# Patient Record
Sex: MALE | Race: WHITE | Employment: OTHER | URBAN - NONMETROPOLITAN AREA
[De-identification: names, ages, dates, MRNs, and addresses within clinical notes are randomized per-mention and may not be internally consistent; named-entity substitution may affect disease eponyms.]

---

## 2017-02-10 ENCOUNTER — TELEPHONE (OUTPATIENT)
Dept: FAMILY MEDICINE CLINIC | Age: 72
End: 2017-02-10

## 2017-02-10 ENCOUNTER — OFFICE VISIT (OUTPATIENT)
Dept: FAMILY MEDICINE CLINIC | Age: 72
End: 2017-02-10

## 2017-02-10 VITALS
DIASTOLIC BLOOD PRESSURE: 70 MMHG | OXYGEN SATURATION: 97 % | WEIGHT: 313.6 LBS | SYSTOLIC BLOOD PRESSURE: 110 MMHG | HEIGHT: 76 IN | TEMPERATURE: 97 F | HEART RATE: 58 BPM | BODY MASS INDEX: 38.19 KG/M2

## 2017-02-10 DIAGNOSIS — N18.3 TYPE 2 DIABETES MELLITUS WITH STAGE 3 CHRONIC KIDNEY DISEASE, UNSPECIFIED LONG TERM INSULIN USE STATUS: ICD-10-CM

## 2017-02-10 DIAGNOSIS — Z11.59 NEED FOR HEPATITIS C SCREENING TEST: ICD-10-CM

## 2017-02-10 DIAGNOSIS — I10 ESSENTIAL HYPERTENSION: ICD-10-CM

## 2017-02-10 DIAGNOSIS — E11.9 TYPE 2 DIABETES MELLITUS WITHOUT COMPLICATION, UNSPECIFIED LONG TERM INSULIN USE STATUS: ICD-10-CM

## 2017-02-10 DIAGNOSIS — D64.9 ANEMIA, UNSPECIFIED TYPE: Primary | ICD-10-CM

## 2017-02-10 DIAGNOSIS — N40.1 BENIGN NON-NODULAR PROSTATIC HYPERPLASIA WITH LOWER URINARY TRACT SYMPTOMS: ICD-10-CM

## 2017-02-10 DIAGNOSIS — E11.22 TYPE 2 DIABETES MELLITUS WITH STAGE 3 CHRONIC KIDNEY DISEASE, UNSPECIFIED LONG TERM INSULIN USE STATUS: ICD-10-CM

## 2017-02-10 DIAGNOSIS — E11.8 TYPE 2 DIABETES MELLITUS WITH COMPLICATION, UNSPECIFIED LONG TERM INSULIN USE STATUS: ICD-10-CM

## 2017-02-10 DIAGNOSIS — D64.9 ANEMIA, UNSPECIFIED TYPE: ICD-10-CM

## 2017-02-10 DIAGNOSIS — I25.10 CORONARY ARTERY DISEASE INVOLVING NATIVE CORONARY ARTERY OF NATIVE HEART, ANGINA PRESENCE UNSPECIFIED: ICD-10-CM

## 2017-02-10 LAB
ALBUMIN SERPL-MCNC: 4.3 G/DL (ref 3.9–4.9)
ALP BLD-CCNC: 73 U/L (ref 35–104)
ALT SERPL-CCNC: 28 U/L (ref 0–41)
ANION GAP SERPL CALCULATED.3IONS-SCNC: 12 MEQ/L (ref 7–13)
AST SERPL-CCNC: 23 U/L (ref 0–40)
BILIRUB SERPL-MCNC: 0.7 MG/DL (ref 0–1.2)
BUN BLDV-MCNC: 39 MG/DL (ref 8–23)
CALCIUM SERPL-MCNC: 9.9 MG/DL (ref 8.6–10.2)
CHLORIDE BLD-SCNC: 96 MEQ/L (ref 98–107)
CHOLESTEROL, TOTAL: 183 MG/DL (ref 0–199)
CO2: 26 MEQ/L (ref 22–29)
CREAT SERPL-MCNC: 1.59 MG/DL (ref 0.7–1.2)
CREATININE URINE: 59.1 MG/DL
GFR AFRICAN AMERICAN: 52.4
GFR NON-AFRICAN AMERICAN: 43.3
GLOBULIN: 2.9 G/DL (ref 2.3–3.5)
GLUCOSE BLD-MCNC: 420 MG/DL (ref 74–109)
HBA1C MFR BLD: 12.6 % (ref 4.8–5.9)
HCT VFR BLD CALC: 47 % (ref 42–52)
HDLC SERPL-MCNC: 55 MG/DL (ref 40–59)
HEMOGLOBIN: 15.7 G/DL (ref 14–18)
HEPATITIS C ANTIBODY INTERPRETATION: NORMAL
LDL CHOLESTEROL CALCULATED: 84 MG/DL (ref 0–129)
MCH RBC QN AUTO: 29 PG (ref 27–31.3)
MCHC RBC AUTO-ENTMCNC: 33.5 % (ref 33–37)
MCV RBC AUTO: 86.8 FL (ref 80–100)
MICROALBUMIN UR-MCNC: 5.3 MG/DL
MICROALBUMIN/CREAT UR-RTO: 89.7 MG/G (ref 0–30)
PDW BLD-RTO: 14.6 % (ref 11.5–14.5)
PLATELET # BLD: 110 K/UL (ref 130–400)
POTASSIUM SERPL-SCNC: 4.6 MEQ/L (ref 3.5–5.1)
RBC # BLD: 5.41 M/UL (ref 4.7–6.1)
SODIUM BLD-SCNC: 134 MEQ/L (ref 132–144)
TOTAL PROTEIN: 7.2 G/DL (ref 6.4–8.1)
TRIGL SERPL-MCNC: 222 MG/DL (ref 0–200)
WBC # BLD: 7.2 K/UL (ref 4.8–10.8)

## 2017-02-10 PROCEDURE — 3017F COLORECTAL CA SCREEN DOC REV: CPT | Performed by: FAMILY MEDICINE

## 2017-02-10 PROCEDURE — 4040F PNEUMOC VAC/ADMIN/RCVD: CPT | Performed by: FAMILY MEDICINE

## 2017-02-10 PROCEDURE — G8417 CALC BMI ABV UP PARAM F/U: HCPCS | Performed by: FAMILY MEDICINE

## 2017-02-10 PROCEDURE — G8484 FLU IMMUNIZE NO ADMIN: HCPCS | Performed by: FAMILY MEDICINE

## 2017-02-10 PROCEDURE — G8427 DOCREV CUR MEDS BY ELIG CLIN: HCPCS | Performed by: FAMILY MEDICINE

## 2017-02-10 PROCEDURE — 1123F ACP DISCUSS/DSCN MKR DOCD: CPT | Performed by: FAMILY MEDICINE

## 2017-02-10 PROCEDURE — 3046F HEMOGLOBIN A1C LEVEL >9.0%: CPT | Performed by: FAMILY MEDICINE

## 2017-02-10 PROCEDURE — 1036F TOBACCO NON-USER: CPT | Performed by: FAMILY MEDICINE

## 2017-02-10 PROCEDURE — G8598 ASA/ANTIPLAT THER USED: HCPCS | Performed by: FAMILY MEDICINE

## 2017-02-10 PROCEDURE — 99214 OFFICE O/P EST MOD 30 MIN: CPT | Performed by: FAMILY MEDICINE

## 2017-02-10 RX ORDER — FERROUS SULFATE 325(65) MG
325 TABLET ORAL
Qty: 30 TABLET | Refills: 5 | Status: SHIPPED | OUTPATIENT
Start: 2017-02-10 | End: 2018-06-11

## 2017-02-10 RX ORDER — TAMSULOSIN HYDROCHLORIDE 0.4 MG/1
0.4 CAPSULE ORAL DAILY
Qty: 90 CAPSULE | Refills: 3 | Status: SHIPPED | OUTPATIENT
Start: 2017-02-10 | End: 2018-02-16 | Stop reason: SDUPTHER

## 2017-02-10 RX ORDER — LOSARTAN POTASSIUM 25 MG/1
25 TABLET ORAL DAILY
Qty: 90 TABLET | Refills: 3 | Status: SHIPPED | OUTPATIENT
Start: 2017-02-10 | End: 2017-04-20 | Stop reason: SDUPTHER

## 2017-02-10 RX ORDER — FUROSEMIDE 40 MG/1
40 TABLET ORAL DAILY
Qty: 90 TABLET | Refills: 3 | Status: SHIPPED | OUTPATIENT
Start: 2017-02-10 | End: 2018-02-16 | Stop reason: SDUPTHER

## 2017-02-10 RX ORDER — VALACYCLOVIR HYDROCHLORIDE 1 G/1
1000 TABLET, FILM COATED ORAL 3 TIMES DAILY
Qty: 21 TABLET | Refills: 0 | Status: SHIPPED | OUTPATIENT
Start: 2017-02-10 | End: 2018-06-11

## 2017-02-10 RX ORDER — GLIMEPIRIDE 4 MG/1
4 TABLET ORAL 2 TIMES DAILY
Qty: 180 TABLET | Refills: 3 | Status: SHIPPED | OUTPATIENT
Start: 2017-02-10 | End: 2018-02-16 | Stop reason: SDUPTHER

## 2017-02-10 RX ORDER — NAPROXEN 500 MG/1
500 TABLET ORAL 2 TIMES DAILY WITH MEALS
Qty: 60 TABLET | Refills: 1 | Status: SHIPPED | OUTPATIENT
Start: 2017-02-10 | End: 2018-06-11

## 2017-02-10 RX ORDER — ATORVASTATIN CALCIUM 40 MG/1
40 TABLET, FILM COATED ORAL NIGHTLY
Qty: 90 TABLET | Refills: 3 | Status: SHIPPED | OUTPATIENT
Start: 2017-02-10 | End: 2018-02-16 | Stop reason: SDUPTHER

## 2017-02-17 ENCOUNTER — OFFICE VISIT (OUTPATIENT)
Dept: CARDIOLOGY | Age: 72
End: 2017-02-17

## 2017-02-17 VITALS
HEIGHT: 76 IN | HEART RATE: 68 BPM | SYSTOLIC BLOOD PRESSURE: 160 MMHG | WEIGHT: 313.4 LBS | DIASTOLIC BLOOD PRESSURE: 88 MMHG | BODY MASS INDEX: 38.16 KG/M2 | RESPIRATION RATE: 14 BRPM | OXYGEN SATURATION: 98 %

## 2017-02-17 DIAGNOSIS — E66.01 MORBID OBESITY DUE TO EXCESS CALORIES (HCC): Chronic | ICD-10-CM

## 2017-02-17 DIAGNOSIS — I10 ESSENTIAL HYPERTENSION: ICD-10-CM

## 2017-02-17 DIAGNOSIS — I25.10 CORONARY ARTERY DISEASE INVOLVING NATIVE CORONARY ARTERY OF NATIVE HEART WITHOUT ANGINA PECTORIS: ICD-10-CM

## 2017-02-17 DIAGNOSIS — Z95.1 S/P CABG X 4: ICD-10-CM

## 2017-02-17 DIAGNOSIS — E78.2 MIXED HYPERLIPIDEMIA: ICD-10-CM

## 2017-02-17 DIAGNOSIS — I48.91 ATRIAL FIBRILLATION, UNSPECIFIED TYPE (HCC): Primary | Chronic | ICD-10-CM

## 2017-02-17 PROCEDURE — 3017F COLORECTAL CA SCREEN DOC REV: CPT | Performed by: INTERNAL MEDICINE

## 2017-02-17 PROCEDURE — G8417 CALC BMI ABV UP PARAM F/U: HCPCS | Performed by: INTERNAL MEDICINE

## 2017-02-17 PROCEDURE — 4040F PNEUMOC VAC/ADMIN/RCVD: CPT | Performed by: INTERNAL MEDICINE

## 2017-02-17 PROCEDURE — 1036F TOBACCO NON-USER: CPT | Performed by: INTERNAL MEDICINE

## 2017-02-17 PROCEDURE — 1123F ACP DISCUSS/DSCN MKR DOCD: CPT | Performed by: INTERNAL MEDICINE

## 2017-02-17 PROCEDURE — G8427 DOCREV CUR MEDS BY ELIG CLIN: HCPCS | Performed by: INTERNAL MEDICINE

## 2017-02-17 PROCEDURE — G8598 ASA/ANTIPLAT THER USED: HCPCS | Performed by: INTERNAL MEDICINE

## 2017-02-17 PROCEDURE — 99213 OFFICE O/P EST LOW 20 MIN: CPT | Performed by: INTERNAL MEDICINE

## 2017-02-17 PROCEDURE — 93000 ELECTROCARDIOGRAM COMPLETE: CPT | Performed by: INTERNAL MEDICINE

## 2017-02-17 PROCEDURE — G8484 FLU IMMUNIZE NO ADMIN: HCPCS | Performed by: INTERNAL MEDICINE

## 2017-04-20 DIAGNOSIS — I10 ESSENTIAL HYPERTENSION: ICD-10-CM

## 2017-04-21 RX ORDER — LOSARTAN POTASSIUM 25 MG/1
25 TABLET ORAL DAILY
Qty: 90 TABLET | Refills: 3 | Status: SHIPPED | OUTPATIENT
Start: 2017-04-21 | End: 2018-02-16 | Stop reason: SDUPTHER

## 2018-02-12 DIAGNOSIS — E11.22 TYPE 2 DIABETES MELLITUS WITH STAGE 3 CHRONIC KIDNEY DISEASE, UNSPECIFIED LONG TERM INSULIN USE STATUS: ICD-10-CM

## 2018-02-12 DIAGNOSIS — N18.3 TYPE 2 DIABETES MELLITUS WITH STAGE 3 CHRONIC KIDNEY DISEASE, UNSPECIFIED LONG TERM INSULIN USE STATUS: ICD-10-CM

## 2018-02-16 ENCOUNTER — OFFICE VISIT (OUTPATIENT)
Dept: FAMILY MEDICINE CLINIC | Age: 73
End: 2018-02-16
Payer: MEDICARE

## 2018-02-16 VITALS
DIASTOLIC BLOOD PRESSURE: 78 MMHG | WEIGHT: 308 LBS | HEIGHT: 76 IN | SYSTOLIC BLOOD PRESSURE: 120 MMHG | HEART RATE: 69 BPM | BODY MASS INDEX: 37.51 KG/M2 | OXYGEN SATURATION: 98 %

## 2018-02-16 DIAGNOSIS — E11.22 TYPE 2 DIABETES MELLITUS WITH STAGE 3 CHRONIC KIDNEY DISEASE, UNSPECIFIED LONG TERM INSULIN USE STATUS: ICD-10-CM

## 2018-02-16 DIAGNOSIS — E11.9 TYPE 2 DIABETES MELLITUS WITHOUT COMPLICATION, UNSPECIFIED LONG TERM INSULIN USE STATUS: ICD-10-CM

## 2018-02-16 DIAGNOSIS — G47.33 OSA (OBSTRUCTIVE SLEEP APNEA): Chronic | ICD-10-CM

## 2018-02-16 DIAGNOSIS — D50.9 IRON DEFICIENCY ANEMIA, UNSPECIFIED IRON DEFICIENCY ANEMIA TYPE: ICD-10-CM

## 2018-02-16 DIAGNOSIS — Z95.1 S/P CABG X 4: ICD-10-CM

## 2018-02-16 DIAGNOSIS — N18.3 TYPE 2 DIABETES MELLITUS WITH STAGE 3 CHRONIC KIDNEY DISEASE, UNSPECIFIED LONG TERM INSULIN USE STATUS: ICD-10-CM

## 2018-02-16 DIAGNOSIS — I25.10 CORONARY ARTERY DISEASE INVOLVING NATIVE CORONARY ARTERY OF NATIVE HEART, ANGINA PRESENCE UNSPECIFIED: ICD-10-CM

## 2018-02-16 DIAGNOSIS — E78.2 MIXED HYPERLIPIDEMIA: ICD-10-CM

## 2018-02-16 DIAGNOSIS — N40.1 BENIGN NON-NODULAR PROSTATIC HYPERPLASIA WITH LOWER URINARY TRACT SYMPTOMS: ICD-10-CM

## 2018-02-16 DIAGNOSIS — N18.30 STAGE 3 CHRONIC KIDNEY DISEASE (HCC): ICD-10-CM

## 2018-02-16 DIAGNOSIS — I10 ESSENTIAL HYPERTENSION: ICD-10-CM

## 2018-02-16 DIAGNOSIS — Z23 NEED FOR PROPHYLACTIC VACCINATION AGAINST STREPTOCOCCUS PNEUMONIAE (PNEUMOCOCCUS): Primary | ICD-10-CM

## 2018-02-16 DIAGNOSIS — N40.1 BENIGN PROSTATIC HYPERPLASIA WITH LOWER URINARY TRACT SYMPTOMS, SYMPTOM DETAILS UNSPECIFIED: ICD-10-CM

## 2018-02-16 LAB
ALBUMIN SERPL-MCNC: 4.1 G/DL (ref 3.9–4.9)
ALP BLD-CCNC: 70 U/L (ref 35–104)
ALT SERPL-CCNC: 21 U/L (ref 0–41)
ANION GAP SERPL CALCULATED.3IONS-SCNC: 17 MEQ/L (ref 7–13)
AST SERPL-CCNC: 23 U/L (ref 0–40)
BILIRUB SERPL-MCNC: 0.8 MG/DL (ref 0–1.2)
BUN BLDV-MCNC: 31 MG/DL (ref 8–23)
CALCIUM SERPL-MCNC: 9.4 MG/DL (ref 8.6–10.2)
CHLORIDE BLD-SCNC: 97 MEQ/L (ref 98–107)
CHOLESTEROL, TOTAL: 155 MG/DL (ref 0–199)
CO2: 23 MEQ/L (ref 22–29)
CREAT SERPL-MCNC: 1.38 MG/DL (ref 0.7–1.2)
CREATININE URINE: 170 MG/DL
GFR AFRICAN AMERICAN: >60
GFR NON-AFRICAN AMERICAN: 50.9
GLOBULIN: 2.7 G/DL (ref 2.3–3.5)
GLUCOSE BLD-MCNC: 306 MG/DL (ref 74–109)
HBA1C MFR BLD: 13.6 % (ref 4.8–5.9)
HCT VFR BLD CALC: 44.6 % (ref 42–52)
HDLC SERPL-MCNC: 43 MG/DL (ref 40–59)
HEMOGLOBIN: 14.8 G/DL (ref 14–18)
LDL CHOLESTEROL CALCULATED: 69 MG/DL (ref 0–129)
MCH RBC QN AUTO: 29.8 PG (ref 27–31.3)
MCHC RBC AUTO-ENTMCNC: 33.3 % (ref 33–37)
MCV RBC AUTO: 89.6 FL (ref 80–100)
MICROALBUMIN UR-MCNC: 16.2 MG/DL
MICROALBUMIN/CREAT UR-RTO: 95.3 MG/G (ref 0–30)
PDW BLD-RTO: 14.5 % (ref 11.5–14.5)
PLATELET # BLD: 130 K/UL (ref 130–400)
POTASSIUM SERPL-SCNC: 4.4 MEQ/L (ref 3.5–5.1)
RBC # BLD: 4.97 M/UL (ref 4.7–6.1)
SODIUM BLD-SCNC: 137 MEQ/L (ref 132–144)
TOTAL PROTEIN: 6.8 G/DL (ref 6.4–8.1)
TRIGL SERPL-MCNC: 215 MG/DL (ref 0–200)
WBC # BLD: 6 K/UL (ref 4.8–10.8)

## 2018-02-16 PROCEDURE — 1123F ACP DISCUSS/DSCN MKR DOCD: CPT | Performed by: FAMILY MEDICINE

## 2018-02-16 PROCEDURE — G8427 DOCREV CUR MEDS BY ELIG CLIN: HCPCS | Performed by: FAMILY MEDICINE

## 2018-02-16 PROCEDURE — 3017F COLORECTAL CA SCREEN DOC REV: CPT | Performed by: FAMILY MEDICINE

## 2018-02-16 PROCEDURE — G0009 ADMIN PNEUMOCOCCAL VACCINE: HCPCS | Performed by: FAMILY MEDICINE

## 2018-02-16 PROCEDURE — 1036F TOBACCO NON-USER: CPT | Performed by: FAMILY MEDICINE

## 2018-02-16 PROCEDURE — G8598 ASA/ANTIPLAT THER USED: HCPCS | Performed by: FAMILY MEDICINE

## 2018-02-16 PROCEDURE — 3046F HEMOGLOBIN A1C LEVEL >9.0%: CPT | Performed by: FAMILY MEDICINE

## 2018-02-16 PROCEDURE — 90670 PCV13 VACCINE IM: CPT | Performed by: FAMILY MEDICINE

## 2018-02-16 PROCEDURE — G8484 FLU IMMUNIZE NO ADMIN: HCPCS | Performed by: FAMILY MEDICINE

## 2018-02-16 PROCEDURE — 99214 OFFICE O/P EST MOD 30 MIN: CPT | Performed by: FAMILY MEDICINE

## 2018-02-16 PROCEDURE — G8417 CALC BMI ABV UP PARAM F/U: HCPCS | Performed by: FAMILY MEDICINE

## 2018-02-16 PROCEDURE — 4040F PNEUMOC VAC/ADMIN/RCVD: CPT | Performed by: FAMILY MEDICINE

## 2018-02-16 RX ORDER — LOSARTAN POTASSIUM 25 MG/1
25 TABLET ORAL DAILY
Qty: 90 TABLET | Refills: 3 | Status: SHIPPED | OUTPATIENT
Start: 2018-02-16 | End: 2019-02-01 | Stop reason: SDUPTHER

## 2018-02-16 RX ORDER — ATORVASTATIN CALCIUM 40 MG/1
40 TABLET, FILM COATED ORAL NIGHTLY
Qty: 90 TABLET | Refills: 3 | Status: SHIPPED | OUTPATIENT
Start: 2018-02-16 | End: 2019-02-01 | Stop reason: SDUPTHER

## 2018-02-16 RX ORDER — GLIMEPIRIDE 4 MG/1
4 TABLET ORAL 2 TIMES DAILY
Qty: 180 TABLET | Refills: 3 | Status: SHIPPED | OUTPATIENT
Start: 2018-02-16 | End: 2019-02-01 | Stop reason: SDUPTHER

## 2018-02-16 RX ORDER — FUROSEMIDE 40 MG/1
40 TABLET ORAL DAILY
Qty: 90 TABLET | Refills: 3 | Status: SHIPPED | OUTPATIENT
Start: 2018-02-16 | End: 2019-02-01 | Stop reason: SDUPTHER

## 2018-02-16 RX ORDER — TAMSULOSIN HYDROCHLORIDE 0.4 MG/1
0.4 CAPSULE ORAL DAILY
Qty: 90 CAPSULE | Refills: 3 | Status: SHIPPED | OUTPATIENT
Start: 2018-02-16 | End: 2019-02-01 | Stop reason: SDUPTHER

## 2018-02-16 RX ORDER — NITROGLYCERIN 0.4 MG/1
0.4 TABLET SUBLINGUAL SEE ADMIN INSTRUCTIONS
Qty: 25 TABLET | Refills: 1 | Status: SHIPPED | OUTPATIENT
Start: 2018-02-16

## 2018-02-16 ASSESSMENT — PATIENT HEALTH QUESTIONNAIRE - PHQ9
SUM OF ALL RESPONSES TO PHQ QUESTIONS 1-9: 0
1. LITTLE INTEREST OR PLEASURE IN DOING THINGS: 0
2. FEELING DOWN, DEPRESSED OR HOPELESS: 0
SUM OF ALL RESPONSES TO PHQ9 QUESTIONS 1 & 2: 0

## 2018-02-16 NOTE — PROGRESS NOTES
Chief Complaint   Patient presents with    Annual Exam     colon 3 years ago, podiatry does foot exam       Bebeto Marrero is a 81 y.o. male    Not seen in about a year  Noncompliant with checking sugars and with regular follow up    He has upcoming appt with cardio    Wt Readings from Last 3 Encounters:   02/16/18 (!) 308 lb (139.7 kg)   02/17/17 (!) 313 lb 6.4 oz (142.2 kg)   02/10/17 (!) 313 lb 9.6 oz (142.2 kg)     BP Readings from Last 3 Encounters:   02/16/18 120/78   02/17/17 (!) 160/88   02/10/17 110/70            Current status:  DM has been previously out of control   And he hasn't been good about follow up    \"im still here\"    Metformin stopped due to renal function which remains prohibitive of metformin    He is only at 15 units of lantus           Review of Systems:   General ROS: negative for - nausea, vomiting, chills, fatigue, fever, malaise, weight gain or weight loss  Respiratory ROS: no cough, shortness of breath, or wheezing  Cardiovascular ROS: no chest pain or dyspnea on exertion  Gastrointestinal ROS: no abdominal pain, change in bowel habits, or black or bloody stools  Genito-Urinary ROS: no dysuria, trouble voiding, or hematuria  Musculoskeletal ROS: occ leg cramps  Neurological ROS: denies tingling in feet  In general patient otherwise reports feeling well. Physical Exam:  /78 (Site: Right Arm)   Pulse 69   Ht 6' 4\" (1.93 m)   Wt (!) 308 lb (139.7 kg)   SpO2 98%   BMI 37.49 kg/m²     Gen: Well, NAD, Alert, Oriented x 3   HEENT: EOMI, eyes clear, MMM  Skin: without rash or jaundice  Neck: no significant lymphadenopathy or thyromegaly  Lungs: CTA B w/out Rales/Wheezes/Rhonchi, Good respiratory effort   Heart: RRR, S1S2, w/out M/R/G, non-displaced PMI   Abdomen: Soft NT/ND, w/out R/G, w/ +BSx4   Ext: No C/C/E Bilaterally. Neuro: Neurovascularly intact w/ Sensory/Motor intact UE/LE Bilaterally. DIABETIC FOOT EXAM:  podiatry        Assessment/Plan:    1.  Need for

## 2018-05-11 ENCOUNTER — TELEPHONE (OUTPATIENT)
Dept: FAMILY MEDICINE CLINIC | Age: 73
End: 2018-05-11

## 2018-05-11 DIAGNOSIS — M48.061 SPINAL STENOSIS OF LUMBAR REGION, UNSPECIFIED WHETHER NEUROGENIC CLAUDICATION PRESENT: Primary | ICD-10-CM

## 2018-05-21 ENCOUNTER — TELEPHONE (OUTPATIENT)
Dept: FAMILY MEDICINE CLINIC | Age: 73
End: 2018-05-21

## 2018-05-21 DIAGNOSIS — M48.061 SPINAL STENOSIS OF LUMBAR REGION, UNSPECIFIED WHETHER NEUROGENIC CLAUDICATION PRESENT: Primary | ICD-10-CM

## 2018-06-11 ENCOUNTER — OFFICE VISIT (OUTPATIENT)
Dept: FAMILY MEDICINE CLINIC | Age: 73
End: 2018-06-11
Payer: MEDICARE

## 2018-06-11 VITALS
SYSTOLIC BLOOD PRESSURE: 138 MMHG | HEART RATE: 64 BPM | OXYGEN SATURATION: 98 % | BODY MASS INDEX: 37.14 KG/M2 | DIASTOLIC BLOOD PRESSURE: 80 MMHG | HEIGHT: 76 IN | WEIGHT: 305 LBS

## 2018-06-11 DIAGNOSIS — N48.1 BALANITIS: Primary | ICD-10-CM

## 2018-06-11 PROCEDURE — G8417 CALC BMI ABV UP PARAM F/U: HCPCS | Performed by: FAMILY MEDICINE

## 2018-06-11 PROCEDURE — G8427 DOCREV CUR MEDS BY ELIG CLIN: HCPCS | Performed by: FAMILY MEDICINE

## 2018-06-11 PROCEDURE — 3017F COLORECTAL CA SCREEN DOC REV: CPT | Performed by: FAMILY MEDICINE

## 2018-06-11 PROCEDURE — 1036F TOBACCO NON-USER: CPT | Performed by: FAMILY MEDICINE

## 2018-06-11 PROCEDURE — 1123F ACP DISCUSS/DSCN MKR DOCD: CPT | Performed by: FAMILY MEDICINE

## 2018-06-11 PROCEDURE — 4040F PNEUMOC VAC/ADMIN/RCVD: CPT | Performed by: FAMILY MEDICINE

## 2018-06-11 PROCEDURE — G8598 ASA/ANTIPLAT THER USED: HCPCS | Performed by: FAMILY MEDICINE

## 2018-06-11 PROCEDURE — 99213 OFFICE O/P EST LOW 20 MIN: CPT | Performed by: FAMILY MEDICINE

## 2018-06-11 RX ORDER — CLOTRIMAZOLE AND BETAMETHASONE DIPROPIONATE 10; .64 MG/G; MG/G
CREAM TOPICAL
Qty: 45 G | Refills: 1 | Status: SHIPPED | OUTPATIENT
Start: 2018-06-11 | End: 2020-11-06

## 2018-07-09 DIAGNOSIS — E11.22 TYPE 2 DIABETES MELLITUS WITH STAGE 3 CHRONIC KIDNEY DISEASE, UNSPECIFIED LONG TERM INSULIN USE STATUS: ICD-10-CM

## 2018-07-09 DIAGNOSIS — N18.3 TYPE 2 DIABETES MELLITUS WITH STAGE 3 CHRONIC KIDNEY DISEASE, UNSPECIFIED LONG TERM INSULIN USE STATUS: ICD-10-CM

## 2018-07-09 NOTE — TELEPHONE ENCOUNTER
PLEASE READ MESSAGE! Pharmacy won't refill because he needs 15cc per day.  Last Rx was only for 10cc, and he ran out quicker than normal. Can you please adjust Rx and refill? lov 6/11/18

## 2019-01-14 LAB
AVERAGE GLUCOSE: NORMAL
HBA1C MFR BLD: 9.4 %

## 2019-02-01 ENCOUNTER — OFFICE VISIT (OUTPATIENT)
Dept: FAMILY MEDICINE CLINIC | Age: 74
End: 2019-02-01
Payer: MEDICARE

## 2019-02-01 VITALS
HEART RATE: 98 BPM | HEIGHT: 76 IN | OXYGEN SATURATION: 68 % | BODY MASS INDEX: 35.19 KG/M2 | WEIGHT: 289 LBS | SYSTOLIC BLOOD PRESSURE: 132 MMHG | DIASTOLIC BLOOD PRESSURE: 64 MMHG

## 2019-02-01 DIAGNOSIS — I10 ESSENTIAL HYPERTENSION: ICD-10-CM

## 2019-02-01 DIAGNOSIS — N40.1 BENIGN NON-NODULAR PROSTATIC HYPERPLASIA WITH LOWER URINARY TRACT SYMPTOMS: ICD-10-CM

## 2019-02-01 DIAGNOSIS — N18.30 TYPE 2 DIABETES MELLITUS WITH STAGE 3 CHRONIC KIDNEY DISEASE, WITH LONG-TERM CURRENT USE OF INSULIN (HCC): ICD-10-CM

## 2019-02-01 DIAGNOSIS — Z79.4 TYPE 2 DIABETES MELLITUS WITH STAGE 3 CHRONIC KIDNEY DISEASE, WITH LONG-TERM CURRENT USE OF INSULIN (HCC): ICD-10-CM

## 2019-02-01 DIAGNOSIS — E66.01 MORBID OBESITY (HCC): Primary | Chronic | ICD-10-CM

## 2019-02-01 DIAGNOSIS — I25.10 CORONARY ARTERY DISEASE INVOLVING NATIVE CORONARY ARTERY OF NATIVE HEART, ANGINA PRESENCE UNSPECIFIED: ICD-10-CM

## 2019-02-01 DIAGNOSIS — E11.22 TYPE 2 DIABETES MELLITUS WITH STAGE 3 CHRONIC KIDNEY DISEASE, WITH LONG-TERM CURRENT USE OF INSULIN (HCC): ICD-10-CM

## 2019-02-01 PROCEDURE — 3046F HEMOGLOBIN A1C LEVEL >9.0%: CPT | Performed by: FAMILY MEDICINE

## 2019-02-01 PROCEDURE — 1101F PT FALLS ASSESS-DOCD LE1/YR: CPT | Performed by: FAMILY MEDICINE

## 2019-02-01 PROCEDURE — 4040F PNEUMOC VAC/ADMIN/RCVD: CPT | Performed by: FAMILY MEDICINE

## 2019-02-01 PROCEDURE — 3017F COLORECTAL CA SCREEN DOC REV: CPT | Performed by: FAMILY MEDICINE

## 2019-02-01 PROCEDURE — G8598 ASA/ANTIPLAT THER USED: HCPCS | Performed by: FAMILY MEDICINE

## 2019-02-01 PROCEDURE — G8417 CALC BMI ABV UP PARAM F/U: HCPCS | Performed by: FAMILY MEDICINE

## 2019-02-01 PROCEDURE — 1123F ACP DISCUSS/DSCN MKR DOCD: CPT | Performed by: FAMILY MEDICINE

## 2019-02-01 PROCEDURE — G8428 CUR MEDS NOT DOCUMENT: HCPCS | Performed by: FAMILY MEDICINE

## 2019-02-01 PROCEDURE — 1036F TOBACCO NON-USER: CPT | Performed by: FAMILY MEDICINE

## 2019-02-01 PROCEDURE — G8482 FLU IMMUNIZE ORDER/ADMIN: HCPCS | Performed by: FAMILY MEDICINE

## 2019-02-01 PROCEDURE — 99213 OFFICE O/P EST LOW 20 MIN: CPT | Performed by: FAMILY MEDICINE

## 2019-02-01 PROCEDURE — 2022F DILAT RTA XM EVC RTNOPTHY: CPT | Performed by: FAMILY MEDICINE

## 2019-02-01 RX ORDER — LOSARTAN POTASSIUM 25 MG/1
25 TABLET ORAL DAILY
Qty: 90 TABLET | Refills: 3 | Status: SHIPPED | OUTPATIENT
Start: 2019-02-01 | End: 2020-02-12 | Stop reason: SDUPTHER

## 2019-02-01 RX ORDER — FUROSEMIDE 40 MG/1
40 TABLET ORAL DAILY
Qty: 90 TABLET | Refills: 3 | Status: SHIPPED | OUTPATIENT
Start: 2019-02-01 | End: 2020-02-12 | Stop reason: SDUPTHER

## 2019-02-01 RX ORDER — GLIMEPIRIDE 4 MG/1
4 TABLET ORAL 2 TIMES DAILY
Qty: 180 TABLET | Refills: 3 | Status: SHIPPED | OUTPATIENT
Start: 2019-02-01 | End: 2020-02-12 | Stop reason: SDUPTHER

## 2019-02-01 RX ORDER — ATORVASTATIN CALCIUM 40 MG/1
40 TABLET, FILM COATED ORAL NIGHTLY
Qty: 90 TABLET | Refills: 3 | Status: SHIPPED | OUTPATIENT
Start: 2019-02-01 | End: 2020-02-12 | Stop reason: SDUPTHER

## 2019-02-01 RX ORDER — TAMSULOSIN HYDROCHLORIDE 0.4 MG/1
0.4 CAPSULE ORAL DAILY
Qty: 90 CAPSULE | Refills: 3 | Status: SHIPPED | OUTPATIENT
Start: 2019-02-01 | End: 2020-02-12 | Stop reason: SDUPTHER

## 2019-05-07 ENCOUNTER — OFFICE VISIT (OUTPATIENT)
Dept: FAMILY MEDICINE CLINIC | Age: 74
End: 2019-05-07
Payer: MEDICARE

## 2019-05-07 VITALS
HEART RATE: 60 BPM | BODY MASS INDEX: 34.61 KG/M2 | HEIGHT: 76 IN | SYSTOLIC BLOOD PRESSURE: 124 MMHG | OXYGEN SATURATION: 98 % | DIASTOLIC BLOOD PRESSURE: 64 MMHG | WEIGHT: 284.2 LBS

## 2019-05-07 DIAGNOSIS — G47.33 OSA (OBSTRUCTIVE SLEEP APNEA): Chronic | ICD-10-CM

## 2019-05-07 DIAGNOSIS — R19.5 OTHER FECAL ABNORMALITIES: Primary | ICD-10-CM

## 2019-05-07 DIAGNOSIS — I10 HYPERTENSION, UNSPECIFIED TYPE: ICD-10-CM

## 2019-05-07 DIAGNOSIS — I25.10 CORONARY ARTERY DISEASE INVOLVING NATIVE CORONARY ARTERY OF NATIVE HEART WITHOUT ANGINA PECTORIS: ICD-10-CM

## 2019-05-07 DIAGNOSIS — D50.8 OTHER IRON DEFICIENCY ANEMIA: ICD-10-CM

## 2019-05-07 DIAGNOSIS — R53.83 FATIGUE, UNSPECIFIED TYPE: ICD-10-CM

## 2019-05-07 DIAGNOSIS — E11.8 TYPE 2 DIABETES MELLITUS WITH COMPLICATION, WITHOUT LONG-TERM CURRENT USE OF INSULIN (HCC): ICD-10-CM

## 2019-05-07 DIAGNOSIS — N18.30 STAGE 3 CHRONIC KIDNEY DISEASE (HCC): ICD-10-CM

## 2019-05-07 DIAGNOSIS — E66.01 MORBID OBESITY (HCC): Chronic | ICD-10-CM

## 2019-05-07 LAB
ALBUMIN SERPL-MCNC: 4 G/DL (ref 3.5–4.6)
ALP BLD-CCNC: 63 U/L (ref 35–104)
ALT SERPL-CCNC: 19 U/L (ref 0–41)
ANION GAP SERPL CALCULATED.3IONS-SCNC: 16 MEQ/L (ref 9–15)
AST SERPL-CCNC: 19 U/L (ref 0–40)
BILIRUB SERPL-MCNC: 0.6 MG/DL (ref 0.2–0.7)
BUN BLDV-MCNC: 46 MG/DL (ref 8–23)
CALCIUM SERPL-MCNC: 9.2 MG/DL (ref 8.5–9.9)
CHLORIDE BLD-SCNC: 103 MEQ/L (ref 95–107)
CO2: 23 MEQ/L (ref 20–31)
CREAT SERPL-MCNC: 1.74 MG/DL (ref 0.7–1.2)
GFR AFRICAN AMERICAN: 47
GFR NON-AFRICAN AMERICAN: 38.8
GLOBULIN: 2.6 G/DL (ref 2.3–3.5)
GLUCOSE BLD-MCNC: 249 MG/DL (ref 70–99)
HBA1C MFR BLD: 7.2 % (ref 4.8–5.9)
HCT VFR BLD CALC: 41.2 % (ref 42–52)
HEMOGLOBIN: 14 G/DL (ref 14–18)
MCH RBC QN AUTO: 30.2 PG (ref 27–31.3)
MCHC RBC AUTO-ENTMCNC: 33.9 % (ref 33–37)
MCV RBC AUTO: 89.1 FL (ref 80–100)
PDW BLD-RTO: 14 % (ref 11.5–14.5)
PLATELET # BLD: 116 K/UL (ref 130–400)
POTASSIUM SERPL-SCNC: 4.7 MEQ/L (ref 3.4–4.9)
RBC # BLD: 4.63 M/UL (ref 4.7–6.1)
SODIUM BLD-SCNC: 142 MEQ/L (ref 135–144)
TOTAL PROTEIN: 6.6 G/DL (ref 6.3–8)
TSH SERPL DL<=0.05 MIU/L-ACNC: 1.19 UIU/ML (ref 0.44–3.86)
WBC # BLD: 5.1 K/UL (ref 4.8–10.8)

## 2019-05-07 PROCEDURE — 3288F FALL RISK ASSESSMENT DOCD: CPT | Performed by: FAMILY MEDICINE

## 2019-05-07 PROCEDURE — G8427 DOCREV CUR MEDS BY ELIG CLIN: HCPCS | Performed by: FAMILY MEDICINE

## 2019-05-07 PROCEDURE — G8417 CALC BMI ABV UP PARAM F/U: HCPCS | Performed by: FAMILY MEDICINE

## 2019-05-07 PROCEDURE — 1036F TOBACCO NON-USER: CPT | Performed by: FAMILY MEDICINE

## 2019-05-07 PROCEDURE — G8510 SCR DEP NEG, NO PLAN REQD: HCPCS | Performed by: FAMILY MEDICINE

## 2019-05-07 PROCEDURE — 99213 OFFICE O/P EST LOW 20 MIN: CPT | Performed by: FAMILY MEDICINE

## 2019-05-07 PROCEDURE — 1123F ACP DISCUSS/DSCN MKR DOCD: CPT | Performed by: FAMILY MEDICINE

## 2019-05-07 PROCEDURE — 3017F COLORECTAL CA SCREEN DOC REV: CPT | Performed by: FAMILY MEDICINE

## 2019-05-07 PROCEDURE — 4040F PNEUMOC VAC/ADMIN/RCVD: CPT | Performed by: FAMILY MEDICINE

## 2019-05-07 PROCEDURE — 2022F DILAT RTA XM EVC RTNOPTHY: CPT | Performed by: FAMILY MEDICINE

## 2019-05-07 PROCEDURE — G8598 ASA/ANTIPLAT THER USED: HCPCS | Performed by: FAMILY MEDICINE

## 2019-05-07 PROCEDURE — 3046F HEMOGLOBIN A1C LEVEL >9.0%: CPT | Performed by: FAMILY MEDICINE

## 2019-05-07 ASSESSMENT — PATIENT HEALTH QUESTIONNAIRE - PHQ9
SUM OF ALL RESPONSES TO PHQ QUESTIONS 1-9: 0
1. LITTLE INTEREST OR PLEASURE IN DOING THINGS: 0
SUM OF ALL RESPONSES TO PHQ QUESTIONS 1-9: 0
SUM OF ALL RESPONSES TO PHQ9 QUESTIONS 1 & 2: 0
2. FEELING DOWN, DEPRESSED OR HOPELESS: 0

## 2019-05-07 NOTE — PROGRESS NOTES
Chief Complaint   Patient presents with    Flank Pain     x 2 week, had food poisioning, since then kidney's sore    Other     pain when having bm        Elfego Quach is a 72 y.o. male    2 weeks ago had gastroenteritis  Kidneys were sore the next few days    Pneumonia years ago with sepsis/ODELL and issues ever since    Drinking water  Urinating ok  Unusual BM  Tan colored BMs with more of an \"earthworm\" appearance  Floating stool    a1c down to 7.9 at [facility]    Keeping the weight off     Annual checkups with cardio        Wt Readings from Last 3 Encounters:   05/07/19 284 lb 3.2 oz (128.9 kg)   02/01/19 289 lb (131.1 kg)   06/11/18 (!) 305 lb (138.3 kg)     BP Readings from Last 3 Encounters:   05/07/19 124/64   02/01/19 132/64   06/11/18 138/80            Current status:  DM has been previously out of control         He is at 25  units of lantus   Mealtime humalog 15-20 with meals        Review of Systems:   General ROS: negative for - nausea, vomiting, chills, fatigue, fever, malaise, weight gain or weight loss  Respiratory ROS: no cough, shortness of breath, or wheezing  Cardiovascular ROS: no chest pain or dyspnea on exertion  Gastrointestinal ROS: per HPI  Genito-Urinary ROS: no dysuria, trouble voiding, or hematuria  Musculoskeletal ROS: occ leg cramps  Neurological ROS: denies tingling in feet  In general patient otherwise reports feeling well. Physical Exam:  /64 (Site: Left Upper Arm)   Pulse 60   Ht 6' 4\" (1.93 m)   Wt 284 lb 3.2 oz (128.9 kg)   SpO2 98%   BMI 34.59 kg/m²     Gen: Well, NAD, Alert, Oriented x 3   HEENT: EOMI, eyes clear, MMM  Skin: without rash or jaundice  Neck: no significant lymphadenopathy or thyromegaly  Lungs: CTA B w/out Rales/Wheezes/Rhonchi, Good respiratory effort   Heart: RRR, S1S2, w/out M/R/G, non-displaced PMI   Abdomen: Soft NT/ND, w/out R/G, w/ +BSx4   Ext: No C/C/E Bilaterally.    Neuro: Neurovascularly intact w/ Sensory/Motor intact UE/LE Bilaterally. DIABETIC FOOT EXAM:  podiatry        Assessment/Plan:     Diagnosis Orders   1. Other fecal abnormalities     2. JAZZMINE (obstructive sleep apnea)     3. Type 2 diabetes mellitus with complication, without long-term current use of insulin ([facility])  Hemoglobin A1C    Comprehensive Metabolic Panel   4. Hypertension, unspecified type     5. Coronary artery disease involving native coronary artery of native heart without angina pectoris     6. Morbid obesity (HCC)     7. Other iron deficiency anemia  CBC   8. Stage 3 chronic kidney disease ([facility] [address]     9.  Fatigue, unspecified type  TSH without Reflex     Will check labs as ordered    Continue other regimen    Drink plenty of water      Wt Readings from Last 3 Encounters:   05/07/19 284 lb 3.2 oz (128.9 kg)   02/01/19 289 lb (131.1 kg)   06/11/18 (!) 305 lb (138.3 kg)         Brayan Diaz MD

## 2019-05-08 ENCOUNTER — CARE COORDINATION (OUTPATIENT)
Dept: CARE COORDINATION | Age: 74
End: 2019-05-08

## 2019-05-08 NOTE — CARE COORDINATION
Dr Charlie Hebert reviewed. He states that the patient has been working with endocrinology for his diabetes and he has made very good changes in his health. His weight is down 24 pounds and his A1C is down 2 percentage points since the beginning of the year,  He does not feel care coordination is needed at this time.

## 2019-06-18 ENCOUNTER — TELEPHONE (OUTPATIENT)
Dept: FAMILY MEDICINE CLINIC | Age: 74
End: 2019-06-18

## 2019-06-18 DIAGNOSIS — M48.061 SPINAL STENOSIS OF LUMBAR REGION, UNSPECIFIED WHETHER NEUROGENIC CLAUDICATION PRESENT: Primary | ICD-10-CM

## 2019-10-08 ENCOUNTER — OFFICE VISIT (OUTPATIENT)
Dept: FAMILY MEDICINE CLINIC | Age: 74
End: 2019-10-08
Payer: MEDICARE

## 2019-10-08 VITALS
OXYGEN SATURATION: 98 % | WEIGHT: 281.4 LBS | HEART RATE: 65 BPM | SYSTOLIC BLOOD PRESSURE: 118 MMHG | BODY MASS INDEX: 34.27 KG/M2 | HEIGHT: 76 IN | DIASTOLIC BLOOD PRESSURE: 60 MMHG

## 2019-10-08 DIAGNOSIS — I25.10 CORONARY ARTERY DISEASE INVOLVING NATIVE CORONARY ARTERY OF NATIVE HEART WITHOUT ANGINA PECTORIS: ICD-10-CM

## 2019-10-08 DIAGNOSIS — Z79.4 TYPE 2 DIABETES MELLITUS WITH STAGE 3 CHRONIC KIDNEY DISEASE, WITH LONG-TERM CURRENT USE OF INSULIN (HCC): ICD-10-CM

## 2019-10-08 DIAGNOSIS — G47.33 OSA (OBSTRUCTIVE SLEEP APNEA): ICD-10-CM

## 2019-10-08 DIAGNOSIS — E66.01 MORBID OBESITY (HCC): ICD-10-CM

## 2019-10-08 DIAGNOSIS — E78.2 MIXED HYPERLIPIDEMIA: ICD-10-CM

## 2019-10-08 DIAGNOSIS — N18.30 STAGE 3 CHRONIC KIDNEY DISEASE (HCC): ICD-10-CM

## 2019-10-08 DIAGNOSIS — E11.22 TYPE 2 DIABETES MELLITUS WITH STAGE 3 CHRONIC KIDNEY DISEASE, WITH LONG-TERM CURRENT USE OF INSULIN (HCC): ICD-10-CM

## 2019-10-08 DIAGNOSIS — N18.30 TYPE 2 DIABETES MELLITUS WITH STAGE 3 CHRONIC KIDNEY DISEASE, WITH LONG-TERM CURRENT USE OF INSULIN (HCC): ICD-10-CM

## 2019-10-08 DIAGNOSIS — M48.061 SPINAL STENOSIS OF LUMBAR REGION, UNSPECIFIED WHETHER NEUROGENIC CLAUDICATION PRESENT: Primary | ICD-10-CM

## 2019-10-08 PROCEDURE — G8427 DOCREV CUR MEDS BY ELIG CLIN: HCPCS | Performed by: FAMILY MEDICINE

## 2019-10-08 PROCEDURE — 2022F DILAT RTA XM EVC RTNOPTHY: CPT | Performed by: FAMILY MEDICINE

## 2019-10-08 PROCEDURE — 3045F PR MOST RECENT HEMOGLOBIN A1C LEVEL 7.0-9.0%: CPT | Performed by: FAMILY MEDICINE

## 2019-10-08 PROCEDURE — 3017F COLORECTAL CA SCREEN DOC REV: CPT | Performed by: FAMILY MEDICINE

## 2019-10-08 PROCEDURE — 1123F ACP DISCUSS/DSCN MKR DOCD: CPT | Performed by: FAMILY MEDICINE

## 2019-10-08 PROCEDURE — 4040F PNEUMOC VAC/ADMIN/RCVD: CPT | Performed by: FAMILY MEDICINE

## 2019-10-08 PROCEDURE — 1036F TOBACCO NON-USER: CPT | Performed by: FAMILY MEDICINE

## 2019-10-08 PROCEDURE — G8598 ASA/ANTIPLAT THER USED: HCPCS | Performed by: FAMILY MEDICINE

## 2019-10-08 PROCEDURE — 99213 OFFICE O/P EST LOW 20 MIN: CPT | Performed by: FAMILY MEDICINE

## 2019-10-08 PROCEDURE — G8417 CALC BMI ABV UP PARAM F/U: HCPCS | Performed by: FAMILY MEDICINE

## 2019-10-08 PROCEDURE — G8484 FLU IMMUNIZE NO ADMIN: HCPCS | Performed by: FAMILY MEDICINE

## 2019-10-08 RX ORDER — GABAPENTIN 300 MG/1
300 CAPSULE ORAL
COMMUNITY
Start: 2019-09-23 | End: 2019-10-23

## 2019-10-08 RX ORDER — OXYCODONE HYDROCHLORIDE AND ACETAMINOPHEN 5; 325 MG/1; MG/1
TABLET ORAL
COMMUNITY
Start: 2019-09-25 | End: 2019-10-09

## 2020-02-12 ENCOUNTER — OFFICE VISIT (OUTPATIENT)
Dept: FAMILY MEDICINE CLINIC | Age: 75
End: 2020-02-12
Payer: MEDICARE

## 2020-02-12 VITALS
HEIGHT: 76 IN | OXYGEN SATURATION: 98 % | SYSTOLIC BLOOD PRESSURE: 130 MMHG | TEMPERATURE: 98.5 F | BODY MASS INDEX: 34.73 KG/M2 | RESPIRATION RATE: 20 BRPM | HEART RATE: 51 BPM | DIASTOLIC BLOOD PRESSURE: 60 MMHG | WEIGHT: 285.2 LBS

## 2020-02-12 DIAGNOSIS — N18.30 TYPE 2 DIABETES MELLITUS WITH STAGE 3 CHRONIC KIDNEY DISEASE, WITH LONG-TERM CURRENT USE OF INSULIN (HCC): ICD-10-CM

## 2020-02-12 DIAGNOSIS — E11.22 TYPE 2 DIABETES MELLITUS WITH STAGE 3 CHRONIC KIDNEY DISEASE, WITH LONG-TERM CURRENT USE OF INSULIN (HCC): ICD-10-CM

## 2020-02-12 DIAGNOSIS — Z79.4 TYPE 2 DIABETES MELLITUS WITH STAGE 3 CHRONIC KIDNEY DISEASE, WITH LONG-TERM CURRENT USE OF INSULIN (HCC): ICD-10-CM

## 2020-02-12 LAB
ALBUMIN SERPL-MCNC: 4.4 G/DL (ref 3.5–4.6)
ALP BLD-CCNC: 88 U/L (ref 35–104)
ALT SERPL-CCNC: 19 U/L (ref 0–41)
ANION GAP SERPL CALCULATED.3IONS-SCNC: 17 MEQ/L (ref 9–15)
AST SERPL-CCNC: 20 U/L (ref 0–40)
BILIRUB SERPL-MCNC: 0.7 MG/DL (ref 0.2–0.7)
BUN BLDV-MCNC: 45 MG/DL (ref 8–23)
CALCIUM SERPL-MCNC: 9.4 MG/DL (ref 8.5–9.9)
CHLORIDE BLD-SCNC: 99 MEQ/L (ref 95–107)
CHOLESTEROL, TOTAL: 166 MG/DL (ref 0–199)
CO2: 22 MEQ/L (ref 20–31)
CREAT SERPL-MCNC: 1.55 MG/DL (ref 0.7–1.2)
CREATININE URINE: 26 MG/DL
GFR AFRICAN AMERICAN: 53.5
GFR NON-AFRICAN AMERICAN: 44.3
GLOBULIN: 2.9 G/DL (ref 2.3–3.5)
GLUCOSE BLD-MCNC: 307 MG/DL (ref 70–99)
HBA1C MFR BLD: 11.3 % (ref 4.8–5.9)
HDLC SERPL-MCNC: 44 MG/DL (ref 40–59)
LDL CHOLESTEROL CALCULATED: 77 MG/DL (ref 0–129)
MICROALBUMIN UR-MCNC: 5.4 MG/DL
MICROALBUMIN/CREAT UR-RTO: 207.7 MG/G (ref 0–30)
POTASSIUM SERPL-SCNC: 4.1 MEQ/L (ref 3.4–4.9)
SODIUM BLD-SCNC: 138 MEQ/L (ref 135–144)
TOTAL PROTEIN: 7.3 G/DL (ref 6.3–8)
TRIGL SERPL-MCNC: 225 MG/DL (ref 0–150)

## 2020-02-12 PROCEDURE — G8427 DOCREV CUR MEDS BY ELIG CLIN: HCPCS | Performed by: FAMILY MEDICINE

## 2020-02-12 PROCEDURE — G8417 CALC BMI ABV UP PARAM F/U: HCPCS | Performed by: FAMILY MEDICINE

## 2020-02-12 PROCEDURE — 99214 OFFICE O/P EST MOD 30 MIN: CPT | Performed by: FAMILY MEDICINE

## 2020-02-12 PROCEDURE — 3017F COLORECTAL CA SCREEN DOC REV: CPT | Performed by: FAMILY MEDICINE

## 2020-02-12 PROCEDURE — 2022F DILAT RTA XM EVC RTNOPTHY: CPT | Performed by: FAMILY MEDICINE

## 2020-02-12 PROCEDURE — 3046F HEMOGLOBIN A1C LEVEL >9.0%: CPT | Performed by: FAMILY MEDICINE

## 2020-02-12 PROCEDURE — 1123F ACP DISCUSS/DSCN MKR DOCD: CPT | Performed by: FAMILY MEDICINE

## 2020-02-12 PROCEDURE — G8484 FLU IMMUNIZE NO ADMIN: HCPCS | Performed by: FAMILY MEDICINE

## 2020-02-12 PROCEDURE — 4040F PNEUMOC VAC/ADMIN/RCVD: CPT | Performed by: FAMILY MEDICINE

## 2020-02-12 PROCEDURE — 1036F TOBACCO NON-USER: CPT | Performed by: FAMILY MEDICINE

## 2020-02-12 RX ORDER — GLIMEPIRIDE 4 MG/1
4 TABLET ORAL 2 TIMES DAILY
Qty: 180 TABLET | Refills: 3 | Status: SHIPPED | OUTPATIENT
Start: 2020-02-12 | End: 2021-01-15 | Stop reason: SDUPTHER

## 2020-02-12 RX ORDER — INSULIN LISPRO 100 [IU]/ML
15 INJECTION, SOLUTION INTRAVENOUS; SUBCUTANEOUS
Qty: 5 PEN | Refills: 3 | Status: SHIPPED | OUTPATIENT
Start: 2020-02-12 | End: 2020-11-06

## 2020-02-12 RX ORDER — TAMSULOSIN HYDROCHLORIDE 0.4 MG/1
0.4 CAPSULE ORAL DAILY
Qty: 90 CAPSULE | Refills: 3 | Status: SHIPPED | OUTPATIENT
Start: 2020-02-12 | End: 2021-01-15 | Stop reason: SDUPTHER

## 2020-02-12 RX ORDER — ATORVASTATIN CALCIUM 40 MG/1
40 TABLET, FILM COATED ORAL NIGHTLY
Qty: 90 TABLET | Refills: 3 | Status: SHIPPED | OUTPATIENT
Start: 2020-02-12 | End: 2021-01-15 | Stop reason: SDUPTHER

## 2020-02-12 RX ORDER — FUROSEMIDE 40 MG/1
40 TABLET ORAL DAILY
Qty: 90 TABLET | Refills: 3 | Status: SHIPPED | OUTPATIENT
Start: 2020-02-12 | End: 2021-01-15 | Stop reason: SDUPTHER

## 2020-02-12 RX ORDER — LOSARTAN POTASSIUM 25 MG/1
25 TABLET ORAL DAILY
Qty: 90 TABLET | Refills: 3 | Status: SHIPPED | OUTPATIENT
Start: 2020-02-12 | End: 2021-01-15 | Stop reason: SDUPTHER

## 2020-02-12 ASSESSMENT — PATIENT HEALTH QUESTIONNAIRE - PHQ9
SUM OF ALL RESPONSES TO PHQ9 QUESTIONS 1 & 2: 0
1. LITTLE INTEREST OR PLEASURE IN DOING THINGS: 0
2. FEELING DOWN, DEPRESSED OR HOPELESS: 0
SUM OF ALL RESPONSES TO PHQ QUESTIONS 1-9: 0
SUM OF ALL RESPONSES TO PHQ QUESTIONS 1-9: 0

## 2020-02-12 NOTE — PROGRESS NOTES
losartan (COZAAR) 25 MG tablet   8. Stage 3 chronic kidney disease ([address]     9. Spinal stenosis of lumbar region, unspecified whether neurogenic claudication present  Handicap Placard MISC     Sugars have been \"ok\"     He states he wants his bloodwork done here    Ongoing issues with back, seeing specialists    Naproxen BID  Refills given  Call or return to clinic prn if these symptoms worsen or fail to improve as anticipated.     Chronic conditions are stable  Continue current regimen  Follow up with appropriate specialists and here routinely for ongoing monitoring of chronic conditions      Eleanor Ruiz MD

## 2020-05-21 ENCOUNTER — TELEPHONE (OUTPATIENT)
Dept: FAMILY MEDICINE CLINIC | Age: 75
End: 2020-05-21

## 2020-05-22 ENCOUNTER — VIRTUAL VISIT (OUTPATIENT)
Dept: FAMILY MEDICINE CLINIC | Age: 75
End: 2020-05-22
Payer: MEDICARE

## 2020-05-22 VITALS
BODY MASS INDEX: 34.69 KG/M2 | TEMPERATURE: 98.7 F | SYSTOLIC BLOOD PRESSURE: 135 MMHG | DIASTOLIC BLOOD PRESSURE: 64 MMHG | WEIGHT: 285 LBS

## 2020-05-22 PROCEDURE — 3017F COLORECTAL CA SCREEN DOC REV: CPT | Performed by: NURSE PRACTITIONER

## 2020-05-22 PROCEDURE — 4040F PNEUMOC VAC/ADMIN/RCVD: CPT | Performed by: NURSE PRACTITIONER

## 2020-05-22 PROCEDURE — G0438 PPPS, INITIAL VISIT: HCPCS | Performed by: NURSE PRACTITIONER

## 2020-05-22 PROCEDURE — 1123F ACP DISCUSS/DSCN MKR DOCD: CPT | Performed by: NURSE PRACTITIONER

## 2020-05-22 ASSESSMENT — PATIENT HEALTH QUESTIONNAIRE - PHQ9
SUM OF ALL RESPONSES TO PHQ QUESTIONS 1-9: 0
SUM OF ALL RESPONSES TO PHQ QUESTIONS 1-9: 0

## 2020-05-22 ASSESSMENT — LIFESTYLE VARIABLES: HOW OFTEN DO YOU HAVE A DRINK CONTAINING ALCOHOL: 0

## 2020-05-22 NOTE — PATIENT INSTRUCTIONS
Personalized Preventive Plan for Annabel Wolff - 5/22/2020  Medicare offers a range of preventive health benefits. Some of the tests and screenings are paid in full while other may be subject to a deductible, co-insurance, and/or copay. Some of these benefits include a comprehensive review of your medical history including lifestyle, illnesses that may run in your family, and various assessments and screenings as appropriate. After reviewing your medical record and screening and assessments performed today your provider may have ordered immunizations, labs, imaging, and/or referrals for you. A list of these orders (if applicable) as well as your Preventive Care list are included within your After Visit Summary for your review. Other Preventive Recommendations:    · A preventive eye exam performed by an eye specialist is recommended every 1-2 years to screen for glaucoma; cataracts, macular degeneration, and other eye disorders. · A preventive dental visit is recommended every 6 months. · Try to get at least 150 minutes of exercise per week or 10,000 steps per day on a pedometer . · Order or download the FREE \"Exercise & Physical Activity: Your Everyday Guide\" from [employer]. Call 4-629.832.3383 or search [employer] online. · You need 7727-0768 mg of calcium and 4431-2342 IU of vitamin D per day. It is possible to meet your calcium requirement with diet alone, but a vitamin D supplement is usually necessary to meet this goal.  · When exposed to the sun, use a sunscreen that protects against both UVA and UVB radiation with an SPF of 30 or greater. Reapply every 2 to 3 hours or after sweating, drying off with a towel, or swimming. · Always wear a seat belt when traveling in a car. Always wear a helmet when riding a bicycle or motorcycle.

## 2020-05-22 NOTE — PROGRESS NOTES
referrals ordered. Positive Risk Factor Screenings with Interventions:     Health Habits/Nutrition:  Health Habits/Nutrition  Do you exercise for at least 20 minutes 2-3 times per week?: Yes  Have you lost any weight without trying in the past 3 months?: No  Do you eat fewer than 2 meals per day?: No  Have you seen a dentist within the past year?: Yes  Body mass index is 34.69 kg/m². Health Habits/Nutrition Interventions:  · n/a    Personalized Preventive Plan   Current Health Maintenance Status  Immunization History   Administered Date(s) Administered    Influenza Vaccine, unspecified formulation 10/26/2015    Influenza, High Dose (Fluzone 65 yrs and older) 10/20/2016, 10/01/2017, 09/18/2018    Pneumococcal Conjugate 13-valent (Utligfy24) 02/16/2018    Pneumococcal Polysaccharide ([id number]) 11/21/2012    Td, unspecified formulation 07/10/2013        Health Maintenance   Topic Date Due    DTaP/Tdap/Td vaccine (1 - Tdap) 10/14/1966    Shingles Vaccine (1 of 2) 10/14/1997    Diabetic retinal exam  06/15/2015    Annual Wellness Visit (AWV)  05/29/2019    A1C test (Diabetic or Prediabetic)  05/12/2020    Colon cancer screen colonoscopy  02/16/2023 (Originally 12/12/2017)    Diabetic foot exam  02/12/2021    Lipid screen  02/12/2021    Potassium monitoring  02/12/2021    Creatinine monitoring  02/12/2021    Flu vaccine  Completed    Pneumococcal 65+ years Vaccine  Completed    Hepatitis C screen  Completed    Hepatitis A vaccine  Aged Out    Hib vaccine  Aged Out    Meningococcal (ACWY) vaccine  Aged Out     Recommendations for [employer] Due: see orders and patient instructions/AVS.  . Recommended screening schedule for the next 5-10 years is provided to the patient in written form: see Patient Instructions/AVS.    There are no diagnoses linked to this encounter.           Ignacia Najjar is a 69 y.o. male being evaluated by a Virtual Visit (phone) encounter to address concerns as mentioned above. A caregiver was present when appropriate. Due to this being a TeleHealth encounter (During [facility] public health emergency), evaluation of the following organ systems was limited: Vitals/Constitutional/EENT/Resp/CV/GI//MS/Neuro/Skin/Heme-Lymph-Imm. Pursuant to the emergency declaration under the [address] [place] [address], [address] [place] [address] and the [employer] Act, this Virtual Visit was conducted with patient's (and/or legal guardian's) consent, to reduce the patient's risk of exposure to COVID-19 and provide necessary medical care. The patient (and/or legal guardian) has also been advised to contact this office for worsening conditions or problems, and seek emergency medical treatment and/or call 911 if deemed necessary. Patient identification was verified at the start of the visit: Yes    Services were provided through phone to substitute for in-person clinic visit. Patient and provider were located at their individual homes. --SULAIMAN Varela NP on 5/22/2020 at 11:18 AM    An electronic signature was used to authenticate this note.

## 2020-05-29 ENCOUNTER — OFFICE VISIT (OUTPATIENT)
Dept: FAMILY MEDICINE CLINIC | Age: 75
End: 2020-05-29
Payer: MEDICARE

## 2020-05-29 VITALS
HEART RATE: 60 BPM | DIASTOLIC BLOOD PRESSURE: 68 MMHG | BODY MASS INDEX: 36.46 KG/M2 | WEIGHT: 299.4 LBS | TEMPERATURE: 97.5 F | HEIGHT: 76 IN | OXYGEN SATURATION: 98 % | SYSTOLIC BLOOD PRESSURE: 126 MMHG

## 2020-05-29 LAB — HBA1C MFR BLD: 12 %

## 2020-05-29 PROCEDURE — 2022F DILAT RTA XM EVC RTNOPTHY: CPT | Performed by: FAMILY MEDICINE

## 2020-05-29 PROCEDURE — 83036 HEMOGLOBIN GLYCOSYLATED A1C: CPT | Performed by: FAMILY MEDICINE

## 2020-05-29 PROCEDURE — 4040F PNEUMOC VAC/ADMIN/RCVD: CPT | Performed by: FAMILY MEDICINE

## 2020-05-29 PROCEDURE — 99214 OFFICE O/P EST MOD 30 MIN: CPT | Performed by: FAMILY MEDICINE

## 2020-05-29 PROCEDURE — 3046F HEMOGLOBIN A1C LEVEL >9.0%: CPT | Performed by: FAMILY MEDICINE

## 2020-05-29 PROCEDURE — 1123F ACP DISCUSS/DSCN MKR DOCD: CPT | Performed by: FAMILY MEDICINE

## 2020-05-29 PROCEDURE — G8427 DOCREV CUR MEDS BY ELIG CLIN: HCPCS | Performed by: FAMILY MEDICINE

## 2020-05-29 PROCEDURE — G8417 CALC BMI ABV UP PARAM F/U: HCPCS | Performed by: FAMILY MEDICINE

## 2020-05-29 PROCEDURE — 3017F COLORECTAL CA SCREEN DOC REV: CPT | Performed by: FAMILY MEDICINE

## 2020-05-29 PROCEDURE — 1036F TOBACCO NON-USER: CPT | Performed by: FAMILY MEDICINE

## 2020-06-23 ENCOUNTER — NURSE TRIAGE (OUTPATIENT)
Dept: OTHER | Facility: CLINIC | Age: 75
End: 2020-06-23

## 2020-06-23 ENCOUNTER — TELEPHONE (OUTPATIENT)
Dept: ADMINISTRATIVE | Age: 75
End: 2020-06-23

## 2020-06-26 ENCOUNTER — OFFICE VISIT (OUTPATIENT)
Dept: FAMILY MEDICINE CLINIC | Age: 75
End: 2020-06-26
Payer: MEDICARE

## 2020-06-26 VITALS
WEIGHT: 300.8 LBS | BODY MASS INDEX: 36.63 KG/M2 | HEIGHT: 76 IN | HEART RATE: 68 BPM | TEMPERATURE: 97.8 F | OXYGEN SATURATION: 97 % | DIASTOLIC BLOOD PRESSURE: 66 MMHG | SYSTOLIC BLOOD PRESSURE: 130 MMHG

## 2020-06-26 PROCEDURE — 99213 OFFICE O/P EST LOW 20 MIN: CPT | Performed by: FAMILY MEDICINE

## 2020-06-26 PROCEDURE — 1036F TOBACCO NON-USER: CPT | Performed by: FAMILY MEDICINE

## 2020-06-26 PROCEDURE — 4040F PNEUMOC VAC/ADMIN/RCVD: CPT | Performed by: FAMILY MEDICINE

## 2020-06-26 PROCEDURE — 1123F ACP DISCUSS/DSCN MKR DOCD: CPT | Performed by: FAMILY MEDICINE

## 2020-06-26 PROCEDURE — 3017F COLORECTAL CA SCREEN DOC REV: CPT | Performed by: FAMILY MEDICINE

## 2020-06-26 PROCEDURE — G8417 CALC BMI ABV UP PARAM F/U: HCPCS | Performed by: FAMILY MEDICINE

## 2020-06-26 PROCEDURE — G8427 DOCREV CUR MEDS BY ELIG CLIN: HCPCS | Performed by: FAMILY MEDICINE

## 2020-06-26 SDOH — ECONOMIC STABILITY: FOOD INSECURITY: WITHIN THE PAST 12 MONTHS, THE FOOD YOU BOUGHT JUST DIDN'T LAST AND YOU DIDN'T HAVE MONEY TO GET MORE.: NEVER TRUE

## 2020-06-26 SDOH — ECONOMIC STABILITY: FOOD INSECURITY: WITHIN THE PAST 12 MONTHS, YOU WORRIED THAT YOUR FOOD WOULD RUN OUT BEFORE YOU GOT MONEY TO BUY MORE.: NEVER TRUE

## 2020-06-26 SDOH — ECONOMIC STABILITY: TRANSPORTATION INSECURITY
IN THE PAST 12 MONTHS, HAS THE LACK OF TRANSPORTATION KEPT YOU FROM MEDICAL APPOINTMENTS OR FROM GETTING MEDICATIONS?: NO

## 2020-06-26 SDOH — ECONOMIC STABILITY: INCOME INSECURITY: HOW HARD IS IT FOR YOU TO PAY FOR THE VERY BASICS LIKE FOOD, HOUSING, MEDICAL CARE, AND HEATING?: NOT HARD AT ALL

## 2020-06-26 SDOH — ECONOMIC STABILITY: TRANSPORTATION INSECURITY
IN THE PAST 12 MONTHS, HAS LACK OF TRANSPORTATION KEPT YOU FROM MEETINGS, WORK, OR FROM GETTING THINGS NEEDED FOR DAILY LIVING?: NO

## 2020-06-26 NOTE — PROGRESS NOTES
Subjective:     Chief Complaint   Patient presents with    Leg Pain     Monday bent down to take off stock and \"tore\" something in left thigh, ice, heat , lloyd         Renee Hanna is a 69 y.o. male who presents with left thigh pain   Sudden pain in left thigh  Felt like a pop/burn  Now when walking on it feels like burning  If he holds it while he is walking it actually helps        Patient's medications, allergies, past medical, surgical, social and family histories were reviewed and updated as appropriate. Review of Systems  Otherwise physically feels healthy without sob/palpitations/chest pain/f/c/n/v/weight gain/weight loss/abd pain     Objective:      /66 (Site: Left Upper Arm)   Pulse 68   Temp 97.8 °F (36.6 °C)   Ht 6' 4\" (1.93 m)   Wt (!) 300 lb 12.8 oz (136.4 kg)   SpO2 97%   BMI 36.61 kg/m²   Right leg:  normal and no effusion, full active range of motion, no joint line tenderness, ligamentous structures intact. Left leg:  Tender left hamstring, no deformity, no bruising        Assessment:      Diagnosis Orders   1.  Hamstring strain, initial encounter          Plan:     RICE  Stretching  Heat  See pt instructions  Lloyd Anderson MD

## 2020-06-26 NOTE — PATIENT INSTRUCTIONS
4 times. 5. Repeat steps 1 through 4, but this time keep your back knee bent. Single-leg balance   1. Stand on a flat surface with your arms stretched out to your sides like you are making the letter \"T. \" Then lift your good leg off the floor, bending it at the knee. If you are not steady on your feet, use one hand to hold on to a chair, counter, or wall. 2. Standing on your affected leg, keep that knee straight. Try to balance on that leg for up to 30 seconds. Then rest for up to 10 seconds. 3. Repeat 6 to 8 times. 4. When you can balance on your affected leg for 30 seconds with your eyes open, try to balance on it with your eyes closed. 5. When you can do this exercise with your eyes closed for 30 seconds and with ease and no pain, try standing on a pillow or piece of foam, and repeat steps 1 through 4. Follow-up care is a key part of your treatment and safety. Be sure to make and go to all appointments, and call your doctor if you are having problems. It's also a good idea to know your test results and keep a list of the medicines you take. Where can you learn more? Go to [email or web address] and sign in to your [employer] account. Enter [phone] in the [employer] box to learn more about \"Hamstring Strain: Rehab Exercises. \"     If you do not have an account, please click on the \"Sign Up Now\" link. Current as of: March 2, 2020               Content Version: 12.5  © 5758-6542 Healthwise, Incorporated. Care instructions adapted under license by [facility] ([facility]). If you have questions about a medical condition or this instruction, always ask your healthcare professional. [place] [address] any warranty or liability for your use of this information. Patient Education         How to Do a Hamstring Stretch While Sitting (01:01)  Your health professional recommends that you watch this short online health video.   Stretch the muscles in the back of your thighs with this seated hamstring stretch. How to watch the video    Scan the QR code   OR Visit the website    [email or web address]   Current as of: March 2, 2020               Content Version: 12.5 © 2006-2020 [employer]. Care instructions adapted under license by [facility] ([facility]). If you have questions about a medical condition or this instruction, always ask your healthcare professional. [facility] any warranty or liability for your use of this information. Patient Education         How to Do the Hamstring Stretch in a Doorway (01:14)  Your health professional recommends that you watch this short online health video. Learn how to do the hamstring stretch exercise in a doorway to stretch the back of your leg. How to watch the video    Scan the QR code   OR Visit the website    [email or web address]   Current as of: January 16, 2020               Content Version: 12.5  © 2006-2020 Healthwise, [employer]. Care instructions adapted under license by [facility] ([facility]). If you have questions about a medical condition or this instruction, always ask your healthcare professional. [employer] any warranty or liability for your use of this information.

## 2020-11-06 ENCOUNTER — OFFICE VISIT (OUTPATIENT)
Dept: CARDIOLOGY CLINIC | Age: 75
End: 2020-11-06
Payer: MEDICARE

## 2020-11-06 VITALS
BODY MASS INDEX: 36.52 KG/M2 | SYSTOLIC BLOOD PRESSURE: 138 MMHG | WEIGHT: 300 LBS | DIASTOLIC BLOOD PRESSURE: 84 MMHG | HEART RATE: 68 BPM

## 2020-11-06 PROCEDURE — 1123F ACP DISCUSS/DSCN MKR DOCD: CPT | Performed by: INTERNAL MEDICINE

## 2020-11-06 PROCEDURE — 3017F COLORECTAL CA SCREEN DOC REV: CPT | Performed by: INTERNAL MEDICINE

## 2020-11-06 PROCEDURE — 93000 ELECTROCARDIOGRAM COMPLETE: CPT | Performed by: INTERNAL MEDICINE

## 2020-11-06 PROCEDURE — G8427 DOCREV CUR MEDS BY ELIG CLIN: HCPCS | Performed by: INTERNAL MEDICINE

## 2020-11-06 PROCEDURE — 4040F PNEUMOC VAC/ADMIN/RCVD: CPT | Performed by: INTERNAL MEDICINE

## 2020-11-06 PROCEDURE — G8417 CALC BMI ABV UP PARAM F/U: HCPCS | Performed by: INTERNAL MEDICINE

## 2020-11-06 PROCEDURE — 99204 OFFICE O/P NEW MOD 45 MIN: CPT | Performed by: INTERNAL MEDICINE

## 2020-11-06 PROCEDURE — 1036F TOBACCO NON-USER: CPT | Performed by: INTERNAL MEDICINE

## 2020-11-06 PROCEDURE — G8484 FLU IMMUNIZE NO ADMIN: HCPCS | Performed by: INTERNAL MEDICINE

## 2020-11-06 RX ORDER — FAMOTIDINE 20 MG/1
20 TABLET, FILM COATED ORAL DAILY
COMMUNITY
End: 2021-11-19 | Stop reason: SDUPTHER

## 2020-11-06 NOTE — PROGRESS NOTES
Chief Complaint   Patient presents with    Cardiac Clearance   WOrks at [facility]. Patient presents for follow up medical evaluation. Patient is followed on a regular basis by Dr. Rivas Carpenter MD. 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 Patient was found to have severe CAD and underwent CABGx4 in 6/2013 at [facility]. Echo with EF of 50-60%, grade III DD. Mildly dilated ascending aorta. Doing well overall. Would like to undergo cardiac rehab. Now allowed to lift up to 20pounds. Compliant with meds, no bleeding issues, no SL nitro use. Pt denies chest pain, dyspnea, dyspnea on exertion, change in exercise capacity, fatigue, nausea, vomiting, diarrhea, constipation, motor weakness, insomnia, weight loss, syncope, dizziness, lightheadedness, palpitations, PND, orthopnea, or claudication. + snoring. Patient had CHRISTIANO post op, was anemic s/p PRBC.     10-17-13: as above, doing well overall. Finished cardiac rehab, has lost 33 pounds since surgery. Exercises 3x a week without any issues. Compliant with meds, no bleeding issues. Pt denies chest pain, dyspnea, dyspnea on exertion, change in exercise capacity, fatigue,  nausea, vomiting, diarrhea, constipation, motor weakness, insomnia, weight loss, syncope, dizziness, lightheadedness, palpitations, PND, orthopnea, or claudication. No Nitro use.    5-1-14: as above, doing good overall, no recent hospitalizations. No change in meds. No bleeding issues. No SL nitro use. Pt denies chest pain, dyspnea, dyspnea on exertion, change in exercise capacity, fatigue,  nausea, vomiting, diarrhea, constipation, motor weakness, insomnia, weight loss, syncope, dizziness, lightheadedness, palpitations, PND, orthopnea, or claudication. Following up with PCP on renal insufficiency. 11-13-14: as above, doing great. Got a new job at [employer].  Pt denies chest pain, dyspnea, dyspnea on exertion, change in exercise capacity, fatigue,  nausea, vomiting, diarrhea, constipation, motor weakness, insomnia, weight loss, syncope, dizziness, lightheadedness, palpitations, PND, orthopnea, or claudication. Compliant with meds. S/p normal KSENIA. States he's active at work, having a hard time losing weight, always have. States he's careful with diet. No SL nitro use. No recent hospitalizations. No LE edema. 6-2-15: as above, Pt denies chest pain, dyspnea, dyspnea on exertion, change in exercise capacity, fatigue,  nausea, vomiting, diarrhea, constipation, motor weakness, insomnia, weight loss, syncope, dizziness, lightheadedness, palpitations, PND, orthopnea, or claudication. No SL nitro use. No hospitalizations. No LE edema. Did see Dr. Nadira Strange and tried an injectable for weight loss that did not work. 12-8-15: as above, did have right LE cellulitis. Doing better now. Pt denies chest pain, dyspnea, dyspnea on exertion, change in exercise capacity, fatigue,  nausea, vomiting, diarrhea, constipation, motor weakness, insomnia, weight loss, syncope, dizziness, lightheadedness, palpitations, PND, orthopnea, or claudication. States he's exercising.      7-22-16; as above, s/p echo with EF of 60%, mild LAE, mild LVH, grade IDD. Pt denies chest pain, dyspnea, dyspnea on exertion, change in exercise capacity, fatigue,  nausea, vomiting, diarrhea, constipation, motor weakness, insomnia, weight loss, syncope, dizziness, lightheadedness, palpitations, PND, orthopnea, or claudication. EKG: first degree AVB. BP is good. 2-17-17: states he is having hip pain and may need replacement. His BP is high today but normal at PCP office last week. Pt denies chest pain, dyspnea, dyspnea on exertion, change in exercise capacity, fatigue,  nausea, vomiting, diarrhea, constipation, motor weakness, insomnia, weight loss, syncope, dizziness, lightheadedness, palpitations, PND, orthopnea. No nitro use. BP and hr are good. CAD is stable. No LE discoloration or ulcers. 3+ on the left side. LABS:    Chemistry        Component Value Date/Time     02/12/2020 1103    K 4.1 02/12/2020 1103    CL 99 02/12/2020 1103    CO2 22 02/12/2020 1103    BUN 45 (H) 02/12/2020 1103    CREATININE 1.55 (H) 02/12/2020 1103        Component Value Date/Time    CALCIUM 9.4 02/12/2020 1103    ALKPHOS 88 02/12/2020 1103    AST 20 02/12/2020 1103    ALT 19 02/12/2020 1103    BILITOT 0.7 02/12/2020 1103            Lab Results   Component Value Date    WBC 5.1 05/07/2019    HGB 14.0 05/07/2019    HCT 41.2 (L) 05/07/2019    MCV 89.1 05/07/2019     (L) 05/07/2019       No components found for: CHLPL  Lab Results   Component Value Date    TRIG 225 (H) 02/12/2020    TRIG 215 (H) 02/16/2018    TRIG 222 (H) 02/10/2017     Lab Results   Component Value Date    HDL 44 02/12/2020    HDL 43 02/16/2018    HDL 55 02/10/2017     Lab Results   Component Value Date    LDLCALC 77 02/12/2020    LDLCALC 69 02/16/2018    LDLCALC 84 02/10/2017       No results found for: LABVLDL     EKG: NSR. First degree AVB. ASSESSMENT:     Diagnosis Orders   1. Pre-operative clearance  EKG 12 Lead    ECHO Complete 2D W Doppler W Color    NM MYOCARDIAL SPECT REST EXERCISE OR RX         PLAN:     Patient will need to continue to follow up with you for their general medical care and return to see me in the office in 6 months    As always, aggressive risk factor modification is strongly recommended. We should adhere to the [address] guidelines for HTN management and the NCEP ATP III guidelines for LDL-C management. The nature of cardiac risk has been fully discussed with this patient. I have made him aware of his LDL target goal given his cardiovascular risk analysis. I have discussed the appropriate diet. The need for lifelong compliance in order to reduce risk is stressed. A regular exercise program is recommended to help achieve and maintain normal body weight, fitness and improve lipid balance.      Continue medications at current doses. Non invasive cardiac testing will be ordered to further evaluate for any ischemic or structural heart disease as a cause of the patient symptoms. We will proceed with a Cardiolite stress test and echo. Check EKG    Consider PAD evaluation in future if warranted by symptoms    Should be ok for hip replacement if needed. Patient was advised and encouraged to check blood pressure at home or at a pharmacy, maintain a logbook, and also call us back if blood pressure are above the target ranges or if it is low. Patient clearly understands and agrees to the instructions. We will need to continue to monitor muscle and liver enzymes, BUN, CR, and electrolytes. The proper use and anticipated side effects of nitroglycerine has been carefully explained. If a single episode of chest pain is not relieved by one tablet, the patient will try another within 5 minutes; and if this doesn't relieve the pain, the patient is instructed to call 911 for transportation to an emergency department. Patient was advised to go to the ER if he starts experiencing adverse effects of the medications. patient was instructed to call us back or go to nearby emergency room immediately if symptoms get worse or do not improve. Details of medical condition explained and patient was warned about adverse consequences of uncontrolled medical conditions and possible side effects of prescribed medications. Thank you for allowing me to participate in the care of your patient, please don't hesitate to contact me if you have any further questions.

## 2020-11-09 ENCOUNTER — HOSPITAL ENCOUNTER (OUTPATIENT)
Dept: NON INVASIVE DIAGNOSTICS | Age: 75
Discharge: HOME | End: 2020-11-09
Payer: MEDICARE

## 2020-11-09 ENCOUNTER — HOSPITAL ENCOUNTER (OUTPATIENT)
Dept: NUCLEAR MEDICINE | Age: 75
Discharge: HOME | End: 2020-11-11
Payer: MEDICARE

## 2020-11-09 LAB
LV EF: 43 %
LVEF MODALITY: NORMAL

## 2020-11-09 PROCEDURE — 2580000003 HC RX 258: Performed by: INTERNAL MEDICINE

## 2020-11-09 PROCEDURE — 6360000002 HC RX W HCPCS: Performed by: INTERNAL MEDICINE

## 2020-11-09 PROCEDURE — A9502 TC99M TETROFOSMIN: HCPCS | Performed by: INTERNAL MEDICINE

## 2020-11-09 PROCEDURE — 3430000000 HC RX DIAGNOSTIC RADIOPHARMACEUTICAL: Performed by: INTERNAL MEDICINE

## 2020-11-09 PROCEDURE — 93017 CV STRESS TEST TRACING ONLY: CPT

## 2020-11-09 PROCEDURE — 93306 TTE W/DOPPLER COMPLETE: CPT

## 2020-11-09 PROCEDURE — 78452 HT MUSCLE IMAGE SPECT MULT: CPT

## 2020-11-09 RX ORDER — SODIUM CHLORIDE 0.9 % (FLUSH) 0.9 %
10 SYRINGE (ML) INJECTION PRN
Status: DISCONTINUED | OUTPATIENT
Start: 2020-11-09 | End: 2020-11-12 | Stop reason: HOSPADM

## 2020-11-09 RX ADMIN — TETROFOSMIN 36 MILLICURIE: 1.38 INJECTION, POWDER, LYOPHILIZED, FOR SOLUTION INTRAVENOUS at 10:42

## 2020-11-09 RX ADMIN — Medication 10 ML: at 10:32

## 2020-11-09 RX ADMIN — REGADENOSON 0.4 MG: 0.08 INJECTION, SOLUTION INTRAVENOUS at 10:32

## 2020-11-09 RX ADMIN — Medication 10 ML: at 10:33

## 2020-11-09 NOTE — PROGRESS NOTES
Reviewed history, allergies, and medications. Consent confirmed. Lexiscan exam explained. Placed patient on monitor. @1642 Nuclear Medicine tech here to inject Child Dingwall. SOB noted during recovery phase. Denied chest pain. No ectopy noted. 1 pvc noted. Patient off monitor and instructed to eat, will have last part of exam in 1 hour.

## 2020-11-10 ENCOUNTER — HOSPITAL ENCOUNTER (OUTPATIENT)
Dept: NUCLEAR MEDICINE | Age: 75
Discharge: HOME | End: 2020-11-12
Payer: MEDICARE

## 2020-11-10 ENCOUNTER — TELEPHONE (OUTPATIENT)
Dept: CARDIOLOGY CLINIC | Age: 75
End: 2020-11-10

## 2020-11-10 LAB
LV EF: 57 %
LVEF MODALITY: NORMAL

## 2020-11-10 PROCEDURE — A9502 TC99M TETROFOSMIN: HCPCS | Performed by: INTERNAL MEDICINE

## 2020-11-10 PROCEDURE — 78452 HT MUSCLE IMAGE SPECT MULT: CPT | Performed by: INTERNAL MEDICINE

## 2020-11-10 PROCEDURE — 3430000000 HC RX DIAGNOSTIC RADIOPHARMACEUTICAL: Performed by: INTERNAL MEDICINE

## 2020-11-10 RX ADMIN — TETROFOSMIN 35.1 MILLICURIE: 1.38 INJECTION, POWDER, LYOPHILIZED, FOR SOLUTION INTRAVENOUS at 07:32

## 2020-11-10 NOTE — TELEPHONE ENCOUNTER
Hilda with Dr. Seth Linn office calling to see if patient is cleared for Surgery (laminectomy) this Friday.     [phone]

## 2020-11-10 NOTE — TELEPHONE ENCOUNTER
Please review    Stress test:  Impression    UNDERLYING LEFT BUNDLE-BRANCH BLOCK.         A NORMAL MYOCARDIAL PERFUSION IMAGING WITH NO STRESS-INDUCED ISCHEMIA AND OR PRIOR MYOCARDIAL INFARCTION.         NORMAL EJECTION FRACTION.       Please see Echo

## 2020-11-10 NOTE — TELEPHONE ENCOUNTER
----- Message from Laila Urban MA sent at 11/6/2020  1:18 PM EST -----  Regarding: testig  Notify of stress and echo asap for preop

## 2020-11-11 ENCOUNTER — TELEPHONE (OUTPATIENT)
Dept: CARDIOLOGY CLINIC | Age: 75
End: 2020-11-11

## 2020-11-11 NOTE — LETTER
[facility]  [address] [facility] [place] [address] [id number]  Phone: [phone]  Fax: Loren Medel DO        November 11, 2020    Ragini Arnett  [address] [place] [id number]      Dear Raquel Powers:    The above named patient is considered to be Low to Intermediate risk for perioperative cardiovascular events from a cardiac standpoint. He  may proceed with scheduled surgery. If you have any questions or concerns, please don't hesitate to call.     Sincerely,        DO Mary Ann

## 2020-11-30 ENCOUNTER — TELEPHONE (OUTPATIENT)
Dept: FAMILY MEDICINE CLINIC | Age: 75
End: 2020-11-30

## 2020-12-16 ENCOUNTER — TELEPHONE (OUTPATIENT)
Dept: FAMILY MEDICINE CLINIC | Age: 75
End: 2020-12-16

## 2020-12-23 ENCOUNTER — TELEPHONE (OUTPATIENT)
Dept: FAMILY MEDICINE CLINIC | Age: 75
End: 2020-12-23

## 2020-12-23 NOTE — TELEPHONE ENCOUNTER
Trinidad Soto from [facility], calling stating that the pt is being discharged from home health. Need an order for Outpatient PT order to the [facility] on [address]. Order has to have box checked that states new claim, Please fax order to [phone].

## 2021-01-06 ENCOUNTER — HOSPITAL ENCOUNTER (OUTPATIENT)
Dept: PHYSICAL THERAPY | Age: 76
Setting detail: THERAPIES SERIES
Discharge: HOME | End: 2021-01-06
Payer: MEDICARE

## 2021-01-06 PROCEDURE — 97162 PT EVAL MOD COMPLEX 30 MIN: CPT

## 2021-01-06 ASSESSMENT — PAIN SCALES - GENERAL: PAINLEVEL_OUTOF10: 7

## 2021-01-06 ASSESSMENT — PAIN DESCRIPTION - DESCRIPTORS: DESCRIPTORS: SHARP;CONSTANT

## 2021-01-06 NOTE — PROGRESS NOTES
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, sciatica laterality unspecified  Treatment Diagnosis: neck and back pain, decreased daily activity tolerance, impaired gait and mobility        Spinal Precautions: No Bending; No Twisting(Lifting > 8#)  Spinal Precautions: Per pt's verbal report    Past Medical History:  has a past medical history of CAD (coronary artery disease), CKD (chronic kidney disease), Hypertension, Kidney stones, Neuropathy, diabetic ([facility] [address] Osteoarthritis, S/P CABG (coronary artery bypass graft), and Type II or unspecified type diabetes mellitus without mention of complication, not stated as uncontrolled. Past Surgical History:   has a past surgical history that includes Tonsillectomy (); Rotator cuff repair (); Biceps tendon repair (); knee surgery (); Coronary artery bypass graft (13); and Cardiac catheterization (2013). Vital Signs  Patient Currently in Pain: Yes   Pain Screening  Patient Currently in Pain: Yes  Pain Assessment  Pain Assessment: 0-10  Pain Level: 7(LBP 7/10, Neck 4/10)  Pain Type: Surgical pain  Pain Location: Back;Neck  Pain Descriptors: Graham Sanderson; Constant     Lives With: Spouse  Type of Home: House  Home Layout: One level; Laundry in basement; Work area in [employer] Access: Stairs to enter with [address] - Number of Steps: 2  Entrance Stairs - Rails: Both  Bathroom Shower/Tub: Walk-in shower  Bathroom Equipment: Shower chair  Home Equipment: Cane;Rolling walker  ADL Assistance: Independent  Homemaking Assistance: Independent  Ambulation Assistance: Independent  Transfer Assistance: Independent  Active : Yes  Occupation: Retired  Type of occupation: [employer] - Retired  Leisure & Hobbies: home chores, yard work, wood working  Additional Comments: Standing activity tolerance < 10 min        Subjective:  Subjective: Pt presents to PT carlosal s/p neck and back surgery 11/13/2020 at [facility] by Dr. Micky Espino. Pt reports following surgery, he spent 3 weeks in inpatient rehab at [facility], completed 3 weeks of [facility], and recommended to transition to outpatient services. Pt pt has a hx of multiple back surgeries, at currently uses a L ankle AFO for foot drop. Pt currently is using a cane for ambulation but states he would like to advance off of it. Pt c/o neck weakness, generalized weakness, and reduced activity tolerance of <10 min standing. Pt verbalizes his surgical restrictions as no bending, lifting >8#, and no twisting. Pt denies changes in swallowing, N/T into extremities, changes in bowel or bladder, and saddle anesthesia. Pt uses OTC meds for pain relief. Pt uses both heat and ice on low back. Objective:   Ambulation 1  Surface: level tile  Device: Single point cane  Assistance: Modified Independent  Gait Deviations: Slow Yvonne  Distance: clinical distances 50-75ft  Comments: mild trunk flexion in standing w/ cervical flexion; reports using cane only for community; functional ambulation limited by endurance  Stairs  # Steps : 8  Stairs Height: 6\"  Rails: Bilateral  Assistance: Modified independent   Comment: non-reciprocal pattern, favors LLE d/t ankle weakness/AFO use     Transfers  Sit to Stand: Independent  Stand to sit:  Independent    Strength RLE  Strength RLE: Exception  R Hip Flexion: 4-/5  R Hip Extension: 4-/5  R Hip ABduction: 4-/5  R Hip ADduction: 4-/5  R Hip Internal Rotation: NT  R Hip External Rotation: NT  R Knee Flexion: 4/5  R Knee Extension: 4/5  R Ankle Dorsiflexion: 4/5  R Ankle Plantar flexion: NT  Strength LLE  Strength LLE: Exception  L Hip Flexion: 4-/5  L Hip Extension: 4-/5  L Hip ABduction: 4-/5  L Hip ADduction: 4-/5  L Hip Internal Rotation: NT  L Hip External Rotation: NT  L Knee Flexion: 4-/5  L Knee Extension: 4/5  L Ankle Dorsiflexion: 2-/5  L Ankle Plantar Flexion: NT  Strength RUE  Strength RUE: WFL  Comment: : 85#  Strength LUE  Strength LUE: WFL  Comment: : 58#  Strength Other  Other: 5x sit to stand: 11 sec       AROM RLE (degrees)  RLE AROM: WFL     AROM LLE (degrees)  LLE AROM : WFL     PROM LUE (degrees)  LUE PROM: WFL  AROM RUE (degrees)  RUE AROM : WFL  RUE General AROM: limited by past R shoulder sx per pt       Spine  Cervical: Flex: 40, Ext: 30, Rot L: 50, Rot R: 50, SB B: 15-20 w/ rotational compensation  Lumbar: NT; post-surgical restrictions    Observation/Palpation  Posture: Fair  Palpation: muscle tension throughout leyla UTs  Observation: fwd head, rounded shoulders  Scar: well healing cervical and lumbar surgical incisions  Bed mobility  Rolling to Left: Independent  Supine to Sit: Independent  Sit to Supine: Independent  Scooting: Independent  Comment: good adherence to BLT precautions         Exercises:   Exercises  Exercise 1: Neck  Exercise 2: *UBE  Exercise 3: *cervical isometrics  Exercise 4: *rows/lats  Exercise 6: Low Back  Exercise 7: *abdominal bracing/DLS exercises  Exercise 8: *supine/SL leg raises  Exercise 9: *sink exercises  Exercise 10: *box steps  Exercise 11: *balance exercises  Exercise 12: *gait drills  Modalities:  Modalities  Moist heat: *PRN  Cryotherapy (Minutes\Location): *PRN  Manual:  Manual therapy  Soft Tissue Mobalization: *cervical and lumbar  *Indicates exercise,modality, or manual techniques to be initiated when appropriate    Assessment: Body structures, Functions, Activity limitations: Increased pain, Decreased posture, Decreased ROM, Decreased strength, Decreased endurance, Decreased ADL status, Decreased functional mobility   Assessment: Pt is a 69 yo male s/p cervical and lumbar spine surgery. Pt completed inpatient rehab and [place] [address] prior to starting OP services.  Pt reports and displays functional daily activity limitations secondary to pain and decreased strength/functional endurance. Pt currently ambulates with a cane and reports ~25% function. Pt can benefit from PT services to address his deficits and goals, while building an appropriate HEP for continued self managment upon discharge. Prognosis: Good  Discharge Recommendations: Continue to assess pending progress  Activity Tolerance: Patient limited by endurance     Decision Making: Medium Complexity  History: arthritis, DM, heart disease, kidney disease, HTN, MI 2013, multile lumbar surgeries  Exam: Mod Oswestry: 24/50; self reports 25% daily function  Clinical Presentation: evolving        Plan  Frequency/Duration:  Plan  Times per week: 2  Plan weeks: 6  Current Treatment Recommendations: ROM, Strengthening, Balance Training, Gait Training, Endurance Training, Manual Therapy - Soft Tissue Mobilization, Patient/Caregiver Education & Training, Home Exercise Program, Modalities         Patient Education  New Education Provided: PT Education: PT Role;Plan of Care;Goals    POST-PAIN     Pain Rating (0-10 pain scale): no numeric change reported  Location and pain description same as pre-treatment unless indicated. Action: [] NA  [] Call Physician  [x] Perform HEP  [x] Meds as prescribed    Evaluation and patient rights have been reviewed and patient agrees with plan of care. Yes  [x]  No  []   Explain:       Reynolds Fall Risk Assessment  Risk Factor Scale  Score   History of Falls [] Yes  [x] No 25  0    Secondary Diagnosis [] Yes  [x] No 15  0    Ambulatory Aid [] Furniture  [x] Crutches/cane/walker  [] None/bedrest/wheelchair/nurse 30  15  0 15   IV/Heparin Lock [] Yes  [x] No 20  0    Gait/Transferring [] Impaired  [] Weak  [x] Normal/bedrest/immobile 20  10  0    Mental Status [] Forgets limitations  [x] Oriented to own ability 15  0       Total: 15     Based on the Assessment score: check the appropriate box.   []  No intervention needed   Low =   Score of 0-24  [x]  Use standard prevention interventions Moderate =  Score of 24-44   [x] Discuss fall prevention strategies   [] Indicate moderate falls risk on eval  []  Use high risk prevention interventions High = Score of 45 and higher   [] Discuss fall prevention strategies   [] Provide supervision during treatment time    Goals  Short term goals  Time Frame for Short term goals: 3 weeks  Short term goal 1: Pt will improve standing activity tolerance to at least 15 consecutive minutes  Short term goal 2: Pt will report at least 25% decrease in neck and LBP with daily activity  Long term goals  Time Frame for Long term goals : 6 weeks  Long term goal 1: Pt will ambulate community distances w/o AD, displaying good step symmetry and balance  Long term goal 2: Pt will display improved functional endurance with ability to complete 6 minute walk test w/o AD. Long term goal 3: Pt will report at least 50% decrease in neck and back pain with daily activity  Long term goal 4: Pt will improve Mod Oswestry score to <15/50 to demo functional improvement.        PT Individual Minutes  Time In: 9044  Time Out: 2799  Minutes: 50     Procedure Minutes: 50 min eval       Electronically signed by Sandra Dunham PT on 1/6/21 at 5:58 PM EST

## 2021-01-06 NOTE — PROGRESS NOTES
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, sciatica laterality unspecified     Date: 2021  Treatment Diagnosis: neck and back pain, decreased daily activity tolerance, impaired gait and mobility    Plan of Care/Certification Expiration Date: 21  Progress Report Period from:  2021  to 2021    Total # of Visits to Date: 1   No Show: 0    Canceled Appointment: 0     OBJECTIVE:   Short Term Goals - Time Frame for Short term goals: 3 weeks    Goals Current/Discharge status  Met   Short term goal 1: Pt will improve standing activity tolerance to at least 15 consecutive minutes  <10 min per pt [] yes  [x] no   Short term goal 2: Pt will report at least 25% decrease in neck and LBP with daily activity    Pain Location: Back, Neck    Pain Level: 7(LBP 7/10, Neck 4/10)    Pain Descriptors: Sharp, Constant     [] yes  [x] no     Long Term Goals - Time Frame for Long term goals : 6 weeks  Goals Current/ Discharge status Met   Long term goal 1: Pt will ambulate community distances w/o AD, displaying good step symmetry and balance No AD at home  [address] for community [] yes  [x] no   Long term goal 2: Pt will display improved functional endurance with ability to complete 6 minute walk test w/o AD.  Test to be completed as appropriate [] yes  [x] no   Long term goal 3: Pt will report at least 50% decrease in neck and back pain with daily activity   Pain Location: Back, Neck    Pain Level: 7(LBP 7/10, Neck 4/10)    Pain Descriptors: Sharp, Constant     [] yes  [x] no Long term goal 4: Pt will improve Mod Oswestry score to <15/50 to demo functional improvement. Exam: Mod Oswestry: 24/50; self reports 25% daily function   [] yes  [x] no       Body structures, Functions, Activity limitations: Increased pain, Decreased posture, Decreased ROM, Decreased strength, Decreased endurance, Decreased ADL status, Decreased functional mobility   Assessment: Pt is a 69 yo male s/p cervical and lumbar spine surgery. Pt completed inpatient rehab and [facility] prior to starting OP services. Pt reports and displays functional daily activity limitations secondary to pain and decreased strength/functional endurance. Pt currently ambulates with a cane and reports ~25% function. Pt can benefit from PT services to address his deficits and goals, while building an appropriate HEP for continued self managment upon discharge. Prognosis: Good  Discharge Recommendations: Continue to assess pending progress      PT Education: PT Role;Plan of Care;Goals    PLAN: [x] Evaluate and Treat  Frequency/Duration:  Plan  Times per week: 2  Plan weeks: 6  Current Treatment Recommendations: ROM, Strengthening, Balance Training, Gait Training, Endurance Training, Manual Therapy - Soft Tissue Mobilization, Patient/Caregiver Education & Training, Home Exercise Program, Modalities     Precautions:         Spinal Precautions: No Bending; No Twisting(Lifting > 8#)  Spinal Precautions: Per pt's verbal report                  Patient Status:[x] Continue/ Initiate plan of Care    [] Discharge PT. Recommend pt continue with HEP. [] Additional visits requested, Please re-certify for additional visits:          Signature: Electronically signed by Pablo Fenton PT on 1/6/21 at 6:00 PM EST      If you have any questions or concerns, please don't hesitate to call. Thank you for your referral.    I have reviewed this plan of care and certify a need for medically necessary rehabilitation services.

## 2021-01-08 ENCOUNTER — HOSPITAL ENCOUNTER (OUTPATIENT)
Dept: PHYSICAL THERAPY | Age: 76
Setting detail: THERAPIES SERIES
Discharge: HOME | End: 2021-01-08
Payer: MEDICARE

## 2021-01-08 PROCEDURE — 97140 MANUAL THERAPY 1/> REGIONS: CPT

## 2021-01-08 PROCEDURE — 97110 THERAPEUTIC EXERCISES: CPT

## 2021-01-08 ASSESSMENT — PAIN DESCRIPTION - DESCRIPTORS: DESCRIPTORS: SHARP;ACHING;DULL

## 2021-01-08 ASSESSMENT — PAIN DESCRIPTION - LOCATION: LOCATION: BACK;NECK

## 2021-01-08 ASSESSMENT — PAIN DESCRIPTION - PAIN TYPE: TYPE: SURGICAL PAIN

## 2021-01-08 NOTE — PROGRESS NOTES
Patient limited by pain    Body structures, Functions, Activity limitations: Increased pain, Decreased posture, Decreased ROM, Decreased strength, Decreased endurance, Decreased ADL status, Decreased functional mobility   Assessment: Initiated POC to improve ROM, posture, ADL, funcitonal mobility and decrease pain. Pt demo'd decreased strength in Lt LE vs RT LE with increased pain noted with SLR with vc's to stay within in tolerable range. Pt required vc's for correct for to decrease compensation with AROM. STM to cervical and Lumbar with good tolerance. Demo'd fatigue and LBP with gait drills. Increased pain with therex in LB to 6/10 with some improvement after MH to LB and Cervical to 5/10(LB). HEP given. Treatment Diagnosis: neck and back pain, decreased daily activity tolerance, impaired gait and mobility  Prognosis: Good       Goals:  Short term goals  Time Frame for Short term goals: 3 weeks  Short term goal 1: Pt will improve standing activity tolerance to at least 15 consecutive minutes  Short term goal 2: Pt will report at least 25% decrease in neck and LBP with daily activity    Long term goals  Time Frame for Long term goals : 6 weeks  Long term goal 1: Pt will ambulate community distances w/o AD, displaying good step symmetry and balance  Long term goal 2: Pt will display improved functional endurance with ability to complete 6 minute walk test w/o AD. Long term goal 3: Pt will report at least 50% decrease in neck and back pain with daily activity  Long term goal 4: Pt will improve Mod Oswestry score to <15/50 to demo functional improvement. Progress toward goals: Intiated POC to improve functional mobility, ROM, strength and decrease pain. POST-PAIN       Pain Rating (0-10 pain scale):   5/10   Location and pain description same as pre-treatment unless indicated.    Action: [] NA   [x] Perform HEP  [x] Meds as prescribed  [] Modalities as prescribed   [] Call Physician     Frequency/Duration: Plan  Times per week: 2  Plan weeks: 6  Current Treatment Recommendations: ROM, Strengthening, Balance Training, Gait Training, Endurance Training, Manual Therapy - Soft Tissue Mobilization, Patient/Caregiver Education & Training, Home Exercise Program, Modalities     Pt to continue current HEP. See objective section for any therapeutic exercise changes, additions or modifications this date.          PT Individual Minutes  Time In: 1500  Time Out: 1550  Minutes: 50  Timed Code Treatment Minutes: 40 Minutes  Procedure Minutes:10 mins     Timed Activity Minutes Units   Ther Ex         30        2   Manual          10        2       Signature:  Electronically signed by Sharif Pacheco PTA on 1/8/21 at 4:04 PM EST

## 2021-01-09 ENCOUNTER — TELEPHONE (OUTPATIENT)
Dept: FAMILY MEDICINE CLINIC | Age: 76
End: 2021-01-09

## 2021-01-12 ENCOUNTER — APPOINTMENT (OUTPATIENT)
Dept: PHYSICAL THERAPY | Age: 76
End: 2021-01-12
Payer: MEDICARE

## 2021-01-14 ENCOUNTER — HOSPITAL ENCOUNTER (OUTPATIENT)
Dept: PHYSICAL THERAPY | Age: 76
Setting detail: THERAPIES SERIES
Discharge: HOME | End: 2021-01-14
Payer: MEDICARE

## 2021-01-14 PROCEDURE — 97110 THERAPEUTIC EXERCISES: CPT

## 2021-01-14 ASSESSMENT — PAIN DESCRIPTION - LOCATION: LOCATION: BACK;NECK

## 2021-01-14 ASSESSMENT — PAIN DESCRIPTION - ORIENTATION: ORIENTATION: LOWER

## 2021-01-14 NOTE — PROGRESS NOTES
[address] [address]  Outpatient Physical Therapy    Treatment Note        Date: 2021  Patient: Dorian Fletcher  :   ACCT #: [de-identified]  Referring Practitioner: Amrita Hall MD  Diagnosis: Chronic right-sided low back pain with sciatica, sciatica laterality unspecified    Visit Information:  PT Visit Information  Onset Date: 21  PT Insurance Information: Medicare  Total # of Visits Approved: 99  Total # of Visits to Date: 3  Plan of Care/Certification Expiration Date: 21  No Show: 0  Canceled Appointment: 0  Progress Note Counter: 3/8- (PN 2021)    Subjective: pain today in LB is 3/10, my neck has less pain, I saw my MD, everything looks good, he removed all restrictions except, 8 lb lifting limit     HEP Compliance:  [x] Good [] Fair [] Poor [] Reports not doing due to:    Vital Signs  Patient Currently in Pain: Yes   Pain Screening  Patient Currently in Pain: Yes  Pain Assessment  Pain Level: 3  Pain Type: Surgical pain  Pain Location: Back;Neck  Pain Orientation: Lower  Pain Descriptors: Dull;Aching    OBJECTIVE:   Exercises  Exercise 2: UBE F/R 2.5 mins ea Level 1  Exercise 3: cervical isometrics 5 secs x 5ea  Exercise 4: rows/lats x 15, RTB  Exercise 5: AROM 6 way, 3 sec x 10, and LS stretch 20 sec x 3, b/l, w/ slight overpressure  Exercise 7: abdominal bracing/DLS exercises: TA breaths 5 sec x 20,  DLS 2-way x 15, supine  Exercise 8: supine/SL leg raises: SLR x 10,  ABD x10  Exercise 11: SLS's 20 sec x 3, b/l  Exercise 13:  Ab walkouts x 15  Exercise 14: DLS double arm reACH X 15 W/ BALL       Strength: [x] NT  [] MMT completed:   ROM: [x] NT  [] ROM measurements:     Modalities:  Modalities  Moist heat: MH to cervcial and LB x 10 mins     *Indicates exercise, modality, or manual techniques to be initiated when appropriate    Assessment: Body structures, Functions, Activity limitations: Increased pain, Decreased posture, Decreased ROM, Decreased strength, Decreased endurance, Decreased ADL status, Decreased functional mobility   Assessment: difficulty w/ SLR and S/L abd, lag and compensation noted, fingers used for support w/ SLS ex, L>R, no c/o pain increase, just fatigue, conclude w/ HP to neck and LB to reduce mm soreness  Treatment Diagnosis: neck and back pain, decreased daily activity tolerance, impaired gait and mobility  Prognosis: Good       Goals:  Short term goals  Time Frame for Short term goals: 3 weeks  Short term goal 1: Pt will improve standing activity tolerance to at least 15 consecutive minutes  Short term goal 2: Pt will report at least 25% decrease in neck and LBP with daily activity    Long term goals  Time Frame for Long term goals : 6 weeks  Long term goal 1: Pt will ambulate community distances w/o AD, displaying good step symmetry and balance  Long term goal 2: Pt will display improved functional endurance with ability to complete 6 minute walk test w/o AD. Long term goal 3: Pt will report at least 50% decrease in neck and back pain with daily activity  Long term goal 4: Pt will improve Mod Oswestry score to <15/50 to demo functional improvement. Progress toward goals:ongoing    POST-PAIN       Pain Rating (0-10 pain scale):   3/10   Location and pain description same as pre-treatment unless indicated. Action: [] NA   [x] Perform HEP  [] Meds as prescribed  [] Modalities as prescribed   [] Call Physician     Frequency/Duration:  Plan  Times per week: 2  Plan weeks: 6  Current Treatment Recommendations: ROM, Strengthening, Balance Training, Gait Training, Endurance Training, Manual Therapy - Soft Tissue Mobilization, Patient/Caregiver Education & Training, Home Exercise Program, Modalities     Pt to continue current HEP. See objective section for any therapeutic exercise changes, additions or modifications this date. PT Individual Minutes  Time In: 1500  Time Out: 7822  Minutes: 48  Timed Code Treatment Minutes: 38 Minutes  Procedure Minutes:HP 10 min     Timed Activity Minutes Units   Ther Ex 38 3       Signature:  Electronically signed by Magalys London PTA on 1/14/21 at 3:36 PM EST

## 2021-01-15 ENCOUNTER — OFFICE VISIT (OUTPATIENT)
Dept: FAMILY MEDICINE CLINIC | Age: 76
End: 2021-01-15
Payer: MEDICARE

## 2021-01-15 VITALS
HEIGHT: 76 IN | BODY MASS INDEX: 36.17 KG/M2 | SYSTOLIC BLOOD PRESSURE: 128 MMHG | WEIGHT: 297 LBS | HEART RATE: 83 BPM | DIASTOLIC BLOOD PRESSURE: 66 MMHG | OXYGEN SATURATION: 98 %

## 2021-01-15 DIAGNOSIS — Z79.4 TYPE 2 DIABETES MELLITUS WITH STAGE 3 CHRONIC KIDNEY DISEASE, WITH LONG-TERM CURRENT USE OF INSULIN, UNSPECIFIED WHETHER STAGE 3A OR 3B CKD (HCC): ICD-10-CM

## 2021-01-15 DIAGNOSIS — N18.30 TYPE 2 DIABETES MELLITUS WITH STAGE 3 CHRONIC KIDNEY DISEASE, WITH LONG-TERM CURRENT USE OF INSULIN, UNSPECIFIED WHETHER STAGE 3A OR 3B CKD (HCC): ICD-10-CM

## 2021-01-15 DIAGNOSIS — I73.9 PVD (PERIPHERAL VASCULAR DISEASE) (HCC): ICD-10-CM

## 2021-01-15 DIAGNOSIS — E66.01 MORBID OBESITY (HCC): ICD-10-CM

## 2021-01-15 DIAGNOSIS — I48.91 ATRIAL FIBRILLATION, UNSPECIFIED TYPE (HCC): ICD-10-CM

## 2021-01-15 DIAGNOSIS — N18.30 STAGE 3 CHRONIC KIDNEY DISEASE, UNSPECIFIED WHETHER STAGE 3A OR 3B CKD (HCC): ICD-10-CM

## 2021-01-15 DIAGNOSIS — E11.22 TYPE 2 DIABETES MELLITUS WITH STAGE 3 CHRONIC KIDNEY DISEASE, WITH LONG-TERM CURRENT USE OF INSULIN, UNSPECIFIED WHETHER STAGE 3A OR 3B CKD (HCC): ICD-10-CM

## 2021-01-15 DIAGNOSIS — G47.33 OSA (OBSTRUCTIVE SLEEP APNEA): Primary | ICD-10-CM

## 2021-01-15 DIAGNOSIS — N40.1 BENIGN NON-NODULAR PROSTATIC HYPERPLASIA WITH LOWER URINARY TRACT SYMPTOMS: ICD-10-CM

## 2021-01-15 DIAGNOSIS — I10 ESSENTIAL HYPERTENSION: ICD-10-CM

## 2021-01-15 DIAGNOSIS — M21.372 LEFT FOOT DROP: ICD-10-CM

## 2021-01-15 DIAGNOSIS — I25.10 CORONARY ARTERY DISEASE INVOLVING NATIVE CORONARY ARTERY OF NATIVE HEART, ANGINA PRESENCE UNSPECIFIED: ICD-10-CM

## 2021-01-15 LAB — HBA1C MFR BLD: 9 % (ref 4.8–5.9)

## 2021-01-15 PROCEDURE — 2022F DILAT RTA XM EVC RTNOPTHY: CPT | Performed by: FAMILY MEDICINE

## 2021-01-15 PROCEDURE — 3017F COLORECTAL CA SCREEN DOC REV: CPT | Performed by: FAMILY MEDICINE

## 2021-01-15 PROCEDURE — 99214 OFFICE O/P EST MOD 30 MIN: CPT | Performed by: FAMILY MEDICINE

## 2021-01-15 PROCEDURE — 4040F PNEUMOC VAC/ADMIN/RCVD: CPT | Performed by: FAMILY MEDICINE

## 2021-01-15 PROCEDURE — 1036F TOBACCO NON-USER: CPT | Performed by: FAMILY MEDICINE

## 2021-01-15 PROCEDURE — G8484 FLU IMMUNIZE NO ADMIN: HCPCS | Performed by: FAMILY MEDICINE

## 2021-01-15 PROCEDURE — 3046F HEMOGLOBIN A1C LEVEL >9.0%: CPT | Performed by: FAMILY MEDICINE

## 2021-01-15 PROCEDURE — 1123F ACP DISCUSS/DSCN MKR DOCD: CPT | Performed by: FAMILY MEDICINE

## 2021-01-15 PROCEDURE — G8427 DOCREV CUR MEDS BY ELIG CLIN: HCPCS | Performed by: FAMILY MEDICINE

## 2021-01-15 PROCEDURE — G8417 CALC BMI ABV UP PARAM F/U: HCPCS | Performed by: FAMILY MEDICINE

## 2021-01-15 RX ORDER — INSULIN GLARGINE 100 [IU]/ML
30 INJECTION, SOLUTION SUBCUTANEOUS NIGHTLY
Qty: 10 PEN | Refills: 5 | Status: SHIPPED | OUTPATIENT
Start: 2021-01-15 | End: 2021-11-19 | Stop reason: SDUPTHER

## 2021-01-15 RX ORDER — FUROSEMIDE 40 MG/1
40 TABLET ORAL DAILY
Qty: 90 TABLET | Refills: 3 | Status: SHIPPED | OUTPATIENT
Start: 2021-01-15 | End: 2021-11-19 | Stop reason: SDUPTHER

## 2021-01-15 RX ORDER — GLIMEPIRIDE 4 MG/1
4 TABLET ORAL 2 TIMES DAILY
Qty: 180 TABLET | Refills: 3 | Status: SHIPPED | OUTPATIENT
Start: 2021-01-15 | End: 2021-11-19 | Stop reason: SDUPTHER

## 2021-01-15 RX ORDER — ATORVASTATIN CALCIUM 40 MG/1
40 TABLET, FILM COATED ORAL NIGHTLY
Qty: 90 TABLET | Refills: 3 | Status: SHIPPED | OUTPATIENT
Start: 2021-01-15 | End: 2021-11-19 | Stop reason: SDUPTHER

## 2021-01-15 RX ORDER — LOSARTAN POTASSIUM 25 MG/1
25 TABLET ORAL DAILY
Qty: 90 TABLET | Refills: 3 | Status: SHIPPED | OUTPATIENT
Start: 2021-01-15 | End: 2021-11-19 | Stop reason: SDUPTHER

## 2021-01-15 RX ORDER — TAMSULOSIN HYDROCHLORIDE 0.4 MG/1
0.4 CAPSULE ORAL DAILY
Qty: 90 CAPSULE | Refills: 3 | Status: SHIPPED | OUTPATIENT
Start: 2021-01-15 | End: 2021-11-19 | Stop reason: SDUPTHER

## 2021-01-15 ASSESSMENT — PATIENT HEALTH QUESTIONNAIRE - PHQ9
SUM OF ALL RESPONSES TO PHQ QUESTIONS 1-9: 0
SUM OF ALL RESPONSES TO PHQ QUESTIONS 1-9: 0
SUM OF ALL RESPONSES TO PHQ9 QUESTIONS 1 & 2: 0

## 2021-01-15 NOTE — PROGRESS NOTES
Chief Complaint   Patient presents with    Annual Exam     physical, needs script for diabetic shoes       Yudy June is a 70 y.o. male    Annual checkups with cardio    Needs DM shoes    Just had neck and back surgery  [place]  [place]overing  Doing therapy    Wt Readings from Last 3 Encounters:   01/15/21 297 lb (134.7 kg)   11/06/20 300 lb (136.1 kg)   06/26/20 (!) 300 lb 12.8 oz (136.4 kg)     BP Readings from Last 3 Encounters:   01/15/21 128/66   11/06/20 138/84   06/26/20 130/66          Current status:  Diet not always best    Was suggested to f/u with endocrine      Lab Results   Component Value Date    LABA1C 12 05/29/2020     No results found for: EAG      Review of Systems:   General ROS: negative for - nausea, vomiting, chills, fatigue, fever, malaise, weight gain or weight loss  Respiratory ROS: no cough, shortness of breath, or wheezing  Cardiovascular ROS: no chest pain or dyspnea on exertion  Gastrointestinal ROS: no abdominal pain, change in bowel habits, or black or bloody stools  Genito-Urinary ROS: no dysuria, trouble voiding, or hematuria  Musculoskeletal ROS: occ leg cramps  Neurological ROS: denies tingling in feet  In general patient otherwise reports feeling well. Physical Exam:  /66 (Site: Left Upper Arm)   Pulse 83   Ht 6' 4\" (1.93 m)   Wt 297 lb (134.7 kg)   SpO2 98%   BMI 36.15 kg/m²     Gen: Well, NAD, Alert, Oriented x 3   HEENT: EOMI, eyes clear, MMM  Skin: without rash or jaundice  Neck: no significant lymphadenopathy or thyromegaly  Lungs: CTA B w/out Rales/Wheezes/Rhonchi, Good respiratory effort   Heart: RRR, S1S2, w/out M/R/G, non-displaced PMI   Ext: No C/C/E Bilaterally. Neuro: drop left foot    DIABETIC FOOT EXAM:  podiatry    No results found for this visit on 01/15/21.    a1c 12! Assessment/Plan:     Diagnosis Orders   1. JAZZMINE (obstructive sleep apnea)     2.  Coronary artery disease involving native coronary artery of native heart, angina presence unspecified  atorvastatin (LIPITOR) 40 MG tablet    metoprolol tartrate (LOPRESSOR) 25 MG tablet   3. Essential hypertension  losartan (COZAAR) 25 MG tablet   4. Benign non-nodular prostatic hyperplasia with lower urinary tract symptoms  tamsulosin (FLOMAX) 0.4 MG capsule   5. Type 2 diabetes mellitus with stage 3 chronic kidney disease, with long-term current use of insulin, unspecified whether stage 3a or 3b CKD (HCC)  glimepiride (AMARYL) 4 MG tablet    insulin glargine (LANTUS SOLOSTAR) 100 UNIT/ML injection pen    Hemoglobin A1C   6. Left foot drop     7. PVD (peripheral vascular disease) ([facility] [address]     8. Atrial fibrillation, unspecified type ([facility] [address]     9. Morbid obesity ([facility] [address]     10.  Stage 3 chronic kidney disease, unspecified whether stage 3a or 3b CKD         Refills given    bp good    Unable to control his DM with noncompliance       Wt Readings from Last 3 Encounters:   01/15/21 297 lb (134.7 kg)   11/06/20 300 lb (136.1 kg)   06/26/20 (!) 300 lb 12.8 oz (136.4 kg)     Script for DM shoes    Sees Dr. Amanda Bahena annually    Brijesh Ramachandran MD

## 2021-01-19 ENCOUNTER — HOSPITAL ENCOUNTER (OUTPATIENT)
Dept: PHYSICAL THERAPY | Age: 76
Setting detail: THERAPIES SERIES
Discharge: HOME | End: 2021-01-19
Payer: MEDICARE

## 2021-01-19 PROCEDURE — 97110 THERAPEUTIC EXERCISES: CPT

## 2021-01-19 ASSESSMENT — PAIN SCALES - GENERAL: PAINLEVEL_OUTOF10: 2

## 2021-01-19 NOTE — PROGRESS NOTES
[address] [address]  Outpatient Physical Therapy    Treatment Note        Date: 2021  Patient: Melo Hilton  :   ACCT #: [de-identified]  Referring Practitioner: Bob Duran MD  Diagnosis: Chronic right-sided low back pain with sciatica, sciatica laterality unspecified    Visit Information:  PT Visit Information  Onset Date: 21  PT Insurance Information: Medicare  Total # of Visits Approved: 99  Total # of Visits to Date: 4  Plan of Care/Certification Expiration Date: 21  No Show: 0  Canceled Appointment: 0  Progress Note Counter: -10 (PN 2021)    Subjective: Has occassional soreness throughout neck and low back. Discomfort is \"maybe between a 1 and a 2\". Comments: No lifting > 8 #  HEP Compliance:  [x] Good [] Fair [] Poor [] Reports not doing due to:    Vital Signs  Patient Currently in Pain: Yes   Pain Screening  Patient Currently in Pain: Yes  Pain Assessment  Pain Assessment: 0-10  Pain Level: 2  Pain Type: Surgical pain  Pain Location: Back    OBJECTIVE:   Exercises  Exercise 1: Neck  Exercise 4: Row/Lat Pulls: 2 x 15, RTB  Exercise 6: Low Back  Exercise 7: TA marches, H/L position x 15 leyla  Exercise 8: TA with hip ADD ball squeeze: 5\" holds x 15  Exercise 9: TA with overhead 4# med ball lift, H/L position x 15  Exercise 10: 4\" box steps: LLE x 10  Exercise 15: STS from table (27-28\" height) w/ foam pad: 2 x 10, leyla UE reach to ceiling to further challenge balance    Strength: [x] NT  [] MMT completed:    ROM: [x] NT  [] ROM measurements:     Manual: none this session     Modalities:  Modalities  Cryotherapy (Minutes\Location): CP to cervical and LB x 10 min     *Indicates exercise, modality, or manual techniques to be initiated when appropriate    Assessment:       Body structures, Functions, Activity limitations: Increased pain, Decreased posture, Decreased ROM, Decreased strength, Decreased endurance, Decreased ADL status, Decreased functional mobility Assessment: Cont'd functional strengthening program to improve mobility and ADL tolerance. Pt able to increase reps on rows/lat pulls w/o difficulty. Added foam to LE strengthening exercises to also challenge balance. Pt with noticeable posterior instability requiring push from back of legs to transition sit<>stand w/o UE support. Difficulty performing 4\" box steps with LLE, requiring leyla UE assist for safety and stability. Overall, good tolerance to exercises addressed. Concluded session with CP to neck and low back per pt request to determine if he gets more relief with CP vs MHP. Treatment Diagnosis: neck and back pain, decreased daily activity tolerance, impaired gait and mobility  Prognosis: Good       Goals:  Short term goals  Time Frame for Short term goals: 3 weeks  Short term goal 1: Pt will improve standing activity tolerance to at least 15 consecutive minutes  Short term goal 2: Pt will report at least 25% decrease in neck and LBP with daily activity    Long term goals  Time Frame for Long term goals : 6 weeks  Long term goal 1: Pt will ambulate community distances w/o AD, displaying good step symmetry and balance  Long term goal 2: Pt will display improved functional endurance with ability to complete 6 minute walk test w/o AD. Long term goal 3: Pt will report at least 50% decrease in neck and back pain with daily activity  Long term goal 4: Pt will improve Mod Oswestry score to <15/50 to demo functional improvement. Progress toward goals: cont strength    POST-PAIN       Pain Rating (0-10 pain scale):  2 /10   Location and pain description same as pre-treatment unless indicated.    Action: [] NA   [x] Perform HEP  [] Meds as prescribed  [x] Modalities as prescribed   [] Call Physician     Frequency/Duration:  Plan  Times per week: 2  Plan weeks: 6 Current Treatment Recommendations: ROM, Strengthening, Balance Training, Gait Training, Endurance Training, Manual Therapy - Soft Tissue Mobilization, Patient/Caregiver Education & Training, Home Exercise Program, Modalities     Pt to continue current HEP. See objective section for any therapeutic exercise changes, additions or modifications this date.        PT Individual Minutes  Time In: 1450  Time Out: 5140  Minutes: 51  Timed Code Treatment Minutes: 38 Minutes  Procedure Minutes: 10 min CP     Timed Activity Minutes Units   Ther Ex 38 3       Signature:  Electronically signed by Bulmaro Aguirre PT on 1/19/21 at 3:38 PM EST

## 2021-01-21 ENCOUNTER — HOSPITAL ENCOUNTER (OUTPATIENT)
Dept: PHYSICAL THERAPY | Age: 76
Setting detail: THERAPIES SERIES
Discharge: HOME | End: 2021-01-21
Payer: MEDICARE

## 2021-01-21 PROCEDURE — 97110 THERAPEUTIC EXERCISES: CPT

## 2021-01-21 ASSESSMENT — PAIN SCALES - GENERAL: PAINLEVEL_OUTOF10: 3

## 2021-01-21 NOTE — PROGRESS NOTES
[address] [address]  Outpatient Physical Therapy    Treatment Note        Date: 2021  Patient: Carmela Leone  :   ACCT #: [de-identified]  Referring Practitioner: Ady Dixon MD  Diagnosis: Chronic right-sided low back pain with sciatica, sciatica laterality unspecified    Visit Information:  PT Visit Information  Onset Date: 21  PT Insurance Information: Medicare  Total # of Visits Approved: 99  Total # of Visits to Date: 5  Plan of Care/Certification Expiration Date: 21  No Show: 0  Canceled Appointment: 0  Progress Note Counter: -10 (PN 2021)    Subjective: Pt notes feeling fatigued after last session, stating he went home and quickly fell asleep. Comments: No lifting > 8 #  HEP Compliance:  [x] Good [] Fair [] Poor [] Reports not doing due to:    Vital Signs  Patient Currently in Pain: Yes   Pain Screening  Patient Currently in Pain: Yes  Pain Assessment  Pain Assessment: 0-10  Pain Level: 3  Pain Location: Back    OBJECTIVE:   Exercises  Exercise 4: Rows: GTB x 15; Double Arm Press: GRB x 15  Exercise 9: TA with overhead 4# med ball lift, H/L position x 15  Exercise 10: 4\" box steps: LLE x 10 (UE support required)  Exercise 11: 6\" box steps x 10 leyla (UE support required)  Exercise 15: STS from table (25\" height) pad: 2 x 15, leyla UE reach to ceiling to further challenge balance  Exercise 16: LAQs w/ 4# ankle weights: x10, x15 leyla    Strength: [x] NT  [] MMT completed:    ROM: [x] NT  [] ROM measurements:     Modalities:  Modalities  Moist heat: MH to cervcial and LB x 10 mins     *Indicates exercise, modality, or manual techniques to be initiated when appropriate    Assessment:       Body structures, Functions, Activity limitations: Increased pain, Decreased posture, Decreased ROM, Decreased strength, Decreased endurance, Decreased ADL status, Decreased functional mobility Assessment: Cont'd challenge pt's functional LE strength and standing endurance to improve mobility and daily activity tolerance. Pt reports neck fatigue with standing activity and prolonged unsupported sitting. Pt still displays less functional LE strength throughout his left leg compared to his right. Pt continues to tolerate exercise progressions including increased sets and reps. Pt expresses desire to continue PT POC working toward all goals. Will continue at 2x/wk frequency. Treatment Diagnosis: neck and back pain, decreased daily activity tolerance, impaired gait and mobility  Prognosis: Good       Goals:  Short term goals  Time Frame for Short term goals: 3 weeks  Short term goal 1: Pt will improve standing activity tolerance to at least 15 consecutive minutes  Short term goal 2: Pt will report at least 25% decrease in neck and LBP with daily activity    Long term goals  Time Frame for Long term goals : 6 weeks  Long term goal 1: Pt will ambulate community distances w/o AD, displaying good step symmetry and balance  Long term goal 2: Pt will display improved functional endurance with ability to complete 6 minute walk test w/o AD. Long term goal 3: Pt will report at least 50% decrease in neck and back pain with daily activity  Long term goal 4: Pt will improve Mod Oswestry score to <15/50 to demo functional improvement. Progress toward goals:    POST-PAIN       Pain Rating (0-10 pain scale):   3/10 low back  Location and pain description same as pre-treatment unless indicated.    Action: [] NA   [x] Perform HEP  [x] Meds as prescribed  [x] Modalities as prescribed   [] Call Physician     Frequency/Duration:  Plan  Times per week: 2  Plan weeks: 6  Current Treatment Recommendations: ROM, Strengthening, Balance Training, Gait Training, Endurance Training, Manual Therapy - Soft Tissue Mobilization, Patient/Caregiver Education & Training, Home Exercise Program, Modalities Pt to continue current HEP. See objective section for any therapeutic exercise changes, additions or modifications this date.        PT Individual Minutes  Time In: 1500  Time Out: 3779  Minutes: 53  Timed Code Treatment Minutes: 40 Minutes  Procedure Minutes: 10 min MHPs     Timed Activity Minutes Units   Ther Ex 40 3       Signature:  Electronically signed by Toby Hall PT on 1/21/21 at 3:53 PM EST

## 2021-01-26 ENCOUNTER — HOSPITAL ENCOUNTER (OUTPATIENT)
Dept: PHYSICAL THERAPY | Age: 76
Setting detail: THERAPIES SERIES
Discharge: HOME | End: 2021-01-26
Payer: MEDICARE

## 2021-01-26 PROCEDURE — 97110 THERAPEUTIC EXERCISES: CPT

## 2021-01-26 ASSESSMENT — PAIN SCALES - GENERAL: PAINLEVEL_OUTOF10: 3

## 2021-01-26 NOTE — PROGRESS NOTES
[address] [address]  Outpatient Physical Therapy    Treatment Note        Date: 2021  Patient: Kacy Olivier  :   ACCT #: [de-identified]  Referring Practitioner: Emmie Brownlee MD  Diagnosis: Chronic right-sided low back pain with sciatica, sciatica laterality unspecified    Visit Information:  PT Visit Information  Onset Date: 21  PT Insurance Information: Medicare  Total # of Visits Approved: 99  Total # of Visits to Date: 6  Plan of Care/Certification Expiration Date: 21  No Show: 0  Canceled Appointment: 0  Progress Note Counter: -10 (PN 2021)    Subjective: Pt states \"I feel ok today, not an exceptional amount of pain. Maybe a 3 or 4.\" (in reference to neck and LBP) Pt staes \"I was wore out after last visit but everyday I seem to get better. \"  Comments: No lifting > 8 #  HEP Compliance:  [] Good [] Fair [] Poor [] Reports not doing due to:    Vital Signs  Patient Currently in Pain: Yes   Pain Screening  Patient Currently in Pain: Yes  Pain Assessment  Pain Assessment: 0-10  Pain Level: 3    OBJECTIVE:   Exercises  Exercise 1: Supine exs performed on red foam pad for pt comfort  Exercise 2: SciFit L2 5 min  Exercise 3: Supine chest pulls x10 3 way  Exercise 4: Rows: GTB 2x 15; Double Arm Press: GTB 2x 15  Exercise 7: TA marches, H/L position x 15 leyla  Exercise 9: TA with overhead 4# med ball lift, H/L position x 20  Exercise 11: 6\" box steps x 10 leyla (UE support required)  Exercise 15: STS from table (24\" height) pad: 2 x 15, leyla UE reach to ceiling to further challenge balance  Exercise 16: LAQs w/ 4# ankle weights: x15 leyla  Exercise 20: HEP: supine chest pulls, TB lats/rows    Strength: [x] NT  [] MMT completed:     ROM: [x] NT  [] ROM measurements:     Modalities:  Modalities  Moist heat: MH to cervcial and LB x 10 mins     *Indicates exercise, modality, or manual techniques to be initiated when appropriate    Assessment: Body structures, Functions, Activity limitations: Increased pain, Decreased posture, Decreased ROM, Decreased strength, Decreased endurance, Decreased ADL status, Decreased functional mobility   Assessment: Cont'd current strengthening and endurance program w/ multiple additions to HEP to inc consistency of exs outside of current PT schedule. Pt tolerating exs well however, requiring frequent seated rests to complete desired standing exs. Pt often stating \"It gets tiring to hold my head up\" though also verbalizes  improving function and strength since initiate start of PT. Will cont to challenge standing gen and postural strengthening to ease functional gen. Treatment Diagnosis: neck and back pain, decreased daily activity tolerance, impaired gait and mobility  Prognosis: Good    Goals:  Short term goals  Time Frame for Short term goals: 3 weeks  Short term goal 1: Pt will improve standing activity tolerance to at least 15 consecutive minutes  Short term goal 2: Pt will report at least 25% decrease in neck and LBP with daily activity  Long term goals  Time Frame for Long term goals : 6 weeks  Long term goal 1: Pt will ambulate community distances w/o AD, displaying good step symmetry and balance  Long term goal 2: Pt will display improved functional endurance with ability to complete 6 minute walk test w/o AD. Long term goal 3: Pt will report at least 50% decrease in neck and back pain with daily activity  Long term goal 4: Pt will improve Mod Oswestry score to <15/50 to demo functional improvement. Progress toward goals: dec neck/back pain     POST-PAIN       Pain Rating (0-10 pain scale):   3/10   Location and pain description same as pre-treatment unless indicated.    Action: [] NA   [x] Perform HEP  [] Meds as prescribed  [x] Modalities as prescribed   [] Call Physician     Frequency/Duration:  Plan  Times per week: 2  Plan weeks: 6 Current Treatment Recommendations: ROM, Strengthening, Balance Training, Gait Training, Endurance Training, Manual Therapy - Soft Tissue Mobilization, Patient/Caregiver Education & Training, Home Exercise Program, Modalities     Pt to continue current HEP. See objective section for any therapeutic exercise changes, additions or modifications this date.     PT Individual Minutes  Time In: 4611  Time Out: 6400  Minutes: 50  Timed Code Treatment Minutes: 40 Minutes  Procedure Minutes: 10 min ()      Timed Activity Minutes Units   Ther Ex 40 3     Signature:  Electronically signed by Tejinder Donahue PTA on 1/26/21 at 4:16 PM EST

## 2021-01-28 ENCOUNTER — HOSPITAL ENCOUNTER (OUTPATIENT)
Dept: PHYSICAL THERAPY | Age: 76
Setting detail: THERAPIES SERIES
Discharge: HOME | End: 2021-01-28
Payer: MEDICARE

## 2021-01-28 PROCEDURE — 97110 THERAPEUTIC EXERCISES: CPT

## 2021-01-28 ASSESSMENT — PAIN DESCRIPTION - DESCRIPTORS: DESCRIPTORS: SORE

## 2021-01-28 ASSESSMENT — PAIN SCALES - GENERAL: PAINLEVEL_OUTOF10: 3

## 2021-01-29 NOTE — PROGRESS NOTES
[address] [address]  Outpatient Physical Therapy    Treatment Note        Date: 2021  Patient: lE Gross  :   ACCT #: [de-identified]  Referring Practitioner: Marysol Dupont MD  Diagnosis: Chronic right-sided low back pain with sciatica, sciatica laterality unspecified    Visit Information:  PT Visit Information  Onset Date: 21  PT Insurance Information: Medicare  Total # of Visits Approved: 99  Total # of Visits to Date: 7  Plan of Care/Certification Expiration Date: 21  No Show: 0  Canceled Appointment: 0  Progress Note Counter: -10 (PN 2021)     HEP Compliance:  [x] Good []  [address] [] Poor [] Reports not doing due to:      OBJECTIVE:   Exercises  Exercise 1: Supine exs performed on red foam pad for pt comfort  Exercise 2: SciFit L2.5 5 min  Exercise 3: Supine chest pulls x10 3 way (progress to sitting NV*)  Exercise 4: Rows: GTB 2x 15; Double Arm Press: GTB 2x 15  Exercise 5: Cervical ext iso into towel (supine) 5\"x10  Exercise 7: TA marches, H/L position x 15 leyla w/ YTB around knees  Exercise 9: TA with overhead 4# med ball lift, H/L position x 20  Exercise 11: 6\" box steps x 10 leyla (UE support required)  Exercise 12: gait drills: attempted marching w/ inability to support Lt SLS; modified to step taps w/ intermittent x1 UE support x20 at 4\" box  Exercise 15: STS from table (24\" height) from foam pad:  x 10 w/ leyla UE reach to ceiling to further challenge balance, x8 w/ Rt foot on 4\" step  Exercise 16: LAQs w/ 4# ankle weights: x15 leyla  Exercise 20: HEP: cont current      Strength: [x] NT  [] MMT completed:     ROM: [x] NT  [] ROM measurements:    Modalities:  Modalities  Moist heat: MH to cervcial and LB x 10 mins     *Indicates exercise, modality, or manual techniques to be initiated when appropriate    Assessment: Body structures, Functions, Activity limitations: Increased pain, Decreased posture, Decreased ROM, Decreased strength, Decreased endurance, Decreased ADL status, Decreased functional mobility   Assessment: Continuation of strengthening and endurance program to ease functional tolerance and daily activity. Attempted marching gait drill however pt c/o inc LBP and inability to assume Lt SLS. Pt was able to modify to 4\" alternating step taps to progress SL tolerance and balance however, x1 UE was req. Inc pain and fatigue post tx therefore concluded w/ MH. Treatment Diagnosis: neck and back pain, decreased daily activity tolerance, impaired gait and mobility  Prognosis: Good     Goals:  Short term goals  Time Frame for Short term goals: 3 weeks  Short term goal 1: Pt will improve standing activity tolerance to at least 15 consecutive minutes  Short term goal 2: Pt will report at least 25% decrease in neck and LBP with daily activity  Long term goals  Time Frame for Long term goals : 6 weeks  Long term goal 1: Pt will ambulate community distances w/o AD, displaying good step symmetry and balance  Long term goal 2: Pt will display improved functional endurance with ability to complete 6 minute walk test w/o AD. Long term goal 3: Pt will report at least 50% decrease in neck and back pain with daily activity  Long term goal 4: Pt will improve Mod Oswestry score to <15/50 to demo functional improvement. Progress toward goals: dec neck/LBP, inc strength/mm endurance     POST-PAIN       Pain Rating (0-10 pain scale):   2/10   Location and pain description same as pre-treatment unless indicated.    Action: [] NA   [] Perform HEP  [] Meds as prescribed  [] Modalities as prescribed   [] Call Physician     Frequency/Duration:  Plan  Times per week: 2  Plan weeks: 6  Specific instructions for Next Treatment: PN to be completed for Medicare; pt would like to cont beyond curent POC if appropriate

## 2021-02-02 ENCOUNTER — HOSPITAL ENCOUNTER (OUTPATIENT)
Dept: PHYSICAL THERAPY | Age: 76
Setting detail: THERAPIES SERIES
Discharge: HOME | End: 2021-02-02
Payer: MEDICARE

## 2021-02-02 PROCEDURE — 97110 THERAPEUTIC EXERCISES: CPT

## 2021-02-02 PROCEDURE — 97116 GAIT TRAINING THERAPY: CPT

## 2021-02-02 NOTE — PROGRESS NOTES
[address] [address]  Outpatient Physical Therapy    Treatment Note        Date: 2021  Patient: Gelacio Tripathi  :   ACCT #: [de-identified]  Referring Practitioner: Abdullahi Gomez MD  Diagnosis: Chronic right-sided low back pain with sciatica, sciatica laterality unspecified    Visit Information:  PT Visit Information  Onset Date: 21  PT Insurance Information: Medicare  Total # of Visits Approved: 99  Total # of Visits to Date: 8  Plan of Care/Certification Expiration Date: 21  No Show: 0  Canceled Appointment: 0  Progress Note Counter: -10 (PN 2021)    Subjective: Pt states \"I'm moving slow today, the low back is stiff. \"  Comments: No lifting > 8 #  HEP Compliance:  [x] Good [] Fair [] Poor [] Reports not doing due to:    Vital Signs  Patient Currently in Pain: Denies   Pain Screening  Patient Currently in Pain: Denies    OBJECTIVE:   Exercises  Exercise 1: Supine exs performed on red foam pad for pt comfort  Exercise 2: SciFit L2.8 5 min  Exercise 3: Seated chest pulls x15 3 way  Exercise 4: Rows: GTB 2x 15; Double Arm Press: GTB 2x 15  Exercise 5: Cervical ext iso into towel (supine) 5\"x10  Exercise 6: Supine DKTC w/ Pball 5\"x20  Exercise 7: Bridges 5\"x10  Exercise 9: Sink exs: x10 ea  Exercise 12: 6 min walk test (w/ SC)- Pt completes 625ft around unit; x1 RB of ~40 sec required at 3:30  Exercise 20: HEP: sink exs    Strength: [x] NT  [] MMT completed:     ROM: [x] NT  [] ROM measurements:    Modalities:  Modalities  Moist heat: MH to cervcial and LB x 10 mins     *Indicates exercise, modality, or manual techniques to be initiated when appropriate    Assessment: Body structures, Functions, Activity limitations: Increased pain, Decreased posture, Decreased ROM, Decreased strength, Decreased endurance, Decreased ADL status, Decreased functional mobility   Assessment: Pt completing 6 min walk test this date w/ consistent use of SC after ~50' D/T onset of LBP.  Pt expressing inc LBP of 5-6/10 level w/ inc distance though recovers quickly after ~30 sec seated rest break; pt able to proceed w/ test and self initiates inc walking speed for distance remaining. Pt also able to progress standing activity w/ initiation of sink exs this tx to further address functional endurance; good gen w/ 1-2 seated rests as needed. Concluded tx w/ MH for pain control and stiffness. Treatment Diagnosis: neck and back pain, decreased daily activity tolerance, impaired gait and mobility  Prognosis: Good     Goals:  Short term goals  Time Frame for Short term goals: 3 weeks  Short term goal 1: Pt will improve standing activity tolerance to at least 15 consecutive minutes  Short term goal 2: Pt will report at least 25% decrease in neck and LBP with daily activity  Long term goals  Time Frame for Long term goals : 6 weeks  Long term goal 1: Pt will ambulate community distances w/o AD, displaying good step symmetry and balance  Long term goal 2: Pt will display improved functional endurance with ability to complete 6 minute walk test w/o AD. Long term goal 3: Pt will report at least 50% decrease in neck and back pain with daily activity  Long term goal 4: Pt will improve Mod Oswestry score to <15/50 to demo functional improvement. Progress toward goals: as listed above     POST-PAIN       Pain Rating (0-10 pain scale):   2/10   Location and pain description same as pre-treatment unless indicated.    Action: [] NA   [x] Perform HEP  [] Meds as prescribed  [x] Modalities as prescribed   [] Call Physician     Frequency/Duration:  Plan  Times per week: 2  Plan weeks: 6  Specific instructions for Next Treatment: PN to be completed for Medicare; pt would like to cont beyond curent POC if appropriate  Current Treatment Recommendations: ROM, Strengthening, Balance Training, Gait Training, Endurance Training, Manual Therapy - Soft Tissue Mobilization, Patient/Caregiver Education & Training, Home Exercise Program, Modalities Pt to continue current HEP. See objective section for any therapeutic exercise changes, additions or modifications this date.      PT Individual Minutes  Time In: 1540  Time Out: 1630  Minutes: 50  Timed Code Treatment Minutes: 40 Minutes  Procedure Minutes: 10 min ()      Timed Activity Minutes Units   Ther Ex 34 2   Gt 6 1       Signature:  Electronically signed by Marisol Figueroa PTA on 2/2/21 at 4:26 PM EST

## 2021-02-04 ENCOUNTER — HOSPITAL ENCOUNTER (OUTPATIENT)
Dept: PHYSICAL THERAPY | Age: 76
Setting detail: THERAPIES SERIES
Discharge: HOME | End: 2021-02-04
Payer: MEDICARE

## 2021-02-04 NOTE — PROGRESS NOTES
Therapy                            Cancellation/No-show Note      Date:  2021  Patient Name:  Jeannine Ocampo  :  [birth year]   MRN:  [id number]  Referring Practitioner: Yadira Ramos MD  Diagnosis: Chronic right-sided low back pain with sciatica, sciatica laterality unspecified    Visit Information:  PT Visit Information  Onset Date: 21  PT Insurance Information: Medicare  Total # of Visits Approved: 99  Total # of Visits to Date: 8  Plan of Care/Certification Expiration Date: 21  No Show: 0  Canceled Appointment: 1  Progress Note Counter: -10 (PN 2021) (CX 21)    For today's appointment patient:  [x]  Cancelled  [x]  Rescheduled appointment  []  No-show   []  Called pt to remind of next appointment     Reason given by patient:  []  Patient ill  [x]  Conflicting appointment  []  No transportation    []  Conflict with work  []  No reason given  []  Other:      [x] Pt has future appointments scheduled, no follow up needed  [] Pt requests to be on hold.     Reason:   If > 2 weeks please discuss with therapist.  [] Therapist to call pt for follow up     Comments:  Prog Note to be completed at next scheduled appt     Signature: Electronically signed by Pablo Fenton PT on 21 at 12:40 PM EST

## 2021-02-09 ENCOUNTER — HOSPITAL ENCOUNTER (OUTPATIENT)
Dept: PHYSICAL THERAPY | Age: 76
Setting detail: THERAPIES SERIES
Discharge: HOME | End: 2021-02-09
Payer: MEDICARE

## 2021-02-09 PROCEDURE — 97110 THERAPEUTIC EXERCISES: CPT

## 2021-02-09 ASSESSMENT — PAIN SCALES - GENERAL: PAINLEVEL_OUTOF10: 6

## 2021-02-09 NOTE — PROGRESS NOTES
Gloria burdick, [address]     Ph: [phone]  Fax: [phone]    [] Certification  [] Recertification []  Plan of Care  [x] Progress Note [] Discharge      To:  Venkat Jon MD      From:  Vaibhav Mendez, PT, DPT  Patient: Welby Leyden     : 1945  Diagnosis: Chronic right-sided low back pain with sciatica, sciatica laterality unspecified     Date: 2021  Treatment Diagnosis: neck and back pain, decreased daily activity tolerance, impaired gait and mobility    Plan of Care/Certification Expiration Date: 21  Progress Report Period from:  2021  to 2021    Total # of Visits to Date: 9   No Show: 0    Canceled Appointment: 1     OBJECTIVE:   Short Term Goals - Time Frame for Short term goals: 3 weeks    Goals Current/Discharge status  Met   Short term goal 1: Pt will improve standing activity tolerance to at least 15 consecutive minutes  <10 min [] yes  [x] no   Short term goal 2: Pt will report at least 25% decrease in neck and LBP with daily activity    Pain Location: Back    Pain Level: 6    Pain Descriptors: (\"pulling\")    Reported pain fluctuated based on weekly activity. Pain increases with standing activity. Relief reported with hot/cold modalities   [] yes  [x] no     Long Term Goals - Time Frame for Long term goals : 6 weeks  Goals Current/ Discharge status Met   Long term goal 1: Pt will ambulate community distances w/o AD, displaying good step symmetry and balance Community ambulation with cane  [] yes  [x] no   Long term goal 2: Pt will display improved functional endurance with ability to complete 6 minute walk test w/o AD.  Cane; 625ft, 1 seated break of 40 sec duration  [] yes  [x] no   Long term goal 3: Pt will report at least 50% decrease in neck and back pain with daily activity   Pain Location: Back    Pain Level: 6    Pain Descriptors: (\"pulling\")     [] yes  [x] no Long term goal 4: Pt will improve Mod Oswestry score to <15/50 to demo functional improvement. Exam: Oswestry: 27/50   [] yes  [x] no       Body structures, Functions, Activity limitations: Increased pain, Decreased posture, Decreased ROM, Decreased strength, Decreased endurance, Decreased ADL status, Decreased functional mobility   Assessment: Pt continues to work with PT to address LBP and neck pain. Pt still displays decreased daily activity tolerance and mobility noting pain as a primary limitation. Pt still notes <10 min standing activity tolerance. Upon assessment of 6min walk test (LV), pt managed to ambuate 625ft while requiring 1 seated break of 40 seconds. PT expresses desire to continue therapy services working on general strength and pain mgmt. Pt declines aquatic exerices at this time secondary to cold weather, despite potential for improved exercise tolerance. Mat level exercises limited by pt's tolerance to supine and prone positioning. Performance standing exercises requires close SBA/CGA for pt safety, with duration of standing activity limited by pain. Will continue to advance exercise to pt tolerance working on general strength, posture, and mobility. Discussed with patient PT discharge if reaching functional plateau or resolution of symptoms. Prognosis: Good  Discharge Recommendations: Continue to assess pending progress           PLAN:   Frequency/Duration:  Plan  Times per week: 2  Plan weeks: 6  Current Treatment Recommendations: ROM, Strengthening, Balance Training, Gait Training, Endurance Training, Manual Therapy - Soft Tissue Mobilization, Patient/Caregiver Education & Training, Home Exercise Program, Modalities  Plan Comment: PN completed this date secondary to appt cancellation on 2/4/21     Precautions:    Fall risk, no lifting >8#                       Patient Status:[x] Continue/ Initiate plan of Care    [] Discharge PT. Recommend pt continue with HEP. [] Additional visits requested, Please re-certify for additional visits:          Signature: Electronically signed by Maria De Jesus Guardado PT on 2/9/21 at 6:42 PM EST      If you have any questions or concerns, please don't hesitate to call. Thank you for your referral.    I have reviewed this plan of care and certify a need for medically necessary rehabilitation services.     Physician Signature:__________________________________________________________  Date:  Please sign and return

## 2021-02-09 NOTE — PROGRESS NOTES
[address] [address]  Outpatient Physical Therapy    Treatment Note        Date: 2021  Patient: Falguni Lackey  : [birth year]  ACCT #: [de-identified]  Referring Practitioner: Oly Daley MD  Diagnosis: Chronic right-sided low back pain with sciatica, sciatica laterality unspecified    Visit Information:  PT Visit Information  Onset Date: 21  PT Insurance Information: Medicare  Total # of Visits Approved: 99  Total # of Visits to Date: 9  Plan of Care/Certification Expiration Date: 21  No Show: 0  Canceled Appointment: 1  Progress Note Counter: 9/10    Subjective: Pt reports body feels stiff and sore today. Rates LBP at 5-6/10 with standing activity. Says his neck feels okay. Comments: No lifting > 8 #  HEP Compliance:  [x] Good [] Fair [] Poor [] Reports not doing due to:    Vital Signs  Patient Currently in Pain: Yes   Pain Screening  Patient Currently in Pain: Yes  Pain Assessment  Pain Assessment: 0-10  Pain Level: 6  Pain Location: Back  Pain Orientation: Lower  Pain Descriptors: (\"pulling\")    OBJECTIVE:   Exercises  Exercise 2: SciFit L2.5 6 min  Exercise 3: Positioning assessment: pain when supine w/ legs extended, mild relief in H/L  Exercise 4: S/L hip ABD x 10 leyla  Exercise 5: RTB resist clam shells: x10 leyla (very challenged to bias glut med)  Exercise 6: 5x STS: x 3  Exercise 7: core punches: RTB 2 x 10 leyla (SBA/CGA for pt safety/stability)  Exercise 8: rows/lats: RTB 2x10    Strength: [x] NT  [] MMT completed:    ROM: [x] NT  [] ROM measurements:     Modalities:  Modalities  Moist heat: MH to cervcial and LB x 10 mins     *Indicates exercise, modality, or manual techniques to be initiated when appropriate    Assessment:       Body structures, Functions, Activity limitations: Increased pain, Decreased posture, Decreased ROM, Decreased strength, Decreased endurance, Decreased ADL status, Decreased functional mobility Assessment: Pt continues to work with PT to address LBP and neck pain. Pt still displays decreased daily activity tolerance and mobility noting pain as a primary limitation. Pt still notes <10 min standing activity tolerance. Upon assessment of 6min walk test (LV), pt managed to ambuate 625ft while requiring 1 seated break of 40 seconds. PT expresses desire to continue therapy services working on general strength and pain mgmt. Pt declines aquatic exerices at this time secondary to cold weather, despite potential for improved exercise tolerance. Mat level exercises limited by pt's tolerance to supine and prone positioning. Performing standing exercises with close SBA/CGA as needed for pt safety, although duration of standing activity limited by pain. Will continue to advance exercise to pt tolerance working on general strength, posture, and mobility. Discussed with patient need for PT discharge if reaching functional plateau or resolution of symptoms. Treatment Diagnosis: neck and back pain, decreased daily activity tolerance, impaired gait and mobility  Prognosis: Good       Goals:  Short term goals  Time Frame for Short term goals: 3 weeks  Short term goal 1: Pt will improve standing activity tolerance to at least 15 consecutive minutes  Short term goal 2: Pt will report at least 25% decrease in neck and LBP with daily activity    Long term goals  Time Frame for Long term goals : 6 weeks  Long term goal 1: Pt will ambulate community distances w/o AD, displaying good step symmetry and balance  Long term goal 2: Pt will display improved functional endurance with ability to complete 6 minute walk test w/o AD. Long term goal 3: Pt will report at least 50% decrease in neck and back pain with daily activity  Long term goal 4: Pt will improve Mod Oswestry score to <15/50 to demo functional improvement.   Progress toward goals: see PN    POST-PAIN       Pain Rating (0-10 pain scale):  0 /10 Location and pain description same as pre-treatment unless indicated. Action: [] NA   [] Perform HEP  [] Meds as prescribed  [] Modalities as prescribed   [] Call Physician     Frequency/Duration:  Plan  Times per week: 2  Plan weeks: 6  Current Treatment Recommendations: ROM, Strengthening, Balance Training, Gait Training, Endurance Training, Manual Therapy - Soft Tissue Mobilization, Patient/Caregiver Education & Training, Home Exercise Program, Modalities  Plan Comment: PN completed this date secondary to appt cancellation on 2/4/21     Pt to continue current HEP. See objective section for any therapeutic exercise changes, additions or modifications this date.        PT Individual Minutes  Time In: 1500  Time Out: 1550  Minutes: 50  Timed Code Treatment Minutes: 38 Minutes  Procedure Minutes: 10 min MHP     Timed Activity Minutes Units   Ther Ex 38 3       Signature:  Electronically signed by Bulmaro Aguirre PT on 2/9/21 at 6:37 PM EST

## 2021-02-11 ENCOUNTER — HOSPITAL ENCOUNTER (OUTPATIENT)
Dept: PHYSICAL THERAPY | Age: 76
Setting detail: THERAPIES SERIES
Discharge: HOME | End: 2021-02-11
Payer: MEDICARE

## 2021-02-16 ENCOUNTER — HOSPITAL ENCOUNTER (OUTPATIENT)
Dept: PHYSICAL THERAPY | Age: 76
Setting detail: THERAPIES SERIES
Discharge: HOME | End: 2021-02-16
Payer: MEDICARE

## 2021-02-16 NOTE — PROGRESS NOTES
[address]       Physical Therapy  Cancellation/No-show Note  Patient Name:  Trish Lange  :     Date:  2021  Referring Practitioner: Estelle Montes MD  Diagnosis: Chronic right-sided low back pain with sciatica, sciatica laterality unspecified    Visit Information:  PT Visit Information  Onset Date: 21  PT Insurance Information: Medicare  Total # of Visits Approved: 99  Total # of Visits to Date: 9  Plan of Care/Certification Expiration Date: 21  No Show: 0  Canceled Appointment: 3  Progress Note Counter: 9/10 (CX 21)    For today's appointment patient:  [x]  Cancelled  []  Rescheduled appointment  []  No-show   []  Called pt to remind of next appointment     Reason given by patient:  []  Patient ill  []  Conflicting appointment  []  No transportation    []  Conflict with work  []  No reason given  [x]  Other:  Winter storm     Comments:       Signature: Electronically signed by Pamela Dhillon PTA on 21 at 9:26 AM EST

## 2021-02-18 ENCOUNTER — HOSPITAL ENCOUNTER (OUTPATIENT)
Dept: PHYSICAL THERAPY | Age: 76
Setting detail: THERAPIES SERIES
Discharge: HOME | End: 2021-02-18
Payer: MEDICARE

## 2021-02-18 PROCEDURE — 97110 THERAPEUTIC EXERCISES: CPT

## 2021-02-18 PROCEDURE — 97112 NEUROMUSCULAR REEDUCATION: CPT

## 2021-02-18 ASSESSMENT — PAIN DESCRIPTION - DESCRIPTORS: DESCRIPTORS: TIGHTNESS

## 2021-02-18 ASSESSMENT — PAIN DESCRIPTION - LOCATION: LOCATION: BACK

## 2021-02-18 NOTE — PROGRESS NOTES
[address] [address]  Outpatient Physical Therapy    Treatment Note        Date: 2021  Patient: Trish Lange  : [birth year]  ACCT #: [de-identified]  Referring Practitioner: Estelle Montes MD  Diagnosis: Chronic right-sided low back pain with sciatica, sciatica laterality unspecified    Visit Information:  PT Visit Information  Onset Date: 21  PT Insurance Information: Medicare  Total # of Visits Approved: 99  Total # of Visits to Date: 10  Plan of Care/Certification Expiration Date: 21  No Show: 0  Canceled Appointment: 3  Progress Note Counter: 10/12 (per POC)    Subjective: Pt reports LB muscles feel stretched. Feels balance could improve, currently ambulates short distance in house without AD however feels unsteady at times. Comments: No lifting > 8 #  HEP Compliance:  [x] Good [] Fair [] Poor [] Reports not doing due to:    Vital Signs  Patient Currently in Pain: Yes   Pain Screening  Patient Currently in Pain: Yes  Pain Assessment  Pain Level: 4  Pain Location: Back  Pain Orientation: Lower  Pain Descriptors: Tightness    OBJECTIVE:   Exercises  Exercise 1: Supine exs performed on red foam pad for pt comfort  Exercise 2: SciFit L3 6 min to increase LE and core strength  Exercise 3: RTB seated chest pulls x12  Exercise 4: S/L hip ABD x 10 leyla (tactile cues to improve technique)  Exercise 6: Bridges 3'' x10 (add TB around knees NV)  Exercise 17: Gait drills: Forward, lateral and retro unsupported with emphasis on foot clearance x2 laps  Exercise 18:  Shah balance tasks: static standing feet together, modified tandem 30sec, eyes closed 10sec x2  Exercise 20: HEP: static stands         Modalities:  Modalities  Moist heat: MH to cervcial and LB x 10 mins     *Indicates exercise, modality, or manual techniques to be initiated when appropriate    Assessment: Body structures, Functions, Activity limitations: Increased pain, Decreased posture, Decreased ROM, Decreased strength, Decreased endurance, Decreased ADL status, Decreased functional mobility   Assessment: Initiated balance activities to improve standing tolerance and stability. Stability challenged with NBOS activities such as WBing through [address] in modified tandem stance. Demonstrates Lt Trendelenburg with lateral ambulation, VCs to increase Lt foot clearance with forward gait. Treatment Diagnosis: neck and back pain, decreased daily activity tolerance, impaired gait and mobility  Prognosis: Good       Goals:  Short term goals  Time Frame for Short term goals: 3 weeks  Short term goal 1: Pt will improve standing activity tolerance to at least 15 consecutive minutes  Short term goal 2: Pt will report at least 25% decrease in neck and LBP with daily activity    Long term goals  Time Frame for Long term goals : 6 weeks  Long term goal 1: Pt will ambulate community distances w/o AD, displaying good step symmetry and balance  Long term goal 2: Pt will display improved functional endurance with ability to complete 6 minute walk test w/o AD. Long term goal 3: Pt will report at least 50% decrease in neck and back pain with daily activity  Long term goal 4: Pt will improve Mod Oswestry score to <15/50 to demo functional improvement. Progress toward goals: Strength, balance    POST-PAIN       Pain Rating (0-10 pain scale):  2 /10   Location and pain description same as pre-treatment unless indicated.    Action: [] NA   [] Perform HEP  [x] Meds as prescribed  [] Modalities as prescribed   [] Call Physician     Frequency/Duration:  Plan  Times per week: 2  Plan weeks: 6  Current Treatment Recommendations: ROM, Strengthening, Balance Training, Gait Training, Endurance Training, Manual Therapy - Soft Tissue Mobilization, Patient/Caregiver Education & Training, Home Exercise Program, Modalities Pt to continue current HEP. See objective section for any therapeutic exercise changes, additions or modifications this date.          PT Individual Minutes  Time In: 7228  Time Out: 1426  Minutes: 51  Timed Code Treatment Minutes: 41 Minutes  Procedure Minutes:10- HP   Timed Activity Minutes Units   Ther Ex 30 2   Neuro 11 1       Signature:  Electronically signed by Alba Ware PTA on 2/18/21 at 1:15 PM EST

## 2021-02-23 ENCOUNTER — HOSPITAL ENCOUNTER (OUTPATIENT)
Dept: PHYSICAL THERAPY | Age: 76
Setting detail: THERAPIES SERIES
Discharge: HOME | End: 2021-02-23
Payer: MEDICARE

## 2021-02-23 PROCEDURE — 97140 MANUAL THERAPY 1/> REGIONS: CPT

## 2021-02-23 PROCEDURE — 97110 THERAPEUTIC EXERCISES: CPT

## 2021-02-23 ASSESSMENT — PAIN DESCRIPTION - PAIN TYPE: TYPE: SURGICAL PAIN

## 2021-02-23 ASSESSMENT — PAIN SCALES - GENERAL: PAINLEVEL_OUTOF10: 6

## 2021-02-23 NOTE — PROGRESS NOTES
[address] [address]  Outpatient Physical Therapy    Treatment Note        Date: 2021  Patient: Shanta Rodriguez  :   ACCT #: [de-identified]  Referring Practitioner: Kathy Sevilla MD  Diagnosis: Chronic right-sided low back pain with sciatica, sciatica laterality unspecified    Visit Information:  PT Visit Information  Onset Date: 21  PT Insurance Information: Medicare  Total # of Visits Approved: 99  Total # of Visits to Date: 11  Plan of Care/Certification Expiration Date: 21  No Show: 0  Canceled Appointment: 3  Progress Note Counter:  (per POC)    Subjective: Pt reports \"a sore muscle\" on R side of LB that he's had since Friday. Pt states he's unsure what caused it. Comments: No lifting > 8 #  HEP Compliance:  [x] Good [] Fair [] Poor [] Reports not doing due to:    Vital Signs  Patient Currently in Pain: Yes   Pain Screening  Patient Currently in Pain: Yes  Pain Assessment  Pain Assessment: 0-10  Pain Level: 6  Pain Type: Surgical pain  Pain Location: Back  Pain Orientation: Lower  Pain Descriptors: Tightness    OBJECTIVE:   Exercises  Exercise 1: Supine exs performed on red foam pad for pt comfort  Exercise 2: SciFit L3 6 min to increase LE and core strength  Exercise 3: RTB seated chest pulls x12  Exercise 5: RTB resist clam shells: x10 leyla (very challenged to bias glut med)  Exercise 6: Bridges 3'' x10 (w/ RTB around knees)  Exercise 7: DKTC w/ pball 5''x10  Exercise 8: HS curls RTB x15 b/l  Exercise 17: Gait drills:  Forward, lateral unsupported with emphasis on foot clearance x2 laps - inc LBP therefore held retro     Strength: [x] NT  [] MMT completed:    ROM: [x] NT  [] ROM measurements:     Manual:   Manual therapy  Soft Tissue Mobalization: Lumbar (R>L) STM w/ tennis ball to dec pain and mm tightness x10 mins    Modalities:   Modalities  Moist heat: MH to cervcial and LB x 10 mins     *Indicates exercise, modality, or manual techniques to be initiated when appropriate Current Treatment Recommendations: ROM, Strengthening, Balance Training, Gait Training, Endurance Training, Manual Therapy - Soft Tissue Mobilization, Patient/Caregiver Education & Training, Home Exercise Program, Modalities     Pt to continue current HEP. See objective section for any therapeutic exercise changes, additions or modifications this date.     PT Individual Minutes  Time In: 6849  Time Out: 3988  Minutes: 48  Timed Code Treatment Minutes: 38 Minutes  Procedure Minutes: 10     Timed Activity Minutes Units   Ther Ex 28 2   Manual  10 1       Signature:  Electronically signed by Nori Hood PTA on 2/23/21 at 2:56 PM EST

## 2021-02-25 ENCOUNTER — HOSPITAL ENCOUNTER (OUTPATIENT)
Dept: PHYSICAL THERAPY | Age: 76
Setting detail: THERAPIES SERIES
Discharge: HOME | End: 2021-02-25
Payer: MEDICARE

## 2021-02-25 PROCEDURE — 97112 NEUROMUSCULAR REEDUCATION: CPT

## 2021-02-25 PROCEDURE — 97110 THERAPEUTIC EXERCISES: CPT

## 2021-02-25 PROCEDURE — 97116 GAIT TRAINING THERAPY: CPT

## 2021-02-25 ASSESSMENT — PAIN DESCRIPTION - LOCATION: LOCATION: BACK

## 2021-02-25 ASSESSMENT — PAIN DESCRIPTION - ORIENTATION: ORIENTATION: LOWER;RIGHT

## 2021-02-25 NOTE — PROGRESS NOTES
[id number] [address]  Outpatient Physical Therapy    Treatment Note        Date: 2021  Patient: Jeannine Ocampo  :   ACCT #: [de-identified]  Referring Practitioner: Yadira Ramos MD  Diagnosis: Chronic right-sided low back pain with sciatica, sciatica laterality unspecified    Visit Information:  PT Visit Information  Onset Date: 21  PT Insurance Information: Medicare  Total # of Visits Approved: 99  Total # of Visits to Date: 12  Plan of Care/Certification Expiration Date: 21  No Show: 0  Canceled Appointment: 3  Progress Note Counter:  (next PN counter )    Subjective: Pt states \"I'm sore from when I pulled my back last week but the tennis ball massage really helped. \"  Comments: No lifting > 8 #  HEP Compliance:  [x] Good [] Fair [] Poor [] Reports not doing due to:    Vital Signs  Patient Currently in Pain: Yes   Pain Screening  Patient Currently in Pain: Yes  Pain Assessment  Pain Assessment: 0-10  Pain Level: 5(5-6)  Pain Location: Back  Pain Orientation: Lower;Right  Pain Descriptors: (\"weakness on right side\")    OBJECTIVE:   Exercises  Exercise 2: SciFit L3 6 min to increase LE and core strength  Exercise 3: 6 min walk test (w/ SC)- Pt completes 825ft around unit; x1 standing RB of ~20 sec required at 3:00  Exercise 4: Shah balance assessment: 41/56    Strength: [x] NT  [] MMT completed:     ROM: [] NT  [x] ROM measurements:   Spine  Lumbar: flex 75% WNLs, ext <25%, b/l SB 75% (bending/twisting restrictions have been lifted)     Modalities:  Modalities  Moist heat: MH to cervcial and LB x 10 mins     *Indicates exercise, modality, or manual techniques to be initiated when appropriate    Assessment:     Body structures, Functions, Activity limitations: Increased pain, Decreased posture, Decreased ROM, Decreased strength, Decreased endurance, Decreased ADL status, Decreased functional mobility Assessment: Pt has reached end of current POC w/ good improvements towards goals though has cont'd concern for balance , Lt LE weakness and need for [facility] w/ ambulation. Pt LB and neck pain cont's to range 3-8/10 at most dependant on activity though pt expressing 30% decrease since start of PT. POC extended additional 8 visits to cont establishing appropriate HEP and working towards pts goal of increasing standing gen and amb w/o AD. Treatment Diagnosis: neck and back pain, decreased daily activity tolerance, impaired gait and mobility  Prognosis: Good     Goals:  Short term goals  Time Frame for Short term goals: 3 weeks  Short term goal 1: Pt will improve standing activity tolerance to at least 15 consecutive minutes  Short term goal 2: Pt will report at least 25% decrease in neck and LBP with daily activity  Long term goals  Time Frame for Long term goals : 6 weeks  Long term goal 1: Pt will ambulate community distances w/o AD, displaying good step symmetry and balance  Long term goal 2: Pt will display improved functional endurance with ability to complete 6 minute walk test w/o AD. Long term goal 3: Pt will report at least 50% decrease in neck and back pain with daily activity  Long term goal 4: Pt will improve Mod Oswestry score to <15/50 to demo functional improvement. Progress toward goals: Please refer to PN for details. POST-PAIN       Pain Rating (0-10 pain scale):   3/10   Location and pain description same as pre-treatment unless indicated. Action: [] NA   [x] Perform HEP  [] Meds as prescribed  [x] Modalities as prescribed   [] Call Physician     Frequency/Duration:  Plan  Times per week: 2  Plan weeks: 4  Current Treatment Recommendations: ROM, Strengthening, Balance Training, Gait Training, Endurance Training, Manual Therapy - Soft Tissue Mobilization, Patient/Caregiver Education & Training, Home Exercise Program, Modalities  Plan Comment: Additional 4 wks; PN completed to extend POC. Pt to continue current HEP. See objective section for any therapeutic exercise changes, additions or modifications this date.     PT Individual Minutes  Time In: 2972  Time Out: 7499  Minutes: 50  Timed Code Treatment Minutes: 40 Minutes  Procedure Minutes: 10 min ()      Timed Activity Minutes Units   Ther Ex 24 2   Neuro 10 1   Gt 6 0     Signature:  Electronically signed by Tejinder Donahue PTA on 2/25/21 at 5:55 PM EST

## 2021-02-25 NOTE — PROGRESS NOTES
[address]     Ph: [phone]  Fax: [phone]    [] Certification  [] Recertification []  Plan of Care  [x] Progress Note [] Discharge      To:  Trice Landry MD      From:  Carmelita Gardiner, PT, DPT  Patient: Peña Castillo     : 1945  Diagnosis: Chronic right-sided low back pain with sciatica, sciatica laterality unspecified     Date: 2021  Treatment Diagnosis: neck and back pain, decreased daily activity tolerance, impaired gait and mobility    Plan of Care/Certification Expiration Date: 21  Progress Report Period from:  2021  to 2021    Total # of Visits to Date: 12   No Show: 0    Canceled Appointment: 3     OBJECTIVE:   Short Term Goals - Time Frame for Short term goals: 3 weeks    Goals Current/Discharge status  Met   Short term goal 1: Pt will improve standing activity tolerance to at least 15 consecutive minutes  10 min standing gen per pt report. [] yes  [x] no   Short term goal 2: Pt will report at least 25% decrease in neck and LBP with daily activity  30% dec in neck/LBP reported    Pain Location: Back    Pain Level: 5(5-6)    Pain Descriptors: (\"weakness on right side\")  Pain ranges 3-8/10 at most.  [x] yes  [] no     Long Term Goals - Time Frame for Long term goals : 6 weeks  Goals Current/ Discharge status Met   Long term goal 1: Pt will ambulate community distances w/o AD, displaying good step symmetry and balance Community amb w/ SC  [] yes  [x] no   Long term goal 2: Pt will display improved functional endurance with ability to complete 6 minute walk test w/o AD. Cane; 825 ft, x1 seated RB of ~20 sec duration.   [] yes  [x] no   Long term goal 3: Pt will report at least 50% decrease in neck and back pain with daily activity See above ; 30% decrease [] yes  [x] no Please sign and return

## 2021-03-01 ENCOUNTER — HOSPITAL ENCOUNTER (OUTPATIENT)
Dept: PHYSICAL THERAPY | Age: 76
Setting detail: THERAPIES SERIES
Discharge: HOME | End: 2021-03-01
Payer: MEDICARE

## 2021-03-01 PROCEDURE — 97110 THERAPEUTIC EXERCISES: CPT

## 2021-03-01 ASSESSMENT — PAIN DESCRIPTION - LOCATION: LOCATION: BACK

## 2021-03-01 ASSESSMENT — PAIN DESCRIPTION - ORIENTATION: ORIENTATION: LOWER

## 2021-03-01 NOTE — PROGRESS NOTES
[address] [address]  Outpatient Physical Therapy    Treatment Note        Date: 3/1/2021  Patient: Donaldo Oneill  :   ACCT #: [de-identified]  Referring Practitioner: Graeme Diaz MD  Diagnosis: Chronic right-sided low back pain with sciatica, sciatica laterality unspecified    Visit Information:  PT Visit Information  Onset Date: 21  PT Insurance Information: Medicare  Total # of Visits Approved: 99  Total # of Visits to Date: 946  Plan of Care/Certification Expiration Date: 21  No Show: 0  Canceled Appointment: 3  Progress Note Counter:     Subjective: slight pain in my LB today, it feels tired and sore, my neck is feeling better  Comments: No lifting > 8 #  HEP Compliance:  [x] Good [] Fair [] Poor [] Reports not doing due to:    Vital Signs  Patient Currently in Pain: Yes   Pain Screening  Patient Currently in Pain: Yes  Pain Assessment  Pain Level: 2  Pain Type: Surgical pain  Pain Location: Back  Pain Orientation: Lower  Pain Descriptors: Tiring; Sore    OBJECTIVE:   Exercises  Exercise 2: SciFit L-3.0, 6 min to increase LE and core strength  Exercise 3: LTR's 5 sec x 10  Exercise 4: SLR x 5, b/l  Exercise 6: Bridges 3'' x10 (w/ RTB around knees)  Exercise 7: DKTC w/ pball 5''x10  Exercise 8: HS curls RTB x15 b/l  Exercise 9: mid thoracic stretch 10 sec x 5, over p-ball  Exercise 12: DLS 3-way x 10, supine  Exercise 13: Ab walkouts x 15  Exercise 14: DLS double arm reach x 15, w/ ball            Strength: [x] NT  [] MMT completed:   ROM: [x] NT  [] ROM measurements:      Modalities:  Modalities  Moist heat: MH to cervcial and LB x 10 mins     *Indicates exercise, modality, or manual techniques to be initiated when appropriate    Assessment:         Body structures, Functions, Activity limitations: Increased pain, Decreased posture, Decreased ROM, Decreased strength, Decreased endurance, Decreased ADL status, Decreased functional mobility Assessment: focused today on mostly LB stretching and core strengthening ex, w/ some LE strengthening also, good tolerance, relief stated w/ tennis ball massage, conclude w/ HP to LB to reduce mm stiffness  Treatment Diagnosis: neck and back pain, decreased daily activity tolerance, impaired gait and mobility  Prognosis: Good       Goals:  Short term goals  Time Frame for Short term goals: 3 weeks  Short term goal 1: Pt will improve standing activity tolerance to at least 15 consecutive minutes  Short term goal 2: Pt will report at least 25% decrease in neck and LBP with daily activity    Long term goals  Time Frame for Long term goals : 6 weeks  Long term goal 1: Pt will ambulate community distances w/o AD, displaying good step symmetry and balance  Long term goal 2: Pt will display improved functional endurance with ability to complete 6 minute walk test w/o AD. Long term goal 3: Pt will report at least 50% decrease in neck and back pain with daily activity  Long term goal 4: Pt will improve Mod Oswestry score to <15/50 to demo functional improvement. Progress toward goals:ongoing    POST-PAIN       Pain Rating (0-10 pain scale):   3/10   Location and pain description same as pre-treatment unless indicated. Action: [] NA   [x] Perform HEP  [] Meds as prescribed  [] Modalities as prescribed   [] Call Physician     Frequency/Duration:  Plan  Times per week: 2  Plan weeks: 4  Current Treatment Recommendations: ROM, Strengthening, Balance Training, Gait Training, Endurance Training, Manual Therapy - Soft Tissue Mobilization, Patient/Caregiver Education & Training, Home Exercise Program, Modalities  Plan Comment: Additional 4 wks; PN completed to extend POC. Pt to continue current HEP. See objective section for any therapeutic exercise changes, additions or modifications this date.          PT Individual Minutes  Time In: 1500  Time Out: 2027  Minutes: 48  Timed Code Treatment Minutes: 38 Minutes Procedure Minutes:HP 10 min     Timed Activity Minutes Units   Ther Ex 38 3       Signature:  Electronically signed by Keiry Ware PTA on 3/1/21 at 3:39 PM EST

## 2021-03-16 ENCOUNTER — HOSPITAL ENCOUNTER (OUTPATIENT)
Dept: PHYSICAL THERAPY | Age: 76
Setting detail: THERAPIES SERIES
Discharge: HOME | End: 2021-03-16
Payer: MEDICARE

## 2021-03-16 PROCEDURE — 97110 THERAPEUTIC EXERCISES: CPT

## 2021-03-16 PROCEDURE — 97140 MANUAL THERAPY 1/> REGIONS: CPT

## 2021-03-16 ASSESSMENT — PAIN DESCRIPTION - ORIENTATION: ORIENTATION: RIGHT;LOWER

## 2021-03-16 ASSESSMENT — PAIN SCALES - GENERAL: PAINLEVEL_OUTOF10: 6

## 2021-03-16 NOTE — PROGRESS NOTES
[address] [address]  Outpatient Physical Therapy    Treatment Note        Date: 3/16/2021  Patient: Yudy June  :   ACCT #: [de-identified]  Referring Practitioner: Yuan Fuentes MD  Diagnosis: Chronic right-sided low back pain with sciatica, sciatica laterality unspecified    Visit Information:  PT Visit Information  Onset Date: 21  PT Insurance Information: Medicare  Total # of Visits Approved: 99  Total # of Visits to Date: 14  Plan of Care/Certification Expiration Date: 21  No Show: 0  Canceled Appointment: 3  Progress Note Counter:     Subjective: Pt saw surgeon last week. He is pleased with current progress, XRs looked great, recommened pt continue therapy for next 6 visits then continue with HEP noting it may be another 3-4 months before healing process is complete. Pt reports his neck feels tired today and rigth side of his low back feels tight and sore. Comments: All restrictions lifted per patient, following last f/u with MD  HEP Compliance:  [] Good [] Fair [] Poor [] Reports not doing due to:    Vital Signs  Patient Currently in Pain: Yes   Pain Screening  Patient Currently in Pain: Yes  Pain Assessment  Pain Assessment: 0-10  Pain Level: 6  Pain Location: Back  Pain Orientation: Right; Lower  Pain Descriptors: Sore;Tightness    OBJECTIVE:   Exercises  Exercise 2: SF: L3 x 6 min  Exercise 3: LTRs with legs on P-ball x 15 (after manual)  Exercise 4: DKTC w/ p-ball x 15 (after manual)    Strength: [x] NT  [] MMT completed:    ROM: [x] NT  [] ROM measurements:      Manual:   Manual therapy  PROM: Georges LE stretches: georges hip flexion, georges HS, hip ER/IR (pt prone and supine), georges piriformis  Soft Tissue Mobalization: R gluteal STM with TB; pt in S/L and prone  Other: Manual x 30 min    Modalities:  Modalities  Moist heat: MH to cervcial and LB x 10 mins     *Indicates exercise, modality, or manual techniques to be initiated when appropriate    Assessment:         Body structures, Functions, Activity limitations: Increased pain, Decreased posture, Decreased ROM, Decreased strength, Decreased endurance, Decreased ADL status, Decreased functional mobility   Assessment: Pt returning to therapy after 2 week break to attend multiple appointments for himself and his spouse. Had f/u with MD who notes good healing progress via XR. Pt still having LBP and neck fatigue that limits his standing activity tolerance. Focused this tx session on mainly manual interventions to decrease low back and LE muscle tightness for pain relief. Pt was able to attain prone position for portion of tx without pain. Pt has very tight leyla LEs, but notes pulling in familiar areas of pain when passive stretches are applied. Pt to continue therapy on 2x/wk basis working toward all therapy goals and HEP. Treatment Diagnosis: neck and back pain, decreased daily activity tolerance, impaired gait and mobility  Prognosis: Good       Goals:  Short term goals  Time Frame for Short term goals: 3 weeks  Short term goal 1: Pt will improve standing activity tolerance to at least 15 consecutive minutes  Short term goal 2: Pt will report at least 25% decrease in neck and LBP with daily activity    Long term goals  Time Frame for Long term goals : 6 weeks  Long term goal 1: Pt will ambulate community distances w/o AD, displaying good step symmetry and balance  Long term goal 2: Pt will display improved functional endurance with ability to complete 6 minute walk test w/o AD. Long term goal 3: Pt will report at least 50% decrease in neck and back pain with daily activity  Long term goal 4: Pt will improve Mod Oswestry score to <15/50 to demo functional improvement. Progress toward goals: cont toward all     POST-PAIN       Pain Rating (0-10 pain scale):  3 /10   Location and pain description same as pre-treatment unless indicated.    Action: [] NA   [x] Perform HEP  [] Meds as prescribed  [x] Modalities as prescribed   [] Call Physician Frequency/Duration:  Plan  Times per week: 2  Plan weeks: 4  Current Treatment Recommendations: ROM, Strengthening, Balance Training, Gait Training, Endurance Training, Manual Therapy - Soft Tissue Mobilization, Patient/Caregiver Education & Training, Home Exercise Program, Modalities  Plan Comment: Additional 4 wks added at last PN     Pt to continue current HEP. See objective section for any therapeutic exercise changes, additions or modifications this date.        PT Individual Minutes  Time In: 1500  Time Out: 6701  Minutes: 53  Timed Code Treatment Minutes: 40 Minutes  Procedure Minutes: 10 min MHPs     Timed Activity Minutes Units   Ther Ex 10 1   Manual  30 2       Signature:  Electronically signed by Karan Salazar, PT on 3/16/21 at 3:54 PM EDT

## 2021-03-18 ENCOUNTER — HOSPITAL ENCOUNTER (OUTPATIENT)
Dept: PHYSICAL THERAPY | Age: 76
Setting detail: THERAPIES SERIES
Discharge: HOME | End: 2021-03-18
Payer: MEDICARE

## 2021-03-18 PROCEDURE — 97110 THERAPEUTIC EXERCISES: CPT

## 2021-03-18 ASSESSMENT — PAIN DESCRIPTION - LOCATION: LOCATION: BACK;NECK

## 2021-03-18 ASSESSMENT — PAIN DESCRIPTION - ORIENTATION: ORIENTATION: LOWER;RIGHT

## 2021-03-18 ASSESSMENT — PAIN SCALES - GENERAL: PAINLEVEL_OUTOF10: 4

## 2021-03-18 NOTE — PROGRESS NOTES
[address] [address]  Outpatient Physical Therapy    Treatment Note        Date: 3/18/2021  Patient: Aamir Macias  :   ACCT #: [de-identified]  Referring Practitioner: Rosemary Reyes MD  Diagnosis: Chronic right-sided low back pain with sciatica, sciatica laterality unspecified    Visit Information:  PT Visit Information  Onset Date: 21  PT Insurance Information: Medicare  Total # of Visits Approved: 99  Total # of Visits to Date: 15  Plan of Care/Certification Expiration Date: 21  No Show: 0  Canceled Appointment: 3  Progress Note Counter: 3/8    Subjective: I was really sore post LV, so I'd like to take it easy with my back today, my neck is 2/10 and feels heavy, my LB right is 4/10  Comments: All restrictions lifted per patient, following last f/u with MD  HEP Compliance:  [x] Good [] Fair [] Poor [] Reports not doing due to:    Vital Signs  Patient Currently in Pain: Yes   Pain Screening  Patient Currently in Pain: Yes  Pain Assessment  Pain Level: 4  Pain Type: Surgical pain  Pain Location: Back;Neck  Pain Orientation: Lower;Right  Pain Descriptors: Sore    OBJECTIVE:   Exercises  Exercise 2: SF: L-3 x 6 min  Exercise 3: LTR's 10 sec x 10 (on red foam pad)  Exercise 4: SKTC 10 sec x 5, b/l, w/ strap  Exercise 5: QL stretch 10 sec x 5, seated, b/l  Exercise 10: knee to opp shoulder 20 sec x 3, b/l  Exercise 11: scap retractions 5 sec x 15  Exercise 18: cerv retractions x 15  Exercise 19: UT and LS stretches 20 sec x 3, b/l, cerv rotation L/R 3 sec x 10         Strength: [x] NT  [] MMT completed:     ROM: [x] NT  [] ROM measurements:   Modalities:  Modalities  Moist heat: MH to cervcial and LB x 10 mins  Cryotherapy (Minutes\Location): CP to cervical and LB x 10 min     *Indicates exercise, modality, or manual techniques to be initiated when appropriate    Assessment:         Body structures, Functions, Activity limitations: Increased pain, Decreased posture, Decreased ROM, Decreased strength, Decreased endurance, Decreased ADL status, Decreased functional mobility   Assessment: focus today was on stretching, neck > LB, added multiple stretches to improve ROM and decrease mm soreness, vc;s for proper technique and to remain in a comfortable pull position, good tolerance to all, conclude w/ HP to neck and LB, no neck pain post TX, LB pain  Decreased to 3/10. Treatment Diagnosis: neck and back pain, decreased daily activity tolerance, impaired gait and mobility  Prognosis: Good       Goals:  Short term goals  Time Frame for Short term goals: 3 weeks  Short term goal 1: Pt will improve standing activity tolerance to at least 15 consecutive minutes  Short term goal 2: Pt will report at least 25% decrease in neck and LBP with daily activity    Long term goals  Time Frame for Long term goals : 6 weeks  Long term goal 1: Pt will ambulate community distances w/o AD, displaying good step symmetry and balance  Long term goal 2: Pt will display improved functional endurance with ability to complete 6 minute walk test w/o AD. Long term goal 3: Pt will report at least 50% decrease in neck and back pain with daily activity  Long term goal 4: Pt will improve Mod Oswestry score to <15/50 to demo functional improvement. Progress toward goals:ongoing    POST-PAIN       Pain Rating (0-10 pain scale):   3/10   Location and pain description same as pre-treatment unless indicated. Action: [] NA   [x] Perform HEP  [] Meds as prescribed  [] Modalities as prescribed   [] Call Physician     Frequency/Duration:  Plan  Times per week: 2  Plan weeks: 4  Current Treatment Recommendations: ROM, Strengthening, Balance Training, Gait Training, Endurance Training, Manual Therapy - Soft Tissue Mobilization, Patient/Caregiver Education & Training, Home Exercise Program, Modalities  Plan Comment: Additional 4 wks added at last PN     Pt to continue current HEP.   See objective section for any therapeutic exercise changes, additions or modifications this date.          PT Individual Minutes  Time In: 0373  Time Out: 1530  Minutes: 48  Timed Code Treatment Minutes: 38 Minutes  Procedure Minutes:HP 10 min     Timed Activity Minutes Units   Ther Ex 38 3       Signature:  Electronically signed by Donald Echeverria PTA on 3/18/21 at 3:27 PM EDT

## 2021-03-23 ENCOUNTER — HOSPITAL ENCOUNTER (OUTPATIENT)
Dept: PHYSICAL THERAPY | Age: 76
Setting detail: THERAPIES SERIES
Discharge: HOME | End: 2021-03-23
Payer: MEDICARE

## 2021-03-23 PROCEDURE — 97110 THERAPEUTIC EXERCISES: CPT

## 2021-03-23 PROCEDURE — 97140 MANUAL THERAPY 1/> REGIONS: CPT

## 2021-03-23 ASSESSMENT — PAIN DESCRIPTION - LOCATION: LOCATION: BACK;NECK

## 2021-03-23 ASSESSMENT — PAIN SCALES - GENERAL: PAINLEVEL_OUTOF10: 4

## 2021-03-23 ASSESSMENT — PAIN DESCRIPTION - PAIN TYPE: TYPE: SURGICAL PAIN

## 2021-03-23 NOTE — PROGRESS NOTES
[facility]  Outpatient Physical Therapy    Treatment Note        Date: 3/23/2021  Patient: Tono Kumar  : [birth year]  ACCT #: [de-identified]  Referring Practitioner: Yanet Perry MD  Diagnosis: Chronic right-sided low back pain with sciatica, sciatica laterality unspecified    Visit Information:  PT Visit Information  Onset Date: 21  PT Insurance Information: Medicare  Total # of Visits Approved: 99  Total # of Visits to Date: 12  Plan of Care/Certification Expiration Date: 21  No Show: 0  Canceled Appointment: 3  Progress Note Counter:     Subjective: Patient states he walks without AD around his home. \"Sometimes I feel a little unsteady and reach for the wall/ grab bars. \" Patient denies recent LOB/falls. Comments: All restrictions lifted per patient, following last f/u with MD  HEP Compliance:  [x] Good [] Fair [] Poor [] Reports not doing due to:    Vital Signs  Patient Currently in Pain: Yes   Pain Screening  Patient Currently in Pain: Yes  Pain Assessment  Pain Assessment: 0-10  Pain Level: 4  Pain Type: Surgical pain  Pain Location: Back;Neck    OBJECTIVE:   Exercises  Exercise 2: SF: L-3 x 6 min  Exercise 3: LTR's 10 sec x 10 (on red foam pad)  Exercise 4: SKTC 10 sec x 5, b/l, w/ strap  Exercise 5: QL stretch 10 sec x 5, seated, b/l  Exercise 10: knee to opp shoulder 20 sec x 3, b/l  Exercise 11: scap retractions 5 sec x 15  Exercise 18: cerv retractions x 15  Exercise 19: UT and LS stretches 20 sec x 3, b/l, cerv rotation L/R 3 sec x 10  Manual:   Manual therapy  PROM: Georges LE stretches: georges hip flexion, georges HS    Modalities:  Modalities  Moist heat: MH to cervcial and LB x 10 mins     *Indicates exercise, modality, or manual techniques to be initiated when appropriate    Assessment:    Body structures, Functions, Activity limitations: Increased pain, Decreased posture, Decreased ROM, Decreased strength, Decreased endurance, Decreased ADL status, Decreased functional mobility Assessment: continued current POC wit focus on BLE/ lumbar mobility and decreased pain. Patient continues to require cuing to maintain pain free range during stretches. C/o fatigue post cervical spine exercises. Treatment Diagnosis: neck and back pain, decreased daily activity tolerance, impaired gait and mobility  Prognosis: Good       Goals:  Short term goals  Time Frame for Short term goals: 3 weeks  Short term goal 1: Pt will improve standing activity tolerance to at least 15 consecutive minutes  Short term goal 2: Pt will report at least 25% decrease in neck and LBP with daily activity    Long term goals  Time Frame for Long term goals : 6 weeks  Long term goal 1: Pt will ambulate community distances w/o AD, displaying good step symmetry and balance  Long term goal 2: Pt will display improved functional endurance with ability to complete 6 minute walk test w/o AD. Long term goal 3: Pt will report at least 50% decrease in neck and back pain with daily activity  Long term goal 4: Pt will improve Mod Oswestry score to <15/50 to demo functional improvement. Progress toward goals:continue towards all   POST-PAIN       Pain Rating (0-10 pain scale):   /10   Location and pain description same as pre-treatment unless indicated. Action: [] NA   [] Perform HEP  [] Meds as prescribed  [] Modalities as prescribed   [] Call Physician     Frequency/Duration:  Plan  Times per week: 2  Plan weeks: 4  Current Treatment Recommendations: ROM, Strengthening, Balance Training, Gait Training, Endurance Training, Manual Therapy - Soft Tissue Mobilization, Patient/Caregiver Education & Training, Home Exercise Program, Modalities  Plan Comment: Additional 4 wks added at last PN     Pt to continue current HEP. See objective section for any therapeutic exercise changes, additions or modifications this date.          PT Individual Minutes  Time In: 1502  Time Out: 1552  Minutes: 50  Timed Code Treatment Minutes: 40 Minutes Procedure Minutes:10     Timed Activity Minutes Units   Ther Ex 35 2   Manual  5 1       Signature:  Electronically signed by Josephine Wong PTA on 3/23/21 at 4:01 PM EDT

## 2021-03-25 ENCOUNTER — HOSPITAL ENCOUNTER (OUTPATIENT)
Dept: PHYSICAL THERAPY | Age: 76
Setting detail: THERAPIES SERIES
Discharge: HOME | End: 2021-03-25
Payer: MEDICARE

## 2021-03-25 PROCEDURE — 97110 THERAPEUTIC EXERCISES: CPT

## 2021-03-25 ASSESSMENT — PAIN SCALES - GENERAL: PAINLEVEL_OUTOF10: 1

## 2021-03-25 NOTE — PROGRESS NOTES
[address] [address]  Outpatient Physical Therapy    Treatment Note        Date: 3/25/2021  Patient: Pasqual Councilman  :   ACCT #: [de-identified]  Referring Practitioner: Mia Zuleta MD  Diagnosis: Chronic right-sided low back pain with sciatica, sciatica laterality unspecified    Visit Information:  PT Visit Information  Onset Date: 21  PT Insurance Information: Medicare  Total # of Visits Approved: 99  Total # of Visits to Date:   Plan of Care/Certification Expiration Date: 21  No Show: 0  Canceled Appointment: 3  Progress Note Counter:     Subjective: Pt states \"Really sore and stiff today in the lower back. I did a lot of stretches yesterday. \"  Comments:  All restrictions lifted per patient, following last f/u with MD, RTD 21  HEP Compliance:  [x] Good [] Fair [] Poor [] Reports not doing due to:    Vital Signs  Patient Currently in Pain: Yes   Pain Screening  Patient Currently in Pain: Yes  Pain Assessment  Pain Assessment: 0-10  Pain Level: 1(1/10 neck, LB 3-4/10)  Pain Location: Back;Neck  Pain Orientation: Lower;Right    OBJECTIVE:   Exercises  Exercise 2: SF: L-3 x 6 min  Exercise 3: LTR's 10 sec x 10 (on red foam pad)  Exercise 4: SKTC 10 sec x 5, b/l, w/ strap  Exercise 5: QL stretch 10 sec x 5, seated, b/l over PBall  Exercise 6: Bridges 3'' x10 (w/ RTB around knees)  Exercise 7: Lumbar flexion stretch over PBall in seated 10\"x5 ea  Exercise 8: TB lats/rows x10 YTB  Exercise 9: Seaed chest pulls x10 (IR only this date) cues to progress in standing  Exercise 10: knee to opp shoulder 20 sec x 3, b/l  Exercise 11: scap retractions 5 sec x 15  Exercise 18: cerv retractions x 15  Exercise 19: UT and LS stretches 20 sec x 3, b/l, cerv rotation L/R 3 sec x 10    Strength: [x] NT  [] MMT completed:     ROM: [x] NT  [] ROM measurements:     Modalities:  Modalities  Moist heat: MH to cervcial and LB x 10 mins     *Indicates exercise, modality, or manual techniques to be initiated Current Treatment Recommendations: ROM, Strengthening, Balance Training, Gait Training, Endurance Training, Manual Therapy - Soft Tissue Mobilization, Patient/Caregiver Education & Training, Home Exercise Program, Modalities  Plan Comment: Additional 4 wks added at last PN     Pt to continue current HEP. See objective section for any therapeutic exercise changes, additions or modifications this date.      PT Individual Minutes  Time In: 1997  Time Out: 2310  Minutes: 48  Timed Code Treatment Minutes: 38 Minutes  Procedure Minutes: 10 min ()      Timed Activity Minutes Units   Ther Ex 38 3     Signature:  Electronically signed by Tobi Ontiveros PTA on 3/25/21 at 3:49 PM EDT

## 2021-03-30 ENCOUNTER — HOSPITAL ENCOUNTER (OUTPATIENT)
Dept: PHYSICAL THERAPY | Age: 76
Setting detail: THERAPIES SERIES
Discharge: HOME | End: 2021-03-30
Payer: MEDICARE

## 2021-03-30 PROCEDURE — 97110 THERAPEUTIC EXERCISES: CPT

## 2021-03-30 PROCEDURE — 97150 GROUP THERAPEUTIC PROCEDURES: CPT

## 2021-03-30 ASSESSMENT — PAIN DESCRIPTION - PAIN TYPE: TYPE: SURGICAL PAIN

## 2021-03-30 ASSESSMENT — PAIN SCALES - GENERAL: PAINLEVEL_OUTOF10: 3

## 2021-03-30 ASSESSMENT — PAIN DESCRIPTION - DESCRIPTORS: DESCRIPTORS: SORE

## 2021-03-30 NOTE — PROGRESS NOTES
[facility]  Outpatient Physical Therapy    Treatment Note        Date: 3/30/2021  Patient: Refugio Rodriguez  :   ACCT #: [de-identified]  Referring Practitioner: David Hicks MD  Diagnosis: Chronic right-sided low back pain with sciatica, sciatica laterality unspecified    Visit Information:  PT Visit Information  Onset Date: 21  PT Insurance Information: Medicare  Total # of Visits Approved: 99  Total # of Visits to Date: 18  Plan of Care/Certification Expiration Date: 21  No Show: 0  Canceled Appointment: 3  Progress Note Counter:     Subjective: Pt presenting to appt reporting 3/10 in LB stating \"The pain is still in the back but not like it was. There is no pain in the neck but it still gets tired. \"  Comments:  All restrictions lifted per patient, following last f/u with MD, RTD 21  HEP Compliance:  [x] Good [] Fair [] Poor [] Reports not doing due to:    Vital Signs  Patient Currently in Pain: Yes   Pain Screening  Patient Currently in Pain: Yes  Pain Assessment  Pain Assessment: 0-10  Pain Level: 3  Pain Type: Surgical pain  Pain Location: Neck;Back  Pain Orientation: Lower  Pain Descriptors: Sore    OBJECTIVE:   Exercises  Exercise 2: SF: L-3 x 6 min  Exercise 3: LTR's 10 sec x 10 (on red foam pad)  Exercise 4: SKTC 10 sec x 5, b/l, w/ strap  Exercise 5: QL stretch 10 sec x 5, seated, b/l over PBall  Exercise 6: Bridges 3'' x10 alternating w/ ABD (w/ YTB around knees)  Exercise 7: Lumbar flexion stretch over PBall in seated 10\"x5 ea  Exercise 8: TB lats/rows x10 YTB  Exercise 9: Standing chest pulls x10 (IR only this date)  Exercise 10: knee to opp shoulder 20 sec x 3, b/l  Exercise 11: scap retractions 5 sec x 15  Exercise 18: cerv retractions x 15  Exercise 19: UT and LS stretches 20 sec x 3, b/l, cerv rotation L/R 3 sec x 10  Exercise 20: HEP: YTB provided for chest pulls    Strength: [x] NT  [] MMT completed:     ROM: [x] NT  [] ROM measurements:      Modalities: Modalities  Moist heat: MH to cervcial and LB x 10 mins     *Indicates exercise, modality, or manual techniques to be initiated when appropriate    Assessment: Body structures, Functions, Activity limitations: Increased pain, Decreased posture, Decreased ROM, Decreased strength, Decreased endurance, Decreased ADL status, Decreased functional mobility   Assessment: Cont'd current program focused on strengthening and functional endurance. Pt still utilizing SC in community however, stating \"more for security\" vs continuous need for support. Discussed pt appraching end of POC in 2 visits w/ pt requesting to D/C following NV. Pt expressing good compliance w/ HEP noting increased LE strength and standing tolerance from initial 30 sec to now 5-10 minutes. Treatment Diagnosis: neck and back pain, decreased daily activity tolerance, impaired gait and mobility  Prognosis: Good     Goals:  Short term goals  Time Frame for Short term goals: 3 weeks  Short term goal 1: Pt will improve standing activity tolerance to at least 15 consecutive minutes  Short term goal 2: Pt will report at least 25% decrease in neck and LBP with daily activity  Long term goals  Time Frame for Long term goals : 6 weeks  Long term goal 1: Pt will ambulate community distances w/o AD, displaying good step symmetry and balance  Long term goal 2: Pt will display improved functional endurance with ability to complete 6 minute walk test w/o AD. Long term goal 3: Pt will report at least 50% decrease in neck and back pain with daily activity  Long term goal 4: Pt will improve Mod Oswestry score to <15/50 to demo functional improvement. Progress toward goals: as listed above    POST-PAIN       Pain Rating (0-10 pain scale):   0/10   Location and pain description same as pre-treatment unless indicated.    Action: [x] NA   [] Perform HEP  [] Meds as prescribed  [] Modalities as prescribed   [] Call Physician     Frequency/Duration:  Plan  Times per week: 2 Plan weeks: 4  Specific instructions for Next Treatment: D/C NV  Current Treatment Recommendations: ROM, Strengthening, Balance Training, Gait Training, Endurance Training, Manual Therapy - Soft Tissue Mobilization, Patient/Caregiver Education & Training, Home Exercise Program, Modalities     Pt to continue current HEP. See objective section for any therapeutic exercise changes, additions or modifications this date.      PT Individual Minutes  Time In: 6897  Time Out: 1612  Minutes: 48  Timed Code Treatment Minutes: 38 Minutes  Procedure Minutes: 10 min ()      Timed Activity Minutes Units   Ther Ex 38 3     Signature:  Electronically signed by Jeffrey Villanueva PTA on 3/30/21 at 3:51 PM EDT

## 2021-04-01 ENCOUNTER — HOSPITAL ENCOUNTER (OUTPATIENT)
Dept: PHYSICAL THERAPY | Age: 76
Setting detail: THERAPIES SERIES
Discharge: HOME | End: 2021-04-01
Payer: MEDICARE

## 2021-04-01 PROCEDURE — 97110 THERAPEUTIC EXERCISES: CPT

## 2021-04-01 ASSESSMENT — PAIN SCALES - GENERAL: PAINLEVEL_OUTOF10: 2

## 2021-04-01 NOTE — PROGRESS NOTES
[address] [address]  Outpatient Physical Therapy    Treatment Note        Date: 2021  Patient: Jose Maria Vogt  :   ACCT #: [de-identified]  Referring Practitioner: Susie Mills MD  Diagnosis: Chronic right-sided low back pain with sciatica, sciatica laterality unspecified    Visit Information:  PT Visit Information  Onset Date: 21  PT Insurance Information: Medicare  Total # of Visits Approved: 99  Total # of Visits to Date:   Plan of Care/Certification Expiration Date: 21  No Show: 0  Canceled Appointment: 3  Progress Note Counter:     Subjective: slight pain today, but getting better, I feel about 90% functional  Comments: All restrictions lifted per patient, following last f/u with MD, RTD 21  HEP Compliance:  [x] Good [] Fair [] Poor [] Reports not doing due to:    Vital Signs  Patient Currently in Pain: Yes   Pain Screening  Patient Currently in Pain: Yes  Pain Assessment  Pain Level: 2  Pain Type: Surgical pain  Pain Location: Neck;Back  Pain Orientation: Lower  Pain Descriptors: Sore    OBJECTIVE:   Exercises  Exercise 2: SF: L-3 x 6 min  Exercise 3: LTR's 10 sec x 10 (on red foam pad)  Exercise 4: SKTC 10 sec x 10, b/l, w/ strap  Exercise 10: knee to opp shoulder 20 sec x 3, b/l  Exercise 13: Ab walkouts x 15  Exercise 14: DLS double arm reach x 15, w/ 2# ball  Exercise 15: 6 min walk on track, st cane used        Strength: [x] NT  [] MMT completed:     ROM: [x] NT  [] ROM measurements:      Modalities:  Modalities  Cryotherapy (Minutes\Location): CP to cervical and LB x 10 min     *Indicates exercise, modality, or manual techniques to be initiated when appropriate    Assessment:         Body structures, Functions, Activity limitations: Increased pain, Decreased posture, Decreased ROM, Decreased strength, Decreased endurance, Decreased ADL status, Decreased functional mobility   Assessment: continued w/ current ex and performed 6 min walk test, good tolerance to exercises, mod fatigue w/ walk test, no c/o pain during session, conclude w/ HP to LB and neck to reduce mm soreness  Treatment Diagnosis: neck and back pain, decreased daily activity tolerance, impaired gait and mobility  Prognosis: Good       Goals:  Short term goals  Time Frame for Short term goals: 3 weeks  Short term goal 1: Pt will improve standing activity tolerance to at least 15 consecutive minutes  Short term goal 2: Pt will report at least 25% decrease in neck and LBP with daily activity    Long term goals  Time Frame for Long term goals : 6 weeks  Long term goal 1: Pt will ambulate community distances w/o AD, displaying good step symmetry and balance  Long term goal 2: Pt will display improved functional endurance with ability to complete 6 minute walk test w/o AD. Long term goal 3: Pt will report at least 50% decrease in neck and back pain with daily activity  Long term goal 4: Pt will improve Mod Oswestry score to <15/50 to demo functional improvement. Progress toward goals:see D/C summary    POST-PAIN       Pain Rating (0-10 pain scale):   0/10   Location and pain description same as pre-treatment unless indicated. Action: [x] NA   [] Perform HEP  [] Meds as prescribed  [] Modalities as prescribed   [] Call Physician       Plan  D/C per request     Pt to continue current HEP. See objective section for any therapeutic exercise changes, additions or modifications this date.          PT Individual Minutes  Time In: 1500  Time Out: 5487  Minutes: 48  Timed Code Treatment Minutes: 38 Minutes  Procedure Minutes:HP 10 min     Timed Activity Minutes Units   Ther Ex 38 3       Signature:  Electronically signed by Zaida Schwarz PTA on 4/1/21 at 3:40 PM EDT

## 2021-04-01 NOTE — PROGRESS NOTES
Jalen burdick, [address]     Ph: [phone]  Fax: [phone]    [] Certification  [] Recertification []  Plan of Care  [] Progress Note [x] Discharge      To:  Vianey Lee MD      From:  Luanne Cheung, PT  Patient: Lien Muhammad     : 1945  Diagnosis: Chronic right-sided low back pain with sciatica, sciatica laterality unspecified     Date: 2021  Treatment Diagnosis: neck and back pain, decreased daily activity tolerance, impaired gait and mobility    Plan of Care/Certification Expiration Date: 21  Progress Report Period from:  2021  to 2021    Total # of Visits to Date: 19   No Show: 0    Canceled Appointment: 3     OBJECTIVE:   Short Term Goals - Time Frame for Short term goals: 3 weeks    Goals Current/Discharge status  Met   Short term goal 1: Pt will improve standing activity tolerance to at least 15 consecutive minutes  Able to stand <15 min [x] yes  [] no   Short term goal 2: Pt will report at least 25% decrease in neck and LBP with daily activity  Recent pain 0-2/10 [x] yes  [] no     Long Term Goals - Time Frame for Long term goals : 6 weeks  Goals Current/ Discharge status Met   Long term goal 1: Pt will ambulate community distances w/o AD, displaying good step symmetry and balance Able to walk 6 min w/ st cane and 10 min pushing shopping cart  [x] yes  [] no   Long term goal 2: Pt will display improved functional endurance with ability to complete 6 minute walk test w/o AD. Able to walk 6 min w/ cane and 10 min pushing shopping cart [] yes  [x] no- continues to use cane   Long term goal 3: Pt will report at least 50% decrease in neck and back pain with daily activity Recent pain 0-2/10 [x] yes  [] no   Long term goal 4: Pt will improve Mod Oswestry score to <15/50 to demo functional improvement.  ASA=19/50 [x] yes  [] no       Body structures, Functions, Activity limitations: Increased pain, Decreased posture, Decreased ROM, Decreased strength, Decreased endurance, Decreased ADL status, Decreased functional mobility   Assessment: Pt exhibits decreased pain ranging from 0-2/10 during activity. Pt continues to use cane during walking but exhibited improvement in self report of back pain. Pt requesting d/c on 4/1/21. Prognosis: Good  Discharge Recommendations: Continue to assess pending progress    PLAN:     Plan   D/C per request                   Patient Status:[] Continue/ Initiate plan of Care    [x] Discharge PT. Recommend pt continue with HEP. [] Additional visits requested, Please re-certify for additional visits:          Signature: objective info byElectronically signed by Isacc Richardson PTA on 4/1/21 at 3:47 PM EDT  Electronically signed by Jessy Solorio PT on 4/7/2021 at 9:58 AM    If you have any questions or concerns, please don't hesitate to call. Thank you for your referral.    I have reviewed this plan of care and certify a need for medically necessary rehabilitation services.     Physician Signature:__________________________________________________________  Date:  Please sign and return

## 2021-07-01 ENCOUNTER — OFFICE VISIT (OUTPATIENT)
Dept: CARDIOLOGY CLINIC | Age: 76
End: 2021-07-01
Payer: MEDICARE

## 2021-07-01 VITALS
SYSTOLIC BLOOD PRESSURE: 122 MMHG | OXYGEN SATURATION: 98 % | WEIGHT: 294 LBS | BODY MASS INDEX: 35.79 KG/M2 | DIASTOLIC BLOOD PRESSURE: 58 MMHG | HEART RATE: 73 BPM

## 2021-07-01 DIAGNOSIS — E66.01 MORBID OBESITY (HCC): Chronic | ICD-10-CM

## 2021-07-01 DIAGNOSIS — N18.30 STAGE 3 CHRONIC KIDNEY DISEASE, UNSPECIFIED WHETHER STAGE 3A OR 3B CKD (HCC): ICD-10-CM

## 2021-07-01 DIAGNOSIS — I10 ESSENTIAL HYPERTENSION: ICD-10-CM

## 2021-07-01 DIAGNOSIS — G47.33 OSA (OBSTRUCTIVE SLEEP APNEA): Chronic | ICD-10-CM

## 2021-07-01 DIAGNOSIS — I25.10 CORONARY ARTERY DISEASE INVOLVING NATIVE CORONARY ARTERY OF NATIVE HEART WITHOUT ANGINA PECTORIS: Primary | ICD-10-CM

## 2021-07-01 DIAGNOSIS — Z95.1 S/P CABG X 4: ICD-10-CM

## 2021-07-01 DIAGNOSIS — I73.9 PVD (PERIPHERAL VASCULAR DISEASE) (HCC): ICD-10-CM

## 2021-07-01 DIAGNOSIS — E78.2 MIXED HYPERLIPIDEMIA: ICD-10-CM

## 2021-07-01 PROCEDURE — G8417 CALC BMI ABV UP PARAM F/U: HCPCS | Performed by: INTERNAL MEDICINE

## 2021-07-01 PROCEDURE — G8427 DOCREV CUR MEDS BY ELIG CLIN: HCPCS | Performed by: INTERNAL MEDICINE

## 2021-07-01 PROCEDURE — 3017F COLORECTAL CA SCREEN DOC REV: CPT | Performed by: INTERNAL MEDICINE

## 2021-07-01 PROCEDURE — 99214 OFFICE O/P EST MOD 30 MIN: CPT | Performed by: INTERNAL MEDICINE

## 2021-07-01 PROCEDURE — 4040F PNEUMOC VAC/ADMIN/RCVD: CPT | Performed by: INTERNAL MEDICINE

## 2021-07-01 PROCEDURE — 1036F TOBACCO NON-USER: CPT | Performed by: INTERNAL MEDICINE

## 2021-07-01 PROCEDURE — 1123F ACP DISCUSS/DSCN MKR DOCD: CPT | Performed by: INTERNAL MEDICINE

## 2021-07-01 NOTE — PROGRESS NOTES
Chief Complaint   Patient presents with    Coronary Artery Disease    Claudication     P.V.D.  6 Month Follow-Up   WOrks at [place]. Patient presents for follow up medical evaluation. Patient is followed on a regular basis by Dr. Juliana Alvarez MD. 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 Patient was found to have severe CAD and underwent CABGx4 in 6/2013 at [facility]. Echo with EF of 50-60%, grade III DD. Mildly dilated ascending aorta. Doing well overall. Would like to undergo cardiac rehab. Now allowed to lift up to 20pounds. Compliant with meds, no bleeding issues, no SL nitro use. Pt denies chest pain, dyspnea, dyspnea on exertion, change in exercise capacity, fatigue, nausea, vomiting, diarrhea, constipation, motor weakness, insomnia, weight loss, syncope, dizziness, lightheadedness, palpitations, PND, orthopnea, or claudication. + snoring. Patient had CHRISTIANO post op, was anemic s/p PRBC.     10-17-13: as above, doing well overall. Finished cardiac rehab, has lost 33 pounds since surgery. Exercises 3x a week without any issues. Compliant with meds, no bleeding issues. Pt denies chest pain, dyspnea, dyspnea on exertion, change in exercise capacity, fatigue,  nausea, vomiting, diarrhea, constipation, motor weakness, insomnia, weight loss, syncope, dizziness, lightheadedness, palpitations, PND, orthopnea, or claudication. No Nitro use.    5-1-14: as above, doing good overall, no recent hospitalizations. No change in meds. No bleeding issues. No SL nitro use. Pt denies chest pain, dyspnea, dyspnea on exertion, change in exercise capacity, fatigue,  nausea, vomiting, diarrhea, constipation, motor weakness, insomnia, weight loss, syncope, dizziness, lightheadedness, palpitations, PND, orthopnea, or claudication. Following up with PCP on renal insufficiency. 11-13-14: as above, doing great. Got a new job at [employer].  Pt denies chest pain, dyspnea, dyspnea on exertion, change in exercise capacity, fatigue,  nausea, vomiting, diarrhea, constipation, motor weakness, insomnia, weight loss, syncope, dizziness, lightheadedness, palpitations, PND, orthopnea, or claudication. Compliant with meds. S/p normal KSENIA. States he's active at work, having a hard time losing weight, always have. States he's careful with diet. No SL nitro use. No recent hospitalizations. No LE edema. 6-2-15: as above, Pt denies chest pain, dyspnea, dyspnea on exertion, change in exercise capacity, fatigue,  nausea, vomiting, diarrhea, constipation, motor weakness, insomnia, weight loss, syncope, dizziness, lightheadedness, palpitations, PND, orthopnea, or claudication. No SL nitro use. No hospitalizations. No LE edema. Did see Dr. Eris Quinonez and tried an injectable for weight loss that did not work. 12-8-15: as above, did have right LE cellulitis. Doing better now. Pt denies chest pain, dyspnea, dyspnea on exertion, change in exercise capacity, fatigue,  nausea, vomiting, diarrhea, constipation, motor weakness, insomnia, weight loss, syncope, dizziness, lightheadedness, palpitations, PND, orthopnea, or claudication. States he's exercising.      7-22-16; as above, s/p echo with EF of 60%, mild LAE, mild LVH, grade IDD. Pt denies chest pain, dyspnea, dyspnea on exertion, change in exercise capacity, fatigue,  nausea, vomiting, diarrhea, constipation, motor weakness, insomnia, weight loss, syncope, dizziness, lightheadedness, palpitations, PND, orthopnea, or claudication. EKG: first degree AVB. BP is good. 2-17-17: states he is having hip pain and may need replacement. His BP is high today but normal at PCP office last week. Pt denies chest pain, dyspnea, dyspnea on exertion, change in exercise capacity, fatigue,  nausea, vomiting, diarrhea, constipation, motor weakness, insomnia, weight loss, syncope, dizziness, lightheadedness, palpitations, PND, orthopnea. No nitro use.  BP and hr are good. CAD is stable. No LE discoloration or ulcers. No LE edema. No CHF type symptoms. Lipid profile is normal.  Follows with Pod DR prasad for diabetic foot care. Does have CKD, CR of 1.6. Has gained two pounds. 11-6-20: Needs preoperative clearance for back surgery at [facility] as well as neck surgery. Patient has not been seen since 2017. Patient with history of Crohn disease status post CABG x4 in 2013 at [facility].  He denies any cardiac symptoms such as chest pain chest pressure heaviness or shortness of breath. Denies any nitro use. Blood pressure and heart rate are under control. Denies any recent cardiac testing. Patient with history of diabetes, hypertension, hyperlipidemia. EKG with normal sinus rhythm, left bundle branch block. BMI of 36.     2021: Status post normal nuclear stress test in November 2020. Underlying left bundle branch block. Normal ejection fraction status post echo at that time ejection fraction of 40 to 30% grade 1 diastolic dysfunction. Pt denies chest pain, dyspnea, dyspnea on exertion, change in exercise capacity, fatigue,  nausea, vomiting, diarrhea, constipation, motor weakness, insomnia, weight loss, syncope, dizziness, lightheadedness, palpitations, PND, orthopnea, or claudication. Patient with history of coronary artery disease status post CABG x4 in 2013 at [facility].  He denies any cardiac symptoms such as chest pain chest pressure heaviness or shortness of breath. LDL was 77 in 2/12/2020. Has stage III CKD. GFR of 43.       Patient Active Problem List   Diagnosis    Hypertension    Diabetes mellitus ([address]    Kidney stone    S/P CABG x 4    JAZZMINE (obstructive sleep apnea)    Morbid obesity (HCC)    Iron deficiency anemia    CAD (coronary artery disease)    BPH (benign prostatic hyperplasia)    Hyperlipidemia    Stage 3 chronic kidney disease    Type 2 diabetes mellitus with stage 3 chronic kidney disease, with long-term current use of insulin (HCC)    Left foot drop    PVD (peripheral vascular disease) ([facility])       Current Outpatient Medications   Medication Sig Dispense Refill    atorvastatin (LIPITOR) 40 MG tablet Take 1 tablet by mouth nightly 90 tablet 3    furosemide (LASIX) 40 MG tablet Take 1 tablet by mouth daily 90 tablet 3    glimepiride (AMARYL) 4 MG tablet Take 1 tablet by mouth 2 times daily 180 tablet 3    insulin glargine (LANTUS SOLOSTAR) 100 UNIT/ML injection pen Inject 30 Units into the skin nightly 10 pen 5    losartan (COZAAR) 25 MG tablet Take 1 tablet by mouth daily 90 tablet 3    tamsulosin (FLOMAX) 0.4 MG capsule Take 1 capsule by mouth daily 90 capsule 3    metoprolol tartrate (LOPRESSOR) 25 MG tablet Take 1 tablet by mouth 2 times daily 180 tablet 3    famotidine (PEPCID) 20 MG tablet Take 20 mg by mouth daily      nitroGLYCERIN (NITROSTAT) 0.4 MG SL tablet Place 1 tablet under the tongue See Admin Instructions 25 tablet 1    Handicap Placard MISC by Does not apply route Exp 5 years 1 each 0    aspirin 81 MG tablet Take 81 mg by mouth daily. No current facility-administered medications for this visit. ALLERGIES: Dye [iodides]    Review of Systems:  General ROS: negative  Psychological ROS: negative  Hematological and Lymphatic ROS: No history of blood clots or bleeding disorder. Respiratory ROS: no cough, shortness of breath, or wheezing  Cardiovascular ROS: no chest pain or dyspnea on exertion  Gastrointestinal ROS: no abdominal pain, change in bowel habits, or black or bloody stools  Genito-Urinary ROS: no dysuria, trouble voiding, or hematuria  Musculoskeletal ROS: negative  Neurological ROS: no TIA or stroke symptoms  Dermatological ROS: negative    VITALS:  Blood pressure (!) 122/58, pulse 73, weight 294 lb (133.4 kg), SpO2 98 %. Body mass index is 35.79 kg/m².     Physical Examination:  General appearance - alert, well appearing, and in no distress  Mental status - alert, oriented to artery disease involving native coronary artery of native heart without angina pectoris     2. Mixed hyperlipidemia     3. Essential hypertension     4. Morbid obesity ([facility]     5. JAZZMINE (obstructive sleep apnea)     6. PVD (peripheral vascular disease) ([facility]     7. S/P CABG x 4     8. Stage 3 chronic kidney disease, unspecified whether stage 3a or 3b CKD ([facility]           PLAN:     Patient will need to continue to follow up with you for their general medical care and return to see me in the office in 6 months    As always, aggressive risk factor modification is strongly recommended. We should adhere to the [address] guidelines for HTN management and the NCEP ATP III guidelines for LDL-C management. The nature of cardiac risk has been fully discussed with this patient. I have made him aware of his LDL target goal given his cardiovascular risk analysis. I have discussed the appropriate diet. The need for lifelong compliance in order to reduce risk is stressed. A regular exercise program is recommended to help achieve and maintain normal body weight, fitness and improve lipid balance. Continue medications at current doses. Check EKG    Consider PAD evaluation in future if warranted by symptoms    Patient was advised and encouraged to check blood pressure at home or at a pharmacy, maintain a logbook, and also call us back if blood pressure are above the target ranges or if it is low. Patient clearly understands and agrees to the instructions. We will need to continue to monitor muscle and liver enzymes, BUN, CR, and electrolytes. The proper use and anticipated side effects of nitroglycerine has been carefully explained. If a single episode of chest pain is not relieved by one tablet, the patient will try another within 5 minutes; and if this doesn't relieve the pain, the patient is instructed to call 911 for transportation to an emergency department.     Patient was advised to go to the ER if he starts experiencing adverse effects of the medications. patient was instructed to call us back or go to nearby emergency room immediately if symptoms get worse or do not improve. Details of medical condition explained and patient was warned about adverse consequences of uncontrolled medical conditions and possible side effects of prescribed medications. Thank you for allowing me to participate in the care of your patient, please don't hesitate to contact me if you have any further questions.

## 2021-07-06 ENCOUNTER — OFFICE VISIT (OUTPATIENT)
Dept: FAMILY MEDICINE CLINIC | Age: 76
End: 2021-07-06
Payer: MEDICARE

## 2021-07-06 VITALS
OXYGEN SATURATION: 98 % | HEIGHT: 76 IN | SYSTOLIC BLOOD PRESSURE: 122 MMHG | DIASTOLIC BLOOD PRESSURE: 72 MMHG | HEART RATE: 75 BPM | TEMPERATURE: 98.8 F | BODY MASS INDEX: 36.07 KG/M2 | WEIGHT: 296.2 LBS

## 2021-07-06 DIAGNOSIS — R10.9 RIGHT FLANK PAIN: Primary | ICD-10-CM

## 2021-07-06 DIAGNOSIS — E11.22 TYPE 2 DIABETES MELLITUS WITH STAGE 3 CHRONIC KIDNEY DISEASE, WITH LONG-TERM CURRENT USE OF INSULIN, UNSPECIFIED WHETHER STAGE 3A OR 3B CKD (HCC): ICD-10-CM

## 2021-07-06 DIAGNOSIS — M54.50 ACUTE RIGHT-SIDED LOW BACK PAIN, UNSPECIFIED WHETHER SCIATICA PRESENT: ICD-10-CM

## 2021-07-06 DIAGNOSIS — N18.30 TYPE 2 DIABETES MELLITUS WITH STAGE 3 CHRONIC KIDNEY DISEASE, WITH LONG-TERM CURRENT USE OF INSULIN, UNSPECIFIED WHETHER STAGE 3A OR 3B CKD (HCC): ICD-10-CM

## 2021-07-06 DIAGNOSIS — Z79.4 TYPE 2 DIABETES MELLITUS WITH STAGE 3 CHRONIC KIDNEY DISEASE, WITH LONG-TERM CURRENT USE OF INSULIN, UNSPECIFIED WHETHER STAGE 3A OR 3B CKD (HCC): ICD-10-CM

## 2021-07-06 LAB
BILIRUBIN, POC: ABNORMAL
BLOOD URINE, POC: ABNORMAL
CLARITY, POC: CLEAR
COLOR, POC: YELLOW
GLUCOSE URINE, POC: 500
KETONES, POC: ABNORMAL
LEUKOCYTE EST, POC: ABNORMAL
NITRITE, POC: ABNORMAL
PH, POC: 5.5
PROTEIN, POC: 100
SPECIFIC GRAVITY, POC: 1.02
UROBILINOGEN, POC: 0.2

## 2021-07-06 PROCEDURE — G8427 DOCREV CUR MEDS BY ELIG CLIN: HCPCS | Performed by: FAMILY MEDICINE

## 2021-07-06 PROCEDURE — 1123F ACP DISCUSS/DSCN MKR DOCD: CPT | Performed by: FAMILY MEDICINE

## 2021-07-06 PROCEDURE — 81003 URINALYSIS AUTO W/O SCOPE: CPT | Performed by: FAMILY MEDICINE

## 2021-07-06 PROCEDURE — 99214 OFFICE O/P EST MOD 30 MIN: CPT | Performed by: FAMILY MEDICINE

## 2021-07-06 PROCEDURE — 3052F HG A1C>EQUAL 8.0%<EQUAL 9.0%: CPT | Performed by: FAMILY MEDICINE

## 2021-07-06 PROCEDURE — 3017F COLORECTAL CA SCREEN DOC REV: CPT | Performed by: FAMILY MEDICINE

## 2021-07-06 PROCEDURE — G8417 CALC BMI ABV UP PARAM F/U: HCPCS | Performed by: FAMILY MEDICINE

## 2021-07-06 PROCEDURE — 1036F TOBACCO NON-USER: CPT | Performed by: FAMILY MEDICINE

## 2021-07-06 PROCEDURE — 2022F DILAT RTA XM EVC RTNOPTHY: CPT | Performed by: FAMILY MEDICINE

## 2021-07-06 PROCEDURE — 4040F PNEUMOC VAC/ADMIN/RCVD: CPT | Performed by: FAMILY MEDICINE

## 2021-07-06 RX ORDER — HYDROCODONE BITARTRATE AND ACETAMINOPHEN 5; 325 MG/1; MG/1
1 TABLET ORAL EVERY 6 HOURS PRN
Qty: 20 TABLET | Refills: 0 | Status: SHIPPED | OUTPATIENT
Start: 2021-07-06 | End: 2021-07-11

## 2021-07-06 RX ORDER — CIPROFLOXACIN 500 MG/1
500 TABLET, FILM COATED ORAL 2 TIMES DAILY
Qty: 14 TABLET | Refills: 0 | Status: SHIPPED | OUTPATIENT
Start: 2021-07-06 | End: 2021-07-13

## 2021-07-06 RX ORDER — CYCLOBENZAPRINE HCL 5 MG
5 TABLET ORAL 3 TIMES DAILY PRN
COMMUNITY
End: 2021-07-19

## 2021-07-06 SDOH — ECONOMIC STABILITY: FOOD INSECURITY: WITHIN THE PAST 12 MONTHS, THE FOOD YOU BOUGHT JUST DIDN'T LAST AND YOU DIDN'T HAVE MONEY TO GET MORE.: NEVER TRUE

## 2021-07-06 SDOH — ECONOMIC STABILITY: FOOD INSECURITY: WITHIN THE PAST 12 MONTHS, YOU WORRIED THAT YOUR FOOD WOULD RUN OUT BEFORE YOU GOT MONEY TO BUY MORE.: NEVER TRUE

## 2021-07-06 ASSESSMENT — SOCIAL DETERMINANTS OF HEALTH (SDOH): HOW HARD IS IT FOR YOU TO PAY FOR THE VERY BASICS LIKE FOOD, HOUSING, MEDICAL CARE, AND HEATING?: NOT HARD AT ALL

## 2021-07-06 NOTE — PROGRESS NOTES
Chief Complaint   Patient presents with    Back Pain     back pain kidney right side pain been going on for two weeks, medication muscle relaxer isnt working        Yesica All is a 70 y.o. male    Flank pain for two weeks  Muscle relaxer not working  Prescribed by surgeon    He is still recovering from a recent back surgery     Has had stones before    Does feel like that possibely    Wt Readings from Last 3 Encounters:   07/06/21 296 lb 3.2 oz (134.4 kg)   07/01/21 294 lb (133.4 kg)   01/15/21 297 lb (134.7 kg)     BP Readings from Last 3 Encounters:   07/06/21 122/72   07/01/21 (!) 122/58   01/15/21 128/66          Current status:  Diet not always best    Was suggested to f/u with endocrine      Lab Results   Component Value Date    LABA1C 9.0 (H) 01/15/2021     No results found for: EAG      Review of Systems:   General ROS: negative for - nausea, vomiting, chills, fatigue, fever, malaise, weight gain or weight loss  Respiratory ROS: no cough, shortness of breath, or wheezing  Cardiovascular ROS: no chest pain or dyspnea on exertion  Gastrointestinal ROS: no abdominal pain, change in bowel habits, or black or bloody stools  Genito-Urinary ROS: no dysuria, trouble voiding, or hematuria  Musculoskeletal ROS: occ leg cramps  Neurological ROS: denies tingling in feet  In general patient otherwise reports feeling well. Physical Exam:  /72 (Site: Left Upper Arm)   Pulse 75   Temp 98.8 °F (37.1 °C)   Ht 6' 4\" (1.93 m)   Wt 296 lb 3.2 oz (134.4 kg)   SpO2 98%   BMI 36.05 kg/m²     Gen: Well, NAD, Alert, Oriented x 3   HEENT: EOMI, eyes clear, MMM  Skin: without rash or jaundice  Neck: no significant lymphadenopathy or thyromegaly  Lungs: CTA B w/out Rales/Wheezes/Rhonchi, Good respiratory effort   Heart: RRR, S1S2, w/out M/R/G, non-displaced PMI   Ext: No C/C/E Bilaterally.    Neuro: drop left foot    Right CVA tenderness    DIABETIC FOOT EXAM:  podiatry    No results found for this visit on 07/06/21. Blood/glucose/protein    Assessment/Plan:     Diagnosis Orders   1. Right flank pain  POCT Urinalysis No Micro (Auto)    CT ABDOMEN PELVIS WO CONTRAST Additional Contrast? None    HYDROcodone-acetaminophen (NORCO) 5-325 MG per tablet    Culture, Urine    ciprofloxacin (CIPRO) 500 MG tablet   2. Acute right-sided low back pain, unspecified whether sciatica present  POCT Urinalysis No Micro (Auto)    HYDROcodone-acetaminophen (NORCO) 5-325 MG per tablet    Culture, Urine    ciprofloxacin (CIPRO) 500 MG tablet   3. Type 2 diabetes mellitus with stage 3 chronic kidney disease, with long-term current use of insulin, unspecified whether stage 3a or 3b CKD (HCC)  Hemoglobin A1C    Lipid Panel    Comprehensive Metabolic Panel    CBC       Suspect kidney stone  Cover for infection  Pain med     Arrange CT  Labs as ordered    Call or return to clinic prn if these symptoms worsen or fail to improve as anticipated.     Moderate MDM        Wt Readings from Last 3 Encounters:   07/06/21 296 lb 3.2 oz (134.4 kg)   07/01/21 294 lb (133.4 kg)   01/15/21 297 lb (134.7 kg)       Karrie Menard MD

## 2021-07-07 DIAGNOSIS — N18.30 TYPE 2 DIABETES MELLITUS WITH STAGE 3 CHRONIC KIDNEY DISEASE, WITH LONG-TERM CURRENT USE OF INSULIN, UNSPECIFIED WHETHER STAGE 3A OR 3B CKD (HCC): ICD-10-CM

## 2021-07-07 DIAGNOSIS — E11.22 TYPE 2 DIABETES MELLITUS WITH STAGE 3 CHRONIC KIDNEY DISEASE, WITH LONG-TERM CURRENT USE OF INSULIN, UNSPECIFIED WHETHER STAGE 3A OR 3B CKD (HCC): ICD-10-CM

## 2021-07-07 DIAGNOSIS — M54.50 ACUTE RIGHT-SIDED LOW BACK PAIN, UNSPECIFIED WHETHER SCIATICA PRESENT: ICD-10-CM

## 2021-07-07 DIAGNOSIS — R10.9 RIGHT FLANK PAIN: ICD-10-CM

## 2021-07-07 DIAGNOSIS — Z79.4 TYPE 2 DIABETES MELLITUS WITH STAGE 3 CHRONIC KIDNEY DISEASE, WITH LONG-TERM CURRENT USE OF INSULIN, UNSPECIFIED WHETHER STAGE 3A OR 3B CKD (HCC): ICD-10-CM

## 2021-07-07 LAB
ALBUMIN SERPL-MCNC: 3.9 G/DL (ref 3.5–4.6)
ALP BLD-CCNC: 94 U/L (ref 35–104)
ALT SERPL-CCNC: 13 U/L (ref 0–41)
ANION GAP SERPL CALCULATED.3IONS-SCNC: 16 MEQ/L (ref 9–15)
AST SERPL-CCNC: 19 U/L (ref 0–40)
BILIRUB SERPL-MCNC: 0.4 MG/DL (ref 0.2–0.7)
BUN BLDV-MCNC: 46 MG/DL (ref 8–23)
CALCIUM SERPL-MCNC: 9.6 MG/DL (ref 8.5–9.9)
CHLORIDE BLD-SCNC: 102 MEQ/L (ref 95–107)
CHOLESTEROL, TOTAL: 148 MG/DL (ref 0–199)
CO2: 19 MEQ/L (ref 20–31)
CREAT SERPL-MCNC: 1.68 MG/DL (ref 0.7–1.2)
GFR AFRICAN AMERICAN: 48.6
GFR NON-AFRICAN AMERICAN: 40.2
GLOBULIN: 3.2 G/DL (ref 2.3–3.5)
GLUCOSE BLD-MCNC: 372 MG/DL (ref 70–99)
HBA1C MFR BLD: 10.1 % (ref 4.8–5.9)
HCT VFR BLD CALC: 42.2 % (ref 42–52)
HDLC SERPL-MCNC: 39 MG/DL (ref 40–59)
HEMOGLOBIN: 14 G/DL (ref 14–18)
LDL CHOLESTEROL CALCULATED: 66 MG/DL (ref 0–129)
MCH RBC QN AUTO: 29.7 PG (ref 27–31.3)
MCHC RBC AUTO-ENTMCNC: 33.1 % (ref 33–37)
MCV RBC AUTO: 89.6 FL (ref 80–100)
PDW BLD-RTO: 13.9 % (ref 11.5–14.5)
PLATELET # BLD: 115 K/UL (ref 130–400)
POTASSIUM SERPL-SCNC: 4.4 MEQ/L (ref 3.4–4.9)
RBC # BLD: 4.71 M/UL (ref 4.7–6.1)
SODIUM BLD-SCNC: 137 MEQ/L (ref 135–144)
TOTAL PROTEIN: 7.1 G/DL (ref 6.3–8)
TRIGL SERPL-MCNC: 213 MG/DL (ref 0–150)
WBC # BLD: 4.2 K/UL (ref 4.8–10.8)

## 2021-07-09 LAB — URINE CULTURE, ROUTINE: NORMAL

## 2021-07-12 ENCOUNTER — HOSPITAL ENCOUNTER (OUTPATIENT)
Dept: CT IMAGING | Age: 76
Discharge: HOME | End: 2021-07-14
Payer: MEDICARE

## 2021-07-12 DIAGNOSIS — R10.9 RIGHT FLANK PAIN: ICD-10-CM

## 2021-07-12 PROCEDURE — 74176 CT ABD & PELVIS W/O CONTRAST: CPT

## 2021-07-19 ENCOUNTER — OFFICE VISIT (OUTPATIENT)
Dept: FAMILY MEDICINE CLINIC | Age: 76
End: 2021-07-19
Payer: MEDICARE

## 2021-07-19 VITALS
TEMPERATURE: 97.5 F | DIASTOLIC BLOOD PRESSURE: 78 MMHG | OXYGEN SATURATION: 97 % | HEIGHT: 76 IN | HEART RATE: 99 BPM | WEIGHT: 296.2 LBS | SYSTOLIC BLOOD PRESSURE: 138 MMHG | BODY MASS INDEX: 36.07 KG/M2

## 2021-07-19 DIAGNOSIS — M54.41 RIGHT-SIDED LOW BACK PAIN WITH RIGHT-SIDED SCIATICA, UNSPECIFIED CHRONICITY: Primary | ICD-10-CM

## 2021-07-19 PROCEDURE — 99212 OFFICE O/P EST SF 10 MIN: CPT | Performed by: FAMILY MEDICINE

## 2021-07-19 PROCEDURE — 1123F ACP DISCUSS/DSCN MKR DOCD: CPT | Performed by: FAMILY MEDICINE

## 2021-07-19 PROCEDURE — 3017F COLORECTAL CA SCREEN DOC REV: CPT | Performed by: FAMILY MEDICINE

## 2021-07-19 PROCEDURE — G8417 CALC BMI ABV UP PARAM F/U: HCPCS | Performed by: FAMILY MEDICINE

## 2021-07-19 PROCEDURE — 1036F TOBACCO NON-USER: CPT | Performed by: FAMILY MEDICINE

## 2021-07-19 PROCEDURE — 4040F PNEUMOC VAC/ADMIN/RCVD: CPT | Performed by: FAMILY MEDICINE

## 2021-07-19 PROCEDURE — G8427 DOCREV CUR MEDS BY ELIG CLIN: HCPCS | Performed by: FAMILY MEDICINE

## 2021-07-19 NOTE — PROGRESS NOTES
chronicity         Suspect kidney stone  Cover for infection  Pain med     Arrange CT  Labs as ordered    Call or return to clinic prn if these symptoms worsen or fail to improve as anticipated.     Moderate MDM        Wt Readings from Last 3 Encounters:   07/19/21 296 lb 3.2 oz (134.4 kg)   07/06/21 296 lb 3.2 oz (134.4 kg)   07/01/21 294 lb (133.4 kg)       Karrie Menard MD

## 2021-08-11 ENCOUNTER — TELEPHONE (OUTPATIENT)
Dept: CARDIOLOGY CLINIC | Age: 76
End: 2021-08-11

## 2021-08-18 NOTE — TELEPHONE ENCOUNTER
Ok to just have surgery if no symptoms. Intermedaite risk.      Electronically signed by Joette Sandhoff, DO on 8/18/2021 at 8:47 AM

## 2021-10-26 ENCOUNTER — HOSPITAL ENCOUNTER (OUTPATIENT)
Dept: PHYSICAL THERAPY | Age: 76
Setting detail: THERAPIES SERIES
Discharge: HOME | End: 2021-10-26
Payer: MEDICARE

## 2021-10-26 PROCEDURE — 97162 PT EVAL MOD COMPLEX 30 MIN: CPT

## 2021-10-26 PROCEDURE — 97110 THERAPEUTIC EXERCISES: CPT

## 2021-10-26 ASSESSMENT — PAIN DESCRIPTION - DESCRIPTORS: DESCRIPTORS: SHARP;SHOOTING

## 2021-10-26 ASSESSMENT — PAIN DESCRIPTION - DIRECTION: RADIATING_TOWARDS: DENIES

## 2021-10-26 ASSESSMENT — PAIN DESCRIPTION - ORIENTATION: ORIENTATION: LOWER

## 2021-10-26 ASSESSMENT — PAIN SCALES - GENERAL: PAINLEVEL_OUTOF10: 2

## 2021-10-26 ASSESSMENT — PAIN DESCRIPTION - LOCATION: LOCATION: BACK

## 2021-10-26 NOTE — PROGRESS NOTES
[address]  PHYSICAL THERAPY EVALUATION    Date: 10/26/2021  Patient Name: Moon Valdez       MRN: [id number]   Account: [de-identified]   : 1945  (73 y.o.)   Gender: male   Referring Practitioner: Kulwinedr Mello MD                 Diagnosis: s/p lumbar fusion, failed back syndrome, arthrodesis status, OA, postalminectomy syndrome  Treatment Diagnosis: decreased balance, decreased posture, decreased hamstring flexibility, decreased LE and core strength, decreased activity tolerance for standing functional mobility, and inceased pain in lumbar spine  Additional Pertinent Hx: CABG, OA, DM, kidney stone, neuropathy, CAD, CKD, L drop foot, back surgeries X 4        Other position/activity restrictions: per note from Anabel PERALTA: no restrictions    Past Medical History:  has a past medical history of CAD (coronary artery disease), CKD (chronic kidney disease), Hypertension, Kidney stones, Left foot drop, Neuropathy, diabetic ([facility] Osteoarthritis, S/P CABG (coronary artery bypass graft), and Type II or unspecified type diabetes mellitus without mention of complication, not stated as uncontrolled. Past Surgical History:   has a past surgical history that includes Tonsillectomy (1966); Rotator cuff repair (1996); Biceps tendon repair (1997); knee surgery (2006); Coronary artery bypass graft (2013); Cardiac catheterization (2013); Neck surgery (); and back surgery (N/A). Vital Signs  Patient Currently in Pain: Yes   Pain Screening  Patient Currently in Pain: Yes  Pain Assessment  Pain Assessment: 0-10  Pain Level: 2 (pain range 2-7/10)  Pain Location: Back (site of incision)  Pain Orientation: Lower  Pain Radiating Towards: denies  Pain Descriptors: Karole Pander; Shooting     Lives With: Spouse  Home Layout: One level (basement- computer- 8 steps with 2 rails)  Home Access: Stairs to enter with rails  Entrance Stairs - Number of Steps: 2  Entrance Stairs - Rails: Both  Bathroom Shower/Tub: Walk-in shower  Bathroom Equipment: Grab bars in shower; Shower chair  Home Equipment: Reacher;Rolling walker  ADL Assistance: Independent (wife dries back; indep with brace)  Homemaking Responsibilities: No  Ambulation Assistance: Independent [employer])  Transfer Assistance: Independent  Active : Yes     Subjective:  Subjective: PT to PT due to continued L LE deficits including weakness since previous back surgeries. Pt with h/o of multiple back surgeries (1st in 2019), most recent surgery: s/p laminectomy and fusion on 09/02/2021. Drop foot after 1st surgery- wears AFO outside and when doing exercises at home. Pt using a cane prior to Sept 2nd. Post surgery on Sept 2nd- pt had rehab in [place] then [facility] which ended last wed. Pt currently completing march, amb fwd/back/side, toe/heel raise daily.   Comments: RTD 11/23/2021    Objective:   Sensation  Overall Sensation Status: Impaired (intermittently tingling L thigh)    Balance  Standing - Static: Fair (mod increased sway without UE support)  Standing - Dynamic: Fair, - (pt requires B UE support to maintain steadiness during gait)    Ambulation 1  Surface: carpet  Device: Rolling Walker  Assistance: Modified Independent  Quality of Gait: FF; heavy use of UEs on AD  Gait Deviations: Slow Yvonne, Increased MARISA, Decreased step length, Decreased step height  Distance: 75ft  Comments: AFO donned  Stairs  # Steps : 4  Stairs Height: 6\"  Rails: Bilateral  Device: No Device  Assistance: Supervision  Comment: non-reciprocal; posterior lean with descend    Strength RLE  Strength RLE: Exception  R Hip Flexion: 4+/5  R Hip Extension: 4/5  R Hip ABduction: 4/5  R Knee Flexion: 4+/5  R Knee Extension: 4+/5  R Ankle Dorsiflexion: 5/5  Strength LLE  Strength LLE: Exception  L Hip Flexion: 2-/5  L Hip Extension: 3+/5  L Hip ABduction: 3/5  L Knee Flexion: 4/5  L Knee Extension: 3-/5  L Ankle Dorsiflexion: 1+/5     Strength Other  Other: isometric core strength: 2/5    PROM RLE (degrees)  RLE PROM: WFL     PROM LLE (degrees)  LLE PROM: WFL    Observation/Palpation  Posture: Poor (FF)  Observation: L AFO donned; scabbing proximal incision     Additional Measures  Special Tests: Lumbar: SLUMP(-), SLR(-)    Exercises:   Exercises  Exercise 1: LAQ 3 sec X 10  Exercise 2: seated TA iso 10 sec X 10  Exercise 3: SLR*  Exercise 4: bridge*  Exercise 5: sink ex*  Exercise 6: standing 4 way hip*  Exercise 7: step ups*  Exercise 8: squats*  Exercise 9: SLS*  Exercise 10: gait over hurdles*  Exercise 11: balance progression on foam*  Exercise 12: gait with cane*  Exercise 13: gait in parallel bars*  Exercise 14: stair negotiation*  Exercise 15: nustep/ scifit*  Exercise 16: DLS progression*  Exercise 17: s/l hip abd*  Exercise 20: HEP: LAQ, TA iso     Modalities:  Modalities  Cryotherapy (Minutes\Location): *  E-stim (parameters): Romanian L quad/hip flexors*     Manual:  Manual therapy  PROM: hamstring, piriformis stretch*  *Indicates exercise,modality, or manual techniques to be initiated when appropriate    Assessment: Body structures, Functions, Activity limitations: Decreased functional mobility , Decreased sensation, Increased pain, Decreased posture, Decreased balance, Decreased ROM, Decreased strength, Decreased endurance  Assessment: Pt is 78 y.o. male with history of 4 back surgeries and currently attending PT due to back pain, L LE weakness, and desire to return to amb with cane. Pt exhibits impairments including decreased balance, decreased posture, decreased hamstring flexibility, decreased LE and core strength, decreased activity tolerance for standing functional mobility, and inceased pain in lumbar spine. Pt requires continued PT to address these impairments, progress strength and ROM, and provide pt with HEP for self management of pain.   Prognosis: Good  Discharge Recommendations: Continue to assess pending progress     Decision prevention strategies   [x] Provide supervision during treatment time    Goals  Short term goals  Time Frame for Short term goals: 2 wks  Short term goal 1: Pt will demonstrate improved abdominal and B LE strength >/= 4+/5 for carryover to decreased pain and amb with cane. Long term goals  Time Frame for Long term goals : 5 wks  Long term goal 1: Pt will demonstrate indep and compliance with % of the time for self management of low back pain. Long term goal 2: Pt will demonstrate improved score on modified oswestry back pain questionnaire to </= 20% indicating minimal distability for improved pt quality of life. Long term goal 3: The pt will report decreased pain </=4/10 at worst in order to increase ease prolonged amb. Long term goal 4: Pt will demonstrate amb with cane household distances mod indep for improved functional mobility.     PT Individual Minutes  Time In: 1500  Time Out: 1550  Minutes: 50  Timed Code Treatment Minutes: 9 Minutes  Procedure Minutes: eval x 41 min     Timed Activity Minutes Units   Ther Ex 9 1     Electronically signed by Beatriz Velez PT on 10/26/21 at 4:50 PM EDT

## 2021-10-26 NOTE — PROGRESS NOTES
[address]     Ph: [phone]  Fax: [phone]    [x] Certification  [] Recertification [x]  Plan of Care  [] Progress Note [] Discharge      To:  Elsie Chen MD      From:  Marshall Ellsworth, PT  Patient: Sylvester Adams     : 1945  Diagnosis: s/p lumbar fusion, failed back syndrome, arthrodesis status, OA, postalminectomy syndrome     Date: 10/26/2021  Treatment Diagnosis: decreased balance, decreased posture, decreased hamstring flexibility, decreased LE and core strength, decreased activity tolerance for standing functional mobility, and inceased pain in lumbar spine    Plan of Care/Certification Expiration Date: 21  Progress Report Period from:  10/26/2021  to 10/26/2021    Total # of Visits to Date: 1   No Show: 0    Canceled Appointment: 0     OBJECTIVE:   Short Term Goals - Time Frame for Short term goals: 2 wks    Goals Current/Discharge status  Met   Short term goal 1: Pt will demonstrate improved abdominal and B LE strength >/= 4+/5 for carryover to decreased pain and amb with cane. Strength RLE  Strength RLE: Exception  R Hip Flexion: 4+/5  R Hip Extension: 4/5  R Hip ABduction: 4/5  R Knee Flexion: 4+/5  R Knee Extension: 4+/5  R Ankle Dorsiflexion: 5/5  Strength LLE  Strength LLE: Exception  L Hip Flexion: 2-/5  L Hip Extension: 3+/5  L Hip ABduction: 3/5  L Knee Flexion: 4/5  L Knee Extension: 3-/5  L Ankle Dorsiflexion: 1+/5        Strength Other  Other: isometric core strength: 2/5   [] yes  [x] no     Long Term Goals - Time Frame for Long term goals : 5 wks  Goals Current/ Discharge status Met   Long term goal 1: Pt will demonstrate indep and compliance with % of the time for self management of low back pain.  HEP initiated [] yes  [x] no   Long term goal 2: Pt will demonstrate improved score on modified oswestry back pain questionnaire to </= 20% indicating minimal distability for improved pt quality of life. modified oswestry back pain index: 23/50 is 46% indicates severe disability   [] yes  [x] no   Long term goal 3: The pt will report decreased pain </=4/10 at worst in order to increase ease prolonged amb. Pain Location: Back (site of incision)    Pain Level: 2 (pain range 2-7/10)    Pain Descriptors: Garon [place], Shooting [] yes  [x] no   Long term goal 4: Pt will demonstrate amb with cane household distances mod indep for improved functional mobility. amb with [employer]; gait deviations: FF; heavy use of UEs on AD, Slow Yvonne; Increased MARISA; Decreased step length; Decreased step height [] yes  [x] no     Body structures, Functions, Activity limitations: Decreased functional mobility , Decreased sensation, Increased pain, Decreased posture, Decreased balance, Decreased ROM, Decreased strength, Decreased endurance  Assessment: Pt is 78 y.o. male with history of 4 back surgeries and currently attending PT due to back pain, L LE weakness, and desire to return to amb with cane. Pt exhibits impairments including decreased balance, decreased posture, decreased hamstring flexibility, decreased LE and core strength, decreased activity tolerance for standing functional mobility, and inceased pain in lumbar spine. Pt requires continued PT to address these impairments, progress strength and ROM, and provide pt with HEP for self management of pain.   Prognosis: Good  Discharge Recommendations: Continue to assess pending progress      PT Education: Goals;PT Role;Plan of Care;Home Exercise Program    PLAN: [x] Evaluate and Treat  Frequency/Duration:  Plan  Times per week: 2  Plan weeks: 5  Current Treatment Recommendations: Strengthening, Neuromuscular Re-education, Home Exercise Program, ROM, Manual Therapy - Soft Tissue Mobilization, Safety Education & Training, Balance Training, Endurance Training, Patient/Caregiver Education & Training, Functional Mobility Training, Equipment Evaluation, Education, & procurement, Gait Training, Modalities, Stair training, Pain Management, Positioning     Precautions:   falls      Other position/activity restrictions: per note from Anabel PERALTA: no restrictions                  Patient Status:[x] Continue/ Initiate plan of Care    [] Discharge PT. Recommend pt continue with HEP. [] Additional visits requested, Please re-certify for additional visits:          Signature: Electronically signed by Kalpesh Guy PT on 10/26/21 at 4:53 PM EDT      If you have any questions or concerns, please don't hesitate to call. Thank you for your referral.    I have reviewed this plan of care and certify a need for medically necessary rehabilitation services.     Physician Signature:__________________________________________________________  Date:  Please sign and return

## 2021-10-28 ENCOUNTER — HOSPITAL ENCOUNTER (OUTPATIENT)
Dept: PHYSICAL THERAPY | Age: 76
Setting detail: THERAPIES SERIES
Discharge: HOME | End: 2021-10-28
Payer: MEDICARE

## 2021-10-28 PROCEDURE — 97110 THERAPEUTIC EXERCISES: CPT

## 2021-10-28 ASSESSMENT — PAIN DESCRIPTION - LOCATION: LOCATION: BACK

## 2021-10-28 ASSESSMENT — PAIN SCALES - GENERAL: PAINLEVEL_OUTOF10: 4

## 2021-10-28 ASSESSMENT — PAIN DESCRIPTION - ORIENTATION: ORIENTATION: LOWER

## 2021-10-28 ASSESSMENT — PAIN DESCRIPTION - DESCRIPTORS: DESCRIPTORS: SHARP;SHOOTING

## 2021-11-02 ENCOUNTER — HOSPITAL ENCOUNTER (OUTPATIENT)
Dept: PHYSICAL THERAPY | Age: 76
Setting detail: THERAPIES SERIES
Discharge: HOME | End: 2021-11-02
Payer: MEDICARE

## 2021-11-02 PROCEDURE — 97116 GAIT TRAINING THERAPY: CPT

## 2021-11-02 PROCEDURE — 97110 THERAPEUTIC EXERCISES: CPT

## 2021-11-02 ASSESSMENT — PAIN DESCRIPTION - LOCATION: LOCATION: BACK

## 2021-11-02 ASSESSMENT — PAIN SCALES - GENERAL: PAINLEVEL_OUTOF10: 4

## 2021-11-02 ASSESSMENT — PAIN DESCRIPTION - ORIENTATION: ORIENTATION: RIGHT;LOWER;LEFT

## 2021-11-02 NOTE — PROGRESS NOTES
[id number] [address]  Outpatient Physical Therapy    Treatment Note        Date: 2021  Patient: Christie Glez  :   ACCT #: [de-identified]  Referring Practitioner: Malia Escobar MD  Diagnosis: s/p lumbar fusion, failed back syndrome, arthrodesis status, OA, postalminectomy syndrome  Treatment Diagnosis: decreased balance, decreased posture, decreased hamstring flexibility, decreased LE and core strength, decreased activity tolerance for standing functional mobility, and inceased pain in lumbar spine    Visit Information:  PT Visit Information  Onset Date:  (chronic pain)  PT Insurance Information: Medicare  Total # of Visits Approved: 80 (BMN)  Total # of Visits to Date: 3  Plan of Care/Certification Expiration Date: 21  No Show: 0  Canceled Appointment: 0  Progress Note Counter: 3/10 (PN 2021)    Subjective: Pt states \"The back is sore today. I've been doing a lot of walking in public. \" Pt reports significant soreness lasting ~3 days after LV. Pt has been practicing use of SC at home and walking along wall shirt distances. Comments: RTD 2021  HEP Compliance:  [x] Good [] Fair [] Poor [] Reports not doing due to:    Vital Signs  Patient Currently in Pain: Yes   Pain Screening  Patient Currently in Pain: Yes  Pain Assessment  Pain Assessment: 0-10  Pain Level: 4  Pain Location: Back  Pain Orientation: Right; Lower; Left (Rt>Lt)    OBJECTIVE:   Exercises  Exercise 1: LAQ 3 sec X 15  Exercise 2: Seated hip ADD into ball 5\"x20  Exercise 5: sink ex: x10 ea, B UE support (SB/CGA)  Exercise 7: step ups (Rt progressed from 4 to 6\" box) x15 fwd, x6 lat;  Lt 2\" step taps x10 ( w/ focus on clearance w/o hip compensation)  Exercise 13: gait in parallel bars w/ RT UE support F/R x2, lat x1 B UE support  Exercise 15: nustep/ scifit: SF L1 5 min  Exercise 18: Seated HS stretching b/l 3x30\"  Exercise 20: HEP: sink exs     Strength: [x] NT  [] MMT completed:     ROM: [x] NT  [] ROM measurements:        Modalities:  Modalities  Cryotherapy (Minutes\Location): CP to LB 10 min  E-stim (parameters): Citizen of [place] L quad/hip flexors*     *Indicates exercise, modality, or manual techniques to be initiated when appropriate    Assessment: Body structures, Functions, Activity limitations: Decreased functional mobility , Decreased sensation, Increased pain, Decreased posture, Decreased balance, Decreased ROM, Decreased strength, Decreased endurance  Assessment: Pt continuing to req frequent seated RB's for tolerance and completion of standing/amb  this date however, trying to improve on stamina and functional activity as pt tolerates. Some modification on UE support and technique w/ exs to encourage upright trunk/ reduced strain on LB. LBP reducing down to 3/10 at best and 5-6/10 at most during tx. Cont;d to concluded w/ CP for post exertional soreness. Treatment Diagnosis: decreased balance, decreased posture, decreased hamstring flexibility, decreased LE and core strength, decreased activity tolerance for standing functional mobility, and inceased pain in lumbar spine  Prognosis: Good     Goals:  Short term goals  Time Frame for Short term goals: 2 wks  Short term goal 1: Pt will demonstrate improved abdominal and B LE strength >/= 4+/5 for carryover to decreased pain and amb with cane. Long term goals  Time Frame for Long term goals : 5 wks  Long term goal 1: Pt will demonstrate indep and compliance with % of the time for self management of low back pain. Long term goal 2: Pt will demonstrate improved score on modified oswestry back pain questionnaire to </= 20% indicating minimal distability for improved pt quality of life. Long term goal 3: The pt will report decreased pain </=4/10 at worst in order to increase ease prolonged amb. Long term goal 4: Pt will demonstrate amb with cane household distances mod indep for improved functional mobility. Progress toward goals:  B LE strength POST-PAIN       Pain Rating (0-10 pain scale):   3/10   Location and pain description same as pre-treatment unless indicated. Action: [] NA   [x] Perform HEP  [] Meds as prescribed  [x] Modalities as prescribed   [] Call Physician     Frequency/Duration:  Plan  Times per week: 2  Plan weeks: 5  Current Treatment Recommendations: Strengthening, Neuromuscular Re-education, Home Exercise Program, ROM, Manual Therapy - Soft Tissue Mobilization, Safety Education & Training, Balance Training, Endurance Training, Patient/Caregiver Education & Training, Functional Mobility Training, Equipment Evaluation, Education, & procurement, Gait Training, Modalities, Stair training, Pain Management, Positioning     Pt to continue current HEP. See objective section for any therapeutic exercise changes, additions or modifications this bienvenido.      PT Individual Minutes  Time In: 1551  Time Out: 1506  Minutes: 52  Timed Code Treatment Minutes: 42 Minutes  Procedure Minutes: 10 min (CP)      Timed Activity Minutes Units   Ther Ex 34 2   Gt  8 1     Signature:  Electronically signed by Eliezer Lala PTA on 11/2/21 at 5:16 PM EDT

## 2021-11-04 ENCOUNTER — HOSPITAL ENCOUNTER (OUTPATIENT)
Dept: PHYSICAL THERAPY | Age: 76
Setting detail: THERAPIES SERIES
Discharge: HOME | End: 2021-11-04
Payer: MEDICARE

## 2021-11-04 PROCEDURE — 97110 THERAPEUTIC EXERCISES: CPT

## 2021-11-04 ASSESSMENT — PAIN DESCRIPTION - DESCRIPTORS: DESCRIPTORS: ACHING

## 2021-11-04 ASSESSMENT — PAIN DESCRIPTION - ORIENTATION: ORIENTATION: RIGHT

## 2021-11-04 ASSESSMENT — PAIN DESCRIPTION - LOCATION: LOCATION: HIP

## 2021-11-04 ASSESSMENT — PAIN DESCRIPTION - PAIN TYPE: TYPE: CHRONIC PAIN

## 2021-11-04 ASSESSMENT — PAIN SCALES - GENERAL: PAINLEVEL_OUTOF10: 5

## 2021-11-04 NOTE — PROGRESS NOTES
Functions, Activity limitations: Decreased functional mobility , Decreased sensation, Increased pain, Decreased posture, Decreased balance, Decreased ROM, Decreased strength, Decreased endurance  Assessment: Continued current POC with focus on Bilateral LE strength to improve quality/safety of ambulation. Patient fatigues quickly. Recovers with short seated RB. Requires Bilateral UE support for standing exercises. Treatment Diagnosis: decreased balance, decreased posture, decreased hamstring flexibility, decreased LE and core strength, decreased activity tolerance for standing functional mobility, and inceased pain in lumbar spine  Prognosis: Good       Goals:  Short term goals  Time Frame for Short term goals: 2 wks  Short term goal 1: Pt will demonstrate improved abdominal and B LE strength >/= 4+/5 for carryover to decreased pain and amb with cane. Long term goals  Time Frame for Long term goals : 5 wks  Long term goal 1: Pt will demonstrate indep and compliance with % of the time for self management of low back pain. Long term goal 2: Pt will demonstrate improved score on modified oswestry back pain questionnaire to </= 20% indicating minimal distability for improved pt quality of life. Long term goal 3: The pt will report decreased pain </=4/10 at worst in order to increase ease prolonged amb. Long term goal 4: Pt will demonstrate amb with cane household distances mod indep for improved functional mobility. Progress toward goals:continue towards all     POST-PAIN       Pain Rating (0-10 pain scale):   /10   Location and pain description same as pre-treatment unless indicated.    Action: [] NA   [] Perform HEP  [] Meds as prescribed  [] Modalities as prescribed   [] Call Physician     Frequency/Duration:  Plan  Times per week: 2  Plan weeks: 5  Current Treatment Recommendations: Strengthening, Neuromuscular Re-education, Home Exercise Program, ROM, Manual Therapy - Soft Tissue Mobilization, Safety Education & Training, Balance Training, Endurance Training, Patient/Caregiver Education & Training, Functional Mobility Training, Equipment Evaluation, Education, & procurement, Gait Training, Modalities, Stair training, Pain Management, Positioning     Pt to continue current HEP. See objective section for any therapeutic exercise changes, additions or modifications this date.   PT Individual Minutes  Time In: 0462  Time Out: 1510  Minutes: 50  Timed Code Treatment Minutes: 40 Minutes  Procedure Minutes:10     Timed Activity Minutes Units   Ther Ex 40 3     Signature:  Electronically signed by Tristin Cordero PTA on 11/4/21 at 3:28 PM EDT

## 2021-11-09 ENCOUNTER — HOSPITAL ENCOUNTER (OUTPATIENT)
Dept: PHYSICAL THERAPY | Age: 76
Setting detail: THERAPIES SERIES
Discharge: HOME | End: 2021-11-09
Payer: MEDICARE

## 2021-11-09 PROCEDURE — 97110 THERAPEUTIC EXERCISES: CPT

## 2021-11-09 PROCEDURE — 97112 NEUROMUSCULAR REEDUCATION: CPT

## 2021-11-09 ASSESSMENT — PAIN DESCRIPTION - ORIENTATION: ORIENTATION: RIGHT

## 2021-11-09 ASSESSMENT — PAIN DESCRIPTION - DESCRIPTORS: DESCRIPTORS: SORE

## 2021-11-09 ASSESSMENT — PAIN DESCRIPTION - PAIN TYPE: TYPE: CHRONIC PAIN

## 2021-11-09 ASSESSMENT — PAIN SCALES - GENERAL: PAINLEVEL_OUTOF10: 6

## 2021-11-09 ASSESSMENT — PAIN DESCRIPTION - LOCATION: LOCATION: LEG

## 2021-11-09 NOTE — PROGRESS NOTES
[address] [address]  Outpatient Physical Therapy    Treatment Note        Date: 2021  Patient: Moon Valdez  :   ACCT #: [de-identified]  Referring Practitioner: Kulwinder Mello MD  Diagnosis: s/p lumbar fusion, failed back syndrome, arthrodesis status, OA, postalminectomy syndrome  Treatment Diagnosis: decreased balance, decreased posture, decreased hamstring flexibility, decreased LE and core strength, decreased activity tolerance for standing functional mobility, and inceased pain in lumbar spine    Visit Information:  PT Visit Information  Onset Date:  (chronic pain)  PT Insurance Information: Medicare  Total # of Visits Approved: 80 (BMN)  Total # of Visits to Date: 5  Plan of Care/Certification Expiration Date: 21  No Show: 0  Canceled Appointment: 0  Progress Note Counter: 5/10 (PN 2021)    Subjective: Pt arriving to appt stating \"I think I pulled a muscle in my Rt leg when I was trying to swing my legs into bed, this was 2 days ago. It's still sore. \"  Comments: RTD 2021  HEP Compliance:  [x] Good [] Fair [] Poor [] Reports not doing due to:    Vital Signs  Patient Currently in Pain: Yes   Pain Screening  Patient Currently in Pain: Yes  Pain Assessment  Pain Assessment: 0-10  Pain Level: 6  Pain Type: Chronic pain  Pain Location: Leg  Pain Orientation: Right  Pain Descriptors: Sore    OBJECTIVE:   Exercises  Exercise 1: QS and LAQ 3 sec X 20 w/ [place] Stim  Exercise 2: Seated hip ADD into ball 5\"x20  Exercise 3: Heel slides into Lt hip abd (leg extended) and knee flexion x20 ea in seated, Lt hip flexion (w/ focus on clearance from floor) x10  Exercise 15: nustep/ scifit: NS L2 5 min  Exercise 18: Seated HS stretching b/l 3x30\"  Exercise 20: HEP: cont current    Strength: [x] NT  [] MMT completed:     ROM: [x] NT  [] ROM measurements:      Modalities:  Modalities  Cryotherapy (Minutes\Location): CP to LB 10 min  E-stim (parameters): [place] L quad     *Indicates exercise, modality, or manual techniques to be initiated when appropriate    Assessment: Body structures, Functions, Activity limitations: Decreased functional mobility , Decreased sensation, Increased pain, Decreased posture, Decreased balance, Decreased ROM, Decreased strength, Decreased endurance  Assessment: Citizen of [place] stim trialed to Lt quad w/ QS and LAQ's to encourage neuro muscular re-ed of Lt LE. Unable to achieve any sensation from pt at higher intensity however, some pulsating occuring beneathe pad placement. Will cont to monitor muscle response. Pt still unable to achieve Lt LE clearance from floor in seated position without posterior lean and/or UE assist. Seated exs added to work towards indep function of Lt LE during transfers etc; isolating ABD, knee and hip flexion this date within limited range. Concluded w/ CP. Treatment Diagnosis: decreased balance, decreased posture, decreased hamstring flexibility, decreased LE and core strength, decreased activity tolerance for standing functional mobility, and inceased pain in lumbar spine  Prognosis: Good     Goals:  Short term goals  Time Frame for Short term goals: 2 wks  Short term goal 1: Pt will demonstrate improved abdominal and B LE strength >/= 4+/5 for carryover to decreased pain and amb with cane. Long term goals  Time Frame for Long term goals : 5 wks  Long term goal 1: Pt will demonstrate indep and compliance with % of the time for self management of low back pain. Long term goal 2: Pt will demonstrate improved score on modified oswestry back pain questionnaire to </= 20% indicating minimal distability for improved pt quality of life. Long term goal 3: The pt will report decreased pain </=4/10 at worst in order to increase ease prolonged amb. Long term goal 4: Pt will demonstrate amb with cane household distances mod indep for improved functional mobility. Progress toward goals:  B LE strength     POST-PAIN       Pain Rating (0-10 pain scale):   6/10   Location and pain description same as pre-treatment unless indicated. Action: [] NA   [] Perform HEP  [] Meds as prescribed  [] Modalities as prescribed   [] Call Physician     Frequency/Duration:  Plan  Times per week: 2  Plan weeks: 5  Current Treatment Recommendations: Strengthening, Neuromuscular Re-education, Home Exercise Program, ROM, Manual Therapy - Soft Tissue Mobilization, Safety Education & Training, Balance Training, Endurance Training, Patient/Caregiver Education & Training, Functional Mobility Training, Equipment Evaluation, Education, & procurement, Gait Training, Modalities, Stair training, Pain Management, Positioning     Pt to continue current HEP. See objective section for any therapeutic exercise changes, additions or modifications this date.      PT Individual Minutes  Time In: 8704  Time Out: 1510  Minutes: 48  Timed Code Treatment Minutes: 38 Minutes  Procedure Minutes: 10 min (CP)      Timed Activity Minutes Units   Ther Ex 30 2   Neuro 8 1     Signature:  Electronically signed by Sharman Dancer, PTA on 11/9/21 at 5:22 PM EST

## 2021-11-11 ENCOUNTER — HOSPITAL ENCOUNTER (OUTPATIENT)
Dept: PHYSICAL THERAPY | Age: 76
Setting detail: THERAPIES SERIES
Discharge: HOME | End: 2021-11-11
Payer: MEDICARE

## 2021-11-11 PROCEDURE — 97110 THERAPEUTIC EXERCISES: CPT

## 2021-11-11 ASSESSMENT — PAIN SCALES - GENERAL: PAINLEVEL_OUTOF10: 5

## 2021-11-11 ASSESSMENT — PAIN DESCRIPTION - LOCATION: LOCATION: BACK

## 2021-11-11 ASSESSMENT — PAIN DESCRIPTION - DESCRIPTORS: DESCRIPTORS: ACHING

## 2021-11-11 ASSESSMENT — PAIN DESCRIPTION - ORIENTATION: ORIENTATION: LOWER

## 2021-11-11 ASSESSMENT — PAIN DESCRIPTION - PAIN TYPE: TYPE: CHRONIC PAIN

## 2021-11-11 NOTE — PROGRESS NOTES
sensation, Increased pain, Decreased posture, Decreased balance, Decreased ROM, Decreased strength, Decreased endurance  Assessment: Continued current POC with focus on bilateral LE strength to improve ADL tolerance. Patient request to D/C e-stim due to poor LE sensation. Attempted supine exercises however patient unable to tolerate due to increased LBP. Good tolerance to standing exercises when frequent RB given. Treatment Diagnosis: decreased balance, decreased posture, decreased hamstring flexibility, decreased LE and core strength, decreased activity tolerance for standing functional mobility, and inceased pain in lumbar spine  Prognosis: Good  Goals:  Short term goals  Time Frame for Short term goals: 2 wks  Short term goal 1: Pt will demonstrate improved abdominal and B LE strength >/= 4+/5 for carryover to decreased pain and amb with cane. Long term goals  Time Frame for Long term goals : 5 wks  Long term goal 1: Pt will demonstrate indep and compliance with % of the time for self management of low back pain. Long term goal 2: Pt will demonstrate improved score on modified oswestry back pain questionnaire to </= 20% indicating minimal distability for improved pt quality of life. Long term goal 3: The pt will report decreased pain </=4/10 at worst in order to increase ease prolonged amb. Long term goal 4: Pt will demonstrate amb with cane household distances mod indep for improved functional mobility. Progress toward goals:continue towards all     POST-PAIN       Pain Rating (0-10 pain scale):  5 /10   Location and pain description same as pre-treatment unless indicated.    Action: [x] NA   [] Perform HEP  [] Meds as prescribed  [] Modalities as prescribed   [] Call Physician     Frequency/Duration:  Plan  Times per week: 2  Plan weeks: 5  Current Treatment Recommendations: Strengthening, Neuromuscular Re-education, Home Exercise Program, ROM, Manual Therapy - Soft Tissue Mobilization, Safety Education & Training, Balance Training, Endurance Training, Patient/Caregiver Education & Training, Functional Mobility Training, Equipment Evaluation, Education, & procurement, Gait Training, Modalities, Stair training, Pain Management, Positioning     Pt to continue current HEP. See objective section for any therapeutic exercise changes, additions or modifications this date.   PT Individual Minutes  Time In: 0128  Time Out: 1500  Minutes: 40  Timed Code Treatment Minutes: 40 Minutes  Procedure Minutes:0     Timed Activity Minutes Units   Ther Ex 40 3       Signature:  Electronically signed by Hartford Merlin, PTA on 11/11/21 at 3:23 PM EST

## 2021-11-15 ENCOUNTER — HOSPITAL ENCOUNTER (OUTPATIENT)
Dept: PHYSICAL THERAPY | Age: 76
Setting detail: THERAPIES SERIES
Discharge: HOME | End: 2021-11-15
Payer: MEDICARE

## 2021-11-15 PROCEDURE — 97140 MANUAL THERAPY 1/> REGIONS: CPT

## 2021-11-15 PROCEDURE — 97110 THERAPEUTIC EXERCISES: CPT

## 2021-11-15 ASSESSMENT — PAIN DESCRIPTION - LOCATION: LOCATION: BUTTOCKS

## 2021-11-15 ASSESSMENT — PAIN SCALES - GENERAL: PAINLEVEL_OUTOF10: 5

## 2021-11-15 ASSESSMENT — PAIN DESCRIPTION - PAIN TYPE: TYPE: CHRONIC PAIN

## 2021-11-15 ASSESSMENT — PAIN DESCRIPTION - ORIENTATION: ORIENTATION: RIGHT

## 2021-11-15 NOTE — PROGRESS NOTES
[address] [address]  Outpatient Physical Therapy    Treatment Note        Date: 11/15/2021  Patient: Josephine Mckeon  :   ACCT #: [de-identified]  Referring Practitioner: Tracy Pires MD  Diagnosis: s/p lumbar fusion, failed back syndrome, arthrodesis status, OA, postalminectomy syndrome  Treatment Diagnosis: decreased balance, decreased posture, decreased hamstring flexibility, decreased LE and core strength, decreased activity tolerance for standing functional mobility, and inceased pain in lumbar spine    Visit Information:  PT Visit Information  Onset Date:  (chronic pain)  PT Insurance Information: Medicare  Total # of Visits Approved: 80 (BMN)  Total # of Visits to Date: 7  Plan of Care/Certification Expiration Date: 21  No Show: 0  Canceled Appointment: 0  Progress Note Counter: 7/10 (PN 2021)    Subjective: Sharp, nagging, persistent pain reported into R buttock. Increases with standing and lying down supine, decreases with medication and ice. Declines pain radiating futher down leg. Occaional shocks of pain into L leg. L leg still feels very weak. Walking short distances in home with cane, but very fearful of falling.   Comments: RTD 2021  HEP Compliance:  [x] Good [] Fair [] Poor [] Reports not doing due to:    Vital Signs  Patient Currently in Pain: Yes   Pain Screening  Patient Currently in Pain: Yes  Pain Assessment  Pain Assessment: 0-10  Pain Level: 5  Pain Type: Chronic pain  Pain Location: Buttocks  Pain Orientation: Right    OBJECTIVE:   Exercises  Exercise 1: SF: L2 x 6 min  Exercise 2: Gait laps in // bars: lateral, retro, march x 3 ea  Exercise 3: Fitter leg press, supine: 2 cords x 10 leyla    Strength: [x] NT  [] MMT completed:    ROM: [x] NT  [] ROM measurements:     Manual:   Manual therapy  PROM: leyla hip flex and ER stretches; pt supine  Other: Manual x 6 min    Modalities:  Modalities  Cryotherapy (Minutes\Location): CP to LB 10 min     *Indicates exercise,

## 2021-11-16 ENCOUNTER — APPOINTMENT (OUTPATIENT)
Dept: PHYSICAL THERAPY | Age: 76
End: 2021-11-16
Payer: MEDICARE

## 2021-11-18 ENCOUNTER — HOSPITAL ENCOUNTER (OUTPATIENT)
Dept: PHYSICAL THERAPY | Age: 76
Setting detail: THERAPIES SERIES
Discharge: HOME | End: 2021-11-18
Payer: MEDICARE

## 2021-11-18 PROCEDURE — 97110 THERAPEUTIC EXERCISES: CPT

## 2021-11-18 ASSESSMENT — PAIN DESCRIPTION - LOCATION: LOCATION: BACK

## 2021-11-18 ASSESSMENT — PAIN SCALES - GENERAL: PAINLEVEL_OUTOF10: 3

## 2021-11-18 ASSESSMENT — PAIN DESCRIPTION - ORIENTATION: ORIENTATION: RIGHT

## 2021-11-18 ASSESSMENT — PAIN DESCRIPTION - DESCRIPTORS: DESCRIPTORS: ACHING

## 2021-11-18 ASSESSMENT — PAIN DESCRIPTION - PAIN TYPE: TYPE: CHRONIC PAIN

## 2021-11-18 NOTE — PROGRESS NOTES
[address] [address]  Outpatient Physical Therapy    Treatment Note        Date: 2021  Patient: Christie Glez  :   ACCT #: [de-identified]  Referring Practitioner: Malia Escobar MD  Diagnosis: s/p lumbar fusion, failed back syndrome, arthrodesis status, OA, postalminectomy syndrome  Treatment Diagnosis: decreased balance, decreased posture, decreased hamstring flexibility, decreased LE and core strength, decreased activity tolerance for standing functional mobility, and inceased pain in lumbar spine    Visit Information:  PT Visit Information  Onset Date:  (chronic pain)  PT Insurance Information: Medicare  Total # of Visits Approved: 99 (BMN)  Total # of Visits to Date: 8  Plan of Care/Certification Expiration Date: 21  No Show: 0  Canceled Appointment: 0  Progress Note Counter: 8/10 (PN 2021)    Subjective: Pt presents to appt reporting 3.5/10 pain level in Rt side of LB. Pt states \"I had a good session with Phill. \" Pt expressing no soreness/pain following.   Comments: RTD 2021  HEP Compliance:  [x] Good [] Fair [] Poor [] Reports not doing due to:    Vital Signs  Patient Currently in Pain: Yes   Pain Screening  Patient Currently in Pain: Yes  Pain Assessment  Pain Assessment: 0-10  Pain Level: 3 (3.5)  Pain Type: Chronic pain  Pain Location: Back  Pain Orientation: Right  Pain Descriptors: Aching    OBJECTIVE:   Exercises  Exercise 1: SF: L2 x 6 min  Exercise 2: Gait laps in // bars: lateral, retro, march x 3 ea (x1 HR)  Exercise 3: Fitter leg press, supine: 2 cords 2x10 leyla  Exercise 4: Standing bal on foam 1 min  static, x20 F/R wt shifts, overhead reaching w/ small ball  x20  Exercise 5: STS from low mat x10 (light b/l UE support, w/ focus on b/l wt distribution)  Exercise 6: Standing trunk ext rolls up wall*  Exercise 7: Seated heel slides into Lt hip ABD and knee flexion x20 ea, Lt LE lifts over SC w/ focus on strengthening and clearance x10    Strength: [x] NT  [] MMT completed:     ROM: [x] NT  [] ROM measurements:      Modalities:  Modalities  Cryotherapy (Minutes\Location): CP to LB 10 min     *Indicates exercise, modality, or manual techniques to be initiated when appropriate    Assessment: Body structures, Functions, Activity limitations: Decreased functional mobility , Decreased sensation, Increased pain, Decreased posture, Decreased balance, Decreased ROM, Decreased strength, Decreased endurance  Assessment: Continuation of functional strengthening to inc QOL and reduce strain on lumbar spine. Incorporated standing activities on foam to challenge static and dynamic balance w/ good egn; SBA needed for safety. Pt cont's to display LE/core weakness w/ UE assist needed to manage Lt LE during mat transfers. Pt cont's to have poor gen to supine positioning on mat w/ LBP reaching 5/10 at most. Pt has begun amb home distancees ~20-30ft w/ use of SC vs [employer]. Treatment Diagnosis: decreased balance, decreased posture, decreased hamstring flexibility, decreased LE and core strength, decreased activity tolerance for standing functional mobility, and inceased pain in lumbar spine  Prognosis: Good     Goals:  Short term goals  Time Frame for Short term goals: 2 wks  Short term goal 1: Pt will demonstrate improved abdominal and B LE strength >/= 4+/5 for carryover to decreased pain and amb with cane. Long term goals  Time Frame for Long term goals : 5 wks  Long term goal 1: Pt will demonstrate indep and compliance with % of the time for self management of low back pain. Long term goal 2: Pt will demonstrate improved score on modified oswestry back pain questionnaire to </= 20% indicating minimal distability for improved pt quality of life. Long term goal 3: The pt will report decreased pain </=4/10 at worst in order to increase ease prolonged amb. Long term goal 4: Pt will demonstrate amb with cane household distances mod indep for improved functional mobility.   Progress

## 2021-11-19 ENCOUNTER — OFFICE VISIT (OUTPATIENT)
Dept: FAMILY MEDICINE CLINIC | Age: 76
End: 2021-11-19
Payer: MEDICARE

## 2021-11-19 VITALS
TEMPERATURE: 96.7 F | HEIGHT: 76 IN | BODY MASS INDEX: 35.97 KG/M2 | SYSTOLIC BLOOD PRESSURE: 146 MMHG | OXYGEN SATURATION: 96 % | HEART RATE: 72 BPM | DIASTOLIC BLOOD PRESSURE: 72 MMHG | WEIGHT: 295.4 LBS

## 2021-11-19 DIAGNOSIS — E11.22 TYPE 2 DIABETES MELLITUS WITH STAGE 3 CHRONIC KIDNEY DISEASE, WITH LONG-TERM CURRENT USE OF INSULIN, UNSPECIFIED WHETHER STAGE 3A OR 3B CKD (HCC): ICD-10-CM

## 2021-11-19 DIAGNOSIS — Z79.4 TYPE 2 DIABETES MELLITUS WITH STAGE 3 CHRONIC KIDNEY DISEASE, WITH LONG-TERM CURRENT USE OF INSULIN, UNSPECIFIED WHETHER STAGE 3A OR 3B CKD (HCC): ICD-10-CM

## 2021-11-19 DIAGNOSIS — N18.30 TYPE 2 DIABETES MELLITUS WITH STAGE 3 CHRONIC KIDNEY DISEASE, WITH LONG-TERM CURRENT USE OF INSULIN, UNSPECIFIED WHETHER STAGE 3A OR 3B CKD (HCC): ICD-10-CM

## 2021-11-19 DIAGNOSIS — Z00.00 ROUTINE GENERAL MEDICAL EXAMINATION AT A HEALTH CARE FACILITY: Primary | ICD-10-CM

## 2021-11-19 DIAGNOSIS — I10 ESSENTIAL HYPERTENSION: ICD-10-CM

## 2021-11-19 DIAGNOSIS — N40.1 BENIGN NON-NODULAR PROSTATIC HYPERPLASIA WITH LOWER URINARY TRACT SYMPTOMS: ICD-10-CM

## 2021-11-19 DIAGNOSIS — I25.10 CORONARY ARTERY DISEASE INVOLVING NATIVE CORONARY ARTERY OF NATIVE HEART WITHOUT ANGINA PECTORIS: ICD-10-CM

## 2021-11-19 PROCEDURE — 1123F ACP DISCUSS/DSCN MKR DOCD: CPT | Performed by: FAMILY MEDICINE

## 2021-11-19 PROCEDURE — G8484 FLU IMMUNIZE NO ADMIN: HCPCS | Performed by: FAMILY MEDICINE

## 2021-11-19 PROCEDURE — 4040F PNEUMOC VAC/ADMIN/RCVD: CPT | Performed by: FAMILY MEDICINE

## 2021-11-19 PROCEDURE — 3046F HEMOGLOBIN A1C LEVEL >9.0%: CPT | Performed by: FAMILY MEDICINE

## 2021-11-19 PROCEDURE — G0439 PPPS, SUBSEQ VISIT: HCPCS | Performed by: FAMILY MEDICINE

## 2021-11-19 PROCEDURE — 3017F COLORECTAL CA SCREEN DOC REV: CPT | Performed by: FAMILY MEDICINE

## 2021-11-19 RX ORDER — GLIMEPIRIDE 4 MG/1
4 TABLET ORAL 2 TIMES DAILY
Qty: 180 TABLET | Refills: 3 | Status: SHIPPED | OUTPATIENT
Start: 2021-11-19

## 2021-11-19 RX ORDER — INSULIN GLARGINE 100 [IU]/ML
30 INJECTION, SOLUTION SUBCUTANEOUS 2 TIMES DAILY
Qty: 10 PEN | Refills: 5 | Status: SHIPPED | OUTPATIENT
Start: 2021-11-19

## 2021-11-19 RX ORDER — FUROSEMIDE 40 MG/1
40 TABLET ORAL DAILY
Qty: 90 TABLET | Refills: 3 | Status: ON HOLD | OUTPATIENT
Start: 2021-11-19 | End: 2022-04-07 | Stop reason: SDUPTHER

## 2021-11-19 RX ORDER — TAMSULOSIN HYDROCHLORIDE 0.4 MG/1
0.4 CAPSULE ORAL DAILY
Qty: 90 CAPSULE | Refills: 3 | Status: SHIPPED | OUTPATIENT
Start: 2021-11-19

## 2021-11-19 RX ORDER — LOSARTAN POTASSIUM 25 MG/1
25 TABLET ORAL DAILY
Qty: 90 TABLET | Refills: 3 | Status: ON HOLD | OUTPATIENT
Start: 2021-11-19 | End: 2022-04-07 | Stop reason: HOSPADM

## 2021-11-19 RX ORDER — FAMOTIDINE 20 MG/1
20 TABLET, FILM COATED ORAL DAILY
Qty: 180 TABLET | Refills: 3 | Status: SHIPPED | OUTPATIENT
Start: 2021-11-19

## 2021-11-19 RX ORDER — ATORVASTATIN CALCIUM 40 MG/1
40 TABLET, FILM COATED ORAL NIGHTLY
Qty: 90 TABLET | Refills: 3 | Status: SHIPPED | OUTPATIENT
Start: 2021-11-19

## 2021-11-19 ASSESSMENT — PATIENT HEALTH QUESTIONNAIRE - PHQ9
1. LITTLE INTEREST OR PLEASURE IN DOING THINGS: 0
2. FEELING DOWN, DEPRESSED OR HOPELESS: 0
SUM OF ALL RESPONSES TO PHQ QUESTIONS 1-9: 0
SUM OF ALL RESPONSES TO PHQ9 QUESTIONS 1 & 2: 0

## 2021-11-19 ASSESSMENT — LIFESTYLE VARIABLES: HOW OFTEN DO YOU HAVE A DRINK CONTAINING ALCOHOL: 0

## 2021-11-19 NOTE — PROGRESS NOTES
Medicare Annual Wellness Visit  Name: Rosina العراقي Date: 2021   MRN: [id number] Sex: Male   Age: 78 y.o. Ethnicity: Non- / Non    : 1945 Race: White (non-)      Marian Cason is here for Medicare AWV    Screenings for behavioral, psychosocial and functional/safety risks, and cognitive dysfunction are all negative except as indicated below. These results, as well as other patient data from the [address] form, are documented in Flowsheets linked to this Encounter. Allergies   Allergen Reactions    Dye [Iodides]          Prior to Visit Medications    Medication Sig Taking? Authorizing Provider   atorvastatin (LIPITOR) 40 MG tablet Take 1 tablet by mouth nightly Yes Eric Patten MD   furosemide (LASIX) 40 MG tablet Take 1 tablet by mouth daily Yes Eric Patten MD   glimepiride (AMARYL) 4 MG tablet Take 1 tablet by mouth 2 times daily Yes Eric Patten MD   insulin glargine (LANTUS SOLOSTAR) 100 UNIT/ML injection pen Inject 30 Units into the skin 2 times daily Yes Eric Patten MD   losartan (COZAAR) 25 MG tablet Take 1 tablet by mouth daily Yes Eric Patten MD   metoprolol tartrate (LOPRESSOR) 25 MG tablet Take 1 tablet by mouth 2 times daily Yes Eric Patten MD   tamsulosin (FLOMAX) 0.4 MG capsule Take 1 capsule by mouth daily Yes Eric Patten MD   famotidine (PEPCID) 20 MG tablet Take 1 tablet by mouth daily Yes Eric Patten MD   nitroGLYCERIN (NITROSTAT) 0.4 MG SL tablet Place 1 tablet under the tongue See Admin Instructions Yes Eric Patten MD   Handicap Placard MISC by Does not apply route Exp 5 years Yes Eric Patten MD   aspirin 81 MG tablet Take 81 mg by mouth daily.  Yes Historical Provider, MD         Past Medical History:   Diagnosis Date    CAD (coronary artery disease)     Dr. Marylen Boast, Dr. Zuleyka Pop CKD (chronic kidney disease)     Hypertension     Kidney stones     Left foot drop     Neuropathy, diabetic (HCC)     mild    Osteoarthritis hip, knee    S/P CABG (coronary artery bypass graft)     Type II or unspecified type diabetes mellitus without mention of complication, not stated as uncontrolled        Past Surgical History:   Procedure Laterality Date    BACK SURGERY N/A     BICEPS TENDON REPAIR  01/01/1997    CARDIAC CATHETERIZATION  06/05/2013    CORONARY ARTERY BYPASS GRAFT  06/07/2013     [address] SURGERY  01/01/2006    left replacement    NECK SURGERY  2020    ROTATOR CUFF REPAIR  01/01/1996    TONSILLECTOMY  01/01/1966         Family History   Problem Relation Age of Onset    Cancer Mother         intestinal, skin    Heart Disease Father     Stroke Father        CareTeam (Including outside providers/suppliers regularly involved in providing care):   Patient Care Team:  Verona Cortés MD as PCP - General (Family Medicine)  Verona Cortés MD as PCP - [facility] Empaneled Provider    Wt Readings from Last 3 Encounters:   11/19/21 295 lb 6.4 oz (134 kg)   07/19/21 296 lb 3.2 oz (134.4 kg)   07/06/21 296 lb 3.2 oz (134.4 kg)     Vitals:    11/19/21 1512 11/19/21 1521   BP: (!) 150/70 (!) 146/72   Site: Left Upper Arm Right Upper Arm   Position: Sitting Sitting   Cuff Size: Large Adult Large Adult   Pulse: 72    Temp: 96.7 °F (35.9 °C)    SpO2: 96%    Weight: 295 lb 6.4 oz (134 kg)    Height: 6' 4\" (1.93 m)      Body mass index is 35.96 kg/m². Based upon direct observation of the patient, evaluation of cognition reveals recent and remote memory intact.     Physical Exam:  BP (!) 146/72 (Site: Right Upper Arm, Position: Sitting, Cuff Size: Large Adult)   Pulse 72   Temp 96.7 °F (35.9 °C)   Ht 6' 4\" (1.93 m)   Wt 295 lb 6.4 oz (134 kg)   SpO2 96%   BMI 35.96 kg/m²     Gen: Well, NAD, Alert, Oriented x 3   HEENT: EOMI, eyes clear, MMM  Skin: without rash or jaundice  Neck: no significant lymphadenopathy or thyromegaly  Lungs: CTA B w/out Rales/Wheezes/Rhonchi, Good respiratory effort   Heart: RRR, S1S2, w/out M/R/G, non-displaced PMI   Ext: No C/C/E Bilaterally. Neuro: Neurovascularly intact w/ Sensory/Motor intact UE/LE Bilaterally. Lab Results   Component Value Date    WBC 5.5 09/21/2021    HGB 10.8 (L) 09/21/2021    HCT 34.7 (L) 09/21/2021     09/21/2021    CHOL 148 07/07/2021    TRIG 213 (H) 07/07/2021    HDL 39 (L) 07/07/2021    ALT 13 07/07/2021    AST 19 07/07/2021     09/21/2021    K 5.1 09/21/2021     09/21/2021    CREATININE 1.70 (H) 09/21/2021    BUN 46 (H) 07/07/2021    CO2 19 (L) 07/07/2021    TSH 1.190 05/07/2019    PSA 1.78 01/18/2016    INR 1.0 11/24/2015    LABA1C 10.1 (H) 07/07/2021    LABMICR 5.40 (H) 02/12/2020       Patient's complete Health Risk Assessment and screening values have been reviewed and are found in Flowsheets. The following problems were reviewed today and where indicated follow up appointments were made and/or referrals ordered. Positive Risk Factor Screenings with Interventions:            General Health and ACP:  General  In general, how would you say your health is?: Fair  In the past 7 days, have you experienced any of the following?  New or Increased Pain, New or Increased Fatigue, Loneliness, Social Isolation, Stress or Anger?: (!) New or Increased Pain  Do you get the social and emotional support that you need?: Yes  Do you have a Living Will?: Yes  Advance Directives     Power of  Living Will ACP-Advance Directive ACP-Power of     Not on File Not on File Not on File Not on File      General Health Risk Interventions:  · persistent back pain following surgery    Health Habits/Nutrition:  Health Habits/Nutrition  Do you exercise for at least 20 minutes 2-3 times per week?: Yes  Have you lost any weight without trying in the past 3 months?: No  Do you eat only one meal per day?: No  Have you seen the dentist within the past year?: (!) No  Body mass index: (!) 35.95  Health Habits/Nutrition Interventions:  · Suggest dentist Hearing/Vision:  No exam data present  Hearing/Vision  Do you or your family notice any trouble with your hearing that hasn't been managed with hearing aids?: No  Do you have difficulty driving, watching TV, or doing any of your daily activities because of your eyesight?: No  Have you had an eye exam within the past year?: (!) No  Hearing/Vision Interventions:  · suggest eye exam      Personalized Preventive Plan   Current Health Maintenance Status  Immunization History   Administered Date(s) Administered    COVID-19, Amanda Pardo, Primary or Immunocompromised, PF, 100mcg/0.5mL 02/11/2021, 03/11/2021, 10/31/2021    Influenza Vaccine, unspecified formulation 10/26/2015    Influenza, High Dose (Fluzone 65 yrs and older) 10/20/2016, 10/01/2017, 09/18/2018    Influenza, High-dose, Quadv, 65 yrs +, IM (Fluzone) 10/24/2021    Pneumococcal Conjugate 13-valent ([id number]) 02/16/2018    Pneumococcal Polysaccharide ([id number]) 11/21/2012    Td, unspecified formulation 07/10/2013        Health Maintenance   Topic Date Due    Shingles Vaccine (1 of 2) Never done    DTaP/Tdap/Td vaccine (1 - Tdap) 07/11/2013    Diabetic retinal exam  06/15/2015    Diabetic foot exam  02/12/2021    Annual Wellness Visit (AWV)  05/27/2021    A1C test (Diabetic or Prediabetic)  10/07/2021    Colon cancer screen colonoscopy  02/16/2023 (Originally 12/12/2017)    Lipid screen  07/07/2022    Potassium monitoring  09/21/2022    Creatinine monitoring  09/21/2022    Flu vaccine  Completed    Pneumococcal 65+ years Vaccine  Completed    COVID-19 Vaccine  Completed    Hepatitis C screen  Completed    Hepatitis A vaccine  Aged Out    Hib vaccine  Aged Out    Meningococcal (ACWY) vaccine  Aged Out     Recommendations for [employer] Due: see orders and patient instructions/AVS.  .   Recommended screening schedule for the next 5-10 years is provided to the patient in written form: see Patient Gilberto Multani was seen today for medicare aw. Diagnoses and all orders for this visit:    Routine general medical examination at a health care facility    Coronary artery disease involving native coronary artery of native heart  -     atorvastatin (LIPITOR) 40 MG tablet; Take 1 tablet by mouth nightly  -     metoprolol tartrate (LOPRESSOR) 25 MG tablet; Take 1 tablet by mouth 2 times daily    Type 2 diabetes mellitus with stage 3 chronic kidney disease, with long-term current use of insulin, unspecified whether stage 3a or 3b CKD (HCC)  -     glimepiride (AMARYL) 4 MG tablet; Take 1 tablet by mouth 2 times daily  -     insulin glargine (LANTUS SOLOSTAR) 100 UNIT/ML injection pen; Inject 30 Units into the skin 2 times daily    Essential hypertension  -     losartan (COZAAR) 25 MG tablet; Take 1 tablet by mouth daily    Benign non-nodular prostatic hyperplasia with lower urinary tract symptoms  -     tamsulosin (FLOMAX) 0.4 MG capsule; Take 1 capsule by mouth daily    Other orders  -     furosemide (LASIX) 40 MG tablet; Take 1 tablet by mouth daily  -     famotidine (PEPCID) 20 MG tablet;  Take 1 tablet by mouth daily               Chronic conditions are stable  Continue current regimen  Follow up with appropriate specialists and here routinely for ongoing monitoring of chronic conditions     Kira Escobedo MD

## 2021-11-19 NOTE — PATIENT INSTRUCTIONS
Personalized Preventive Plan for Lesa Ortega - 11/19/2021  Medicare offers a range of preventive health benefits. Some of the tests and screenings are paid in full while other may be subject to a deductible, co-insurance, and/or copay. Some of these benefits include a comprehensive review of your medical history including lifestyle, illnesses that may run in your family, and various assessments and screenings as appropriate. After reviewing your medical record and screening and assessments performed today your provider may have ordered immunizations, labs, imaging, and/or referrals for you. A list of these orders (if applicable) as well as your Preventive Care list are included within your After Visit Summary for your review. Other Preventive Recommendations:    · A preventive eye exam performed by an eye specialist is recommended every 1-2 years to screen for glaucoma; cataracts, macular degeneration, and other eye disorders. · A preventive dental visit is recommended every 6 months. · Try to get at least 150 minutes of exercise per week or 10,000 steps per day on a pedometer . · Order or download the FREE \"Exercise & Physical Activity: Your Everyday Guide\" from [employer]. Call 4-667.321.1022 or search [employer] online. · You need 1717-8841 mg of calcium and 1394-5683 IU of vitamin D per day. It is possible to meet your calcium requirement with diet alone, but a vitamin D supplement is usually necessary to meet this goal.  · When exposed to the sun, use a sunscreen that protects against both UVA and UVB radiation with an SPF of 30 or greater. Reapply every 2 to 3 hours or after sweating, drying off with a towel, or swimming. · Always wear a seat belt when traveling in a car. Always wear a helmet when riding a bicycle or motorcycle.

## 2021-11-22 ENCOUNTER — HOSPITAL ENCOUNTER (OUTPATIENT)
Dept: PHYSICAL THERAPY | Age: 76
Setting detail: THERAPIES SERIES
Discharge: HOME | End: 2021-11-22
Payer: MEDICARE

## 2021-11-22 PROCEDURE — 97110 THERAPEUTIC EXERCISES: CPT

## 2021-11-22 PROCEDURE — 97140 MANUAL THERAPY 1/> REGIONS: CPT

## 2021-11-22 ASSESSMENT — PAIN SCALES - GENERAL: PAINLEVEL_OUTOF10: 6

## 2021-11-22 ASSESSMENT — PAIN DESCRIPTION - LOCATION: LOCATION: BACK

## 2021-11-22 ASSESSMENT — PAIN DESCRIPTION - DESCRIPTORS: DESCRIPTORS: ACHING

## 2021-11-22 ASSESSMENT — PAIN DESCRIPTION - ORIENTATION: ORIENTATION: LOWER

## 2021-11-22 NOTE — PROGRESS NOTES
[address] [address]  Outpatient Physical Therapy    Treatment Note        Date: 2021  Patient: Brooksie Najjar  :   ACCT #: [de-identified]  Referring Practitioner: Alayna Velez MD  Diagnosis: s/p lumbar fusion, failed back syndrome, arthrodesis status, OA, postalminectomy syndrome  Treatment Diagnosis: decreased balance, decreased posture, decreased hamstring flexibility, decreased LE and core strength, decreased activity tolerance for standing functional mobility, and inceased pain in lumbar spine    Visit Information:  PT Visit Information  Onset Date:  (chronic pain)  PT Insurance Information: Medicare  Total # of Visits Approved: 80 (BMN)  Total # of Visits to Date: 9  Plan of Care/Certification Expiration Date: 21  No Show: 0  Canceled Appointment: 0  Progress Note Counter: 8/10 (PN 2021)    Subjective: Pt reports 6/10 pain \" I've been doing a lot this morning. .. was 4/10 before\"  Comments: RTD 2021  HEP Compliance:  [x] Good [] Fair [] Poor [] Reports not doing due to:    Vital Signs  Patient Currently in Pain: Yes   Pain Screening  Patient Currently in Pain: Yes  Pain Assessment  Pain Level: 6  Patient's Stated Pain Goal: No pain  Pain Location: Back  Pain Orientation: Lower  Pain Descriptors: Aching    OBJECTIVE:   Exercises  Exercise 1: SF: L2.2 x 6 min  Exercise 3: Fitter leg press, supine: 3 cords 2x10 leyla  Exercise 4: Standing bal on foam 1 min  static, x20 F/R wt shifts, overhead reaching w/ small ball  x20  Exercise 5: STS from low mat x10 (light b/l UE support, w/ focus on b/l wt distribution)  Exercise 9: seated : DLS with 4# ball shoulder flexion, push/pull, trunk rotation x15 ea  Exercise 19: objective measurements       Ambulation 1  Surface: carpet  Device: Single point cane  Assistance: Stand by assistance  Quality of Gait: ER BLE's , FF  Gait Deviations: Slow Yvonne, Increased MARISA, Decreased step length, Decreased step height  Distance: 50' Strength: [] NT  [x] MMT completed:  Strength RLE  R Hip Flexion: 4+/5  R Hip Extension: 4/5  R Hip ABduction: 4/5  R Knee Flexion: 4+/5  R Knee Extension: 4+/5  R Ankle Dorsiflexion: 5/5  Strength LLE  Comment: Hip Abd and ext done in sitting secondary to pain sidelying and prone. L Hip Flexion: 3+/5, 4-/5  L Hip Extension: 4/5  L Hip ABduction: 4-/5  L Knee Flexion: 4+/5  L Knee Extension: 4+/5  L Ankle Dorsiflexion: 2-/5             ROM: [x] NT  [] ROM measurements:         Modalities:  Modalities  Cryotherapy (Minutes\Location): CP to LB 10 min     *Indicates exercise, modality, or manual techniques to be initiated when appropriate    Assessment: Body structures, Functions, Activity limitations: Decreased functional mobility , Decreased sensation, Increased pain, Decreased posture, Decreased balance, Decreased ROM, Decreased strength, Decreased endurance  Assessment: Continue to be seen for progression of current exercises. Compliant with HEP. Worst pain in last 5 days 7/10 doing sink ex. Instructed to do in only motions that do not increase sx and focus on core. Concluded with CP. Treatment Diagnosis: decreased balance, decreased posture, decreased hamstring flexibility, decreased LE and core strength, decreased activity tolerance for standing functional mobility, and inceased pain in lumbar spine          Goals:  Short term goals  Time Frame for Short term goals: 2 wks  Short term goal 1: Pt will demonstrate improved abdominal and B LE strength >/= 4+/5 for carryover to decreased pain and amb with cane. Long term goals  Time Frame for Long term goals : 5 wks  Long term goal 1: Pt will demonstrate indep and compliance with % of the time for self management of low back pain. Long term goal 2: Pt will demonstrate improved score on modified oswestry back pain questionnaire to </= 20% indicating minimal distability for improved pt quality of life.   Long term goal 3: The pt will report decreased pain </=4/10 at worst in order to increase ease prolonged amb. Long term goal 4: Pt will demonstrate amb with cane household distances mod indep for improved functional mobility. Progress toward goals: Progressing towards goals. POST-PAIN       Pain Rating (0-10 pain scale):   4/10   Location and pain description same as pre-treatment unless indicated. Action: [] NA   [x] Perform HEP  [] Meds as prescribed  [] Modalities as prescribed   [] Call Physician     Frequency/Duration:  Plan  Times per week: 2  Plan weeks: 5  Current Treatment Recommendations: Strengthening, Neuromuscular Re-education, Home Exercise Program, ROM, Manual Therapy - Soft Tissue Mobilization, Safety Education & Training, Balance Training, Endurance Training, Patient/Caregiver Education & Training, Functional Mobility Training, Equipment Evaluation, Education, & procurement, Gait Training, Modalities, Stair training, Pain Management, Positioning  Plan Comment: PN done for medicare and RTD. Pt to continue current HEP. See objective section for any therapeutic exercise changes, additions or modifications this date.          PT Individual Minutes  Time In: 1823  Time Out: 1510  Minutes: 50  Timed Code Treatment Minutes: 38 Minutes  Procedure Minutes: CP 10     Timed Activity Minutes Units   Ther Ex 38 3     Signature:  Electronically signed by Kyler Troncoso PTA on 11/22/21 at 3:36 PM EST

## 2021-11-22 NOTE — PROGRESS NOTES
[address] [address]     Ph: [phone]  Fax: [phone]    [] Certification  [] Recertification []  Plan of Care  [x] Progress Note [] Discharge      To:  Wyona Eisenmenger, MD      From:  Betzy Dennis PT  Patient: Shane Ray     : 1945  Diagnosis: s/p lumbar fusion, failed back syndrome, arthrodesis status, OA, postalminectomy syndrome     Date: 2021  Treatment Diagnosis: decreased balance, decreased posture, decreased hamstring flexibility, decreased LE and core strength, decreased activity tolerance for standing functional mobility, and inceased pain in lumbar spine    Plan of Care/Certification Expiration Date: 21  Progress Report Period from:  10/26/2021  to 2021    Total # of Visits to Date: 9   No Show: 0    Canceled Appointment: 0     OBJECTIVE:   Short Term Goals - Time Frame for Short term goals: 2 wks    Goals Current/Discharge status  Met   Short term goal 1: Pt will demonstrate improved abdominal and B LE strength >/= 4+/5 for carryover to decreased pain and amb with cane. Strength RLE  R Hip Flexion: 4+/5  R Hip Extension: 4/5  R Hip ABduction: 4/5  R Knee Flexion: 4+/5  R Knee Extension: 4+/5  R Ankle Dorsiflexion: 5/5  Strength LLE  Comment: Hip Abd and ext done in sitting secondary to pain sidelying and prone. L Hip Flexion: 3+/5, 4-/5  L Hip Extension: 4/5  L Hip ABduction: 4-/5  L Knee Flexion: 4+/5  L Knee Extension: 4+/5  L Ankle Dorsiflexion: 2-/5 [] yes  [x] no     Long Term Goals - Time Frame for Long term goals : 5 wks  Goals Current/ Discharge status Met   Long term goal 1: Pt will demonstrate indep and compliance with % of the time for self management of low back pain.  Compliant with HEP- progressions ongoing [] yes  [x] no   Long term goal 2: Pt will demonstrate improved score on modified oswestry back pain questionnaire to </= 20% indicating minimal distability for improved pt quality of life. ASA 25/50 is 50% [] yes  [x] no   Long term goal 3: The pt will report decreased pain </=4/10 at worst in order to increase ease prolonged amb. 7/10 doing sink ex. [] yes  [x] no   Long term goal 4: Pt will demonstrate amb with cane household distances mod indep for improved functional mobility. Ambulation 1  Surface: carpet  Device: Single point cane  Assistance: Stand by assistance  Quality of Gait: ER BLE's , FF  Gait Deviations: Slow Yvonne, Increased MARISA, Decreased step length, Decreased step height  Distance: 50' [] yes  [x] no       Body structures, Functions, Activity limitations: Decreased functional mobility , Decreased sensation, Increased pain, Decreased posture, Decreased balance, Decreased ROM, Decreased strength, Decreased endurance  Assessment: Continue to be seen for progression of current exercises. Compliant with HEP. Worst pain in last 5 days 7/10 doing sink ex. Instructed to do in only motions that do not increase sx and focus on core. Concluded with CP. Pt is making progress toward goals; however, pt has not reached indep level and requires continued PT. Discharge Recommendations: Continue to assess pending progress    PLAN: [x]   Frequency/Duration:  Plan  Times per week: 2  Plan weeks: 5  Current Treatment Recommendations: Strengthening, Neuromuscular Re-education, Home Exercise Program, ROM, Manual Therapy - Soft Tissue Mobilization, Safety Education & Training, Balance Training, Endurance Training, Patient/Caregiver Education & Training, Functional Mobility Training, Equipment Evaluation, Education, & procurement, Gait Training, Modalities, Stair training, Pain Management, Positioning  Plan Comment: PN done for medicare and RTD. Patient Status:[x] Continue/ Initiate plan of Care    [] Discharge PT. Recommend pt continue with HEP.      [] Additional visits requested, Please re-certify for additional visits:          Signature: Electronically signed by Kai Boss PTA on 11/22/21 at 3:41 PM EST  Electronically signed by Awilda Martinez PT on 11/24/2021 at 9:11 AM      If you have any questions or concerns, please don't hesitate to call. Thank you for your referral.    I have reviewed this plan of care and certify a need for medically necessary rehabilitation services.     Physician Signature:__________________________________________________________  Date:  Please sign and return

## 2021-11-24 ENCOUNTER — HOSPITAL ENCOUNTER (OUTPATIENT)
Dept: PHYSICAL THERAPY | Age: 76
Setting detail: THERAPIES SERIES
Discharge: HOME | End: 2021-11-24
Payer: MEDICARE

## 2021-11-24 PROCEDURE — 97112 NEUROMUSCULAR REEDUCATION: CPT

## 2021-11-24 PROCEDURE — 97110 THERAPEUTIC EXERCISES: CPT

## 2021-11-24 ASSESSMENT — PAIN DESCRIPTION - PAIN TYPE: TYPE: CHRONIC PAIN

## 2021-11-24 ASSESSMENT — PAIN DESCRIPTION - LOCATION: LOCATION: BACK

## 2021-11-24 ASSESSMENT — PAIN SCALES - GENERAL: PAINLEVEL_OUTOF10: 4

## 2021-11-24 ASSESSMENT — PAIN DESCRIPTION - ORIENTATION: ORIENTATION: LOWER

## 2021-11-24 ASSESSMENT — PAIN DESCRIPTION - DESCRIPTORS: DESCRIPTORS: SORE

## 2021-11-24 NOTE — PROGRESS NOTES
[address] [address]  Outpatient Physical Therapy    Treatment Note        Date: 2021  Patient: Refugio Schreiber  :   ACCT #: [de-identified]  Referring Practitioner: Edson Terrell MD  Diagnosis: s/p lumbar fusion, failed back syndrome, arthrodesis status, OA, postalminectomy syndrome  Treatment Diagnosis: decreased balance, decreased posture, decreased hamstring flexibility, decreased LE and core strength, decreased activity tolerance for standing functional mobility, and inceased pain in lumbar spine    Visit Information:  PT Visit Information  Onset Date:  (chronic pain)  PT Insurance Information: Medicare  Total # of Visits Approved: 80 (BMN)  Total # of Visits to Date: 10  Plan of Care/Certification Expiration Date: 21  No Show: 0  Progress Note Due Date: 21  Canceled Appointment: 0  Progress Note Counter: 57/15 (recert due )    Subjective: Pt report 4/10 LBP sore\"I did a lot of walking today. .. Dr said to do 12 more. \"  Comments: RTD 2022  HEP Compliance:  [x] Good [] Fair [] Poor [] Reports not doing due to:    Vital Signs  Patient Currently in Pain: Yes   Pain Screening  Patient Currently in Pain: Yes  Pain Assessment  Pain Level: 4  Pain Type: Chronic pain  Pain Location: Back  Pain Orientation: Lower  Pain Descriptors: Sore    OBJECTIVE:   Exercises  Exercise 1: SF: L2.5 x 6 min  Exercise 2: Gait laps in // bars: lateral, retro, march x 3 ea (x1 HR)  Exercise 3: Fitter leg press, seated: 3 cords 2x15 leyla  Exercise 4: Standing bal on foam 1 min  static, x20 F/R wt shifts, overhead reaching w/ small ball  x20  Exercise 5: STS from low mat x10 (light b/l UE support, w/ focus on b/l wt distribution)  Exercise 9: seated : DLS with 4# ball shoulder flexion, push/pull, trunk rotation x20 ea  Exercise 13: gait in parallel bars w/ RT UE support F/R x2, lat x1 B UE support       Strength: [x] NT  [] MMT completed:     ROM: [x] NT  [] ROM measurements: Modalities:  Modalities  Cryotherapy (Minutes\Location): CP to LB 10 min     *Indicates exercise, modality, or manual techniques to be initiated when appropriate    Assessment: Body structures, Functions, Activity limitations: Decreased functional mobility , Decreased sensation, Increased pain, Decreased posture, Decreased balance, Decreased ROM, Decreased strength, Decreased endurance  Assessment: Increased most exercises with out complaint of increased pain. Fatigued with gait drills in II bars needing breif restbreak. less UE assist during gait drills. Sattes using SC around house. Concluded with CP LB to decrease inflammation. Treatment Diagnosis: decreased balance, decreased posture, decreased hamstring flexibility, decreased LE and core strength, decreased activity tolerance for standing functional mobility, and inceased pain in lumbar spine          Goals:  Short term goals  Time Frame for Short term goals: 2 wks  Short term goal 1: Pt will demonstrate improved abdominal and B LE strength >/= 4+/5 for carryover to decreased pain and amb with cane. Long term goals  Time Frame for Long term goals : 5 wks  Long term goal 1: Pt will demonstrate indep and compliance with % of the time for self management of low back pain. Long term goal 2: Pt will demonstrate improved score on modified oswestry back pain questionnaire to </= 20% indicating minimal distability for improved pt quality of life. Long term goal 3: The pt will report decreased pain </=4/10 at worst in order to increase ease prolonged amb. Long term goal 4: Pt will demonstrate amb with cane household distances mod indep for improved functional mobility. Progress toward goals: Progressing towards goals. POST-PAIN       Pain Rating (0-10 pain scale): \"Same\"/10   Location and pain description same as pre-treatment unless indicated.    Action: [] NA   [x] Perform HEP  [] Meds as prescribed  [] Modalities as prescribed   [] Call

## 2021-11-26 LAB — HBA1C MFR BLD: 7.7 % (ref 4.8–5.9)

## 2021-11-30 ENCOUNTER — HOSPITAL ENCOUNTER (OUTPATIENT)
Dept: PHYSICAL THERAPY | Age: 76
Setting detail: THERAPIES SERIES
Discharge: HOME | End: 2021-11-30
Payer: MEDICARE

## 2021-11-30 PROCEDURE — 97110 THERAPEUTIC EXERCISES: CPT

## 2021-11-30 ASSESSMENT — PAIN DESCRIPTION - PAIN TYPE: TYPE: CHRONIC PAIN

## 2021-11-30 ASSESSMENT — PAIN DESCRIPTION - DESCRIPTORS: DESCRIPTORS: SORE

## 2021-11-30 ASSESSMENT — PAIN SCALES - GENERAL: PAINLEVEL_OUTOF10: 2

## 2021-11-30 ASSESSMENT — PAIN DESCRIPTION - ORIENTATION: ORIENTATION: LOWER

## 2021-11-30 ASSESSMENT — PAIN DESCRIPTION - LOCATION: LOCATION: BACK

## 2021-11-30 NOTE — PROGRESS NOTES
[id number] [address]  Outpatient Physical Therapy    Treatment Note        Date: 2021  Patient: Ian Gray  :   ACCT #: [de-identified]  Referring Practitioner: Hailey Ch MD  Diagnosis: s/p lumbar fusion, failed back syndrome, arthrodesis status, OA, postalminectomy syndrome  Treatment Diagnosis: decreased balance, decreased posture, decreased hamstring flexibility, decreased LE and core strength, decreased activity tolerance for standing functional mobility, and inceased pain in lumbar spine    Visit Information:  PT Visit Information  Onset Date:  (chronic pain)  PT Insurance Information: Medicare  Total # of Visits Approved: 80 (BMN)  Total # of Visits to Date: 6  Plan of Care/Certification Expiration Date: 21  No Show: 0  Canceled Appointment: 0  Progress Note Counter:  (recert due )    Subjective: Pt presents to appt reporting 2-3/10 pain in LB.   Comments: RTD 2022  HEP Compliance:  [x] Good [] Fair [] Poor [] Reports not doing due to:    Vital Signs  Patient Currently in Pain: Yes   Pain Screening  Patient Currently in Pain: Yes  Pain Assessment  Pain Assessment: 0-10  Pain Level: 2 (2-3)  Pain Type: Chronic pain  Pain Location: Back  Pain Orientation: Lower  Pain Descriptors: Sore    OBJECTIVE:   Exercises  Exercise 1: SF: L2.8 x 6 min (0.61 mi completed)  Exercise 2: Gait laps in // bars: lateral w/ YTB x2 , retro, march x 3 ea (x1 HR)  Exercise 3: Fitter leg press, seated: 3 cords 2x15 leyla  Exercise 4: Standing bal on foam 1 min  static, x20 F/R wt shifts, overhead reaching w/ small ball  x20  Exercise 5: STS from low mat x10 (light b/l UE support, w/ focus on b/l wt distribution)  Exercise 6: Standing trunk ext rolls up wall x10  Exercise 7: Modified sit ups from seated positioning 2x10 (Pball support)  Exercise 9: seated : DLS with 4# ball chops, seated chest press w/ SC and therapist resistance x20 ea  Exercise 10: TB rows/lats RTB x10 functional mobility. Progress toward goals: B LE strength    POST-PAIN       Pain Rating (0-10 pain scale):   2/10   Location and pain description same as pre-treatment unless indicated. Action: [] NA   [x] Perform HEP  [] Meds as prescribed  [x] Modalities as prescribed   [] Call Physician     Frequency/Duration:  Plan  Times per week: 2  Plan weeks: 9  Current Treatment Recommendations: Strengthening, Neuromuscular Re-education, Home Exercise Program, ROM, Manual Therapy - Soft Tissue Mobilization, Safety Education & Training, Balance Training, Endurance Training, Patient/Caregiver Education & Training, Functional Mobility Training, Equipment Evaluation, Education, & procurement, Gait Training, Modalities, Stair training, Pain Management, Positioning  Plan Comment: added 2xs/wk X 4 wks to POC on 11/22/2021     Pt to continue current HEP. See objective section for any therapeutic exercise changes, additions or modifications this date.      PT Individual Minutes  Time In: 1411  Time Out: 1502  Minutes: 51  Timed Code Treatment Minutes: 41 Minutes  Procedure Minutes: 10 min (CP)       Timed Activity Minutes Units   Ther Ex 41 3     Signature:  Electronically signed by Syd Hernández PTA on 11/30/21 at 2:58 PM EST

## 2021-12-02 ENCOUNTER — HOSPITAL ENCOUNTER (OUTPATIENT)
Dept: PHYSICAL THERAPY | Age: 76
Setting detail: THERAPIES SERIES
Discharge: HOME | End: 2021-12-02
Payer: MEDICARE

## 2021-12-02 PROCEDURE — 97110 THERAPEUTIC EXERCISES: CPT

## 2021-12-02 ASSESSMENT — PAIN DESCRIPTION - DESCRIPTORS: DESCRIPTORS: DULL;ACHING

## 2021-12-02 ASSESSMENT — PAIN DESCRIPTION - PAIN TYPE: TYPE: CHRONIC PAIN

## 2021-12-02 ASSESSMENT — PAIN DESCRIPTION - ORIENTATION: ORIENTATION: LOWER

## 2021-12-02 ASSESSMENT — PAIN DESCRIPTION - LOCATION: LOCATION: BACK

## 2021-12-02 ASSESSMENT — PAIN SCALES - GENERAL: PAINLEVEL_OUTOF10: 4

## 2021-12-02 NOTE — PROGRESS NOTES
[address] [address]  Outpatient Physical Therapy    Treatment Note        Date: 2021  Patient: Brooksie Najjar  :   ACCT #: [de-identified]  Referring Practitioner: Alayna Velez MD  Diagnosis: s/p lumbar fusion, failed back syndrome, arthrodesis status, OA, postalminectomy syndrome  Treatment Diagnosis: decreased balance, decreased posture, decreased hamstring flexibility, decreased LE and core strength, decreased activity tolerance for standing functional mobility, and inceased pain in lumbar spine    Visit Information:  PT Visit Information  Onset Date:  (chronic pain)  PT Insurance Information: Medicare  Total # of Visits Approved: 80 (BMN)  Total # of Visits to Date: 15  Plan of Care/Certification Expiration Date: 21  No Show: 0  Canceled Appointment: 0  Progress Note Counter:  (recert due )    Subjective: Pt arriving w/ reports of still being sore from last therapy session stating \"You worked me hard. \" Pt has current pain level of 4/10  Comments: RTD 2022  HEP Compliance:  [x] Good [] Fair [] Poor [] Reports not doing due to:    Vital Signs  Patient Currently in Pain: Yes   Pain Screening  Patient Currently in Pain: Yes  Pain Assessment  Pain Assessment: 0-10  Pain Level: 4  Pain Type: Chronic pain  Pain Location: Back  Pain Orientation: Lower  Pain Descriptors: Dull; Aching    OBJECTIVE:   Exercises  Exercise 1: SF: L3 x 6 min (0.64 mi completed)  Exercise 2: Gait laps in // bars: lateral w/ YTB x3  Exercise 3: Fitter leg press, seated: 3 cords 2x15 leyla  Exercise 4: Standing bal on foam w/ bean bag toss w/ multidirectional reaches , occ x1UE support  Exercise 5: STS from low mat x10 (light b/l UE support, w/ focus on b/l wt distribution)  Exercise 6: Standing trunk ext rolls up wall x10  Exercise 7: Modified sit ups from seated positioning 2x10 (Pball support)  Exercise 8: 4 \" step taps w/ Lt LE x15 (x1 UE support at [employer])  Exercise 9: standing 4# ball chops x20 b/l  Exercise 20: HEP:     Strength: [x] NT  [] MMT completed:     ROM: [x] NT  [] ROM measurements:      Modalities:  Modalities  Cryotherapy (Minutes\Location): CP to LB 10 min     *Indicates exercise, modality, or manual techniques to be initiated when appropriate    Assessment: Body structures, Functions, Activity limitations: Decreased sensation, Increased pain, Decreased posture, Decreased balance, Decreased ROM, Decreased strength, Decreased endurance  Assessment: Pt demonstrating ability to clear 4\" step w/ Lt LE despite cont'd need for contralateral UE support at [facility]. Still working to improve functional endurance w/ standing/amb activity however, reduced RB's needed today vs LV. Functional/WB activity tolerance continues to be below 5 min during PT sessions. Treatment Diagnosis: decreased balance, decreased posture, decreased hamstring flexibility, decreased LE and core strength, decreased activity tolerance for standing functional mobility, and inceased pain in lumbar spine     Goals:  Short term goals  Time Frame for Short term goals: 2 wks  Short term goal 1: Pt will demonstrate improved abdominal and B LE strength >/= 4+/5 for carryover to decreased pain and amb with cane. Long term goals  Time Frame for Long term goals : 5 wks  Long term goal 1: Pt will demonstrate indep and compliance with % of the time for self management of low back pain. Long term goal 2: Pt will demonstrate improved score on modified oswestry back pain questionnaire to </= 20% indicating minimal distability for improved pt quality of life. Long term goal 3: The pt will report decreased pain </=4/10 at worst in order to increase ease prolonged amb. Long term goal 4: Pt will demonstrate amb with cane household distances mod indep for improved functional mobility.   Progress toward goals: B LE/core strength    POST-PAIN       Pain Rating (0-10 pain scale):  0 /10   Location and pain description same as pre-treatment

## 2021-12-07 ENCOUNTER — HOSPITAL ENCOUNTER (OUTPATIENT)
Dept: PHYSICAL THERAPY | Age: 76
Setting detail: THERAPIES SERIES
Discharge: HOME | End: 2021-12-07
Payer: MEDICARE

## 2021-12-07 PROCEDURE — 97110 THERAPEUTIC EXERCISES: CPT

## 2021-12-07 ASSESSMENT — PAIN DESCRIPTION - DESCRIPTORS: DESCRIPTORS: SHARP;THROBBING

## 2021-12-07 ASSESSMENT — PAIN SCALES - GENERAL: PAINLEVEL_OUTOF10: 5

## 2021-12-07 ASSESSMENT — PAIN DESCRIPTION - PAIN TYPE: TYPE: CHRONIC PAIN

## 2021-12-07 ASSESSMENT — PAIN DESCRIPTION - ORIENTATION: ORIENTATION: RIGHT

## 2021-12-07 ASSESSMENT — PAIN DESCRIPTION - LOCATION: LOCATION: HIP

## 2021-12-07 NOTE — PROGRESS NOTES
[id number] [address]  Outpatient Physical Therapy    Treatment Note        Date: 2021  Patient: Griselda Belton  :   ACCT #: [de-identified]  Referring Practitioner: Jaycee Veras MD  Diagnosis: s/p lumbar fusion, failed back syndrome, arthrodesis status, OA, postalminectomy syndrome  Treatment Diagnosis: decreased balance, decreased posture, decreased hamstring flexibility, decreased LE and core strength, decreased activity tolerance for standing functional mobility, and inceased pain in lumbar spine    Visit Information:  PT Visit Information  Onset Date:  (chronic pain)  PT Insurance Information: Medicare  Total # of Visits Approved: 99 (BMN)  Total # of Visits to Date: 15  Plan of Care/Certification Expiration Date: 21  No Show: 0  Canceled Appointment: 0  Progress Note Counter:  (recert due )    Subjective: no pain at rest, pain when on my feet may reach 5/10, I use the cane in the house and mostly the [employer] when outside the house  Comments: RTD 2022  HEP Compliance:  [x] Good [] Fair [] Poor [] Reports not doing due to:    Vital Signs  Patient Currently in Pain: Yes   Pain Screening  Patient Currently in Pain: Yes  Pain Assessment  Pain Level: 5  Pain Type: Chronic pain  Pain Location: Hip  Pain Orientation: Right (lateral/posterior)  Pain Descriptors: [place]ne;  Throbbing    OBJECTIVE:   Exercises  Exercise 1: SF: L-3.0, 5 min  Exercise 2: Gait laps in // bars: lateral w/ YTB x3  Exercise 3: Fitter leg press, seated: 3 cords 2x15 leyla  Exercise 5: STS from low mat x10 light push off of knees  Exercise 8: 4 \" step taps w/ Lt LE x15 (x1 UE support at [employer])  Exercise 9: standing 4# ball chops x 10, left/right,  b/l  Exercise 11: hip abd w/ circles x 10, b/l, standing and abd, seated w/ LE's ext x 15  Exercise 12: step ups Fwd x 10 w/ 2 in step  Exercise 14: marching x 15, standing       Strength: [] NT  [x] MMT completed:     Strength LLE  Comment: hip abd in seated  L Hip ABduction: 4/5      ROM: [x] NT  [] ROM measurements:     Modalities:  Modalities  Cryotherapy (Minutes\Location): CP to LB 10 min     *Indicates exercise, modality, or manual techniques to be initiated when appropriate    Assessment: Body structures, Functions, Activity limitations: Decreased sensation, Increased pain, Decreased posture, Decreased balance, Decreased ROM, Decreased strength, Decreased endurance  Assessment: continued w/ current exercises, added, standing marches, standing hip abd and circles, fwd step ups on 2 in step leading w/ LLEUE support used throughout session w/ cues for patient to challenge himself while also thinking about safety, conclude w/ CP to reduce mm soreness  Treatment Diagnosis: decreased balance, decreased posture, decreased hamstring flexibility, decreased LE and core strength, decreased activity tolerance for standing functional mobility, and inceased pain in lumbar spine          Goals:  Short term goals  Time Frame for Short term goals: 2 wks  Short term goal 1: Pt will demonstrate improved abdominal and B LE strength >/= 4+/5 for carryover to decreased pain and amb with cane. Long term goals  Time Frame for Long term goals : 5 wks  Long term goal 1: Pt will demonstrate indep and compliance with % of the time for self management of low back pain. Long term goal 2: Pt will demonstrate improved score on modified oswestry back pain questionnaire to </= 20% indicating minimal distability for improved pt quality of life. Long term goal 3: The pt will report decreased pain </=4/10 at worst in order to increase ease prolonged amb. Long term goal 4: Pt will demonstrate amb with cane household distances mod indep for improved functional mobility. Progress toward goals:ongoing    POST-PAIN       Pain Rating (0-10 pain scale):  3 /10   Location and pain description same as pre-treatment unless indicated.    Action: [] NA   [x] Perform HEP  [] Meds as prescribed  []

## 2021-12-09 ENCOUNTER — HOSPITAL ENCOUNTER (OUTPATIENT)
Dept: PHYSICAL THERAPY | Age: 76
Setting detail: THERAPIES SERIES
Discharge: HOME | End: 2021-12-09
Payer: MEDICARE

## 2021-12-09 PROCEDURE — 97110 THERAPEUTIC EXERCISES: CPT

## 2021-12-09 ASSESSMENT — PAIN DESCRIPTION - ORIENTATION: ORIENTATION: LOWER;RIGHT

## 2021-12-09 ASSESSMENT — PAIN SCALES - GENERAL: PAINLEVEL_OUTOF10: 4

## 2021-12-09 ASSESSMENT — PAIN DESCRIPTION - PAIN TYPE: TYPE: CHRONIC PAIN

## 2021-12-09 ASSESSMENT — PAIN DESCRIPTION - DESCRIPTORS: DESCRIPTORS: SHARP

## 2021-12-09 ASSESSMENT — PAIN DESCRIPTION - LOCATION: LOCATION: BACK;HIP

## 2021-12-09 NOTE — PROGRESS NOTES
[id number] [address]  Outpatient Physical Therapy    Treatment Note        Date: 2021  Patient: Harman Kawasaki  :   ACCT #: [de-identified]  Referring Practitioner: Vale Ross MD  Diagnosis: s/p lumbar fusion, failed back syndrome, arthrodesis status, OA, postalminectomy syndrome  Treatment Diagnosis: decreased balance, decreased posture, decreased hamstring flexibility, decreased LE and core strength, decreased activity tolerance for standing functional mobility, and inceased pain in lumbar spine    Visit Information:  PT Visit Information  Onset Date:  (chronic pain)  PT Insurance Information: Medicare  Total # of Visits Approved: 80 (BMN)  Total # of Visits to Date: 15  Plan of Care/Certification Expiration Date: 21  No Show: 0  Canceled Appointment: 0  Progress Note Counter:  (recert due 40/15/5868)    Subjective: pain today is about 4/10 right hip/back  Comments: RTD 2022  HEP Compliance:  [x] Good [] Fair [] Poor [] Reports not doing due to:    Vital Signs  Patient Currently in Pain: Yes   Pain Screening  Patient Currently in Pain: Yes  Pain Assessment  Pain Level: 4  Pain Type: Chronic pain  Pain Location: Back; Hip  Pain Orientation: Lower; Right  Pain Descriptors: Sharp    OBJECTIVE:   Exercises  Exercise 1: SF: L-3.3, 5 min  Exercise 2: SLS high knee march 3 sec x 3 laps and monster walk x 3 laps w/ YTB  Exercise 3: Fitter leg press, seated: 3 cords 2x15 leyla  Exercise 11: hip abd w/ circles x 15, b/l, standing and abd, seated w/ LE's ext x 15  Exercise 12: step ups Fwd x 20 w/ 2 in step  Exercise 14: marching x 15, standing  Exercise 15: hip ext 5 sec x 20, seated, w/ ball  Exercise 16: squats x 12       Strength: [x] NT  [] MMT completed:   ROM: [x] NT  [] ROM measurements:   Modalities:  Modalities  Moist heat: HP 10 min     *Indicates exercise, modality, or manual techniques to be initiated when appropriate    Assessment:         Body structures, Functions, Activity limitations: Decreased sensation, Increased pain, Decreased posture, Decreased balance, Decreased ROM, Decreased strength, Decreased endurance  Assessment: increased Sci-Fit and hip abd ex,  added SLS high knee rosamaria squats,  to improve LE strength and to improve balance, some UE support used, improved tolerance to exercises noted, conclude w/ HP to reduce mm soreness  Treatment Diagnosis: decreased balance, decreased posture, decreased hamstring flexibility, decreased LE and core strength, decreased activity tolerance for standing functional mobility, and inceased pain in lumbar spine          Goals:  Short term goals  Time Frame for Short term goals: 2 wks  Short term goal 1: Pt will demonstrate improved abdominal and B LE strength >/= 4+/5 for carryover to decreased pain and amb with cane. Long term goals  Time Frame for Long term goals : 5 wks  Long term goal 1: Pt will demonstrate indep and compliance with % of the time for self management of low back pain. Long term goal 2: Pt will demonstrate improved score on modified oswestry back pain questionnaire to </= 20% indicating minimal distability for improved pt quality of life. Long term goal 3: The pt will report decreased pain </=4/10 at worst in order to increase ease prolonged amb. Long term goal 4: Pt will demonstrate amb with cane household distances mod indep for improved functional mobility. Progress toward goals:ongoing    POST-PAIN       Pain Rating (0-10 pain scale):  3 /10   Location and pain description same as pre-treatment unless indicated.    Action: [] NA   [x] Perform HEP  [] Meds as prescribed  [] Modalities as prescribed   [] Call Physician     Frequency/Duration:  Plan  Times per week: 2  Plan weeks: 9  Current Treatment Recommendations: Strengthening, Neuromuscular Re-education, Home Exercise Program, ROM, Manual Therapy - Soft Tissue Mobilization, Safety Education & Training, Balance Training, Endurance Training, Patient/Caregiver Education & Training, Functional Mobility Training, Equipment Evaluation, Education, & procurement, Gait Training, Modalities, Stair training, Pain Management, Positioning  Plan Comment: added 2xs/wk X 4 wks to POC on 11/22/2021     Pt to continue current HEP. See objective section for any therapeutic exercise changes, additions or modifications this date.          PT Individual Minutes  Time In: 7724  Time Out: 0703  Minutes: 48  Timed Code Treatment Minutes: 38 Minutes  Procedure Minutes: HP 10 min     Timed Activity Minutes Units   Ther Ex 38 3       Signature:  Electronically signed by Ayah Booth PTA on 12/9/21 at 3:37 PM EST

## 2021-12-14 ENCOUNTER — HOSPITAL ENCOUNTER (OUTPATIENT)
Dept: PHYSICAL THERAPY | Age: 76
Setting detail: THERAPIES SERIES
Discharge: HOME | End: 2021-12-14
Payer: MEDICARE

## 2021-12-14 PROCEDURE — 97110 THERAPEUTIC EXERCISES: CPT

## 2021-12-14 ASSESSMENT — PAIN DESCRIPTION - DESCRIPTORS: DESCRIPTORS: SHARP

## 2021-12-14 ASSESSMENT — PAIN DESCRIPTION - PAIN TYPE: TYPE: CHRONIC PAIN

## 2021-12-14 ASSESSMENT — PAIN SCALES - GENERAL: PAINLEVEL_OUTOF10: 4

## 2021-12-14 ASSESSMENT — PAIN DESCRIPTION - LOCATION: LOCATION: BACK;HIP

## 2021-12-14 ASSESSMENT — PAIN DESCRIPTION - ORIENTATION: ORIENTATION: LOWER;RIGHT

## 2021-12-14 NOTE — PROGRESS NOTES
[address] [address]  Outpatient Physical Therapy    Treatment Note        Date: 2021  Patient: Griselda Belton  :   ACCT #: [de-identified]  Referring Practitioner: Jaycee Veras MD  Diagnosis: s/p lumbar fusion, failed back syndrome, arthrodesis status, OA, postalminectomy syndrome  Treatment Diagnosis: decreased balance, decreased posture, decreased hamstring flexibility, decreased LE and core strength, decreased activity tolerance for standing functional mobility, and inceased pain in lumbar spine    Visit Information:  PT Visit Information  Onset Date:  (chronic pain)  PT Insurance Information: Medicare  Total # of Visits Approved: 80 (BMN)  Total # of Visits to Date: 15  Plan of Care/Certification Expiration Date: 21  No Show: 0  Canceled Appointment: 0  Progress Note Counter:  (recert due 58/91/8497)    Subjective: pain today is about 4/10 right hip/back, I would like to add additional visits  Comments: RTD 2022  HEP Compliance:  [x] Good [] Fair [] Poor [] Reports not doing due to:    Vital Signs  Patient Currently in Pain: Yes   Pain Screening  Patient Currently in Pain: Yes  Pain Assessment  Pain Level: 4  Pain Type: Chronic pain  Pain Location: Back; Hip  Pain Orientation: Lower; Right  Pain Descriptors: Sharp    OBJECTIVE:   Exercises  Exercise 1: SF: L-3.3, 5 min  Exercise 2: SLS high knee march 3 sec x 3 laps and monster walk x 3 laps w/ YTB  Exercise 3: Fitter leg press, seated: 3 cords x 20, leyla  Exercise 10: HS curls x 20, b/l, YTB  Exercise 11: sink ex and circles x 20, and abd seated w/ LE's ext x 20  Exercise 14: marching x 20, standing         Strength: [] NT  [x] MMT completed:     Strength LLE  L Ankle Dorsiflexion: 2/5, 2-/5      ROM: [x] NT  [] ROM measurements:      +Modalities:  Modalities  Cryotherapy (Minutes\Location): CP to LB 10 min     *Indicates exercise, modality, or manual techniques to be initiated when appropriate    Assessment:         Body structures, Functions, Activity limitations: Decreased sensation, Increased pain, Decreased posture, Decreased balance, Decreased ROM, Decreased strength, Decreased endurance  Assessment: decreased hip/knee flexion w/ high knee march, b/l UE support used, increased reps w/ seated and standing abd and circles, short rest breaks taken during session, added sink ex and HS curls w/ YTB, good tolerance, conclude w/ CP, LB, right hip to reduce mm soreness  Treatment Diagnosis: decreased balance, decreased posture, decreased hamstring flexibility, decreased LE and core strength, decreased activity tolerance for standing functional mobility, and inceased pain in lumbar spine          Goals:  Short term goals  Time Frame for Short term goals: 2 wks  Short term goal 1: Pt will demonstrate improved abdominal and B LE strength >/= 4+/5 for carryover to decreased pain and amb with cane. Long term goals  Time Frame for Long term goals : 5 wks  Long term goal 1: Pt will demonstrate indep and compliance with % of the time for self management of low back pain. Long term goal 2: Pt will demonstrate improved score on modified oswestry back pain questionnaire to </= 20% indicating minimal distability for improved pt quality of life. Long term goal 3: The pt will report decreased pain </=4/10 at worst in order to increase ease prolonged amb. Long term goal 4: Pt will demonstrate amb with cane household distances mod indep for improved functional mobility. Progress toward goals:ongoing    POST-PAIN       Pain Rating (0-10 pain scale):   3/10   Location and pain description same as pre-treatment unless indicated.    Action: [] NA   [x] Perform HEP  [] Meds as prescribed  [] Modalities as prescribed   [] Call Physician     Frequency/Duration:  Plan  Times per week: 2  Plan weeks: 9  Current Treatment Recommendations: Strengthening, Neuromuscular Re-education, Home Exercise Program, ROM, Manual Therapy - Soft Tissue Mobilization, Safety Education & Training, Balance Training, Endurance Training, Patient/Caregiver Education & Training, Functional Mobility Training, Equipment Evaluation, Education, & procurement, Gait Training, Modalities, Stair training, Pain Management, Positioning  Plan Comment: added 2xs/wk X 4 wks to POC on 11/22/2021     Pt to continue current HEP. See objective section for any therapeutic exercise changes, additions or modifications this date.          PT Individual Minutes  Time In: 1209  Time Out: 8134  Minutes: 48  Timed Code Treatment Minutes: 38 Minutes  Procedure Minutes: CP 10 mimn     Timed Activity Minutes Units   Ther Ex 38 3       Signature:  Electronically signed by Zion Poe PTA on 12/14/21 at 1:40 PM EST

## 2021-12-16 ENCOUNTER — HOSPITAL ENCOUNTER (OUTPATIENT)
Dept: PHYSICAL THERAPY | Age: 76
Setting detail: THERAPIES SERIES
Discharge: HOME | End: 2021-12-16
Payer: MEDICARE

## 2021-12-16 PROCEDURE — 97140 MANUAL THERAPY 1/> REGIONS: CPT

## 2021-12-16 PROCEDURE — 97110 THERAPEUTIC EXERCISES: CPT

## 2021-12-16 ASSESSMENT — PAIN DESCRIPTION - DESCRIPTORS: DESCRIPTORS: SHARP

## 2021-12-16 ASSESSMENT — PAIN SCALES - GENERAL: PAINLEVEL_OUTOF10: 4

## 2021-12-16 ASSESSMENT — PAIN DESCRIPTION - ORIENTATION: ORIENTATION: RIGHT

## 2021-12-16 ASSESSMENT — PAIN DESCRIPTION - LOCATION: LOCATION: BACK;HIP

## 2021-12-16 ASSESSMENT — PAIN DESCRIPTION - PAIN TYPE: TYPE: CHRONIC PAIN

## 2021-12-16 NOTE — PROGRESS NOTES
[address] [address]  Outpatient Physical Therapy    Treatment Note        Date: 2021  Patient: Rubina Zapata  :   ACCT #: [de-identified]  Referring Practitioner: Zachariah Spear MD  Diagnosis: s/p lumbar fusion, failed back syndrome, arthrodesis status, OA, postalminectomy syndrome  Treatment Diagnosis: decreased balance, decreased posture, decreased hamstring flexibility, decreased LE and core strength, decreased activity tolerance for standing functional mobility, and inceased pain in lumbar spine    Visit Information:  PT Visit Information  Onset Date:  (chronic pain)  PT Insurance Information: Medicare  Total # of Visits Approved: 99 (BMN)  Total # of Visits to Date: 12  Plan of Care/Certification Expiration Date: 21  No Show: 0  Canceled Appointment: 0  Progress Note Counter:  (recert due )    Subjective: Pt states \"I still am having that sharp pain in the right side of my low back. Otherwise, things are good. \"  Comments: RTD 2022  HEP Compliance:  [x] Good [] Fair [] Poor [] Reports not doing due to:     Vital Signs  Patient Currently in Pain: Yes   Pain Screening  Patient Currently in Pain: Yes  Pain Assessment  Pain Assessment: 0-10  Pain Level: 4  Pain Type: Chronic pain  Pain Location: Back;  Hip  Pain Orientation: Right  Pain Descriptors: Sharp    OBJECTIVE:   Exercises  Exercise 1: SF: L-3.5, 5 min  Exercise 2: MMT 5 min  Exercise 3: Review of HEP  Exercise 4: x5 STS 14 sec (1st attempt)     Ambulation 1  Surface: carpet  Device: Single point cane  Assistance: Modified Independent  Gait Deviations: Slow Yvonne, Increased MARISA, Decreased step length, Decreased step height  Distance: 100' including sharp turns R/L  Comments: AFO donned    Stairs  # Steps : 8  Stairs Height: 6\"  Rails: Bilateral  Device: No Device  Assistance: Modified independent   Comment: non-reciprocal; ant lean with descend on HR's    Strength: [] NT  [x] MMT completed:  Strength and B LE strength >/= 4+/5 for carryover to decreased pain and amb with cane. Long term goals  Time Frame for Long term goals : 5 wks  Long term goal 1: Pt will demonstrate indep and compliance with % of the time for self management of low back pain. Long term goal 2: Pt will demonstrate improved score on modified oswestry back pain questionnaire to </= 20% indicating minimal distability for improved pt quality of life. Long term goal 3: The pt will report decreased pain </=4/10 at worst in order to increase ease prolonged amb. Long term goal 4: Pt will demonstrate amb with cane household distances mod indep for improved functional mobility. Progress toward goals: Please refer to D/C note. POST-PAIN       Pain Rating (0-10 pain scale):  2 /10   Location and pain description same as pre-treatment unless indicated. Action: [x] NA   [] Perform HEP  [] Meds as prescribed  [] Modalities as prescribed   [] Call Physician     Frequency/Duration:  Plan  Plan Comment: D/C this date     Pt to continue current HEP. See objective section for any therapeutic exercise changes, additions or modifications this date.     PT Individual Minutes  Time In: 5831  Time Out: 2551  Minutes: 40  Timed Code Treatment Minutes: 30 Minutes  Procedure Minutes: 10 min (CP)      Timed Activity Minutes Units   Ther Ex 20 1   Manual  10 1     Signature:  Electronically signed by Tony Verdugo PTA on 12/16/21 at 2:58 PM EST

## 2021-12-16 NOTE — PROGRESS NOTES
[address]     Ph: [phone]  Fax: [phone]    [] Certification  [] Recertification []  Plan of Care  [] Progress Note [x] Discharge      To:  Hailey Ch MD      From:  Elvis Goldberg , PT  Patient: Ian Gray     : 1945  Diagnosis: s/p lumbar fusion, failed back syndrome, arthrodesis status, OA, postalminectomy syndrome     Date: 2021  Treatment Diagnosis: decreased balance, decreased posture, decreased hamstring flexibility, decreased LE and core strength, decreased activity tolerance for standing functional mobility, and inceased pain in lumbar spine    Plan of Care/Certification Expiration Date: 21  Progress Report Period from:  10/26/2021  to 2021    Total # of Visits to Date: 16   No Show: 0    Canceled Appointment: 0     OBJECTIVE:   Short Term Goals - Time Frame for Short term goals: 2 wks    Goals Current/Discharge status  Met   Short term goal 1: Pt will demonstrate improved abdominal and B LE strength >/= 4+/5 for carryover to decreased pain and amb with cane. Strength RLE  Strength RLE: Exception  R Hip Flexion: 4+/5  R Hip Extension: 4/5  R Hip ABduction: 4/5  R Knee Flexion: 4+/5  R Knee Extension: 4+/5  R Ankle Dorsiflexion: 5/5  Strength LLE  Strength LLE: Exception  L Hip Flexion: 2-/5  L Hip Extension: 3+/5  L Hip ABduction: 3/5  L Knee Flexion: 4+/5  L Knee Extension: 4/5  L Ankle Dorsiflexion: 2/5     Strength Other  Other: isometric core strength: 2/5 [] yes  [x] no     Long Term Goals - Time Frame for Long term goals : 5 wks  Goals Current/ Discharge status Met   Long term goal 1: Pt will demonstrate indep and compliance with % of the time for self management of low back pain.  Indep/compliant  [x] yes  [] no   Long term goal 2: Pt will demonstrate improved score on modified oswestry back pain questionnaire to </= 20% indicating minimal distability for improved pt quality of life. 23/50 or 54% function  [] yes  [x] no   Long term goal 3: The pt will report decreased pain </=4/10 at worst in order to increase ease prolonged amb. Pain Location: Back, Hip    Pain Level: 4    Pain Descriptors: Sharp  Pain ranges 3-6/10 at most.   [] yes  [x] no   Long term goal 4: Pt will demonstrate amb with cane household distances mod indep for improved functional mobility. Ambulation 1  Surface: carpet  Device: Single point cane  Assistance: Modified Independent  Gait Deviations: Slow Yvonne, Increased MARISA, Decreased step length, Decreased step height  Distance: 100' including sharp turns R/L  Comments: AFO donned      Stairs  # Steps : 8  Stairs Height: 6\"  Rails: Bilateral  Device: No Device  Assistance: Modified independent   Comment: non-reciprocal; ant lean with descend on HR's [x] yes  [] no     Body structures, Functions, Activity limitations: Decreased sensation, Increased pain, Decreased posture, Decreased balance, Decreased ROM, Decreased strength, Decreased endurance  Assessment: Pt completing 8 full wks of skilled PT w/ remarkable attendance to sessions. Pt verbalizing 60% function currently as opposed to 20-25% function upon eval ~ 2 months ago despite ASA determining 4% improvement. Pt cont's to self challenge his standing/walking abilities w/ ongoing limitation of 5-10 min D/T SOB/fatigue vs LBP at this time. Pt has become less dependent on [employer] and now able to complete home distances w/ use of hurrycane. MMT was completed noting some improvement in Lt LE however, not significant w/ cont'd need of UE assist to manage Lt LE during transfers. Discussed importance of continuation w/ HEP and functional endurance beyond PT to further inc LOQ.  Therapist posing option of gym membership through use of [employer] program; pt expressed some interest.  Discharge Recommendations: Defer PT at this time    PLAN:   Frequency/Duration:  Plan  Plan Comment: D/C this date              Patient Status:[] Continue/ Initiate plan of Care    [x] Discharge PT. Recommend pt continue with HEP. [] Additional visits requested, Please re-certify for additional visits:        Objective measurements provided by:Electronically signed by Janene Franco PTA on 12/16/21 at 3:02 PM EST  Electronically signed by Soraya Suh PT on 12/16/2021 at 3:24 PM    Signature:  Electronically signed by Janene Franco PTA on 12/16/2021 at 3:02 PM    If you have any questions or concerns, please don't hesitate to call. Thank you for your referral.    I have reviewed this plan of care and certify a need for medically necessary rehabilitation services.     Physician Signature:__________________________________________________________  Date:  Please sign and return

## 2021-12-21 ENCOUNTER — HOSPITAL ENCOUNTER (OUTPATIENT)
Dept: PHYSICAL THERAPY | Age: 76
Setting detail: THERAPIES SERIES
End: 2021-12-21
Payer: MEDICARE

## 2021-12-23 ENCOUNTER — APPOINTMENT (OUTPATIENT)
Dept: PHYSICAL THERAPY | Age: 76
End: 2021-12-23
Payer: MEDICARE

## 2021-12-28 ENCOUNTER — APPOINTMENT (OUTPATIENT)
Dept: PHYSICAL THERAPY | Age: 76
End: 2021-12-28
Payer: MEDICARE

## 2021-12-30 ENCOUNTER — APPOINTMENT (OUTPATIENT)
Dept: PHYSICAL THERAPY | Age: 76
End: 2021-12-30
Payer: MEDICARE

## 2022-02-18 ENCOUNTER — TELEPHONE (OUTPATIENT)
Dept: FAMILY MEDICINE CLINIC | Age: 77
End: 2022-02-18

## 2022-02-18 NOTE — TELEPHONE ENCOUNTER
Spoke with pt, they said that they will call back in a few days to schedule an appt with Dr Perico Locke.

## 2022-03-11 ENCOUNTER — NURSE TRIAGE (OUTPATIENT)
Dept: OTHER | Facility: CLINIC | Age: 77
End: 2022-03-11

## 2022-03-11 ENCOUNTER — TELEPHONE (OUTPATIENT)
Dept: OTHER | Facility: CLINIC | Age: 77
End: 2022-03-11

## 2022-03-11 NOTE — TELEPHONE ENCOUNTER
Received call from Sonia Erickson at [facility] with [employer]. Subjective: Caller states \"SOB and legs swelling x 10 days\"     Current Symptoms: unable to walk length of house without becoming SOB. Gets SOB going to BR, having to use more pillows in bed to sit more elevated. Some nights has to get up and sleep in recliner. Also has noticed blisters on a couple toes on left foot    Onset: 10 days ago; gradual, worsening    Associated Symptoms: NA    Pain Severity: 0/10; ;     Temperature: denies     What has been tried: nothing    LMP: NA Pregnant: NA    Recommended disposition: See in Office Today, or go to ER if unable to schedule    Care advice provided, patient verbalizes understanding; denies any other questions or concerns; instructed to call back for any new or worsening symptoms. Patient/Caller agrees with recommended disposition; writer provided warm transfer to [facility] at [facility] for appointment scheduling     Attention Provider: Thank you for allowing me to participate in the care of your patient. The patient was connected to triage in response to information provided to the ECC/PSC. Please do not respond through this encounter as the response is not directed to a shared pool.       Reason for Disposition   MODERATE swelling of both ankles (e.g., swelling extends up to the knees) AND new-onset or worsening    Protocols used: LEG SWELLING AND EDEMA-ADULT-OH

## 2022-03-25 ENCOUNTER — TELEPHONE (OUTPATIENT)
Dept: FAMILY MEDICINE CLINIC | Age: 77
End: 2022-03-25

## 2022-04-01 ENCOUNTER — APPOINTMENT (OUTPATIENT)
Dept: GENERAL RADIOLOGY | Age: 77
DRG: 286 | End: 2022-04-01
Payer: MEDICARE

## 2022-04-01 ENCOUNTER — HOSPITAL ENCOUNTER (INPATIENT)
Age: 77
LOS: 6 days | Discharge: HOME | DRG: 286 | End: 2022-04-07
Attending: INTERNAL MEDICINE | Admitting: STUDENT IN AN ORGANIZED HEALTH CARE EDUCATION/TRAINING PROGRAM
Payer: MEDICARE

## 2022-04-01 DIAGNOSIS — M79.89 LEG SWELLING: Primary | ICD-10-CM

## 2022-04-01 DIAGNOSIS — R06.09 DYSPNEA ON EXERTION: ICD-10-CM

## 2022-04-01 LAB
ALBUMIN SERPL-MCNC: 3.7 G/DL (ref 3.5–4.6)
ALP BLD-CCNC: 187 U/L (ref 35–104)
ALT SERPL-CCNC: 24 U/L (ref 0–41)
ANION GAP SERPL CALCULATED.3IONS-SCNC: 13 MEQ/L (ref 9–15)
AST SERPL-CCNC: 29 U/L (ref 0–40)
BASOPHILS ABSOLUTE: 0 K/UL (ref 0–0.2)
BASOPHILS RELATIVE PERCENT: 0.7 %
BILIRUB SERPL-MCNC: 0.6 MG/DL (ref 0.2–0.7)
BUN BLDV-MCNC: 45 MG/DL (ref 8–23)
CALCIUM SERPL-MCNC: 9 MG/DL (ref 8.5–9.9)
CHLORIDE BLD-SCNC: 105 MEQ/L (ref 95–107)
CO2: 23 MEQ/L (ref 20–31)
CREAT SERPL-MCNC: 1.83 MG/DL (ref 0.7–1.2)
EKG ATRIAL RATE: 74 BPM
EKG P AXIS: 34 DEGREES
EKG P-R INTERVAL: 278 MS
EKG Q-T INTERVAL: 452 MS
EKG QRS DURATION: 160 MS
EKG QTC CALCULATION (BAZETT): 501 MS
EKG R AXIS: -5 DEGREES
EKG T AXIS: 129 DEGREES
EKG VENTRICULAR RATE: 74 BPM
EOSINOPHILS ABSOLUTE: 0.2 K/UL (ref 0–0.7)
EOSINOPHILS RELATIVE PERCENT: 3.3 %
GFR AFRICAN AMERICAN: 43.9
GFR NON-AFRICAN AMERICAN: 36.3
GLOBULIN: 3.3 G/DL (ref 2.3–3.5)
GLUCOSE BLD-MCNC: 113 MG/DL (ref 70–99)
GLUCOSE BLD-MCNC: 185 MG/DL (ref 70–99)
HCT VFR BLD CALC: 39.8 % (ref 42–52)
HEMOGLOBIN: 12.3 G/DL (ref 14–18)
LYMPHOCYTES ABSOLUTE: 0.5 K/UL (ref 1–4.8)
LYMPHOCYTES RELATIVE PERCENT: 11.7 %
MAGNESIUM: 1.9 MG/DL (ref 1.7–2.4)
MCH RBC QN AUTO: 26.6 PG (ref 27–31.3)
MCHC RBC AUTO-ENTMCNC: 31 % (ref 33–37)
MCV RBC AUTO: 85.8 FL (ref 80–100)
MONOCYTES ABSOLUTE: 0.4 K/UL (ref 0.2–0.8)
MONOCYTES RELATIVE PERCENT: 9.1 %
NEUTROPHILS ABSOLUTE: 3.5 K/UL (ref 1.4–6.5)
NEUTROPHILS RELATIVE PERCENT: 75.2 %
PDW BLD-RTO: 17.6 % (ref 11.5–14.5)
PERFORMED ON: ABNORMAL
PLATELET # BLD: 108 K/UL (ref 130–400)
POTASSIUM SERPL-SCNC: 4.8 MEQ/L (ref 3.4–4.9)
PRO-BNP: 6425 PG/ML
RBC # BLD: 4.64 M/UL (ref 4.7–6.1)
SODIUM BLD-SCNC: 141 MEQ/L (ref 135–144)
TOTAL PROTEIN: 7 G/DL (ref 6.3–8)
TROPONIN: 0.06 NG/ML (ref 0–0.01)
TROPONIN: 0.07 NG/ML (ref 0–0.01)
TROPONIN: 0.07 NG/ML (ref 0–0.01)
WBC # BLD: 4.7 K/UL (ref 4.8–10.8)

## 2022-04-01 PROCEDURE — 99284 EMERGENCY DEPT VISIT MOD MDM: CPT

## 2022-04-01 PROCEDURE — 36415 COLL VENOUS BLD VENIPUNCTURE: CPT

## 2022-04-01 PROCEDURE — 6360000002 HC RX W HCPCS: Performed by: NURSE PRACTITIONER

## 2022-04-01 PROCEDURE — 6360000002 HC RX W HCPCS: Performed by: INTERNAL MEDICINE

## 2022-04-01 PROCEDURE — 80053 COMPREHEN METABOLIC PANEL: CPT

## 2022-04-01 PROCEDURE — 6370000000 HC RX 637 (ALT 250 FOR IP): Performed by: INTERNAL MEDICINE

## 2022-04-01 PROCEDURE — 83735 ASSAY OF MAGNESIUM: CPT

## 2022-04-01 PROCEDURE — 85025 COMPLETE CBC W/AUTO DIFF WBC: CPT

## 2022-04-01 PROCEDURE — 84484 ASSAY OF TROPONIN QUANT: CPT

## 2022-04-01 PROCEDURE — 93010 ELECTROCARDIOGRAM REPORT: CPT | Performed by: INTERNAL MEDICINE

## 2022-04-01 PROCEDURE — 96374 THER/PROPH/DIAG INJ IV PUSH: CPT

## 2022-04-01 PROCEDURE — 2060000000 HC ICU INTERMEDIATE R&B

## 2022-04-01 PROCEDURE — 2580000003 HC RX 258: Performed by: INTERNAL MEDICINE

## 2022-04-01 PROCEDURE — 99223 1ST HOSP IP/OBS HIGH 75: CPT | Performed by: INTERNAL MEDICINE

## 2022-04-01 PROCEDURE — APPSS30 APP SPLIT SHARED TIME 16-30 MINUTES: Performed by: NURSE PRACTITIONER

## 2022-04-01 PROCEDURE — 93005 ELECTROCARDIOGRAM TRACING: CPT | Performed by: STUDENT IN AN ORGANIZED HEALTH CARE EDUCATION/TRAINING PROGRAM

## 2022-04-01 PROCEDURE — 71045 X-RAY EXAM CHEST 1 VIEW: CPT

## 2022-04-01 PROCEDURE — 83880 ASSAY OF NATRIURETIC PEPTIDE: CPT

## 2022-04-01 PROCEDURE — 6360000002 HC RX W HCPCS: Performed by: STUDENT IN AN ORGANIZED HEALTH CARE EDUCATION/TRAINING PROGRAM

## 2022-04-01 RX ORDER — ACETAMINOPHEN 325 MG/1
650 TABLET ORAL EVERY 6 HOURS PRN
Status: DISCONTINUED | OUTPATIENT
Start: 2022-04-01 | End: 2022-04-07 | Stop reason: HOSPADM

## 2022-04-01 RX ORDER — ONDANSETRON 4 MG/1
4 TABLET, ORALLY DISINTEGRATING ORAL EVERY 8 HOURS PRN
Status: DISCONTINUED | OUTPATIENT
Start: 2022-04-01 | End: 2022-04-07 | Stop reason: HOSPADM

## 2022-04-01 RX ORDER — FUROSEMIDE 10 MG/ML
40 INJECTION INTRAMUSCULAR; INTRAVENOUS 2 TIMES DAILY
Status: DISCONTINUED | OUTPATIENT
Start: 2022-04-01 | End: 2022-04-07 | Stop reason: HOSPADM

## 2022-04-01 RX ORDER — ATORVASTATIN CALCIUM 80 MG/1
80 TABLET, FILM COATED ORAL NIGHTLY
Status: DISCONTINUED | OUTPATIENT
Start: 2022-04-01 | End: 2022-04-02

## 2022-04-01 RX ORDER — FUROSEMIDE 10 MG/ML
40 INJECTION INTRAMUSCULAR; INTRAVENOUS ONCE
Status: COMPLETED | OUTPATIENT
Start: 2022-04-01 | End: 2022-04-01

## 2022-04-01 RX ORDER — ACETAMINOPHEN 650 MG/1
650 SUPPOSITORY RECTAL EVERY 6 HOURS PRN
Status: DISCONTINUED | OUTPATIENT
Start: 2022-04-01 | End: 2022-04-07 | Stop reason: HOSPADM

## 2022-04-01 RX ORDER — ASPIRIN 81 MG/1
81 TABLET, CHEWABLE ORAL DAILY
Status: DISCONTINUED | OUTPATIENT
Start: 2022-04-02 | End: 2022-04-02 | Stop reason: SDUPTHER

## 2022-04-01 RX ORDER — POLYETHYLENE GLYCOL 3350 17 G/17G
17 POWDER, FOR SOLUTION ORAL DAILY PRN
Status: DISCONTINUED | OUTPATIENT
Start: 2022-04-01 | End: 2022-04-07 | Stop reason: HOSPADM

## 2022-04-01 RX ORDER — SODIUM CHLORIDE 0.9 % (FLUSH) 0.9 %
5-40 SYRINGE (ML) INJECTION PRN
Status: DISCONTINUED | OUTPATIENT
Start: 2022-04-01 | End: 2022-04-06

## 2022-04-01 RX ORDER — ONDANSETRON 2 MG/ML
4 INJECTION INTRAMUSCULAR; INTRAVENOUS EVERY 6 HOURS PRN
Status: DISCONTINUED | OUTPATIENT
Start: 2022-04-01 | End: 2022-04-07 | Stop reason: HOSPADM

## 2022-04-01 RX ORDER — SODIUM CHLORIDE 9 MG/ML
INJECTION, SOLUTION INTRAVENOUS PRN
Status: DISCONTINUED | OUTPATIENT
Start: 2022-04-01 | End: 2022-04-07 | Stop reason: HOSPADM

## 2022-04-01 RX ORDER — SODIUM CHLORIDE 0.9 % (FLUSH) 0.9 %
5-40 SYRINGE (ML) INJECTION EVERY 12 HOURS SCHEDULED
Status: DISCONTINUED | OUTPATIENT
Start: 2022-04-01 | End: 2022-04-07 | Stop reason: HOSPADM

## 2022-04-01 RX ADMIN — Medication 10 ML: at 20:50

## 2022-04-01 RX ADMIN — ATORVASTATIN CALCIUM 80 MG: 80 TABLET, FILM COATED ORAL at 20:50

## 2022-04-01 RX ADMIN — ENOXAPARIN SODIUM 30 MG: 100 INJECTION SUBCUTANEOUS at 20:50

## 2022-04-01 RX ADMIN — FUROSEMIDE 40 MG: 10 INJECTION, SOLUTION INTRAMUSCULAR; INTRAVENOUS at 18:13

## 2022-04-01 RX ADMIN — FUROSEMIDE 40 MG: 10 INJECTION, SOLUTION INTRAMUSCULAR; INTRAVENOUS at 12:01

## 2022-04-01 ASSESSMENT — ENCOUNTER SYMPTOMS
WHEEZING: 0
SORE THROAT: 0
SHORTNESS OF BREATH: 1
DIARRHEA: 0
ABDOMINAL DISTENTION: 0
CHEST TIGHTNESS: 0
CONSTIPATION: 0
NAUSEA: 0
COUGH: 0
EYE PAIN: 0
RHINORRHEA: 0
TROUBLE SWALLOWING: 0
ABDOMINAL PAIN: 0
VOMITING: 0
BACK PAIN: 0

## 2022-04-01 ASSESSMENT — PAIN SCALES - GENERAL
PAINLEVEL_OUTOF10: 0

## 2022-04-01 NOTE — ACP (ADVANCE CARE PLANNING)
Advance Care Planning     Advance Care Planning Activator (Inpatient)  Conversation Note      Date of ACP Conversation: 4/1/2022     Conversation Conducted with: Patient with Decision Making Capacity    ACP Activator: Nghia Montilla RN    Pt states that he does have a living will and that it is current with his wishes in that if he were in a coma or vegetative state he \"would not\" have cpr/vent as per his documentation.     Health Care Decision Maker:     Current Designated Health Care Decision Maker:     Primary Decision Maker: Carol Jackson Spouse - [phone]    Secondary Decision Maker: Juliana Montalvo - Child - [phone]

## 2022-04-01 NOTE — ED TRIAGE NOTES
Patient states he has bilateral leg swelling and redness, reports increased fatigue, neg covid test weds.  Denies any pain

## 2022-04-01 NOTE — ED NOTES
Pt a/o x 3 skin pink w/d resp slt labored with exertion, lungs faint crackle RLL lt clear. Pt reports sob with laying flat and has been sleeping in chair. 4 + pitting edema bilat legs x 5 days     Maryam Ayers. Iliana Grigsby RN  04/01/22 [facility]  Iliana Grigsby RN  04/01/22 9248

## 2022-04-01 NOTE — PROGRESS NOTES
DVT / VTE PROPHYLAXIS EVALUATION    Estimated Creatinine Clearance: 53 mL/min (A) (based on SCr of 1.83 mg/dL (H)). Recent Labs     04/01/22  1155   BUN 45*   CREATININE 1.83*   *   HGB 12.3*   HCT 39.8*     ADMITTING DX OR CHIEF COMPLAINT? CHF  WARFARIN? DOAC'S? none  ANY APPARENT BLEEDING? none  SCHEDULED SURGERY?  none     Current order:  Enoxaparin 40 mg SUBQ once daily      Plan:  Pharmacologic VTE prophylaxis modified based on patient weight per [facility]/P&T approved protocol     Patient Weight (kg)      50.9 and below .9 101-150.9 151-174.9 175 or greater   Estimated   CrCl  (ml/min) 30 or greater []   30 mg   SUBQ daily   []   40 mg   SUBQ daily [x]  30 mg SUBQ   BID  []  40 mg   SUBQ   BID []  60mg SUBQ BID    15-29.9 []  UFH 5000   units SUBQ BID []  30 mg   SUBQ daily [] 30 mg SUBQ   daily []  40 mg SUBQ   daily [] 60 mg SUBQ   daily    Less than 15 or dialysis []  UFH 5000   units SUBQ BID [] UFH 5000 units SUBQ TID []  UFH 7500   units   SUBQ TID     Car Millan [facility]  4/1/2022  4:33 PM

## 2022-04-01 NOTE — ED PROVIDER NOTES
[facility] 1W TELEMETRY  eMERGENCYdEPARTMENT eNCOUnter      Pt Name: Thalia Dash  MRN: [id number]  Elzbietagfzulema 1945  Date of evaluation: 4/1/2022  Provider:Kian Obregon PA-C    CHIEF COMPLAINT           HPI  Thalia Dash is a 78 y.o. male per chart review has a h/o JAZZMINE, diabetes, stage III CKD, CAD with quadruple bypass presents with leg swelling. Patient reports gradual onset, worsening, severe, nonpainful leg swelling has been ongoing for the past 5 days associated with dyspnea on exertion and while laying flat. Denies chest pain, unilateral leg swelling, fever, chills, back pain, abdominal pain. ROS  Review of Systems   Constitutional: Negative for chills, fatigue and fever. HENT: Negative for ear pain, hearing loss and sore throat. Eyes: Negative for pain and visual disturbance. Respiratory: Positive for shortness of breath. Negative for chest tightness. Cardiovascular: Positive for leg swelling. Negative for chest pain. Gastrointestinal: Negative for diarrhea and nausea. Endocrine: Negative for cold intolerance. Genitourinary: Negative for hematuria. Musculoskeletal: Negative for back pain. Skin: Negative for rash and wound. Neurological: Negative for dizziness and headaches. Psychiatric/Behavioral: Negative for behavioral problems and confusion. Except as noted above the remainder of the review of systems was reviewed and negative.        PAST MEDICAL HISTORY     Past Medical History:   Diagnosis Date    CAD (coronary artery disease)     Dr. Kwame Jensen, Dr. Augustin Nails CKD (chronic kidney disease)     Hypertension     Kidney stones     Left foot drop     Neuropathy, diabetic (HCC)     mild    Osteoarthritis     hip, knee    S/P CABG (coronary artery bypass graft)     Type II or unspecified type diabetes mellitus without mention of complication, not stated as uncontrolled          SURGICAL HISTORY       Past Surgical History:   Procedure Laterality Date    BACK SURGERY N/A     BICEPS TENDON REPAIR  01/01/1997    CARDIAC CATHETERIZATION  06/05/2013    CORONARY ARTERY BYPASS GRAFT  06/07/2013      [address] SURGERY  01/01/2006    left replacement    NECK SURGERY  2020    ROTATOR CUFF REPAIR  01/01/1996    TONSILLECTOMY  01/01/1966         CURRENTMEDICATIONS       Current Discharge Medication List      CONTINUE these medications which have NOT CHANGED    Details   atorvastatin (LIPITOR) 40 MG tablet Take 1 tablet by mouth nightly  Qty: 90 tablet, Refills: 3    Associated Diagnoses: Coronary artery disease involving native coronary artery of native heart without angina pectoris      furosemide (LASIX) 40 MG tablet Take 1 tablet by mouth daily  Qty: 90 tablet, Refills: 3      glimepiride (AMARYL) 4 MG tablet Take 1 tablet by mouth 2 times daily  Qty: 180 tablet, Refills: 3    Associated Diagnoses: Type 2 diabetes mellitus with stage 3 chronic kidney disease, with long-term current use of insulin, unspecified whether stage 3a or 3b CKD (HCC)      insulin glargine (LANTUS SOLOSTAR) 100 UNIT/ML injection pen Inject 30 Units into the skin 2 times daily  Qty: 10 pen, Refills: 5    Associated Diagnoses: Type 2 diabetes mellitus with stage 3 chronic kidney disease, with long-term current use of insulin, unspecified whether stage 3a or 3b CKD (HCC)      losartan (COZAAR) 25 MG tablet Take 1 tablet by mouth daily  Qty: 90 tablet, Refills: 3    Associated Diagnoses: Essential hypertension      metoprolol tartrate (LOPRESSOR) 25 MG tablet Take 1 tablet by mouth 2 times daily  Qty: 180 tablet, Refills: 3    Associated Diagnoses: Coronary artery disease involving native coronary artery of native heart without angina pectoris      tamsulosin (FLOMAX) 0.4 MG capsule Take 1 capsule by mouth daily  Qty: 90 capsule, Refills: 3    Associated Diagnoses: Benign non-nodular prostatic hyperplasia with lower urinary tract symptoms      famotidine (PEPCID) 20 MG tablet Take 1 tablet by mouth daily  Qty: 180 tablet, Refills: 3      nitroGLYCERIN (NITROSTAT) 0.4 MG SL tablet Place 1 tablet under the tongue See Admin Instructions  Qty: 25 tablet, Refills: 1    Associated Diagnoses: Coronary artery disease involving native coronary artery of native heart, angina presence unspecified      Handicap Placard MISC by Does not apply route Exp 5 years  Qty: 1 each, Refills: 0      aspirin 81 MG tablet Take 81 mg by mouth daily. ALLERGIES     Dye [iodides]    FAMILY HISTORY       Family History   Problem Relation Age of Onset    Cancer Mother         intestinal, skin    Heart Disease Father     Stroke Father           SOCIAL HISTORY       Social History     Socioeconomic History    Marital status:      Spouse name: None    Number of children: None    Years of education: None    Highest education level: None   Occupational History    None   Tobacco Use    Smoking status: Never Smoker    Smokeless tobacco: Never Used   Substance and Sexual Activity    Alcohol use: No    Drug use: None    Sexual activity: None   Other Topics Concern    None   Social History Narrative    None     Social Determinants of Health     Financial Resource Strain: Low Risk     Difficulty of Paying Living Expenses: Not hard at all   Food Insecurity: No Food Insecurity    Worried About Running Out of Food in the Last Year: Never true    Candy of Food in the Last Year: Never true   Transportation Needs:     Lack of Transportation (Medical): Not on file    Lack of Transportation (Non-Medical):  Not on file   Physical Activity:     Days of Exercise per Week: Not on file    Minutes of Exercise per Session: Not on file   Stress:     Feeling of Stress : Not on file   Social Connections:     Frequency of Communication with Friends and Family: Not on file    Frequency of Social Gatherings with Friends and Family: Not on file    Attends Confucianist Services: Not on file   [employer] of Clubs or Organizations: Not on file    Attends Club or Organization Meetings: Not on file    Marital Status: Not on file   Intimate Partner Violence:     Fear of Current or Ex-Partner: Not on file    Emotionally Abused: Not on file    Physically Abused: Not on file    Sexually Abused: Not on file   Housing Stability:     Unable to Pay for Housing in the Last Year: Not on file    Number of [place] in the Last Year: Not on file    Unstable Housing in the Last Year: Not on file         PHYSICAL EXAM       ED Triage Vitals [22 1134]   BP Temp Temp src Pulse Resp SpO2 Height Weight   (!) 158/87 97.5 °F (36.4 °C) -- 76 19 98 % 6' 4\" (1.93 m) 300 lb (136.1 kg)       Physical Exam  Constitutional:       Appearance: Normal appearance. HENT:      Head: Normocephalic and atraumatic. Nose: Nose normal.      Mouth/Throat:      Mouth: Mucous membranes are moist.      Pharynx: No oropharyngeal exudate or posterior oropharyngeal erythema. Eyes:      Extraocular Movements: Extraocular movements intact. Conjunctiva/sclera: Conjunctivae normal.      Pupils: Pupils are equal, round, and reactive to light. Cardiovascular:      Rate and Rhythm: Normal rate and regular rhythm. Heart sounds: Heart sounds are distant. Pulmonary:      Effort: Pulmonary effort is normal.      Breath sounds: Normal breath sounds. No wheezing or rhonchi. Abdominal:      General: Bowel sounds are normal.      Palpations: Abdomen is soft. Tenderness: There is no abdominal tenderness. There is no guarding. Musculoskeletal:         General: No deformity. Normal range of motion. Cervical back: Normal range of motion and neck supple. Right lower le+ Pitting Edema present. Left lower le+ Pitting Edema present. Skin:     General: Skin is warm and dry. Coloration: Skin is not jaundiced. Neurological:      General: No focal deficit present.       Mental Status: He is alert and oriented to person, place, and time. Psychiatric:         Mood and Affect: Mood normal.         Behavior: Behavior normal.           MDM  This is a 68-year-old male presenting with leg swelling. Patient is afebrile hemodynamically stable. Patient given IV Lasix. EKG shows sinus with 1st degree AV block with HR 74, normal axis, normal intervals, no ST changes, unchanged from 11/06/20. BNP 6425. Troponin 0 0.072. Creatinine 1.83, slightly elevated from patient's baseline. Spoke with Dr. Shasha Pereyra who accepts patient to admission for CHF. FINAL IMPRESSION      1. Leg swelling    2.  Dyspnea on exertion          DISPOSITION/PLAN   DISPOSITION Admitted 04/01/2022 02:21:34 PM        DISCHARGE MEDICATIONS:  [unfilled]         Erica Hayes PA-C(electronically signed)  Attending Emergency Physician           Erica Hayes PA-C  04/01/22 [phone]

## 2022-04-01 NOTE — H&P
DEPARTMENT OF CARDIOLOGY  HISTORY AND PHYSICAL EXAM    PATIENT NAME:  Pj Miller    MRN:  [id number]  SERVICE DATE:  4/1/2022   SERVICE TIME:  3:20 PM    Primary Care Physician: Rosibel Lopez MD     SUBJECTIVE  CHIEF COMPLAINT:  LE edema    HPI:  Pj Miller is a 78 y.o., , male who  has a past medical history of CAD (coronary artery disease), CKD (chronic kidney disease), Hypertension, Kidney stones, Left foot drop, Neuropathy, diabetic ([address] Osteoarthritis, S/P CABG (coronary artery bypass graft), and Type II or unspecified type diabetes mellitus without mention of complication, not stated as uncontrolled. that is hospitalized for acute CHF exacerbation. Pt presented to ED with c/o worsening lower extremity edema x 3-4 days and orthopnea. Pt is known to this cardiology service as he follows with Dr. Nam Pereira in office. Most recent echo in 2020 with EF 40-45%. Negative stress test in 2020. Work up in the ED shows pro-BNP 6425, troponin 0.072, BUN 45, Cr 1.83, GFR 43.9. CXR shows a small R effusion. On exam, pt has 4+ pitting edema to bilat lower ext. Pt reports at home he has been unable to walk more than a few feet without feeling SOB. States he has been waking up in the middle of the night gasping for air. Pt denies chest pain, denies palpitations. Pt was given 40mg IV Lasix and has been diuresing in the ED. States he feels a little better.         HPI     PAST MEDICAL HISTORY:    Past Medical History:   Diagnosis Date    CAD (coronary artery disease)     Dr. Lenka Mackenzie, Dr. Shamir Brown CKD (chronic kidney disease)     Hypertension     Kidney stones     Left foot drop     Neuropathy, diabetic (HCC)     mild    Osteoarthritis     hip, knee    S/P CABG (coronary artery bypass graft)     Type II or unspecified type diabetes mellitus without mention of complication, not stated as uncontrolled      PAST SURGICAL HISTORY:    Past Surgical History:   Procedure Laterality Date    BACK SURGERY N/A     BICEPS TENDON REPAIR  01/01/1997    CARDIAC CATHETERIZATION  06/05/2013    CORONARY ARTERY BYPASS GRAFT  06/07/2013     [address]  01/01/2006    left replacement    NECK SURGERY  2020    ROTATOR CUFF REPAIR  01/01/1996    TONSILLECTOMY  01/01/1966     FAMILY HISTORY:    Family History   Problem Relation Age of Onset    Cancer Mother         intestinal, skin    Heart Disease Father     Stroke Father      SOCIAL HISTORY:    Social History     Socioeconomic History    Marital status:      Spouse name: Not on file    Number of children: Not on file    Years of education: Not on file    Highest education level: Not on file   Occupational History    Not on file   Tobacco Use    Smoking status: Never Smoker    Smokeless tobacco: Never Used   Substance and Sexual Activity    Alcohol use: No    Drug use: Not on file    Sexual activity: Not on file   Other Topics Concern    Not on file   Social History Narrative    Not on file     Social Determinants of Health     Financial Resource Strain: Low Risk     Difficulty of Paying Living Expenses: Not hard at all   Food Insecurity: No Food Insecurity    Worried About [address] in the Last Year: Never true    [facility] in the Last Year: Never true   Transportation Needs:     Lack of Transportation (Medical): Not on file    Lack of Transportation (Non-Medical):  Not on file   Physical Activity:     Days of Exercise per Week: Not on file    Minutes of Exercise per Session: Not on file   Stress:     Feeling of Stress : Not on file   Social Connections:     Frequency of Communication with Friends and Family: Not on file    Frequency of Social Gatherings with Friends and Family: Not on file    Attends Worship Services: Not on file    Active Member of Clubs or Organizations: Not on file    Attends Club or Organization Meetings: Not on file    Marital Status: Not on file   Intimate Partner Violence:     Fear of Current or Ex-Partner: Not on file    Emotionally Abused: Not on file    Physically Abused: Not on file    Sexually Abused: Not on file   Housing Stability:     Unable to Pay for Housing in the Last Year: Not on file    Number of [place] in the Last Year: Not on file    Unstable Housing in the Last Year: Not on file     MEDICATIONS:   Prior to Admission medications    Medication Sig Start Date End Date Taking? Authorizing Provider   atorvastatin (LIPITOR) 40 MG tablet Take 1 tablet by mouth nightly 11/19/21  Yes Lydia Murillo MD   furosemide (LASIX) 40 MG tablet Take 1 tablet by mouth daily 11/19/21  Yes Lydia Murillo MD   glimepiride (AMARYL) 4 MG tablet Take 1 tablet by mouth 2 times daily 11/19/21  Yes Lydia Murillo MD   insulin glargine (LANTUS SOLOSTAR) 100 UNIT/ML injection pen Inject 30 Units into the skin 2 times daily 11/19/21  Yes Lydia Murillo MD   losartan (COZAAR) 25 MG tablet Take 1 tablet by mouth daily 11/19/21  Yes Lydia Murillo MD   metoprolol tartrate (LOPRESSOR) 25 MG tablet Take 1 tablet by mouth 2 times daily 11/19/21  Yes Lydia Murillo MD   tamsulosin [facility]) 0.4 MG capsule Take 1 capsule by mouth daily 11/19/21  Yes Lydia Murillo MD   famotidine (PEPCID) 20 MG tablet Take 1 tablet by mouth daily 11/19/21  Yes Lydia Murillo MD   nitroGLYCERIN (NITROSTAT) 0.4 MG SL tablet Place 1 tablet under the tongue See Admin Instructions 2/16/18  Yes Lydia Murillo MD   Handicap Placard MISC by Does not apply route Exp 5 years 7/13/15  Yes Lydia Murillo MD   aspirin 81 MG tablet Take 81 mg by mouth daily. Yes Historical Provider, MD       ALLERGIES: Dye [iodides]    REVIEW OF SYSTEM:   Review of Systems   Constitutional: Negative for chills, diaphoresis and fever. HENT: Negative for congestion, rhinorrhea and trouble swallowing. Eyes: Negative for visual disturbance. Respiratory: Positive for shortness of breath. Negative for cough and wheezing.     Cardiovascular: Positive for leg swelling. Negative for chest pain and palpitations. Gastrointestinal: Negative for abdominal distention, abdominal pain, constipation, diarrhea, nausea and vomiting. Endocrine: Negative. Genitourinary: Negative for difficulty urinating, dysuria, frequency and urgency. Musculoskeletal: Negative for back pain and gait problem. Skin: Negative for wound. Neurological: Negative for dizziness, seizures, syncope, speech difficulty, weakness, numbness and headaches. Hematological: Does not bruise/bleed easily. Psychiatric/Behavioral: Negative. OBJECTIVE  PHYSICAL EXAM:   Physical Exam  Vitals and nursing note reviewed. Constitutional:       General: He is not in acute distress. HENT:      Head: Normocephalic. Nose: Nose normal.      Mouth/Throat:      Mouth: Mucous membranes are moist.   Eyes:      Pupils: Pupils are equal, round, and reactive to light. Neck:      Vascular: No carotid bruit. Cardiovascular:      Rate and Rhythm: Normal rate and regular rhythm. Pulses: Normal pulses. Heart sounds: Normal heart sounds. No murmur heard. No friction rub. No gallop. Pulmonary:      Effort: Pulmonary effort is normal. No respiratory distress. Breath sounds: Normal breath sounds. No stridor. No wheezing, rhonchi or rales. Chest:      Chest wall: No tenderness. Abdominal:      General: Abdomen is flat. Palpations: Abdomen is soft. Musculoskeletal:         General: Normal range of motion. Cervical back: Normal range of motion. Right lower leg: Edema present. Left lower leg: Edema present. Skin:     General: Skin is warm and dry. Capillary Refill: Capillary refill takes less than 2 seconds. Neurological:      General: No focal deficit present. Mental Status: He is alert and oriented to person, place, and time.    Psychiatric:         Mood and Affect: Mood normal.         Behavior: Behavior normal.          BP (!) 160/83   Pulse 68   Temp 97.5 °F (36.4 °C)   Resp 22   Ht 6' 4\" (1.93 m)   Wt 300 lb (136.1 kg)   SpO2 99%   BMI 36.52 kg/m²     DATA:     Diagnostic tests reviewed for today's visit:    Most recent labs and imaging results reviewed.      LABS:    Recent Results (from the past 24 hour(s))   EKG 12 Lead - Chest Pain    Collection Time: 04/01/22 11:49 AM   Result Value Ref Range    Ventricular Rate 74 BPM    Atrial Rate 74 BPM    P-R Interval 278 ms    QRS Duration 160 ms    Q-T Interval 452 ms    QTc Calculation (Bazett) 501 ms    P Axis 34 degrees    R Axis -5 degrees    T Axis 129 degrees   Comprehensive Metabolic Panel    Collection Time: 04/01/22 11:55 AM   Result Value Ref Range    Sodium 141 135 - 144 mEq/L    Potassium 4.8 3.4 - 4.9 mEq/L    Chloride 105 95 - 107 mEq/L    CO2 23 20 - 31 mEq/L    Anion Gap 13 9 - 15 mEq/L    Glucose 185 (H) 70 - 99 mg/dL    BUN 45 (H) 8 - 23 mg/dL    CREATININE 1.83 (H) 0.70 - 1.20 mg/dL    GFR Non-African American 36.3 (L) >60    GFR  43.9 (L) >60    Calcium 9.0 8.5 - 9.9 mg/dL    Total Protein 7.0 6.3 - 8.0 g/dL    Albumin 3.7 3.5 - 4.6 g/dL    Total Bilirubin 0.6 0.2 - 0.7 mg/dL    Alkaline Phosphatase 187 (H) 35 - 104 U/L    ALT 24 0 - 41 U/L    AST 29 0 - 40 U/L    Globulin 3.3 2.3 - 3.5 g/dL   CBC with Auto Differential    Collection Time: 04/01/22 11:55 AM   Result Value Ref Range    WBC 4.7 (L) 4.8 - 10.8 K/uL    RBC 4.64 (L) 4.70 - 6.10 M/uL    Hemoglobin 12.3 (L) 14.0 - 18.0 g/dL    Hematocrit 39.8 (L) 42.0 - 52.0 %    MCV 85.8 80.0 - 100.0 fL    MCH 26.6 (L) 27.0 - 31.3 pg    MCHC 31.0 (L) 33.0 - 37.0 %    RDW 17.6 (H) 11.5 - 14.5 %    Platelets 903 (L) 452 - 400 K/uL    Neutrophils % 75.2 %    Lymphocytes % 11.7 %    Monocytes % 9.1 %    Eosinophils % 3.3 %    Basophils % 0.7 %    Neutrophils Absolute 3.5 1.4 - 6.5 K/uL    Lymphocytes Absolute 0.5 (L) 1.0 - 4.8 K/uL    Monocytes Absolute 0.4 0.2 - 0.8 K/uL    Eosinophils Absolute 0.2 0.0 - 0.7 K/uL    Basophils Absolute 0.0 0.0 - 0.2 K/uL   Magnesium    Collection Time: 04/01/22 11:55 AM   Result Value Ref Range    Magnesium 1.9 1.7 - 2.4 mg/dL   Troponin    Collection Time: 04/01/22 11:55 AM   Result Value Ref Range    Troponin 0.072 (HH) 0.000 - 0.010 ng/mL   Brain Natriuretic Peptide    Collection Time: 04/01/22 11:55 AM   Result Value Ref Range    Pro-BNP 6,425 pg/mL       IMAGING:  XR CHEST PORTABLE    Result Date: 4/1/2022  EXAMINATION: XR CHEST PORTABLE CLINICAL HISTORY: DYSPNEA ON EXERTION. SHORTNESS OF BREATH FLAT. COMPARISONS: None available. FINDINGS: Median sternotomy. Cardiopericardial silhouette normal. Aorta calcified. Thickening minor fissure. Blunting right costophrenic angle. Left lung clear. SMALL RIGHT EFFUSION. ASSESSMENT   Principal Problem:    Bilat lower ext edema  SOB   Right pleural effusion  ODELL on CKD  Elevated troponin - in the setting of acute CHF and CKD  DM  Htn  CAD s/p CABG  Reduced EF 40-45% per echo in 2020    PLAN  1. As always, aggressive risk factor modification is strongly recommended. We should adhere to the JNC VIII guidelines for HTN management and the NCEPATP III guidelines for LDL-C management. 2. Monitor on telemetry  3. Check 2D Echo for LV function, PA pressures, wall motion abnormalities and any significant valvular disease. 4. Maximize cardiac medications  5. Strict I's and O's  6. Low sodium diet  7. CHF teachin  8. GI/DVT prophylaxis  9. Maintain potassium greater than 4, magnesium greater than 2  10. Further recommendations to follow      Plan of care discussed with: patient    SIGNATURE: SULAIMAN Rico - CNP  DATE: April 1, 2022  TIME: 3:20 PM   Dr. Heath Sinclair DO - supervising physician    Attending Supervising [de-identified] Attestation Statement  The patient is a 78 y.o. male. I have performed a history and physical examination of the patient. I discussed the case with the nurse practitioner.     I reviewed the patient's Past Medical History, Past Surgical History, Medications, and Allergies. Physical Exam:  Vitals:    04/01/22 1134 04/01/22 1400   BP: (!) 158/87 (!) 160/83   Pulse: 76 68   Resp: 19 22   Temp: 97.5 °F (36.4 °C)    SpO2: 98% 99%   Weight: 300 lb (136.1 kg)    Height: 6' 4\" (1.93 m)        Review of Systems - Respiratory ROS: positive for - orthopnea and shortness of breath  Cardiovascular ROS: positive for - dyspnea on exertion and shortness of breath  Gastrointestinal ROS: no abdominal pain, change in bowel habits, or black or bloody stools    Pulmonary/Chest: decreased breath sounds noted- crackles in base. Cardiovascular: normal rate, normal S1 and S2, no gallops, intact distal pulses and no carotid bruits  Abdomen: soft, non-tender, non-distended, normal bowel sounds, no masses or organomegaly    Active Hospital Problems    Diagnosis Date Noted    Leg swelling [M79.89] 04/01/2022     Priority: Low        I reviewed and agree with the findings and plan documented in her note . Impression    Shortness of breath secondary to acute decompensated combined congestive heart failure. Rule out underlying cardiac ischemia    Elevated cardiac enzymes. Questionable demand ischemia rule out cardiac ischemia    History of CAD status post CABG 9 years ago     Severe bilateral extremity edema     Essential hypertension    History of diabetes mellitus    Chronic kidney disease stage III    Morbid obesity class II    Hyperlipidemia      Plan     1. Maximize cardiac medications. 2. Continue with IV diuresis as gen, monitor I/O's, renal function, electrolytes. Keep K+>4 and Mg>2. Initiate Lasix 40 mg IV every 12 hours  3. Check 2D echocardiogram  4. Coronary valuation when feasible. Questionable inpatient versus outpatient. 5. Monitor on telemetry  6. GI/DVT prophylaxis  7. CHF teaching  8. Low-sodium diet  9. Resume appropriate home cardiac medications  10. No nephrotoxic agents  11.  Further recommendations follow       Electronically signed by Ar Rosales Holiday, DO on 4/1/22 at 4:06 PM EDT

## 2022-04-01 NOTE — PROGRESS NOTES
Patient admitted from ER related to complaints of edema in bilateral lower extremities, inability to  [facility] [place] flat in bed\", and shortness of breath with exertion. Patient on assessment 3-4+ bilateral lower extremity pitting edema and slight redness of both lower extremities. Patient denies any CP or palpitations. Patient denies any dizziness. Gaite steady; uses cane when ambulating. Patient complains of feeling \"tired\" last couple of days. Reviewed home medications and reconciliation completed/ aware. Skin integrity assessed at bedside with Maira Perez RN; multiple wound areas addressed. There are multiple scabbed  areas on right leg as well as left knee, left lateral/medial pretibial area, pedal area of left foot as well as left great toe and second toe. Patient voiced he has a brace he \"wears and it always cuts into his skin\" and stated his \"shoes were knew and since his bypass he has no sensation on his feet\". Wound evaluation and treat ordered. Areas scabbed/no drainage. Patient are of accurate I/O; urinal at bedside. Will continue to evaluate and note changes. Cuco light in reach. No acute distress at this time.

## 2022-04-01 NOTE — ED NOTES
Report to [facility] update given     Siri Briggs.  Janeth Guzman  04/01/22 5744

## 2022-04-01 NOTE — CARE COORDINATION
[facility] EMERGENCY [facility] CENTER AT [place] Case Management Initial Discharge Assessment    Met with Patient to discuss discharge plan. PCP: Crystal Colmenares MD                                Date of Last Visit: Nov. 2000 [facility] Patient: No        [facility] Notified: no    If no PCP, list provided? N/A    Discharge Planning    Living Arrangements: independently at home    Who do you live with? wife    Who helps you with your care:  self    If lives at home:     Do you have any barriers navigating in your home? no    Patient can perform ADL? Yes    Current Services (outpatient and in home) :  None    Dialysis: No    Is transportation available to get to your appointments? Yes    DME Equipment:  yes - cane, walker    Respiratory equipment: None    Respiratory provider:  no     Pharmacy:  yes - [facility]    Consult with Medication Assistance Program?  No      Does Patient Have a High-Risk for Readmission Diagnosis (CHF, PN, MI, COPD)? Chf: he is admitted by cardio, consult to chf nurse      Initial Discharge Plan? (Note: please see concurrent daily documentation for any updates after initial note). Pt was given information on hhc/rehab/snf services. Cm to assess for further d/c needs and referrals.     Readmission Risk              Risk of Unplanned Readmission:  0         Electronically signed by David Barnett RN on 4/1/2022 at 2:54 PM

## 2022-04-01 NOTE — ED NOTES
Pt ambulated down [address] approx 80 feet spo2 remained above 97% but pt was sob and reports being very tired. Fina Goldberg RN  04/01/22 4622

## 2022-04-02 LAB
ANION GAP SERPL CALCULATED.3IONS-SCNC: 13 MEQ/L (ref 9–15)
BASOPHILS ABSOLUTE: 0 K/UL (ref 0–0.2)
BASOPHILS RELATIVE PERCENT: 0.4 %
BUN BLDV-MCNC: 44 MG/DL (ref 8–23)
CALCIUM SERPL-MCNC: 8.7 MG/DL (ref 8.5–9.9)
CHLORIDE BLD-SCNC: 104 MEQ/L (ref 95–107)
CO2: 24 MEQ/L (ref 20–31)
CREAT SERPL-MCNC: 1.78 MG/DL (ref 0.7–1.2)
EOSINOPHILS ABSOLUTE: 0.3 K/UL (ref 0–0.7)
EOSINOPHILS RELATIVE PERCENT: 6.7 %
GFR AFRICAN AMERICAN: 45.4
GFR NON-AFRICAN AMERICAN: 37.5
GLUCOSE BLD-MCNC: 100 MG/DL (ref 70–99)
GLUCOSE BLD-MCNC: 144 MG/DL (ref 70–99)
GLUCOSE BLD-MCNC: 164 MG/DL (ref 70–99)
GLUCOSE BLD-MCNC: 88 MG/DL (ref 70–99)
HCT VFR BLD CALC: 37 % (ref 42–52)
HEMOGLOBIN: 11.6 G/DL (ref 14–18)
LV EF: 20 %
LVEF MODALITY: NORMAL
LYMPHOCYTES ABSOLUTE: 0.8 K/UL (ref 1–4.8)
LYMPHOCYTES RELATIVE PERCENT: 15.6 %
MCH RBC QN AUTO: 26.9 PG (ref 27–31.3)
MCHC RBC AUTO-ENTMCNC: 31.3 % (ref 33–37)
MCV RBC AUTO: 85.8 FL (ref 80–100)
MONOCYTES ABSOLUTE: 0.7 K/UL (ref 0.2–0.8)
MONOCYTES RELATIVE PERCENT: 13.8 %
NEUTROPHILS ABSOLUTE: 3.1 K/UL (ref 1.4–6.5)
NEUTROPHILS RELATIVE PERCENT: 63.5 %
PDW BLD-RTO: 17.5 % (ref 11.5–14.5)
PERFORMED ON: ABNORMAL
PLATELET # BLD: 114 K/UL (ref 130–400)
POTASSIUM SERPL-SCNC: 4.8 MEQ/L (ref 3.4–4.9)
RBC # BLD: 4.31 M/UL (ref 4.7–6.1)
SODIUM BLD-SCNC: 141 MEQ/L (ref 135–144)
WBC # BLD: 4.9 K/UL (ref 4.8–10.8)

## 2022-04-02 PROCEDURE — 85025 COMPLETE CBC W/AUTO DIFF WBC: CPT

## 2022-04-02 PROCEDURE — 80048 BASIC METABOLIC PNL TOTAL CA: CPT

## 2022-04-02 PROCEDURE — 6370000000 HC RX 637 (ALT 250 FOR IP): Performed by: INTERNAL MEDICINE

## 2022-04-02 PROCEDURE — 6360000002 HC RX W HCPCS: Performed by: INTERNAL MEDICINE

## 2022-04-02 PROCEDURE — 93306 TTE W/DOPPLER COMPLETE: CPT

## 2022-04-02 PROCEDURE — 2060000000 HC ICU INTERMEDIATE R&B

## 2022-04-02 PROCEDURE — 99233 SBSQ HOSP IP/OBS HIGH 50: CPT | Performed by: INTERNAL MEDICINE

## 2022-04-02 PROCEDURE — 6360000002 HC RX W HCPCS: Performed by: NURSE PRACTITIONER

## 2022-04-02 PROCEDURE — APPSS30 APP SPLIT SHARED TIME 16-30 MINUTES: Performed by: NURSE PRACTITIONER

## 2022-04-02 PROCEDURE — 36415 COLL VENOUS BLD VENIPUNCTURE: CPT

## 2022-04-02 PROCEDURE — 2580000003 HC RX 258: Performed by: INTERNAL MEDICINE

## 2022-04-02 RX ORDER — GLIPIZIDE 5 MG/1
10 TABLET ORAL
Status: DISCONTINUED | OUTPATIENT
Start: 2022-04-03 | End: 2022-04-07 | Stop reason: HOSPADM

## 2022-04-02 RX ORDER — FAMOTIDINE 20 MG/1
20 TABLET, FILM COATED ORAL DAILY
Status: DISCONTINUED | OUTPATIENT
Start: 2022-04-02 | End: 2022-04-07 | Stop reason: HOSPADM

## 2022-04-02 RX ORDER — INSULIN GLARGINE 100 [IU]/ML
30 INJECTION, SOLUTION SUBCUTANEOUS 2 TIMES DAILY
Status: DISCONTINUED | OUTPATIENT
Start: 2022-04-02 | End: 2022-04-07 | Stop reason: HOSPADM

## 2022-04-02 RX ORDER — TAMSULOSIN HYDROCHLORIDE 0.4 MG/1
0.4 CAPSULE ORAL DAILY
Status: DISCONTINUED | OUTPATIENT
Start: 2022-04-02 | End: 2022-04-07 | Stop reason: HOSPADM

## 2022-04-02 RX ORDER — ATORVASTATIN CALCIUM 40 MG/1
40 TABLET, FILM COATED ORAL NIGHTLY
Status: DISCONTINUED | OUTPATIENT
Start: 2022-04-02 | End: 2022-04-07 | Stop reason: HOSPADM

## 2022-04-02 RX ORDER — ASPIRIN 81 MG/1
81 TABLET, CHEWABLE ORAL DAILY
Status: DISCONTINUED | OUTPATIENT
Start: 2022-04-02 | End: 2022-04-07 | Stop reason: HOSPADM

## 2022-04-02 RX ORDER — CARVEDILOL 6.25 MG/1
6.25 TABLET ORAL 2 TIMES DAILY WITH MEALS
Status: DISCONTINUED | OUTPATIENT
Start: 2022-04-02 | End: 2022-04-07 | Stop reason: HOSPADM

## 2022-04-02 RX ORDER — NITROGLYCERIN 0.4 MG/1
0.4 TABLET SUBLINGUAL SEE ADMIN INSTRUCTIONS
Status: DISCONTINUED | OUTPATIENT
Start: 2022-04-02 | End: 2022-04-07 | Stop reason: HOSPADM

## 2022-04-02 RX ADMIN — TAMSULOSIN HYDROCHLORIDE 0.4 MG: 0.4 CAPSULE ORAL at 11:53

## 2022-04-02 RX ADMIN — ENOXAPARIN SODIUM 30 MG: 100 INJECTION SUBCUTANEOUS at 21:02

## 2022-04-02 RX ADMIN — INSULIN GLARGINE 30 UNITS: 100 INJECTION, SOLUTION SUBCUTANEOUS at 22:07

## 2022-04-02 RX ADMIN — ASPIRIN 81 MG 81 MG: 81 TABLET ORAL at 10:04

## 2022-04-02 RX ADMIN — FUROSEMIDE 40 MG: 10 INJECTION, SOLUTION INTRAMUSCULAR; INTRAVENOUS at 17:01

## 2022-04-02 RX ADMIN — ENOXAPARIN SODIUM 30 MG: 100 INJECTION SUBCUTANEOUS at 10:04

## 2022-04-02 RX ADMIN — INSULIN LISPRO 2 UNITS: 100 INJECTION, SOLUTION INTRAVENOUS; SUBCUTANEOUS at 11:53

## 2022-04-02 RX ADMIN — CARVEDILOL 6.25 MG: 6.25 TABLET, FILM COATED ORAL at 21:03

## 2022-04-02 RX ADMIN — INSULIN GLARGINE 30 UNITS: 100 INJECTION, SOLUTION SUBCUTANEOUS at 12:27

## 2022-04-02 RX ADMIN — CARVEDILOL 6.25 MG: 6.25 TABLET, FILM COATED ORAL at 11:53

## 2022-04-02 RX ADMIN — Medication 10 ML: at 21:03

## 2022-04-02 RX ADMIN — Medication 10 ML: at 10:04

## 2022-04-02 RX ADMIN — FUROSEMIDE 40 MG: 10 INJECTION, SOLUTION INTRAMUSCULAR; INTRAVENOUS at 10:04

## 2022-04-02 RX ADMIN — ATORVASTATIN CALCIUM 40 MG: 40 TABLET, FILM COATED ORAL at 21:03

## 2022-04-02 RX ADMIN — FAMOTIDINE 20 MG: 20 TABLET ORAL at 11:53

## 2022-04-02 RX ADMIN — INSULIN LISPRO 2 UNITS: 100 INJECTION, SOLUTION INTRAVENOUS; SUBCUTANEOUS at 17:01

## 2022-04-02 ASSESSMENT — PAIN SCALES - GENERAL
PAINLEVEL_OUTOF10: 0
PAINLEVEL_OUTOF10: 0

## 2022-04-02 ASSESSMENT — ENCOUNTER SYMPTOMS
SHORTNESS OF BREATH: 1
ABDOMINAL PAIN: 0
NAUSEA: 0
WHEEZING: 0
BACK PAIN: 0
VOMITING: 0
COUGH: 0
RHINORRHEA: 0
ABDOMINAL DISTENTION: 0
CONSTIPATION: 0
TROUBLE SWALLOWING: 0
DIARRHEA: 0

## 2022-04-02 NOTE — PROGRESS NOTES
DEPARTMENT OF CARDIOLOGY  HISTORY AND PHYSICAL EXAM    PATIENT NAME:  Sierra Willis    MRN:  [id number]  SERVICE DATE:  4/2/2022   SERVICE TIME:  10:07 AM    Primary Care Physician: Gregory Sheehan MD     SUBJECTIVE  CHIEF COMPLAINT:  LE edema    HPI:  Sierra Willis is a 78 y.o., , male who  has a past medical history of CAD (coronary artery disease), CKD (chronic kidney disease), Hypertension, Kidney stones, Left foot drop, Neuropathy, diabetic ([address] Osteoarthritis, S/P CABG (coronary artery bypass graft), and Type II or unspecified type diabetes mellitus without mention of complication, not stated as uncontrolled. that is hospitalized for acute CHF exacerbation. Pt presented to ED with c/o worsening lower extremity edema x 3-4 days and orthopnea. Pt is known to this cardiology service as he follows with Dr. Valladares Been in office. Most recent echo in 2020 with EF 40-45%. Negative stress test in 2020. Work up in the ED shows pro-BNP 6425, troponin 0.072, BUN 45, Cr 1.83, GFR 43.9. CXR shows a small R effusion. On exam, pt has 4+ pitting edema to bilat lower ext. Pt reports at home he has been unable to walk more than a few feet without feeling SOB. States he has been waking up in the middle of the night gasping for air. Pt denies chest pain, denies palpitations. Pt was given 40mg IV Lasix and has been diuresing in the ED. States he feels a little better. 4/2/2022: sitting up in bed, in no acute distress. States he is feeling fine overall today. Monitored on telemetry, currently SR, HR 70s. Morning labs reviewed BUN 44, Cr 1.78, GFR 37.4, serial troponins elevated in flat pattern at .072, .063, .066. Pt denies CP, SOB, palpitations. Echo tech at bedside. Pt is negative 3L total fluid balance from admission. Pt continues to have 4+ pitting edema to bilat lower ext.            HPI     PAST MEDICAL HISTORY:    Past Medical History:   Diagnosis Date    CAD (coronary artery disease)      Noman Jefferson, Dr. Marni Dent CKD (chronic kidney disease)     Hypertension     Kidney stones     Left foot drop     Neuropathy, diabetic (HCC)     mild    Osteoarthritis     hip, knee    S/P CABG (coronary artery bypass graft)     Type II or unspecified type diabetes mellitus without mention of complication, not stated as uncontrolled      PAST SURGICAL HISTORY:    Past Surgical History:   Procedure Laterality Date    BACK SURGERY N/A     BICEPS TENDON REPAIR  01/01/1997    CARDIAC CATHETERIZATION  06/05/2013    CORONARY ARTERY BYPASS GRAFT  06/07/2013     [address] SURGERY  01/01/2006    left replacement    NECK SURGERY  2020    ROTATOR CUFF REPAIR  01/01/1996    TONSILLECTOMY  01/01/1966     FAMILY HISTORY:    Family History   Problem Relation Age of Onset    Cancer Mother         intestinal, skin    Heart Disease Father     Stroke Father      SOCIAL HISTORY:    Social History     Socioeconomic History    Marital status:      Spouse name: Not on file    Number of children: Not on file    Years of education: Not on file    Highest education level: Not on file   Occupational History    Not on file   Tobacco Use    Smoking status: Never Smoker    Smokeless tobacco: Never Used   Substance and Sexual Activity    Alcohol use: No    Drug use: Not on file    Sexual activity: Not on file   Other Topics Concern    Not on file   Social History Narrative    Not on file     Social Determinants of Health     Financial Resource Strain: Low Risk     Difficulty of Paying Living Expenses: Not hard at all   Food Insecurity: No Food Insecurity    Worried About [address] in the Last Year: Never true    [facility] in the Last Year: Never true   Transportation Needs:     Lack of Transportation (Medical): Not on file    Lack of Transportation (Non-Medical):  Not on file   Physical Activity:     Days of Exercise per Week: Not on file    Minutes of Exercise per Session: Not on file   Stress:     Feeling of Stress : Not on file   Social Connections:     Frequency of Communication with Friends and Family: Not on file    Frequency of Social Gatherings with Friends and Family: Not on file    Attends Anabaptism Services: Not on file    Active Member of Clubs or Organizations: Not on file    Attends Club or Organization Meetings: Not on file    Marital Status: Not on file   Intimate Partner Violence:     Fear of Current or Ex-Partner: Not on file    Emotionally Abused: Not on file    Physically Abused: Not on file    Sexually Abused: Not on file   Housing Stability:     Unable to Pay for Housing in the Last Year: Not on file    Number of [place] in the Last Year: Not on file    Unstable Housing in the Last Year: Not on file     MEDICATIONS:   Prior to Admission medications    Medication Sig Start Date End Date Taking?  Authorizing Provider   atorvastatin (LIPITOR) 40 MG tablet Take 1 tablet by mouth nightly 11/19/21  Yes Virgie Ross MD   furosemide (LASIX) 40 MG tablet Take 1 tablet by mouth daily 11/19/21  Yes Virgie Ross MD   glimepiride (AMARYL) 4 MG tablet Take 1 tablet by mouth 2 times daily 11/19/21  Yes Virgie Ross MD   insulin glargine (LANTUS SOLOSTAR) 100 UNIT/ML injection pen Inject 30 Units into the skin 2 times daily 11/19/21  Yes Virgie Ross MD   losartan (COZAAR) 25 MG tablet Take 1 tablet by mouth daily 11/19/21  Yes Virgie Ross MD   metoprolol tartrate (LOPRESSOR) 25 MG tablet Take 1 tablet by mouth 2 times daily 11/19/21  Yes Virgie Ross MD   tamsulosin [facility]) 0.4 MG capsule Take 1 capsule by mouth daily 11/19/21  Yes Virgie Ross MD   famotidine (PEPCID) 20 MG tablet Take 1 tablet by mouth daily 11/19/21  Yes Virgie Ross MD   nitroGLYCERIN (NITROSTAT) 0.4 MG SL tablet Place 1 tablet under the tongue See Admin Instructions 2/16/18  Yes Virgie Ross MD   Handicap Placard MISC by Does not apply route Exp 5 years 7/13/15  Yes Virgie Ross MD   aspirin 81 MG tablet Take 81 mg by mouth daily. Yes Historical Provider, MD       ALLERGIES: Dye [iodides]    REVIEW OF SYSTEM:   Review of Systems   Constitutional: Negative for chills, diaphoresis and fever. HENT: Negative for congestion, rhinorrhea and trouble swallowing. Eyes: Negative for visual disturbance. Respiratory: Positive for shortness of breath. Negative for cough and wheezing. Cardiovascular: Positive for leg swelling. Negative for chest pain and palpitations. Gastrointestinal: Negative for abdominal distention, abdominal pain, constipation, diarrhea, nausea and vomiting. Endocrine: Negative. Genitourinary: Negative for difficulty urinating, dysuria, frequency and urgency. Musculoskeletal: Negative for back pain and gait problem. Skin: Negative for wound. Neurological: Negative for dizziness, seizures, syncope, speech difficulty, weakness, numbness and headaches. Hematological: Does not bruise/bleed easily. Psychiatric/Behavioral: Negative. OBJECTIVE  PHYSICAL EXAM:   Physical Exam  Vitals and nursing note reviewed. Constitutional:       General: He is not in acute distress. HENT:      Head: Normocephalic. Nose: Nose normal.      Mouth/Throat:      Mouth: Mucous membranes are moist.   Eyes:      Pupils: Pupils are equal, round, and reactive to light. Neck:      Vascular: No carotid bruit. Cardiovascular:      Rate and Rhythm: Normal rate and regular rhythm. Pulses: Normal pulses. Heart sounds: Normal heart sounds. No murmur heard. No friction rub. No gallop. Pulmonary:      Effort: Pulmonary effort is normal. No respiratory distress. Breath sounds: Normal breath sounds. No stridor. No wheezing, rhonchi or rales. Chest:      Chest wall: No tenderness. Abdominal:      General: Abdomen is flat. Palpations: Abdomen is soft. Musculoskeletal:         General: Normal range of motion. Cervical back: Normal range of motion.       Right lower leg: Edema present. Left lower leg: Edema present. Skin:     General: Skin is warm and dry. Capillary Refill: Capillary refill takes less than 2 seconds. Neurological:      General: No focal deficit present. Mental Status: He is alert and oriented to person, place, and time. Psychiatric:         Mood and Affect: Mood normal.         Behavior: Behavior normal.          BP (!) 159/78   Pulse 78   Temp 98.4 °F (36.9 °C) (Oral)   Resp 17   Ht 6' 4\" (1.93 m)   Wt (!) 321 lb (145.6 kg)   SpO2 95%   BMI 39.07 kg/m²     DATA:     Diagnostic tests reviewed for today's visit:    Most recent labs and imaging results reviewed.      LABS:    Recent Results (from the past 24 hour(s))   EKG 12 Lead - Chest Pain    Collection Time: 04/01/22 11:49 AM   Result Value Ref Range    Ventricular Rate 74 BPM    Atrial Rate 74 BPM    P-R Interval 278 ms    QRS Duration 160 ms    Q-T Interval 452 ms    QTc Calculation (Bazett) 501 ms    P Axis 34 degrees    R Axis -5 degrees    T Axis 129 degrees   Comprehensive Metabolic Panel    Collection Time: 04/01/22 11:55 AM   Result Value Ref Range    Sodium 141 135 - 144 mEq/L    Potassium 4.8 3.4 - 4.9 mEq/L    Chloride 105 95 - 107 mEq/L    CO2 23 20 - 31 mEq/L    Anion Gap 13 9 - 15 mEq/L    Glucose 185 (H) 70 - 99 mg/dL    BUN 45 (H) 8 - 23 mg/dL    CREATININE 1.83 (H) 0.70 - 1.20 mg/dL    GFR Non-African American 36.3 (L) >60    GFR  43.9 (L) >60    Calcium 9.0 8.5 - 9.9 mg/dL    Total Protein 7.0 6.3 - 8.0 g/dL    Albumin 3.7 3.5 - 4.6 g/dL    Total Bilirubin 0.6 0.2 - 0.7 mg/dL    Alkaline Phosphatase 187 (H) 35 - 104 U/L    ALT 24 0 - 41 U/L    AST 29 0 - 40 U/L    Globulin 3.3 2.3 - 3.5 g/dL   CBC with Auto Differential    Collection Time: 04/01/22 11:55 AM   Result Value Ref Range    WBC 4.7 (L) 4.8 - 10.8 K/uL    RBC 4.64 (L) 4.70 - 6.10 M/uL    Hemoglobin 12.3 (L) 14.0 - 18.0 g/dL    Hematocrit 39.8 (L) 42.0 - 52.0 %    MCV 85.8 80.0 - 100.0 fL    MCH 26.6 (L) 27.0 - 31.3 pg    MCHC 31.0 (L) 33.0 - 37.0 %    RDW 17.6 (H) 11.5 - 14.5 %    Platelets 225 (L) 911 - 400 K/uL    Neutrophils % 75.2 %    Lymphocytes % 11.7 %    Monocytes % 9.1 %    Eosinophils % 3.3 %    Basophils % 0.7 %    Neutrophils Absolute 3.5 1.4 - 6.5 K/uL    Lymphocytes Absolute 0.5 (L) 1.0 - 4.8 K/uL    Monocytes Absolute 0.4 0.2 - 0.8 K/uL    Eosinophils Absolute 0.2 0.0 - 0.7 K/uL    Basophils Absolute 0.0 0.0 - 0.2 K/uL   Magnesium    Collection Time: 04/01/22 11:55 AM   Result Value Ref Range    Magnesium 1.9 1.7 - 2.4 mg/dL   Troponin    Collection Time: 04/01/22 11:55 AM   Result Value Ref Range    Troponin 0.072 (HH) 0.000 - 0.010 ng/mL   Brain Natriuretic Peptide    Collection Time: 04/01/22 11:55 AM   Result Value Ref Range    Pro-BNP 6,425 pg/mL   Troponin    Collection Time: 04/01/22  4:51 PM   Result Value Ref Range    Troponin 0.063 (HH) 0.000 - 0.010 ng/mL   Troponin    Collection Time: 04/01/22  7:43 PM   Result Value Ref Range    Troponin 0.066 (HH) 0.000 - 0.010 ng/mL   POCT Glucose    Collection Time: 04/01/22 10:01 PM   Result Value Ref Range    POC Glucose 113 (H) 70 - 99 mg/dl    Performed on ACCU-CHEK    EKG 12 lead    Collection Time: 04/02/22  3:25 AM   Result Value Ref Range    Ventricular Rate 74 BPM    Atrial Rate 74 BPM    P-R Interval 288 ms    QRS Duration 154 ms    Q-T Interval 454 ms    QTc Calculation (Bazett) 503 ms    P Axis 41 degrees    R Axis -3 degrees    T Axis 117 degrees   CBC with Auto Differential    Collection Time: 04/02/22  6:03 AM   Result Value Ref Range    WBC 4.9 4.8 - 10.8 K/uL    RBC 4.31 (L) 4.70 - 6.10 M/uL    Hemoglobin 11.6 (L) 14.0 - 18.0 g/dL    Hematocrit 37.0 (L) 42.0 - 52.0 %    MCV 85.8 80.0 - 100.0 fL    MCH 26.9 (L) 27.0 - 31.3 pg    MCHC 31.3 (L) 33.0 - 37.0 %    RDW 17.5 (H) 11.5 - 14.5 %    Platelets 404 (L) 358 - 400 K/uL    Neutrophils % 63.5 %    Lymphocytes % 15.6 %    Monocytes % 13.8 %    Eosinophils % 6.7 %    Basophils % 0.4 %    Neutrophils Absolute 3.1 1.4 - 6.5 K/uL    Lymphocytes Absolute 0.8 (L) 1.0 - 4.8 K/uL    Monocytes Absolute 0.7 0.2 - 0.8 K/uL    Eosinophils Absolute 0.3 0.0 - 0.7 K/uL    Basophils Absolute 0.0 0.0 - 0.2 K/uL   Basic Metabolic Panel    Collection Time: 04/02/22  6:03 AM   Result Value Ref Range    Sodium 141 135 - 144 mEq/L    Potassium 4.8 3.4 - 4.9 mEq/L    Chloride 104 95 - 107 mEq/L    CO2 24 20 - 31 mEq/L    Anion Gap 13 9 - 15 mEq/L    Glucose 88 70 - 99 mg/dL    BUN 44 (H) 8 - 23 mg/dL    CREATININE 1.78 (H) 0.70 - 1.20 mg/dL    GFR Non-African American 37.5 (L) >60    GFR  45.4 (L) >60    Calcium 8.7 8.5 - 9.9 mg/dL       IMAGING:  XR CHEST PORTABLE    Result Date: 4/1/2022  EXAMINATION: XR CHEST PORTABLE CLINICAL HISTORY: DYSPNEA ON EXERTION. SHORTNESS OF BREATH FLAT. COMPARISONS: None available. FINDINGS: Median sternotomy. Cardiopericardial silhouette normal. Aorta calcified. Thickening minor fissure. Blunting right costophrenic angle. Left lung clear. SMALL RIGHT EFFUSION. Impression    Shortness of breath secondary to acute decompensated combined congestive heart failure. Rule out underlying cardiac ischemia    Elevated cardiac enzymes - flat pattern. Questionable demand ischemia rule out cardiac ischemia    History of CAD status post CABG 9 years ago     Severe bilateral extremity edema     Essential hypertension    History of diabetes mellitus    Chronic kidney disease stage III    Morbid obesity class II    Hyperlipidemia      Plan     1. Maximize cardiac medications. 2. Continue with IV diuresis as gen, monitor I/O's, renal function, electrolytes. Keep K+>4 and Mg>2. Initiate Lasix 40 mg IV every 12 hours  3. Check 2D echocardiogram - awaiting results  4. Coronary valuation when feasible. Questionable inpatient versus outpatient. 5. Monitor on telemetry  6. GI/DVT prophylaxis  7. CHF teaching  8. Low-sodium diet  9.  Resume appropriate home cardiac medications  10. No nephrotoxic agents  11. Further recommendations follow       Attending Supervising [de-identified] Attestation Statement  The patient is a 78 y.o. male. I have performed a history and physical examination of the patient. I discussed the case with the nurse practitioner. I reviewed the patient's Past Medical History, Past Surgical History, Medications, and Allergies. Physical Exam:  Vitals:    04/01/22 1630 04/01/22 1918 04/01/22 2345 04/02/22 0826   BP: (!) 150/72 (!) 142/80 (!) 136/55 (!) 159/78   Pulse: 73 67 74 78   Resp: 18 20 20 17   Temp: 97.8 °F (36.6 °C) 97.4 °F (36.3 °C) 97.9 °F (36.6 °C) 98.4 °F (36.9 °C)   TempSrc: Oral Oral Oral Oral   SpO2: 100% 100% 95% 95%   Weight: (!) 321 lb (145.6 kg)      Height: 6' 4\" (1.93 m)          Review of Systems - Respiratory ROS: positive for - shortness of breath  Cardiovascular ROS: positive for - dyspnea on exertion  Gastrointestinal ROS: no abdominal pain, change in bowel habits, or black or bloody stools    Pulmonary/Chest: decreased breath sounds noted  Cardiovascular: normal rate, normal S1 and S2, no gallops, intact distal pulses and no carotid bruits  Abdomen: soft, non-tender, non-distended, normal bowel sounds, no masses or organomegaly    Active Hospital Problems    Diagnosis Date Noted    Leg swelling [M79.89] 04/01/2022     Priority: Low        I reviewed and agree with the findings and plan documented in her note . Impression     Shortness of breath secondary to acute decompensated combined congestive heart failure. Rule out underlying cardiac ischemia     Elevated cardiac enzymes.   Questionable demand ischemia rule out cardiac ischemia    New onset cardiomyopathy ejection fraction of 20 to 25% likely progression of CAD.     History of CAD status post CABG 9 years ago      Severe bilateral extremity edema      Essential hypertension     History of diabetes mellitus     Chronic kidney disease stage III     Morbid obesity class II     Hyperlipidemia        Plan     1. Maximize cardiac medications. 2. Continue with IV diuresis as gen, monitor I/O's, renal function, electrolytes. Keep K+>4 and Mg>2. Initiate Lasix 40 mg IV every 12 hours  3. Will need heart catheterization on Monday given new onset cardiomyopathy with presentation of acute decompensated heart failure, history of CABG 9 years ago  4. Eventually would like to start Entresto if renal function remains stable post cardiac catheterization. Losartan currently on hold/washout  5. Initiate Coreg 6.25 mg twice daily. 6. Continue with statin. 7. Monitor on telemetry  8. GI/DVT prophylaxis  9. CHF teaching  10. Low-sodium diet  11. No nephrotoxic agents  12.  Further recommendations follow        Electronically signed by Gail Chan DO on 4/2/22 at 11:09 AM EDT

## 2022-04-02 NOTE — FLOWSHEET NOTE
04/01/22 2038   Treatment Team Notification   Reason for Communication Critical results   Type of Critical Result Laboratory   Critical Lab Result Type Troponin   Team Member Name Holiday   Method of Communication Secure Message   Response Waiting for response   Notification Time 2038

## 2022-04-03 LAB
ANION GAP SERPL CALCULATED.3IONS-SCNC: 15 MEQ/L (ref 9–15)
BASOPHILS ABSOLUTE: 0 K/UL (ref 0–0.2)
BASOPHILS RELATIVE PERCENT: 0.8 %
BUN BLDV-MCNC: 48 MG/DL (ref 8–23)
CALCIUM SERPL-MCNC: 8.8 MG/DL (ref 8.5–9.9)
CHLORIDE BLD-SCNC: 102 MEQ/L (ref 95–107)
CO2: 22 MEQ/L (ref 20–31)
CREAT SERPL-MCNC: 1.84 MG/DL (ref 0.7–1.2)
EOSINOPHILS ABSOLUTE: 0.4 K/UL (ref 0–0.7)
EOSINOPHILS RELATIVE PERCENT: 8 %
GFR AFRICAN AMERICAN: 43.7
GFR NON-AFRICAN AMERICAN: 36.1
GLUCOSE BLD-MCNC: 115 MG/DL (ref 70–99)
GLUCOSE BLD-MCNC: 150 MG/DL (ref 70–99)
GLUCOSE BLD-MCNC: 156 MG/DL (ref 70–99)
GLUCOSE BLD-MCNC: 160 MG/DL (ref 70–99)
GLUCOSE BLD-MCNC: 165 MG/DL (ref 70–99)
GLUCOSE BLD-MCNC: 178 MG/DL (ref 70–99)
HCT VFR BLD CALC: 35.4 % (ref 42–52)
HEMOGLOBIN: 11.5 G/DL (ref 14–18)
LYMPHOCYTES ABSOLUTE: 0.8 K/UL (ref 1–4.8)
LYMPHOCYTES RELATIVE PERCENT: 18.5 %
MCH RBC QN AUTO: 27.2 PG (ref 27–31.3)
MCHC RBC AUTO-ENTMCNC: 32.3 % (ref 33–37)
MCV RBC AUTO: 84.2 FL (ref 80–100)
MONOCYTES ABSOLUTE: 0.5 K/UL (ref 0.2–0.8)
MONOCYTES RELATIVE PERCENT: 11.6 %
NEUTROPHILS ABSOLUTE: 2.7 K/UL (ref 1.4–6.5)
NEUTROPHILS RELATIVE PERCENT: 61.1 %
PDW BLD-RTO: 17.9 % (ref 11.5–14.5)
PERFORMED ON: ABNORMAL
PLATELET # BLD: 123 K/UL (ref 130–400)
POTASSIUM SERPL-SCNC: 4.4 MEQ/L (ref 3.4–4.9)
RBC # BLD: 4.21 M/UL (ref 4.7–6.1)
SODIUM BLD-SCNC: 139 MEQ/L (ref 135–144)
WBC # BLD: 4.4 K/UL (ref 4.8–10.8)

## 2022-04-03 PROCEDURE — 2580000003 HC RX 258: Performed by: INTERNAL MEDICINE

## 2022-04-03 PROCEDURE — 85025 COMPLETE CBC W/AUTO DIFF WBC: CPT

## 2022-04-03 PROCEDURE — 6360000002 HC RX W HCPCS: Performed by: NURSE PRACTITIONER

## 2022-04-03 PROCEDURE — 2060000000 HC ICU INTERMEDIATE R&B

## 2022-04-03 PROCEDURE — 6370000000 HC RX 637 (ALT 250 FOR IP): Performed by: INTERNAL MEDICINE

## 2022-04-03 PROCEDURE — 36415 COLL VENOUS BLD VENIPUNCTURE: CPT

## 2022-04-03 PROCEDURE — 80048 BASIC METABOLIC PNL TOTAL CA: CPT

## 2022-04-03 PROCEDURE — 6360000002 HC RX W HCPCS: Performed by: INTERNAL MEDICINE

## 2022-04-03 PROCEDURE — APPSS30 APP SPLIT SHARED TIME 16-30 MINUTES: Performed by: NURSE PRACTITIONER

## 2022-04-03 RX ADMIN — ENOXAPARIN SODIUM 30 MG: 100 INJECTION SUBCUTANEOUS at 08:52

## 2022-04-03 RX ADMIN — Medication 10 ML: at 08:48

## 2022-04-03 RX ADMIN — INSULIN LISPRO 2 UNITS: 100 INJECTION, SOLUTION INTRAVENOUS; SUBCUTANEOUS at 08:49

## 2022-04-03 RX ADMIN — Medication 10 ML: at 20:40

## 2022-04-03 RX ADMIN — TAMSULOSIN HYDROCHLORIDE 0.4 MG: 0.4 CAPSULE ORAL at 08:48

## 2022-04-03 RX ADMIN — ENOXAPARIN SODIUM 30 MG: 100 INJECTION SUBCUTANEOUS at 20:40

## 2022-04-03 RX ADMIN — FAMOTIDINE 20 MG: 20 TABLET ORAL at 08:48

## 2022-04-03 RX ADMIN — CARVEDILOL 6.25 MG: 6.25 TABLET, FILM COATED ORAL at 16:29

## 2022-04-03 RX ADMIN — INSULIN LISPRO 2 UNITS: 100 INJECTION, SOLUTION INTRAVENOUS; SUBCUTANEOUS at 16:29

## 2022-04-03 RX ADMIN — ASPIRIN 81 MG 81 MG: 81 TABLET ORAL at 08:48

## 2022-04-03 RX ADMIN — GLIPIZIDE 10 MG: 5 TABLET ORAL at 08:52

## 2022-04-03 RX ADMIN — FUROSEMIDE 40 MG: 10 INJECTION, SOLUTION INTRAMUSCULAR; INTRAVENOUS at 08:48

## 2022-04-03 RX ADMIN — CARVEDILOL 6.25 MG: 6.25 TABLET, FILM COATED ORAL at 08:48

## 2022-04-03 RX ADMIN — ATORVASTATIN CALCIUM 40 MG: 40 TABLET, FILM COATED ORAL at 20:39

## 2022-04-03 RX ADMIN — FUROSEMIDE 40 MG: 10 INJECTION, SOLUTION INTRAMUSCULAR; INTRAVENOUS at 16:29

## 2022-04-03 RX ADMIN — INSULIN GLARGINE 30 UNITS: 100 INJECTION, SOLUTION SUBCUTANEOUS at 08:48

## 2022-04-03 RX ADMIN — INSULIN GLARGINE 30 UNITS: 100 INJECTION, SOLUTION SUBCUTANEOUS at 20:41

## 2022-04-03 ASSESSMENT — ENCOUNTER SYMPTOMS
COUGH: 0
TROUBLE SWALLOWING: 0
RHINORRHEA: 0
CONSTIPATION: 0
ABDOMINAL DISTENTION: 0
NAUSEA: 0
ABDOMINAL PAIN: 0
VOMITING: 0
WHEEZING: 0
DIARRHEA: 0
BACK PAIN: 0
SHORTNESS OF BREATH: 1

## 2022-04-03 ASSESSMENT — PAIN SCALES - GENERAL
PAINLEVEL_OUTOF10: 0

## 2022-04-03 NOTE — PROGRESS NOTES
04/03/22    From: INDEPENDENT AT HOME WITH WIFE    Admit: CHF, LEG SWELLING2    PMH:CKD, OBESITY, DM, HDL, HTN, EF EST 20%    Anticipated Discharge Disposition: [facility]    Patient Mobility or PT/OT ordered:  Consults: CHF NURSE    Covid result &/or vacc status:     Barriers to Discharge:IV LASIX,   HEART CATH ON Monday 4/4    Assessments: CMI DONE

## 2022-04-03 NOTE — PROGRESS NOTES
DEPARTMENT OF CARDIOLOGY  HISTORY AND PHYSICAL EXAM    PATIENT NAME:  Terrell Perez    MRN:  [id number]  SERVICE DATE:  4/3/2022   SERVICE TIME:  8:49 AM    Primary Care Physician: Yamilka Chaney MD     SUBJECTIVE  CHIEF COMPLAINT:  LE edema    HPI:  Terrell Perez is a 78 y.o., , male who  has a past medical history of CAD (coronary artery disease), CKD (chronic kidney disease), Hypertension, Kidney stones, Left foot drop, Neuropathy, diabetic ([address] Osteoarthritis, S/P CABG (coronary artery bypass graft), and Type II or unspecified type diabetes mellitus without mention of complication, not stated as uncontrolled. that is hospitalized for acute CHF exacerbation. Pt presented to ED with c/o worsening lower extremity edema x 3-4 days and orthopnea. Pt is known to this cardiology service as he follows with Dr. Misa Marrero in office. Most recent echo in 2020 with EF 40-45%. Negative stress test in 2020. Work up in the ED shows pro-BNP 6425, troponin 0.072, BUN 45, Cr 1.83, GFR 43.9. CXR shows a small R effusion. On exam, pt has 4+ pitting edema to bilat lower ext. Pt reports at home he has been unable to walk more than a few feet without feeling SOB. States he has been waking up in the middle of the night gasping for air. Pt denies chest pain, denies palpitations. Pt was given 40mg IV Lasix and has been diuresing in the ED. States he feels a little better. 4/2/2022: sitting up in bed, in no acute distress. States he is feeling fine overall today. Monitored on telemetry, currently SR, HR 70s. Morning labs reviewed BUN 44, Cr 1.78, GFR 37.4, serial troponins elevated in flat pattern at .072, .063, .066. Pt denies CP, SOB, palpitations. Echo tech at bedside. Pt is negative 3L total fluid balance from admission. Pt continues to have 4+ pitting edema to bilat lower ext. 4/3/2022: Monitored on tele, currently SR, HR 70's. BP stable.  Is and Os reflect negative 5L total fluid balance since admission. Morning labs reviewed: BUN 48, Cr 1.84, GFR 36.1. Pt with continued lower ext edema. States he is feeling ok overall today. Pt had echocardiogram yesterday which shows:  Summary   Left ventricular ejection fraction is visually estimated at 20%. global   hypokinesis. Left ventricular size is moderately increased . Pseudonormal filling pattern noted. Normal right ventricular chamber size and function. The left atrium is Mildly dilated. Mild (1+) mitral regurgitation is present. No evidence of aortic valve regurgitation . No evidence of aortic valve stenosis. HPI     PAST MEDICAL HISTORY:    Past Medical History:   Diagnosis Date    CAD (coronary artery disease)     Dr. Kwame Jensen, Dr. Augustin Nails CKD (chronic kidney disease)     Hypertension     Kidney stones     Left foot drop     Neuropathy, diabetic (HCC)     mild    Osteoarthritis     hip, knee    S/P CABG (coronary artery bypass graft)     Type II or unspecified type diabetes mellitus without mention of complication, not stated as uncontrolled      PAST SURGICAL HISTORY:    Past Surgical History:   Procedure Laterality Date    BACK SURGERY N/A     BICEPS TENDON REPAIR  01/01/1997    CARDIAC CATHETERIZATION  06/05/2013    CORONARY ARTERY BYPASS GRAFT  06/07/2013    DR. PERALES    KNEE SURGERY  01/01/2006    left replacement    NECK SURGERY  2020    ROTATOR CUFF REPAIR  01/01/1996    TONSILLECTOMY  01/01/1966     FAMILY HISTORY:    Family History   Problem Relation Age of Onset    Cancer Mother         intestinal, skin    Heart Disease Father     Stroke Father      SOCIAL HISTORY:    Social History     Socioeconomic History    Marital status:      Spouse name: Not on file    Number of children: Not on file    Years of education: Not on file    Highest education level: Not on file   Occupational History    Not on file   Tobacco Use    Smoking status: Never Smoker    Smokeless tobacco: Never Used   Substance and Sexual Activity    Alcohol use: No    Drug use: Not on file    Sexual activity: Not on file   Other Topics Concern    Not on file   Social History Narrative    Not on file     Social Determinants of Health     Financial Resource Strain: Low Risk     Difficulty of Paying Living Expenses: Not hard at all   Food Insecurity: No Food Insecurity    Worried About Running Out of Food in the Last Year: Never true    [address] in the Last Year: Never true   Transportation Needs:     Lack of Transportation (Medical): Not on file    Lack of Transportation (Non-Medical): Not on file   Physical Activity:     Days of Exercise per Week: Not on file    Minutes of Exercise per Session: Not on file   Stress:     Feeling of Stress : Not on file   Social Connections:     Frequency of Communication with Friends and Family: Not on file    Frequency of Social Gatherings with Friends and Family: Not on file    Attends [employer] Services: Not on file    Active Member of [address] [place] [address] or Organizations: Not on file    Attends Club or Organization Meetings: Not on file    Marital Status: Not on file   Intimate Partner Violence:     Fear of Current or Ex-Partner: Not on file    Emotionally Abused: Not on file    Physically Abused: Not on file    Sexually Abused: Not on file   Housing Stability:     Unable to Pay for Housing in the Last Year: Not on file    Number of [place] in the Last Year: Not on file    Unstable Housing in the Last Year: Not on file     MEDICATIONS:   Prior to Admission medications    Medication Sig Start Date End Date Taking?  Authorizing Provider   atorvastatin (LIPITOR) 40 MG tablet Take 1 tablet by mouth nightly 11/19/21  Yes Dyan Lesches, MD   furosemide (LASIX) 40 MG tablet Take 1 tablet by mouth daily 11/19/21  Yes Dyan Lesches, MD   glimepiride (AMARYL) 4 MG tablet Take 1 tablet by mouth 2 times daily 11/19/21  Yes Dyan Lesches, MD   insulin glargine (LANTUS SOLOSTAR) 100 UNIT/ML injection pen Inject 30 Units into the skin 2 times daily 11/19/21  Yes Victoria Carbajal MD   losartan (COZAAR) 25 MG tablet Take 1 tablet by mouth daily 11/19/21  Yes Victoria Carbajal MD   metoprolol tartrate (LOPRESSOR) 25 MG tablet Take 1 tablet by mouth 2 times daily 11/19/21  Yes Victoria Carbajal MD   tamsulosin [facility]) 0.4 MG capsule Take 1 capsule by mouth daily 11/19/21  Yes Victoria Carbajal MD   famotidine (PEPCID) 20 MG tablet Take 1 tablet by mouth daily 11/19/21  Yes Victoria Carbajal MD   nitroGLYCERIN (NITROSTAT) 0.4 MG SL tablet Place 1 tablet under the tongue See Admin Instructions 2/16/18  Yes Victoria Carbajal MD   Handicap Placard MISC by Does not apply route Exp 5 years 7/13/15  Yes Victoria Carbajal MD   aspirin 81 MG tablet Take 81 mg by mouth daily. Yes Historical Provider, MD       ALLERGIES: Dye [iodides]    REVIEW OF SYSTEM:   Review of Systems   Constitutional: Negative for chills, diaphoresis and fever. HENT: Negative for congestion, rhinorrhea and trouble swallowing. Eyes: Negative for visual disturbance. Respiratory: Positive for shortness of breath. Negative for cough and wheezing. Cardiovascular: Positive for leg swelling. Negative for chest pain and palpitations. Gastrointestinal: Negative for abdominal distention, abdominal pain, constipation, diarrhea, nausea and vomiting. Endocrine: Negative. Genitourinary: Negative for difficulty urinating, dysuria, frequency and urgency. Musculoskeletal: Negative for back pain and gait problem. Skin: Negative for wound. Neurological: Negative for dizziness, seizures, syncope, speech difficulty, weakness, numbness and headaches. Hematological: Does not bruise/bleed easily. Psychiatric/Behavioral: Negative. OBJECTIVE  PHYSICAL EXAM:   Physical Exam  Vitals and nursing note reviewed. Constitutional:       General: He is not in acute distress. HENT:      Head: Normocephalic.       Nose: Nose normal.      Mouth/Throat:      Mouth: Mucous membranes are moist.   Eyes:      Pupils: Pupils are equal, round, and reactive to light. Neck:      Vascular: No carotid bruit. Cardiovascular:      Rate and Rhythm: Normal rate and regular rhythm. Pulses: Normal pulses. Heart sounds: Normal heart sounds. No murmur heard. No friction rub. No gallop. Pulmonary:      Effort: Pulmonary effort is normal. No respiratory distress. Breath sounds: Normal breath sounds. No stridor. No wheezing, rhonchi or rales. Chest:      Chest wall: No tenderness. Abdominal:      General: Abdomen is flat. Palpations: Abdomen is soft. Musculoskeletal:         General: Normal range of motion. Cervical back: Normal range of motion. Right lower leg: Edema present. Left lower leg: Edema present. Skin:     General: Skin is warm and dry. Capillary Refill: Capillary refill takes less than 2 seconds. Neurological:      General: No focal deficit present. Mental Status: He is alert and oriented to person, place, and time. Psychiatric:         Mood and Affect: Mood normal.         Behavior: Behavior normal.          /60   Pulse 64   Temp 97.8 °F (36.6 °C) (Oral)   Resp 18   Ht 6' 4\" (1.93 m)   Wt (!) 321 lb (145.6 kg)   SpO2 97%   BMI 39.07 kg/m²     DATA:     Diagnostic tests reviewed for today's visit:    Most recent labs and imaging results reviewed.      LABS:    Recent Results (from the past 24 hour(s))   POCT Glucose    Collection Time: 04/02/22 11:24 AM   Result Value Ref Range    POC Glucose 164 (H) 70 - 99 mg/dl    Performed on ACCU-CHEK    POCT Glucose    Collection Time: 04/02/22  4:15 PM   Result Value Ref Range    POC Glucose 144 (H) 70 - 99 mg/dl    Performed on ACCU-CHEK    POCT Glucose    Collection Time: 04/02/22  7:37 PM   Result Value Ref Range    POC Glucose 100 (H) 70 - 99 mg/dl    Performed on ACCU-CHEK    POCT Glucose    Collection Time: 04/03/22  1:35 AM   Result Value Ref Range    POC Glucose 178 (H) 70 - 99 mg/dl    Performed on ACCU-CHEK    CBC with Auto Differential    Collection Time: 04/03/22  5:18 AM   Result Value Ref Range    WBC 4.4 (L) 4.8 - 10.8 K/uL    RBC 4.21 (L) 4.70 - 6.10 M/uL    Hemoglobin 11.5 (L) 14.0 - 18.0 g/dL    Hematocrit 35.4 (L) 42.0 - 52.0 %    MCV 84.2 80.0 - 100.0 fL    MCH 27.2 27.0 - 31.3 pg    MCHC 32.3 (L) 33.0 - 37.0 %    RDW 17.9 (H) 11.5 - 14.5 %    Platelets 539 (L) 007 - 400 K/uL    Neutrophils % 61.1 %    Lymphocytes % 18.5 %    Monocytes % 11.6 %    Eosinophils % 8.0 %    Basophils % 0.8 %    Neutrophils Absolute 2.7 1.4 - 6.5 K/uL    Lymphocytes Absolute 0.8 (L) 1.0 - 4.8 K/uL    Monocytes Absolute 0.5 0.2 - 0.8 K/uL    Eosinophils Absolute 0.4 0.0 - 0.7 K/uL    Basophils Absolute 0.0 0.0 - 0.2 K/uL   Basic Metabolic Panel    Collection Time: 04/03/22  5:18 AM   Result Value Ref Range    Sodium 139 135 - 144 mEq/L    Potassium 4.4 3.4 - 4.9 mEq/L    Chloride 102 95 - 107 mEq/L    CO2 22 20 - 31 mEq/L    Anion Gap 15 9 - 15 mEq/L    Glucose 150 (H) 70 - 99 mg/dL    BUN 48 (H) 8 - 23 mg/dL    CREATININE 1.84 (H) 0.70 - 1.20 mg/dL    GFR Non-African American 36.1 (L) >60    GFR  43.7 (L) >60    Calcium 8.8 8.5 - 9.9 mg/dL   POCT Glucose    Collection Time: 04/03/22  6:14 AM   Result Value Ref Range    POC Glucose 160 (H) 70 - 99 mg/dl    Performed on ACCU-CHEK        IMAGING:  XR CHEST PORTABLE    Result Date: 4/1/2022  EXAMINATION: XR CHEST PORTABLE CLINICAL HISTORY: DYSPNEA ON EXERTION. SHORTNESS OF BREATH FLAT. COMPARISONS: None available. FINDINGS: Median sternotomy. Cardiopericardial silhouette normal. Aorta calcified. Thickening minor fissure. Blunting right costophrenic angle. Left lung clear. SMALL RIGHT EFFUSION. Impression     Shortness of breath secondary to acute decompensated combined congestive heart failure. Rule out underlying cardiac ischemia     Elevated cardiac enzymes.   Questionable demand ischemia rule out cardiac ischemia    New onset cardiomyopathy ejection fraction of 20 to 25% likely progression of CAD.     History of CAD status post CABG 9 years ago      Severe bilateral extremity edema      Essential hypertension     History of diabetes mellitus     Chronic kidney disease stage III     Morbid obesity class II     Hyperlipidemia        Plan     1. Maximize cardiac medications. 2. Continue with IV diuresis as gen, monitor I/O's, renal function, electrolytes. Keep K+>4 and Mg>2. Initiate Lasix 40 mg IV every 12 hours  3. Will need heart catheterization on Monday given new onset cardiomyopathy with presentation of acute decompensated heart failure, history of CABG 9 years ago  4. Eventually would like to start Entresto if renal function remains stable post cardiac catheterization. Losartan currently on hold/washout  5. Initiate Coreg 6.25 mg twice daily. 6. Continue with statin. 7. Monitor on telemetry  8. GI/DVT prophylaxis  9. CHF teaching  10. Low-sodium diet  11. No nephrotoxic agents  12. Further recommendations follow    Attending Supervising [de-identified] Attestation Statement  The patient is a 78 y.o. male. I have performed a history and physical examination of the patient. I discussed the case with the nurse practitioner. I reviewed the patient's Past Medical History, Past Surgical History, Medications, and Allergies.      Physical Exam:  Vitals:    04/02/22 1520 04/02/22 2017 04/03/22 0838 04/03/22 0850   BP: 120/66 137/64 110/60    Pulse: 64 70 64 64   Resp: 16 18 18    Temp: 97.7 °F (36.5 °C) 97.5 °F (36.4 °C) 97.8 °F (36.6 °C)    TempSrc: Oral Oral Oral    SpO2: 99% 99% 97%    Weight:       Height:               Pulmonary/Chest: Decreased breath sound at the bases no wheezes, rales or rhonchi, normal air movement, no respiratory distress  Cardiovascular: normal rate, normal S1 and S2, no gallops, intact distal pulses and no carotid bruits  Abdomen: soft, non-tender, non-distended, normal bowel sounds, no masses or organomegaly    Active Hospital Problems    Diagnosis Date Noted    Leg swelling [M79.89] 04/01/2022     Priority: Low        I reviewed and agree with the findings and plan documented in her note . Impression     Shortness of breath secondary to acute decompensated combined congestive heart failure.  Rule out underlying cardiac ischemia     Elevated cardiac enzymes.  Questionable demand ischemia rule out cardiac ischemia     New onset cardiomyopathy ejection fraction of 20 to 25% likely progression of CAD.     History of CAD status post CABG 9 years ago      Severe bilateral extremity edema      Essential hypertension     History of diabetes mellitus     Chronic kidney disease stage III     Morbid obesity class II     Hyperlipidemia        Plan     1. Maximize cardiac medications. 2. Continue with IV diuresis as gen, monitor I/O's, renal function, electrolytes. Keep K+>4 and Mg>2.  Continue with Lasix Lasix 40 mg IV every 12 hours  3. Cardiac  catheterization tomorrow given new onset cardiomyopathy with presentation of acute decompensated heart failure, history of CABG 9 years ago  4. Eventually would like to start Entresto if renal function remains stable post cardiac catheterization. Losartan currently on hold/washout  5. Coreg 6.25 mg twice daily. 6. Continue with statin. 7. Monitor on telemetry  8. GI/DVT prophylaxis  9. CHF teaching  10. Low-sodium diet  11. No nephrotoxic agents  12.  Further recommendations follow      Electronically signed by [id number] FAWN Delong DO on 4/3/22 at 11:08 AM EDT

## 2022-04-03 NOTE — FLOWSHEET NOTE
Patient ambulated with a walker and standby assist in the hallway after lunch today. No issues noted. Resting in bed now with call light in reach.

## 2022-04-03 NOTE — FLOWSHEET NOTE
AM assessment completed. Patient sitting on the side of the bed at this time. Denies any chest pain. Remains NSR on the monitor. +3 pitting edema noted to bilateral lower extremities. Pedal pulses palpable. Denies any shortness of breath at this time. Lungs are diminished bilaterally. SATS 97% on RA. He is A/Ox3 and can be up in the room independently with his cane as tolerated. Multiple scabbed areas and redness noted to bilateral lower extremities. Mepilex dressing present to left knee where one scabbed area was bleeding yesterday. #20g SL to right forearm, flushed and patent. Vital signs stable and AM medications provided. Chair cushion placed in the reclining chair and patient then assisted to the chair to rest. Call light remains in reach.  Electronically signed by Roosevelt Adhikari RN on 4/3/2022 at 10:21 AM

## 2022-04-04 ENCOUNTER — APPOINTMENT (OUTPATIENT)
Dept: CARDIAC CATH/INVASIVE PROCEDURES | Age: 77
DRG: 286 | End: 2022-04-04
Payer: MEDICARE

## 2022-04-04 LAB
ANION GAP SERPL CALCULATED.3IONS-SCNC: 14 MEQ/L (ref 9–15)
BASOPHILS ABSOLUTE: 0 K/UL (ref 0–0.2)
BASOPHILS RELATIVE PERCENT: 0.7 %
BUN BLDV-MCNC: 47 MG/DL (ref 8–23)
CALCIUM SERPL-MCNC: 8.9 MG/DL (ref 8.5–9.9)
CHLORIDE BLD-SCNC: 103 MEQ/L (ref 95–107)
CO2: 24 MEQ/L (ref 20–31)
CREAT SERPL-MCNC: 1.71 MG/DL (ref 0.7–1.2)
EKG ATRIAL RATE: 74 BPM
EKG P AXIS: 41 DEGREES
EKG P-R INTERVAL: 288 MS
EKG Q-T INTERVAL: 454 MS
EKG QRS DURATION: 154 MS
EKG QTC CALCULATION (BAZETT): 503 MS
EKG R AXIS: -3 DEGREES
EKG T AXIS: 117 DEGREES
EKG VENTRICULAR RATE: 74 BPM
EOSINOPHILS ABSOLUTE: 0.3 K/UL (ref 0–0.7)
EOSINOPHILS RELATIVE PERCENT: 5.8 %
GFR AFRICAN AMERICAN: 47.5
GFR NON-AFRICAN AMERICAN: 39.3
GLUCOSE BLD-MCNC: 303 MG/DL (ref 70–99)
GLUCOSE BLD-MCNC: 350 MG/DL (ref 70–99)
GLUCOSE BLD-MCNC: 69 MG/DL (ref 70–99)
GLUCOSE BLD-MCNC: 83 MG/DL (ref 70–99)
GLUCOSE BLD-MCNC: 98 MG/DL (ref 70–99)
HCT VFR BLD CALC: 37.7 % (ref 42–52)
HEMOGLOBIN: 12 G/DL (ref 14–18)
INR BLD: 1.2
LYMPHOCYTES ABSOLUTE: 1.3 K/UL (ref 1–4.8)
LYMPHOCYTES RELATIVE PERCENT: 21.8 %
MCH RBC QN AUTO: 26.9 PG (ref 27–31.3)
MCHC RBC AUTO-ENTMCNC: 31.7 % (ref 33–37)
MCV RBC AUTO: 84.9 FL (ref 80–100)
MONOCYTES ABSOLUTE: 0.6 K/UL (ref 0.2–0.8)
MONOCYTES RELATIVE PERCENT: 11.1 %
NEUTROPHILS ABSOLUTE: 3.5 K/UL (ref 1.4–6.5)
NEUTROPHILS RELATIVE PERCENT: 60.6 %
PDW BLD-RTO: 17.4 % (ref 11.5–14.5)
PERFORMED ON: ABNORMAL
PERFORMED ON: ABNORMAL
PERFORMED ON: NORMAL
PERFORMED ON: NORMAL
PLATELET # BLD: 139 K/UL (ref 130–400)
POTASSIUM SERPL-SCNC: 4.4 MEQ/L (ref 3.4–4.9)
PROTHROMBIN TIME: 15.3 SEC (ref 12.3–14.9)
RBC # BLD: 4.45 M/UL (ref 4.7–6.1)
SODIUM BLD-SCNC: 141 MEQ/L (ref 135–144)
WBC # BLD: 5.8 K/UL (ref 4.8–10.8)

## 2022-04-04 PROCEDURE — 6360000002 HC RX W HCPCS: Performed by: NURSE PRACTITIONER

## 2022-04-04 PROCEDURE — 6360000002 HC RX W HCPCS: Performed by: INTERNAL MEDICINE

## 2022-04-04 PROCEDURE — B2131ZZ FLUOROSCOPY OF MULTIPLE CORONARY ARTERY BYPASS GRAFTS USING LOW OSMOLAR CONTRAST: ICD-10-PCS | Performed by: INTERNAL MEDICINE

## 2022-04-04 PROCEDURE — 99211 OFF/OP EST MAY X REQ PHY/QHP: CPT

## 2022-04-04 PROCEDURE — C1894 INTRO/SHEATH, NON-LASER: HCPCS

## 2022-04-04 PROCEDURE — 6360000002 HC RX W HCPCS

## 2022-04-04 PROCEDURE — 6360000004 HC RX CONTRAST MEDICATION: Performed by: INTERNAL MEDICINE

## 2022-04-04 PROCEDURE — 36415 COLL VENOUS BLD VENIPUNCTURE: CPT

## 2022-04-04 PROCEDURE — 6370000000 HC RX 637 (ALT 250 FOR IP): Performed by: INTERNAL MEDICINE

## 2022-04-04 PROCEDURE — 2580000003 HC RX 258: Performed by: INTERNAL MEDICINE

## 2022-04-04 PROCEDURE — B2111ZZ FLUOROSCOPY OF MULTIPLE CORONARY ARTERIES USING LOW OSMOLAR CONTRAST: ICD-10-PCS | Performed by: INTERNAL MEDICINE

## 2022-04-04 PROCEDURE — 2580000003 HC RX 258: Performed by: NURSE PRACTITIONER

## 2022-04-04 PROCEDURE — 80048 BASIC METABOLIC PNL TOTAL CA: CPT

## 2022-04-04 PROCEDURE — 2500000003 HC RX 250 WO HCPCS: Performed by: INTERNAL MEDICINE

## 2022-04-04 PROCEDURE — 85025 COMPLETE CBC W/AUTO DIFF WBC: CPT

## 2022-04-04 PROCEDURE — 2060000000 HC ICU INTERMEDIATE R&B

## 2022-04-04 PROCEDURE — 4A023N7 MEASUREMENT OF CARDIAC SAMPLING AND PRESSURE, LEFT HEART, PERCUTANEOUS APPROACH: ICD-10-PCS | Performed by: INTERNAL MEDICINE

## 2022-04-04 PROCEDURE — 2709999900 HC NON-CHARGEABLE SUPPLY

## 2022-04-04 PROCEDURE — 2500000003 HC RX 250 WO HCPCS

## 2022-04-04 PROCEDURE — 85610 PROTHROMBIN TIME: CPT

## 2022-04-04 PROCEDURE — 99152 MOD SED SAME PHYS/QHP 5/>YRS: CPT | Performed by: INTERNAL MEDICINE

## 2022-04-04 PROCEDURE — 93459 L HRT ART/GRFT ANGIO: CPT | Performed by: INTERNAL MEDICINE

## 2022-04-04 PROCEDURE — 6370000000 HC RX 637 (ALT 250 FOR IP): Performed by: NURSE PRACTITIONER

## 2022-04-04 PROCEDURE — C1769 GUIDE WIRE: HCPCS

## 2022-04-04 PROCEDURE — 93459 L HRT ART/GRFT ANGIO: CPT

## 2022-04-04 RX ORDER — METHYLPREDNISOLONE SODIUM SUCCINATE 125 MG/2ML
INJECTION, POWDER, LYOPHILIZED, FOR SOLUTION INTRAMUSCULAR; INTRAVENOUS
Status: COMPLETED
Start: 2022-04-04 | End: 2022-04-04

## 2022-04-04 RX ORDER — ACETAMINOPHEN 325 MG/1
650 TABLET ORAL EVERY 4 HOURS PRN
Status: DISCONTINUED | OUTPATIENT
Start: 2022-04-04 | End: 2022-04-07 | Stop reason: HOSPADM

## 2022-04-04 RX ORDER — FENTANYL CITRATE 50 UG/ML
25 INJECTION, SOLUTION INTRAMUSCULAR; INTRAVENOUS
Status: ACTIVE | OUTPATIENT
Start: 2022-04-04 | End: 2022-04-04

## 2022-04-04 RX ORDER — HYDRALAZINE HYDROCHLORIDE 20 MG/ML
10 INJECTION INTRAMUSCULAR; INTRAVENOUS EVERY 10 MIN PRN
Status: DISCONTINUED | OUTPATIENT
Start: 2022-04-04 | End: 2022-04-07 | Stop reason: HOSPADM

## 2022-04-04 RX ORDER — SODIUM CHLORIDE 0.9 % (FLUSH) 0.9 %
5-40 SYRINGE (ML) INJECTION EVERY 12 HOURS SCHEDULED
Status: DISCONTINUED | OUTPATIENT
Start: 2022-04-04 | End: 2022-04-06

## 2022-04-04 RX ORDER — NITROGLYCERIN 0.4 MG/1
0.4 TABLET SUBLINGUAL EVERY 5 MIN PRN
Status: DISCONTINUED | OUTPATIENT
Start: 2022-04-04 | End: 2022-04-07 | Stop reason: HOSPADM

## 2022-04-04 RX ORDER — SODIUM CHLORIDE 9 MG/ML
INJECTION, SOLUTION INTRAVENOUS CONTINUOUS
Status: DISCONTINUED | OUTPATIENT
Start: 2022-04-04 | End: 2022-04-04

## 2022-04-04 RX ORDER — SODIUM CHLORIDE 0.9 % (FLUSH) 0.9 %
5-40 SYRINGE (ML) INJECTION PRN
Status: DISCONTINUED | OUTPATIENT
Start: 2022-04-04 | End: 2022-04-07 | Stop reason: HOSPADM

## 2022-04-04 RX ORDER — ONDANSETRON 2 MG/ML
4 INJECTION INTRAMUSCULAR; INTRAVENOUS EVERY 6 HOURS PRN
Status: DISCONTINUED | OUTPATIENT
Start: 2022-04-04 | End: 2022-04-07 | Stop reason: HOSPADM

## 2022-04-04 RX ORDER — METHYLPREDNISOLONE SODIUM SUCCINATE 125 MG/2ML
125 INJECTION, POWDER, LYOPHILIZED, FOR SOLUTION INTRAMUSCULAR; INTRAVENOUS ONCE
Status: COMPLETED | OUTPATIENT
Start: 2022-04-04 | End: 2022-04-04

## 2022-04-04 RX ORDER — SODIUM CHLORIDE 9 MG/ML
25 INJECTION, SOLUTION INTRAVENOUS PRN
Status: DISCONTINUED | OUTPATIENT
Start: 2022-04-04 | End: 2022-04-07 | Stop reason: HOSPADM

## 2022-04-04 RX ORDER — LABETALOL HYDROCHLORIDE 5 MG/ML
10 INJECTION, SOLUTION INTRAVENOUS EVERY 30 MIN PRN
Status: DISCONTINUED | OUTPATIENT
Start: 2022-04-04 | End: 2022-04-07 | Stop reason: HOSPADM

## 2022-04-04 RX ORDER — DIPHENHYDRAMINE HYDROCHLORIDE 50 MG/ML
INJECTION INTRAMUSCULAR; INTRAVENOUS
Status: COMPLETED
Start: 2022-04-04 | End: 2022-04-04

## 2022-04-04 RX ORDER — DIPHENHYDRAMINE HCL 25 MG
50 TABLET ORAL ONCE
Status: DISCONTINUED | OUTPATIENT
Start: 2022-04-04 | End: 2022-04-04

## 2022-04-04 RX ORDER — DIPHENHYDRAMINE HYDROCHLORIDE 50 MG/ML
25 INJECTION INTRAMUSCULAR; INTRAVENOUS ONCE
Status: COMPLETED | OUTPATIENT
Start: 2022-04-04 | End: 2022-04-04

## 2022-04-04 RX ORDER — PREDNISONE 50 MG/1
50 TABLET ORAL ONCE
Status: DISCONTINUED | OUTPATIENT
Start: 2022-04-04 | End: 2022-04-07 | Stop reason: HOSPADM

## 2022-04-04 RX ORDER — ASPIRIN 81 MG/1
81 TABLET ORAL ONCE
Status: COMPLETED | OUTPATIENT
Start: 2022-04-04 | End: 2022-04-04

## 2022-04-04 RX ORDER — SODIUM CHLORIDE 450 MG/100ML
INJECTION, SOLUTION INTRAVENOUS CONTINUOUS
Status: ACTIVE | OUTPATIENT
Start: 2022-04-04 | End: 2022-04-04

## 2022-04-04 RX ADMIN — INSULIN LISPRO 8 UNITS: 100 INJECTION, SOLUTION INTRAVENOUS; SUBCUTANEOUS at 17:02

## 2022-04-04 RX ADMIN — TAMSULOSIN HYDROCHLORIDE 0.4 MG: 0.4 CAPSULE ORAL at 17:01

## 2022-04-04 RX ADMIN — METHYLPREDNISOLONE SODIUM SUCCINATE 125 MG: 125 INJECTION, POWDER, FOR SOLUTION INTRAMUSCULAR; INTRAVENOUS at 08:05

## 2022-04-04 RX ADMIN — SODIUM CHLORIDE, PRESERVATIVE FREE 20 MG: 5 INJECTION INTRAVENOUS at 08:02

## 2022-04-04 RX ADMIN — SODIUM CHLORIDE, PRESERVATIVE FREE 10 ML: 5 INJECTION INTRAVENOUS at 21:06

## 2022-04-04 RX ADMIN — ASPIRIN 81 MG: 81 TABLET, COATED ORAL at 08:08

## 2022-04-04 RX ADMIN — ATORVASTATIN CALCIUM 40 MG: 40 TABLET, FILM COATED ORAL at 21:05

## 2022-04-04 RX ADMIN — CARVEDILOL 6.25 MG: 6.25 TABLET, FILM COATED ORAL at 13:37

## 2022-04-04 RX ADMIN — FUROSEMIDE 40 MG: 10 INJECTION, SOLUTION INTRAMUSCULAR; INTRAVENOUS at 21:05

## 2022-04-04 RX ADMIN — DIPHENHYDRAMINE HYDROCHLORIDE 25 MG: 50 INJECTION, SOLUTION INTRAMUSCULAR; INTRAVENOUS at 08:01

## 2022-04-04 RX ADMIN — ENOXAPARIN SODIUM 30 MG: 100 INJECTION SUBCUTANEOUS at 21:05

## 2022-04-04 RX ADMIN — IOPAMIDOL 100 ML: 612 INJECTION, SOLUTION INTRAVENOUS at 09:12

## 2022-04-04 RX ADMIN — METHYLPREDNISOLONE SODIUM SUCCINATE 125 MG: 125 INJECTION, POWDER, LYOPHILIZED, FOR SOLUTION INTRAMUSCULAR; INTRAVENOUS at 08:05

## 2022-04-04 RX ADMIN — CARVEDILOL 6.25 MG: 6.25 TABLET, FILM COATED ORAL at 21:07

## 2022-04-04 RX ADMIN — DIPHENHYDRAMINE HYDROCHLORIDE 25 MG: 50 INJECTION INTRAMUSCULAR; INTRAVENOUS at 08:01

## 2022-04-04 RX ADMIN — SODIUM CHLORIDE: 9 INJECTION, SOLUTION INTRAVENOUS at 08:01

## 2022-04-04 RX ADMIN — INSULIN GLARGINE 30 UNITS: 100 INJECTION, SOLUTION SUBCUTANEOUS at 21:08

## 2022-04-04 RX ADMIN — FUROSEMIDE 40 MG: 10 INJECTION, SOLUTION INTRAMUSCULAR; INTRAVENOUS at 13:38

## 2022-04-04 ASSESSMENT — PAIN SCALES - GENERAL
PAINLEVEL_OUTOF10: 0

## 2022-04-04 NOTE — CARE COORDINATION
Definition of CHF discussed with patient. Symptoms of heart failure and decompensation reviewed. Weight gain of >3 #, edema, difficulty breathing, cough, issues with appetite, fatigue, or difficulty with sleep. Causes of CHF reviewed. CAD, MI, HTN, valve dz., infection,  ETOH or drug abuse, or genetic problems. Importance of daily weight and B/P monitoring discussed. Pt to use a calender or notebook to record daily weight and call physician immediately with 3# weight gain. Low sodium diet and fluid intake discussed. Pt taught about a fluid restriction and advised to discuss this with the cardiologist prior to limiting oral intake. Shown how to read labels for sodium levels, recommended food list provided. Importance of following their physician's orders for medication administration stressed. Importance of flu and pneumonia vaccinations reinforced. Common CHF medications reviewed as well as avoiding certain other meds (decongestants, NSAIDS)  Instructed to discuss activity recommendations with physician. Pt. denies smoking as well as exposure to second hand smoke. Sample CHF weight documentation form provided. CHF Zones discussed. Importance of staying in \"green\" area stressed. Pt verbalized understanding to call MD ASAP when he reaches the yellow zone, and to call 911 when reaching the red zone. Booklet and zone pamphlet provided to the pt. Patient denies any further questions at this time.    Electronically signed by Radha Pastor RN on 4/4/2022 at 1:29 PM

## 2022-04-04 NOTE — PROGRESS NOTES
04/04/22    From: INDEPENDENT AT HOME WITH WIFE    Admit: CHF, LEG SWELLING2    PMH:CKD, OBESITY, DM, HDL, HTN, EF EST 20%    Anticipated Discharge Disposition: [facility] 140    Patient Mobility or PT/OT ordered:  Consults: CHF NURSE    Covid result &/or vacc status:     Barriers to Discharge:IV LASIX,  S/P HEART CATH 4/4    Assessments: CMI DONE

## 2022-04-04 NOTE — FLOWSHEET NOTE
Pt left for cath. Pt was scheduled for later today. Pt did call his family to let them know. 1200  Pt back from procedure. Pt able to ambulate to the br and to his bed. Rt groin cath site clean and dry. Ivf maintained. Call bell in reach.

## 2022-04-04 NOTE — BRIEF OP NOTE
Section of Cardiology  Adult Brief Cardiac Cath Procedure Note        Procedure(s):  LHC, b/l coronary angio, grafts    Pre-operative Diagnosis:  New CMP    H&P Status: Completed and reviewed. Post-operative Diagnosis:      LV LVEDP=23mmhg  LM normal   % mid  % prox  RCA severe disease, 100% distal    LIMA to LAD patent  SVG jump graft to R. I and distal CX patent  SVG to PDA mild to mod disease, no focal stenosis, patent     Findings:  See full report    Complications:  none    Primary Proceduralist:   Dr.Wes Sorto DO    Plan    Max med rx  rfm  Recheck LVF in 3 months. ? AICD in future.        Full procedure note to follow

## 2022-04-04 NOTE — PROGRESS NOTES
Received report from [facility]. Pt arrives to pre/post cath lab. Alert and oriented. Pt denies having any pain or distress. Vitals obtained and stable. Pre medicated for Dye allergy. Prepping patient for the procedure. 0815 Taken to cath lab.    5910 Pt returns to pre/post cath. Restful, easily aroused. Denies any complaints or distress. Vital signs are stable. R groin quikclot dressing clean dry and intact. No signs of bleeding or hematoma. Firm ridge across bilateral groins felt. Pressure held to R groin for 15 minutes without changes. Will continue to monitor. 1100 R groin remains stable. No changes. Pt resting without pain or distress. 1200 Report called to  [address]    6320 Pt transferred back to room 1WT on portable tele monitor.

## 2022-04-05 LAB
ANION GAP SERPL CALCULATED.3IONS-SCNC: 11 MEQ/L (ref 9–15)
BASOPHILS ABSOLUTE: 0 K/UL (ref 0–0.2)
BASOPHILS RELATIVE PERCENT: 0.2 %
BUN BLDV-MCNC: 48 MG/DL (ref 8–23)
CALCIUM SERPL-MCNC: 9.2 MG/DL (ref 8.5–9.9)
CHLORIDE BLD-SCNC: 98 MEQ/L (ref 95–107)
CO2: 26 MEQ/L (ref 20–31)
CREAT SERPL-MCNC: 1.94 MG/DL (ref 0.7–1.2)
EOSINOPHILS ABSOLUTE: 0 K/UL (ref 0–0.7)
EOSINOPHILS RELATIVE PERCENT: 0 %
GFR AFRICAN AMERICAN: 41.1
GFR NON-AFRICAN AMERICAN: 34
GLUCOSE BLD-MCNC: 180 MG/DL (ref 70–99)
GLUCOSE BLD-MCNC: 183 MG/DL (ref 70–99)
GLUCOSE BLD-MCNC: 270 MG/DL (ref 70–99)
GLUCOSE BLD-MCNC: 317 MG/DL (ref 70–99)
GLUCOSE BLD-MCNC: 319 MG/DL (ref 70–99)
HCT VFR BLD CALC: 36.7 % (ref 42–52)
HEMOGLOBIN: 11.8 G/DL (ref 14–18)
LYMPHOCYTES ABSOLUTE: 0.5 K/UL (ref 1–4.8)
LYMPHOCYTES RELATIVE PERCENT: 9.1 %
MCH RBC QN AUTO: 27.2 PG (ref 27–31.3)
MCHC RBC AUTO-ENTMCNC: 32.3 % (ref 33–37)
MCV RBC AUTO: 84.3 FL (ref 80–100)
MONOCYTES ABSOLUTE: 0.3 K/UL (ref 0.2–0.8)
MONOCYTES RELATIVE PERCENT: 5 %
NEUTROPHILS ABSOLUTE: 4.5 K/UL (ref 1.4–6.5)
NEUTROPHILS RELATIVE PERCENT: 85.7 %
PDW BLD-RTO: 17 % (ref 11.5–14.5)
PERFORMED ON: ABNORMAL
PLATELET # BLD: 135 K/UL (ref 130–400)
POTASSIUM SERPL-SCNC: 5 MEQ/L (ref 3.4–4.9)
RBC # BLD: 4.35 M/UL (ref 4.7–6.1)
SODIUM BLD-SCNC: 135 MEQ/L (ref 135–144)
WBC # BLD: 5.2 K/UL (ref 4.8–10.8)

## 2022-04-05 PROCEDURE — 6360000002 HC RX W HCPCS: Performed by: INTERNAL MEDICINE

## 2022-04-05 PROCEDURE — 36415 COLL VENOUS BLD VENIPUNCTURE: CPT

## 2022-04-05 PROCEDURE — 2580000003 HC RX 258: Performed by: NURSE PRACTITIONER

## 2022-04-05 PROCEDURE — 6370000000 HC RX 637 (ALT 250 FOR IP): Performed by: INTERNAL MEDICINE

## 2022-04-05 PROCEDURE — 85025 COMPLETE CBC W/AUTO DIFF WBC: CPT

## 2022-04-05 PROCEDURE — 2060000000 HC ICU INTERMEDIATE R&B

## 2022-04-05 PROCEDURE — 99233 SBSQ HOSP IP/OBS HIGH 50: CPT | Performed by: INTERNAL MEDICINE

## 2022-04-05 PROCEDURE — 97166 OT EVAL MOD COMPLEX 45 MIN: CPT

## 2022-04-05 PROCEDURE — 6360000002 HC RX W HCPCS: Performed by: NURSE PRACTITIONER

## 2022-04-05 PROCEDURE — 80048 BASIC METABOLIC PNL TOTAL CA: CPT

## 2022-04-05 RX ADMIN — CARVEDILOL 6.25 MG: 6.25 TABLET, FILM COATED ORAL at 16:55

## 2022-04-05 RX ADMIN — INSULIN GLARGINE 30 UNITS: 100 INJECTION, SOLUTION SUBCUTANEOUS at 20:22

## 2022-04-05 RX ADMIN — GLIPIZIDE 10 MG: 5 TABLET ORAL at 06:46

## 2022-04-05 RX ADMIN — FAMOTIDINE 20 MG: 20 TABLET ORAL at 08:32

## 2022-04-05 RX ADMIN — ENOXAPARIN SODIUM 30 MG: 100 INJECTION SUBCUTANEOUS at 08:35

## 2022-04-05 RX ADMIN — ENOXAPARIN SODIUM 30 MG: 100 INJECTION SUBCUTANEOUS at 20:23

## 2022-04-05 RX ADMIN — ATORVASTATIN CALCIUM 40 MG: 40 TABLET, FILM COATED ORAL at 20:21

## 2022-04-05 RX ADMIN — INSULIN GLARGINE 30 UNITS: 100 INJECTION, SOLUTION SUBCUTANEOUS at 08:33

## 2022-04-05 RX ADMIN — INSULIN LISPRO 8 UNITS: 100 INJECTION, SOLUTION INTRAVENOUS; SUBCUTANEOUS at 08:33

## 2022-04-05 RX ADMIN — INSULIN LISPRO 6 UNITS: 100 INJECTION, SOLUTION INTRAVENOUS; SUBCUTANEOUS at 12:34

## 2022-04-05 RX ADMIN — FUROSEMIDE 40 MG: 10 INJECTION, SOLUTION INTRAMUSCULAR; INTRAVENOUS at 16:55

## 2022-04-05 RX ADMIN — FUROSEMIDE 40 MG: 10 INJECTION, SOLUTION INTRAMUSCULAR; INTRAVENOUS at 08:32

## 2022-04-05 RX ADMIN — INSULIN LISPRO 2 UNITS: 100 INJECTION, SOLUTION INTRAVENOUS; SUBCUTANEOUS at 16:55

## 2022-04-05 RX ADMIN — ASPIRIN 81 MG 81 MG: 81 TABLET ORAL at 08:32

## 2022-04-05 RX ADMIN — TAMSULOSIN HYDROCHLORIDE 0.4 MG: 0.4 CAPSULE ORAL at 08:32

## 2022-04-05 RX ADMIN — SODIUM CHLORIDE, PRESERVATIVE FREE 10 ML: 5 INJECTION INTRAVENOUS at 20:26

## 2022-04-05 RX ADMIN — CARVEDILOL 6.25 MG: 6.25 TABLET, FILM COATED ORAL at 08:32

## 2022-04-05 ASSESSMENT — PAIN SCALES - GENERAL
PAINLEVEL_OUTOF10: 0
PAINLEVEL_OUTOF10: 0

## 2022-04-05 ASSESSMENT — ENCOUNTER SYMPTOMS
BACK PAIN: 0
WHEEZING: 0
COUGH: 0
CONSTIPATION: 0
VOMITING: 0
DIARRHEA: 0
RHINORRHEA: 0
TROUBLE SWALLOWING: 0
ABDOMINAL DISTENTION: 0
SHORTNESS OF BREATH: 1
NAUSEA: 0
ABDOMINAL PAIN: 0

## 2022-04-05 NOTE — PROGRESS NOTES
Physician Progress Note      Naomy Sheridan  [facility] #:                  [id number]  :                       1945  ADMIT DATE:       2022 11:37 AM  DISCH DATE:  RESPONDING  PROVIDER #:        HENRY RIDER DO          QUERY TEXT:    Patient admitted with Acute on chronic combined heart failure. Noted   documentation of ODELL on CKD Stage III in the H&P. In order to support the   diagnosis of ODELL, please include additional clinical indicators in your   documentation. Or please document if the diagnosis of ODELL has been ruled out   after further study    The medical record reflects the following:  Risk Factors:  CKD Stage III, HTN, CHF  Clinical Indicators: sCr/GFR this admission: 1.78/37.5, 1.84/36.1, 1.71/39.3; Historical 21-21: sCr/GFR 1.5/46-1.7/40  Treatment: I&O, daily weights, labs and monitoring    Defined by Kidney Disease Improving Global Outcomes (KDIGO) clinical practice   guideline for acute kidney injury:  -Increase in SCr by greater than or equal to 0.3 mg/dl within 48 hours; or  -Increase or decrease in SCr to greater than or equal to 1.5 times baseline,   which is known or presumed to have occurred within the prior 7 days; or  -Urine volume < 0.5ml/kg/h for 6 hours    Thank you,  Mercy Silva RN BSN [facility] [phone]  Outside Callers: [phone]  Options provided:  -- Acute kidney injury evidenced by, Please document evidence as well as   baseline creatinine, if known. -- Currently resolved acute kidney injury was evidenced by, Please document   evidence as well as baseline creatinine, if known. -- Acute kidney injury ruled out after study  -- Other - I will add my own diagnosis  -- Disagree - Not applicable / Not valid  -- Disagree - Clinically unable to determine / Unknown  -- Refer to Clinical Documentation Reviewer    PROVIDER RESPONSE TEXT:    Chronic kidney disease.     Query created by: Shayla Nichols on 2022 11:36 AM      Electronically signed by:  Nelly Lai DO 4/5/2022 10:54 AM

## 2022-04-05 NOTE — FLOWSHEET NOTE
Pt resting in bed. Pt states he feels good. No pain hoping to be dcd today. Pt does have multiple scabbed areas to his left knee an shins bilat. Pt also has a dry wound to the top of his left foot and lt great toe. Pt has been using his urinal and is voiding adequately.

## 2022-04-05 NOTE — PROGRESS NOTES
[facility] OCCUPATIONAL THERAPY EVALUATION - ACUTE     NAME: Zackery Noel  :  (73 y.o.)  MRN: [id number]  CODE STATUS: Full Code  Room: D649/Y972-53    Date of Service: 2022    Patient Diagnosis(es): Dyspnea on exertion [R06.00]  Leg swelling [M79.89]   Chief Complaint   Patient presents with    Leg Swelling     bilater leg swelling redness, increased fatigue      Patient Active Problem List    Diagnosis Date Noted    Leg swelling 2022    PVD (peripheral vascular disease) ([facility] 01/15/2021    Left foot drop     Type 2 diabetes mellitus with stage 3 chronic kidney disease, with long-term current use of insulin ([facility] 2020    Hyperlipidemia 10/14/2013    Stage 3 chronic kidney disease 10/14/2013    Iron deficiency anemia 07/10/2013    BPH (benign prostatic hyperplasia) 07/10/2013    CAD (coronary artery disease)     S/P CABG x 4 2013    JAZZMINE (obstructive sleep apnea) 2013    Morbid obesity ([facility] 2013    Kidney stone 2012    Hypertension 2012    Diabetes mellitus ([facility] 2012        Past Medical History:   Diagnosis Date    CAD (coronary artery disease)     Dr. Paulo Gonzalez, Dr. Elvira Chavarria CKD (chronic kidney disease)     Hypertension     Kidney stones     Left foot drop     Neuropathy, diabetic (HCC)     mild    Osteoarthritis     hip, knee    S/P CABG (coronary artery bypass graft)     Type II or unspecified type diabetes mellitus without mention of complication, not stated as uncontrolled      Past Surgical History:   Procedure Laterality Date    BACK SURGERY N/A     BICEPS TENDON REPAIR  1997    CARDIAC CATHETERIZATION  2013    CORONARY ARTERY BYPASS GRAFT  2013    DR. PERALES    KNEE SURGERY  2006    left replacement    NECK SURGERY      ROTATOR CUFF REPAIR  1996    TONSILLECTOMY  1966        Restrictions  Restrictions/Precautions: Fall Risk     Safety Devices: Safety Devices  Safety Devices in place: Yes  Type of devices: Nurse notified,Call light within reach (earl)        Subjective  Pre Treatment Pain Screening  Pain at present: 0  Scale Used: Numeric Score  Intervention List: Patient able to continue with treatment    Pain Reassessment:   Pain Assessment  Patient Currently in Pain: Denies       Prior Level of Function:  Social/Functional History  Lives With: Spouse  Type of Home: House  Home Layout: One level,Laundry in basement  Home Access: Stairs to enter with rails  Entrance Stairs - Number of Steps: 3 JESSE and 9 steps to basement  Entrance Stairs - Rails: Both  Bathroom Shower/Tub: Walk-in shower  Bathroom Equipment: Grab bars in [place] [facility]  Home Equipment: Cane,Rolling walker  ADL Assistance: Needs assistance (assist with socks and shoes; L AFO for drop foot)  Homemaking Assistance: Independent (shared)  Homemaking Responsibilities: Yes  Ambulation Assistance: Independent (cane with household distances; RW for long distances)  Transfer Assistance: Independent  Active : Yes  Occupation: Retired  Type of occupation: sales, ,   Leisure & Hobbies: computer in basement  IADL Comments: shared with spouse; spouse completes laundry    OBJECTIVE:     Orientation Status:  Orientation  Overall Orientation Status: Within Functional Limits    Observation:  Observation/Palpation  Observation: sitting in bedside chair, pleasant, motivated    Cognition Status:  Cognition  Overall Cognitive Status: WFL    Perception Status:       Sensation Status:  Sensation  Overall Sensation Status: Impaired (numbness in B feet from previous surgery per pt)    Vision and Hearing Status:  Vision  Vision: Impaired  Vision Exceptions: Wears glasses at all times,Wears glasses for distance  Hearing  Hearing: Within functional limits     ROM:   LUE AROM (degrees)  LUE AROM : WFL  Left Hand AROM (degrees)  Left Hand AROM: WFL  RUE AROM (degrees)  RUE AROM : WFL  Right Hand AROM (degrees)  Right Hand AROM: WFL    Strength:  LUE Strength  Gross LUE Strength: WFL  L Hand General: 5/5  LUE Strength Comment: gross assessment  RUE Strength  Gross RUE Strength: WFL  R Hand General: 5/5  RUE Strength Comment: gross assessment    Coordination, Tone, Quality of Movement: Tone RUE  RUE Tone: Normotonic  Tone LUE  LUE Tone: Normotonic  Coordination  Movements Are Fluid And Coordinated: Yes    Hand Dominance:  Hand Dominance  Hand Dominance: Right    ADL Status:  ADL  Feeding: Modified independent   Grooming: Modified independent   UE Bathing: Modified independent   LE Bathing: Stand by assistance  UE Dressing: Modified independent   LE Dressing: Minimal assistance (socks;)  Toileting: Modified independent   Additional Comments: simulated ADL seated in bed side chair.  levels as anticipated  Toilet Transfers  Toilet Transfer: Modified independent  Toilet Transfers Comments: anticipated level       Therapy key for assistance levels -   Independent = Pt. is able to perform task with no assistance but may require a device   Stand by assistance = Pt. does not perform task at an independent level but does not need physical assistance, requires verbal cues  Minimal, Moderate, Maximal Assistance = Pt. requires physical assistance (25%, 50%, 75% assist from helper) for task but is able to actively participate in task   Dependent = Pt. requires total assistance with task and is not able to actively participate with task completion     Functional Mobility:  Functional Mobility  Functional - Mobility Device: Rolling Walker  Activity: Other (Bed side chair to curtain and return)  Assist Level: Supervision  Transfers  Sit to stand: Modified independent  Stand to sit: Modified independent    Bed Mobility  Bed mobility  Comment: Nt pt up in chair    Seated and Standing Balance:  Balance  Sitting Balance: Modified independent   Standing Balance: Supervision    Functional Endurance:  Activity Tolerance  Activity Tolerance: Patient Tolerated treatment well    D/C Recommendations:  OT D/C RECOMMENDATIONS  REQUIRES OT FOLLOW UP: No    Equipment Recommendations:       OT Education:   OT Education  OT Education: OT [facility]    OT Follow Up:  OT D/C RECOMMENDATIONS  REQUIRES OT FOLLOW UP: No       Assessment/Discharge Disposition:     Performance deficits / Impairments: Decreased endurance  Prognosis: Good  Discharge Recommendations: Continue to assess pending progress  Decision Making: Medium Complexity  History: 3 performance  Exam: 1 perf deficit  Assistance / Modification: Mona    Six Click Score   How much help for putting on and taking off regular lower body clothing?: A Little  How much help for Bathing?: A Little  How much help for Toileting?: None  How much help for putting on and taking off regular upper body clothing?: None  How much help for taking care of personal grooming?: None  How much help for eating meals?: None  AM-PAC Inpatient Daily Activity Raw Score: 22  AM-PAC Inpatient ADL T-Scale Score : 47.1  ADL Inpatient CMS 0-100% Score: 25.8    Plan:  Plan  Times per week: N/A    Goals:   Patient will:        Patient Goal: Patient goals : to go home with cardiac rehab    Discussed and agreed upon: Yes Comments:     Therapy Time:   OT Individual Minutes  Time In: 1518  Time Out: 1535  Minutes: 17    Eval: 17 minutes     Electronically signed by:    Charmayne Castor, OT, OTR/L  4/6/2022, 8:00 AM

## 2022-04-05 NOTE — CONSULTS
Cardiac Rehab Consult Note  Name: Darrius Daily  Age: 78 y.o. Gender: male    Chief Complaint:Leg Swelling (bilater leg swelling redness, increased fatigue )    Primary Care Provider: Haley Cason MD  InpatientTreatment Team: Treatment Team: Attending Provider: Noemi Mercado DO; Utilization Reviewer: Chris Hinds RN; : Jaclyn Magdaleno RN; Wound/Ostomy Nurse: Jaclyn Todd, RN; : Juan Estrada, RN; Patient Care Tech: Meghnarafi Gambino; Registered Nurse: Indira Ricketts RN; Unit Clerk: Josue Adler; Utilization Reviewer: Xander Clifton RN  Admission Date: 4/1/2022    Consult Received from Dr. Temi Haywood. Reason for consult CHF. Patient visited at bedside. Program and benefits introduced. Brochures given. Patient's response interested. Principal Problem:    Leg swelling  Resolved Problems:    * No resolved hospital problems. *       Plan: Will schedule patient six weeks from hospital discharge for initial intake with Phase II CR    All of pt questions were answered. Case will be discussed with physician when appropriate.      Electronically signed by Barbara Burrell on 4/5/2022 at 9:49 AM

## 2022-04-06 LAB
ANION GAP SERPL CALCULATED.3IONS-SCNC: 19 MEQ/L (ref 9–15)
BASOPHILS ABSOLUTE: 0 K/UL (ref 0–0.2)
BASOPHILS RELATIVE PERCENT: 0.5 %
BUN BLDV-MCNC: 66 MG/DL (ref 8–23)
CALCIUM SERPL-MCNC: 8.8 MG/DL (ref 8.5–9.9)
CHLORIDE BLD-SCNC: 102 MEQ/L (ref 95–107)
CO2: 20 MEQ/L (ref 20–31)
CREAT SERPL-MCNC: 2.04 MG/DL (ref 0.7–1.2)
EOSINOPHILS ABSOLUTE: 0.1 K/UL (ref 0–0.7)
EOSINOPHILS RELATIVE PERCENT: 2.3 %
GFR AFRICAN AMERICAN: 38.8
GFR NON-AFRICAN AMERICAN: 32
GLUCOSE BLD-MCNC: 116 MG/DL (ref 70–99)
GLUCOSE BLD-MCNC: 147 MG/DL (ref 70–99)
GLUCOSE BLD-MCNC: 168 MG/DL (ref 70–99)
GLUCOSE BLD-MCNC: 178 MG/DL (ref 70–99)
GLUCOSE BLD-MCNC: 217 MG/DL (ref 70–99)
HCT VFR BLD CALC: 36.1 % (ref 42–52)
HEMOGLOBIN: 11.5 G/DL (ref 14–18)
LYMPHOCYTES ABSOLUTE: 0.8 K/UL (ref 1–4.8)
LYMPHOCYTES RELATIVE PERCENT: 13.6 %
MCH RBC QN AUTO: 27 PG (ref 27–31.3)
MCHC RBC AUTO-ENTMCNC: 31.9 % (ref 33–37)
MCV RBC AUTO: 84.6 FL (ref 80–100)
MONOCYTES ABSOLUTE: 0.5 K/UL (ref 0.2–0.8)
MONOCYTES RELATIVE PERCENT: 7.8 %
NEUTROPHILS ABSOLUTE: 4.7 K/UL (ref 1.4–6.5)
NEUTROPHILS RELATIVE PERCENT: 75.8 %
PDW BLD-RTO: 17.3 % (ref 11.5–14.5)
PERFORMED ON: ABNORMAL
PLATELET # BLD: 135 K/UL (ref 130–400)
POTASSIUM SERPL-SCNC: 4.5 MEQ/L (ref 3.4–4.9)
RBC # BLD: 4.27 M/UL (ref 4.7–6.1)
SODIUM BLD-SCNC: 141 MEQ/L (ref 135–144)
WBC # BLD: 6.2 K/UL (ref 4.8–10.8)

## 2022-04-06 PROCEDURE — 2580000003 HC RX 258: Performed by: NURSE PRACTITIONER

## 2022-04-06 PROCEDURE — 6360000002 HC RX W HCPCS: Performed by: NURSE PRACTITIONER

## 2022-04-06 PROCEDURE — 6370000000 HC RX 637 (ALT 250 FOR IP): Performed by: INTERNAL MEDICINE

## 2022-04-06 PROCEDURE — 2580000003 HC RX 258: Performed by: INTERNAL MEDICINE

## 2022-04-06 PROCEDURE — 2060000000 HC ICU INTERMEDIATE R&B

## 2022-04-06 PROCEDURE — 85025 COMPLETE CBC W/AUTO DIFF WBC: CPT

## 2022-04-06 PROCEDURE — 80048 BASIC METABOLIC PNL TOTAL CA: CPT

## 2022-04-06 PROCEDURE — 6360000002 HC RX W HCPCS: Performed by: INTERNAL MEDICINE

## 2022-04-06 PROCEDURE — 97161 PT EVAL LOW COMPLEX 20 MIN: CPT

## 2022-04-06 PROCEDURE — 36415 COLL VENOUS BLD VENIPUNCTURE: CPT

## 2022-04-06 PROCEDURE — APPSS30 APP SPLIT SHARED TIME 16-30 MINUTES: Performed by: NURSE PRACTITIONER

## 2022-04-06 PROCEDURE — 99233 SBSQ HOSP IP/OBS HIGH 50: CPT | Performed by: INTERNAL MEDICINE

## 2022-04-06 RX ADMIN — Medication 10 ML: at 22:25

## 2022-04-06 RX ADMIN — FAMOTIDINE 20 MG: 20 TABLET ORAL at 08:09

## 2022-04-06 RX ADMIN — INSULIN GLARGINE 30 UNITS: 100 INJECTION, SOLUTION SUBCUTANEOUS at 22:20

## 2022-04-06 RX ADMIN — INSULIN GLARGINE 30 UNITS: 100 INJECTION, SOLUTION SUBCUTANEOUS at 08:16

## 2022-04-06 RX ADMIN — ENOXAPARIN SODIUM 30 MG: 100 INJECTION SUBCUTANEOUS at 22:22

## 2022-04-06 RX ADMIN — CARVEDILOL 6.25 MG: 6.25 TABLET, FILM COATED ORAL at 16:21

## 2022-04-06 RX ADMIN — CARVEDILOL 6.25 MG: 6.25 TABLET, FILM COATED ORAL at 08:09

## 2022-04-06 RX ADMIN — SODIUM CHLORIDE, PRESERVATIVE FREE 10 ML: 5 INJECTION INTRAVENOUS at 08:10

## 2022-04-06 RX ADMIN — TAMSULOSIN HYDROCHLORIDE 0.4 MG: 0.4 CAPSULE ORAL at 08:09

## 2022-04-06 RX ADMIN — FUROSEMIDE 40 MG: 10 INJECTION, SOLUTION INTRAMUSCULAR; INTRAVENOUS at 08:09

## 2022-04-06 RX ADMIN — INSULIN LISPRO 2 UNITS: 100 INJECTION, SOLUTION INTRAVENOUS; SUBCUTANEOUS at 08:16

## 2022-04-06 RX ADMIN — FUROSEMIDE 40 MG: 10 INJECTION, SOLUTION INTRAMUSCULAR; INTRAVENOUS at 16:20

## 2022-04-06 RX ADMIN — INSULIN LISPRO 2 UNITS: 100 INJECTION, SOLUTION INTRAVENOUS; SUBCUTANEOUS at 11:17

## 2022-04-06 RX ADMIN — ASPIRIN 81 MG 81 MG: 81 TABLET ORAL at 08:09

## 2022-04-06 RX ADMIN — GLIPIZIDE 10 MG: 5 TABLET ORAL at 06:24

## 2022-04-06 RX ADMIN — ENOXAPARIN SODIUM 30 MG: 100 INJECTION SUBCUTANEOUS at 08:09

## 2022-04-06 RX ADMIN — ATORVASTATIN CALCIUM 40 MG: 40 TABLET, FILM COATED ORAL at 22:19

## 2022-04-06 ASSESSMENT — ENCOUNTER SYMPTOMS
TROUBLE SWALLOWING: 0
CONSTIPATION: 0
NAUSEA: 0
COUGH: 0
ABDOMINAL PAIN: 0
DIARRHEA: 0
RHINORRHEA: 0
ABDOMINAL DISTENTION: 0
SHORTNESS OF BREATH: 1
BACK PAIN: 0
VOMITING: 0
WHEEZING: 0

## 2022-04-06 ASSESSMENT — PAIN SCALES - GENERAL
PAINLEVEL_OUTOF10: 0

## 2022-04-06 NOTE — PROGRESS NOTES
Physical Therapy Med Surg Initial Assessment  Facility/Department: [address] [place] [address]  [facility]       NAME: Db Goldsmith  :  (89 y.o.)  MRN: [id number]  CODE STATUS: Full Code    Date of Service: 2022    Patient Diagnosis(es): Dyspnea on exertion [R06.00]  Leg swelling [M79.89]   Chief Complaint   Patient presents with    Leg Swelling     bilater leg swelling redness, increased fatigue      Patient Active Problem List    Diagnosis Date Noted    Leg swelling 2022    PVD (peripheral vascular disease) ([facility] 01/15/2021    Left foot drop     Type 2 diabetes mellitus with stage 3 chronic kidney disease, with long-term current use of insulin ([facility] 2020    Hyperlipidemia 10/14/2013    Stage 3 chronic kidney disease 10/14/2013    Iron deficiency anemia 07/10/2013    BPH (benign prostatic hyperplasia) 07/10/2013    CAD (coronary artery disease)     S/P CABG x 4 2013    JAZZMINE (obstructive sleep apnea) 2013    Morbid obesity ([facility] 2013    Kidney stone 2012    Hypertension 2012    [facility]        Past Medical History:   Diagnosis Date    CAD (coronary artery disease)     Dr. Joyce Heard, Dr. Venkat Lee CKD (chronic kidney disease)     Hypertension     Kidney stones     Left foot drop     Neuropathy, diabetic (HCC)     mild    Osteoarthritis     hip, knee    S/P CABG (coronary artery bypass graft)     Type II or unspecified type diabetes mellitus without mention of complication, not stated as uncontrolled      Past Surgical History:   Procedure Laterality Date    BACK SURGERY N/A     BICEPS TENDON REPAIR  1997    CARDIAC CATHETERIZATION  2013    CORONARY ARTERY BYPASS GRAFT  2013    DR. PERALES    KNEE SURGERY  2006    left replacement    NECK SURGERY      ROTATOR CUFF REPAIR  1996    TONSILLECTOMY  1966       Chart Reviewed: Yes  Family / Caregiver Present: No    Restrictions:  Restrictions/Precautions: Fall Risk     SUBJECTIVE:      Pain  Pre Treatment Pain Screening  Pain at present: 0    Post Treatment Pain Screening:   Pain Assessment  Pain Level: 0    Prior Level of Function:  Social/Functional History  Lives With: Spouse  Type of Home: House  Home Layout: One level,Laundry in basement  Home Access: Stairs to enter with rails  Entrance Stairs - Number of Steps: 3 JESSE and 9 steps to basement  Entrance Stairs - Rails: Both  Bathroom Shower/Tub: Walk-in shower  Bathroom Equipment: Grab bars in [place] [facility] chair  Home Equipment: Cane,Rolling walker  ADL Assistance: Needs assistance (assist with socks and shoes; L AFO for drop foot)  Homemaking Assistance: Independent (shared)  Homemaking Responsibilities: Yes  Ambulation Assistance: Independent (cane with household distances; RW for long distances)  Transfer Assistance: Independent  Active : Yes  Occupation: Retired  Type of occupation: sales, ,   Leisure & Hobbies: computer in basement  IADL Comments: shared with spouse; spouse completes laundry    OBJECTIVE:   Vision: Impaired  Vision Exceptions: Wears glasses at all times; Wears glasses for distance  Hearing: Within functional limits    Cognition:  Overall Orientation Status: Within Functional Limits  Follows Commands: Within Functional Limits         ROM:  RLE AROM: WFL  LLE AROM : WFL    Strength:  Strength RLE  Strength RLE: WFL  Strength LLE  Strength LLE: WFL    Neuro:  Balance  Sitting - Static: Good  Sitting - Dynamic: Good  Standing - Static: Good  Standing - Dynamic: Good;- (no LOB however mildly unsteady with hurry cane)             Bed mobility  Comment: NT pt up in chair and denies need for trial at this time    Transfers  Sit to Stand: Independent  Stand to sit:  Independent    Ambulation  Ambulation?: Yes  Ambulation 1  Surface: level tile  Device: Rolling Walker (and hurry cane)  Assistance: Modified Independent  Quality of Gait: mild lateral sway, short step length, no LOB however occasional reaching for furniture with cane use  Distance: 25 ft with hurry cane; 40 feet with ww  Comments: improved quality and normalization of gait with ww use    Stairs/Curb  Stairs?: No (pt reports he feels able to perform stairs at this level with no trial needed - therapist agrees)         Activity Tolerance  Activity Tolerance: Patient Tolerated treatment well  Activity Tolerance: mild SOB with pt able to predict distance and rest when needed          PT Education  PT Education: PT Role;Plan of Care    ASSESSMENT:   Decision Making: Low Complexity  History: high  Exam: low  Clinical Presentation: low    Barriers to Learning: none    DISCHARGE RECOMMENDATIONS:       Assessment: Pt demonstrates mild deficits in balance corrected with cane or ww. Pt reports he is able to use these devices at home and feels no need for continuation of therapy at this level of care. Therapist agrees and feels pt is safe and able to mange at home with either device. No further PT waranted at this time  REQUIRES PT FOLLOW UP: No      PLAN OF CARE:  Safety Devices  Type of devices: Call light within reach,Left in chair         [facility] (6 CLICK) [address] Mobility Raw Score : 23     Therapy Time:   Individual   Time In 1025   Time Out 1035   Minutes [address], 04/06/22 at 11:23 AM         Definitions for assistance levels  Independent = pt does not require any physical supervision or assistance from another person for activity completion. Device may be needed.   Stand by assistance = pt requires verbal cues or instructions from another person, close to but not touching, to perform the activity  Minimal assistance= pt performs 75% or more of the activity; assistance is required to complete the activity  Moderate assistance= pt performs 50% of the activity; assistance is required to complete the activity  Maximal assistance = pt performs 25% of the activity; assistance is required to complete the activity  Dependent = pt requires total physical assistance to accomplish the task

## 2022-04-06 NOTE — PROGRESS NOTES
DEPARTMENT OF CARDIOLOGY  HISTORY AND PHYSICAL EXAM    PATIENT NAME:  Rena Borges    MRN:  [id number]  SERVICE DATE:  4/6/2022   SERVICE TIME:  11:34 AM    Primary Care Physician: Crystal Colmenares MD     SUBJECTIVE  CHIEF COMPLAINT:  LE edema    HPI:  Rena Borges is a 78 y.o., , male who  has a past medical history of CAD (coronary artery disease), CKD (chronic kidney disease), Hypertension, Kidney stones, Left foot drop, Neuropathy, diabetic ([address] Osteoarthritis, S/P CABG (coronary artery bypass graft), and Type II or unspecified type diabetes mellitus without mention of complication, not stated as uncontrolled. that is hospitalized for acute CHF exacerbation. Pt presented to ED with c/o worsening lower extremity edema x 3-4 days and orthopnea. Pt is known to this cardiology service as he follows with Dr. Jessica Latham in office. Most recent echo in 2020 with EF 40-45%. Negative stress test in 2020. Work up in the ED shows pro-BNP 6425, troponin 0.072, BUN 45, Cr 1.83, GFR 43.9. CXR shows a small R effusion. On exam, pt has 4+ pitting edema to bilat lower ext. Pt reports at home he has been unable to walk more than a few feet without feeling SOB. States he has been waking up in the middle of the night gasping for air. Pt denies chest pain, denies palpitations. Pt was given 40mg IV Lasix and has been diuresing in the ED. States he feels a little better. 4/2/2022: sitting up in bed, in no acute distress. States he is feeling fine overall today. Monitored on telemetry, currently SR, HR 70s. Morning labs reviewed BUN 44, Cr 1.78, GFR 37.4, serial troponins elevated in flat pattern at .072, .063, .066. Pt denies CP, SOB, palpitations. Echo tech at bedside. Pt is negative 3L total fluid balance from admission. Pt continues to have 4+ pitting edema to bilat lower ext. 4/3/2022: Monitored on tele, currently SR, HR 70's. BP stable.  Is and Os reflect negative 5L total fluid balance since admission. Morning labs reviewed: BUN 48, Cr 1.84, GFR 36.1. Pt with continued lower ext edema. States he is feeling ok overall today. Pt had echocardiogram yesterday which shows:  Summary   Left ventricular ejection fraction is visually estimated at 20%. global   hypokinesis. Left ventricular size is moderately increased . Pseudonormal filling pattern noted. Normal right ventricular chamber size and function. The left atrium is Mildly dilated. Mild (1+) mitral regurgitation is present. No evidence of aortic valve regurgitation . No evidence of aortic valve stenosis. 4/5/2022: Patient is up in the chair. Denies any chest pain. Status post cardiac catheterization with patent grafts with ejection fraction of 30%. Continues to have significant lower extremity edema. Hemodynamically stable. Normal sinus rhythm on telemetry    4/6/2022: sitting up in chair. Denies CP, denies SOB. Pt with continued 3-4+ pitting edema in bilat lower extremities. Monitored on tele, SR, HR 60's. Plan to continue diuresis and hopefully DC tomorrow. Morning labs reviewed: BUN 66, Cr 2.04, GFR 32        HPI     PAST MEDICAL HISTORY:    Past Medical History:   Diagnosis Date    CAD (coronary artery disease)     Dr. Kimberlee Quiñonez, Dr. Jacoby Rendon CKD (chronic kidney disease)     Hypertension     Kidney stones     Left foot drop     Neuropathy, diabetic (HCC)     mild    Osteoarthritis     hip, knee    S/P CABG (coronary artery bypass graft)     Type II or unspecified type diabetes mellitus without mention of complication, not stated as uncontrolled      PAST SURGICAL HISTORY:    Past Surgical History:   Procedure Laterality Date    BACK SURGERY N/A     BICEPS TENDON REPAIR  01/01/1997    CARDIAC CATHETERIZATION  06/05/2013    CORONARY ARTERY BYPASS GRAFT  06/07/2013    DR. PERALES    KNEE SURGERY  01/01/2006    left replacement    NECK SURGERY  2020    ROTATOR CUFF REPAIR  01/01/1996    TONSILLECTOMY  01/01/1966 FAMILY HISTORY:    Family History   Problem Relation Age of Onset    Cancer Mother         intestinal, skin    Heart Disease Father     Stroke Father      SOCIAL HISTORY:    Social History     Socioeconomic History    Marital status:      Spouse name: Not on file    Number of children: Not on file    Years of education: Not on file    Highest education level: Not on file   Occupational History    Not on file   Tobacco Use    Smoking status: Never Smoker    Smokeless tobacco: Never Used   Substance and Sexual Activity    Alcohol use: No    Drug use: Not on file    Sexual activity: Not on file   Other Topics Concern    Not on file   Social History Narrative    Not on file     Social Determinants of Health     Financial Resource Strain: Low Risk     Difficulty of Paying Living Expenses: Not hard at all   Food Insecurity: No Food Insecurity    Worried About [address] in the Last Year: Never true    [address] in the Last Year: Never true   Transportation Needs:     Lack of Transportation (Medical): Not on file    Lack of Transportation (Non-Medical):  Not on file   Physical Activity:     Days of Exercise per Week: Not on file    Minutes of Exercise per Session: Not on file   Stress:     Feeling of Stress : Not on file   Social Connections:     Frequency of Communication with Friends and Family: Not on file    Frequency of Social Gatherings with Friends and Family: Not on file    Attends Latter-day Services: Not on file    Active Member of Clubs or Organizations: Not on file    Attends Club or Organization Meetings: Not on file    Marital Status: Not on file   Intimate Partner Violence:     Fear of Current or Ex-Partner: Not on file    Emotionally Abused: Not on file    Physically Abused: Not on file    Sexually Abused: Not on file   Housing Stability:     Unable to Pay for Housing in the Last Year: Not on file    Number of [place] in the Last Year: Not on file    Unstable Housing in the Last Year: Not on file     MEDICATIONS:   Prior to Admission medications    Medication Sig Start Date End Date Taking? Authorizing Provider   atorvastatin (LIPITOR) 40 MG tablet Take 1 tablet by mouth nightly 11/19/21  Yes Alley Delong MD   furosemide (LASIX) 40 MG tablet Take 1 tablet by mouth daily 11/19/21  Yes Alley Delong MD   glimepiride (AMARYL) 4 MG tablet Take 1 tablet by mouth 2 times daily 11/19/21  Yes Alley Delong MD   insulin glargine (LANTUS SOLOSTAR) 100 UNIT/ML injection pen Inject 30 Units into the skin 2 times daily 11/19/21  Yes Alley Delong MD   losartan (COZAAR) 25 MG tablet Take 1 tablet by mouth daily 11/19/21  Yes Alley Delong MD   metoprolol tartrate (LOPRESSOR) 25 MG tablet Take 1 tablet by mouth 2 times daily 11/19/21  Yes Alley Delong MD   tamsulosin [employer]) 0.4 MG capsule Take 1 capsule by mouth daily 11/19/21  Yes Alley Delong MD   famotidine (PEPCID) 20 MG tablet Take 1 tablet by mouth daily 11/19/21  Yes Alley Delong MD   nitroGLYCERIN (NITROSTAT) 0.4 MG SL tablet Place 1 tablet under the tongue See Admin Instructions 2/16/18  Yes Alley Delong MD   Handicap Placard MISC by Does not apply route Exp 5 years 7/13/15  Yes Alley Delong MD   aspirin 81 MG tablet Take 81 mg by mouth daily. Yes Historical Provider, MD       ALLERGIES: Dye [iodides]    REVIEW OF SYSTEM:   Review of Systems   Constitutional: Negative for chills, diaphoresis and fever. HENT: Negative for congestion, rhinorrhea and trouble swallowing. Eyes: Negative for visual disturbance. Respiratory: Positive for shortness of breath. Negative for cough and wheezing. Cardiovascular: Positive for leg swelling. Negative for chest pain and palpitations. Gastrointestinal: Negative for abdominal distention, abdominal pain, constipation, diarrhea, nausea and vomiting. Endocrine: Negative. Genitourinary: Negative for difficulty urinating, dysuria, frequency and urgency. Musculoskeletal: Negative for back pain and gait problem. Skin: Negative for wound. Neurological: Negative for dizziness, seizures, syncope, speech difficulty, weakness, numbness and headaches. Hematological: Does not bruise/bleed easily. Psychiatric/Behavioral: Negative. OBJECTIVE  PHYSICAL EXAM:   Physical Exam  Vitals and nursing note reviewed. Constitutional:       General: He is not in acute distress. HENT:      Head: Normocephalic. Nose: Nose normal.      Mouth/Throat:      Mouth: Mucous membranes are moist.   Eyes:      Pupils: Pupils are equal, round, and reactive to light. Neck:      Vascular: No carotid bruit. Cardiovascular:      Rate and Rhythm: Normal rate and regular rhythm. Pulses: Normal pulses. Heart sounds: Normal heart sounds. No murmur heard. No friction rub. No gallop. Pulmonary:      Effort: Pulmonary effort is normal. No respiratory distress. Breath sounds: Normal breath sounds. No stridor. No wheezing, rhonchi or rales. Chest:      Chest wall: No tenderness. Abdominal:      General: Abdomen is flat. Palpations: Abdomen is soft. Musculoskeletal:         General: Normal range of motion. Cervical back: Normal range of motion. Right lower leg: Edema present. Left lower leg: Edema present. Skin:     General: Skin is warm and dry. Capillary Refill: Capillary refill takes less than 2 seconds. Neurological:      General: No focal deficit present. Mental Status: He is alert and oriented to person, place, and time. Psychiatric:         Mood and Affect: Mood normal.         Behavior: Behavior normal.          /71   Pulse 65   Temp 97.3 °F (36.3 °C) (Oral)   Resp 17   Ht 6' 4\" (1.93 m)   Wt (!) 331 lb 8 oz (150.4 kg)   SpO2 94%   BMI 40.35 kg/m²     DATA:     Diagnostic tests reviewed for today's visit:    Most recent labs and imaging results reviewed.      LABS:    Recent Results (from the past 24 hour(s))   POCT Glucose    Collection Time: 04/05/22  3:58 PM   Result Value Ref Range    POC Glucose 180 (H) 70 - 99 mg/dl    Performed on ACCU-CHEK    POCT Glucose    Collection Time: 04/05/22  7:46 PM   Result Value Ref Range    POC Glucose 183 (H) 70 - 99 mg/dl    Performed on ACCU-CHEK    CBC with Auto Differential    Collection Time: 04/06/22  5:55 AM   Result Value Ref Range    WBC 6.2 4.8 - 10.8 K/uL    RBC 4.27 (L) 4.70 - 6.10 M/uL    Hemoglobin 11.5 (L) 14.0 - 18.0 g/dL    Hematocrit 36.1 (L) 42.0 - 52.0 %    MCV 84.6 80.0 - 100.0 fL    MCH 27.0 27.0 - 31.3 pg    MCHC 31.9 (L) 33.0 - 37.0 %    RDW 17.3 (H) 11.5 - 14.5 %    Platelets 226 380 - 867 K/uL    Neutrophils % 75.8 %    Lymphocytes % 13.6 %    Monocytes % 7.8 %    Eosinophils % 2.3 %    Basophils % 0.5 %    Neutrophils Absolute 4.7 1.4 - 6.5 K/uL    Lymphocytes Absolute 0.8 (L) 1.0 - 4.8 K/uL    Monocytes Absolute 0.5 0.2 - 0.8 K/uL    Eosinophils Absolute 0.1 0.0 - 0.7 K/uL    Basophils Absolute 0.0 0.0 - 0.2 K/uL   Basic Metabolic Panel    Collection Time: 04/06/22  5:55 AM   Result Value Ref Range    Sodium 141 135 - 144 mEq/L    Potassium 4.5 3.4 - 4.9 mEq/L    Chloride 102 95 - 107 mEq/L    CO2 20 20 - 31 mEq/L    Anion Gap 19 (H) 9 - 15 mEq/L    Glucose 168 (H) 70 - 99 mg/dL    BUN 66 (H) 8 - 23 mg/dL    CREATININE 2.04 (H) 0.70 - 1.20 mg/dL    GFR Non-African American 32.0 (L) >60    GFR  38.8 (L) >60    Calcium 8.8 8.5 - 9.9 mg/dL   POCT Glucose    Collection Time: 04/06/22  6:02 AM   Result Value Ref Range    POC Glucose 178 (H) 70 - 99 mg/dl    Performed on ACCU-CHEK    POCT Glucose    Collection Time: 04/06/22 10:38 AM   Result Value Ref Range    POC Glucose 147 (H) 70 - 99 mg/dl    Performed on ACCU-CHEK        IMAGING:  XR CHEST PORTABLE    Result Date: 4/1/2022  EXAMINATION: XR CHEST PORTABLE CLINICAL HISTORY: DYSPNEA ON EXERTION. SHORTNESS OF BREATH FLAT. COMPARISONS: None available. FINDINGS: Median sternotomy. Cardiopericardial silhouette normal. Aorta calcified. Thickening minor fissure. Blunting right costophrenic angle. Left lung clear. SMALL RIGHT EFFUSION. Impression     Shortness of breath secondary to acute decompensated combined congestive heart failure.      Elevated cardiac enzymes. -Demand ischemia     New onset cardiomyopathy ejection fraction of 20 to 25% likely progression of CAD.     History of CAD status post CABG 9 years ago      Severe bilateral extremity edema - continue IV diuresis     Essential hypertension     History of diabetes mellitus     Chronic kidney disease stage III     Morbid obesity class II     Hyperlipidemia       Plan     1. Maximize cardiac medications. 2. Continue with IV diuresis as gen, monitor I/O's, renal function, electrolytes. Keep K+>4 and Mg>2.  Continue with Lasix Lasix 40 mg IV every 12 hours. Hopefully DC home tomorrow  3. Eventually would like to start Entresto if renal function remains stable   Losartan currently on hold/washout   4. Recheck LV function in the next 3 months for possible AICD if remains below 35%  5. Coreg 6.25 mg twice daily. 6. Continue with statin. 7. Monitor on telemetry  8. GI/DVT prophylaxis  9. CHF teaching  10. Low-sodium diet  11. No nephrotoxic agents due to kidney  12. Will obtain PT/OT. 13. Increase activity as tolerated  14. KAR hose to [facility] lower ext        Attending Supervising [de-identified] Attestation Statement  The patient is a 78 y.o. male. I have performed a history and physical examination of the patient. I discussed the case with the nurse practitioner. I reviewed the patient's Past Medical History, Past Surgical History, Medications, and Allergies.      Physical Exam:  Vitals:    04/05/22 0807 04/05/22 1506 04/06/22 0435 04/06/22 0806   BP: (!) 143/70 129/67  134/71   Pulse: 73 60  65   Resp: 16 17  17   Temp:  98.2 °F (36.8 °C)  97.3 °F (36.3 °C)   TempSrc:  Oral  Oral   SpO2: 94% 97%  94%   Weight:   (!) 331 lb 8 oz (150.4 kg)    Height:               Pulmonary/Chest: clear to auscultation bilaterally- no wheezes, rales or rhonchi, normal air movement, no respiratory distress  Cardiovascular: normal rate, normal S1 and S2, no gallops, intact distal pulses and no carotid bruits  Abdomen: soft, non-tender, non-distended, normal bowel sounds, no masses or organomegaly  3-4+ bilateral pitting edema    Active Hospital Problems    Diagnosis Date Noted    Leg swelling [M79.89] 04/01/2022     Priority: Low        I reviewed and agree with the findings and plan documented in her note .               Impression     Shortness of breath secondary to acute decompensated combined congestive heart failure.      Elevated cardiac enzymes. -Demand ischemia     New onset cardiomyopathy ejection fraction of 20 to 25% likely progression of CAD.     History of CAD status post CABG 9 years ago. Status post cardiac catheterization with patent grafts     Severe bilateral extremity edema      Essential hypertension     History of diabetes mellitus     Chronic kidney disease stage III     Morbid obesity class II     Hyperlipidemia              Plan     1. Maximize cardiac medications. 2. Continue with IV diuresis as gen, monitor I/O's, renal function, electrolytes. Keep K+>4 and Mg>2.  Continue with Lasix Lasix 40 mg IV every 12 hours  3. Eventually would like to start Entresto if renal function remains stable   Losartan currently on hold/washout   4. Recheck LV function in the next 3 months for possible AICD if remains below 35%  5. Coreg 6.25 mg twice daily. 6. Continue with statin. 7. Monitor on telemetry  8. GI/DVT prophylaxis  9. CHF teaching  10. Low-sodium diet  11. No nephrotoxic agents due to kidney  12. Will obtain PT/OT.   13. Increase activity as tolerated       Electronically signed by Arnulfo Niño DO on 4/6/22 at 1:37 PM EDT

## 2022-04-06 NOTE — PROGRESS NOTES
04/06/22    From: INDEPENDENT AT HOME WITH WIFE    Admit: CHF, LEG SWELLING BLE    PMH:CKD, OBESITY, DM, HDL, HTN, EF EST 20%    Anticipated Discharge Disposition: HOME, CARD.  REHAB    Patient Mobility or PT/OT ordered:  Consults: CHF NURSE ET    Covid result &/or vacc status: VACC  EF 20%         BNP 6425  45/1.83--48/1.94  TROP 0.072-0.066-0.063  4//4 CATH NEG PCI    Barriers to Discharge:IV LASIX SR/RA    Assessments: CMI DONE

## 2022-04-07 ENCOUNTER — CARE COORDINATION (OUTPATIENT)
Dept: CARE COORDINATION | Age: 77
End: 2022-04-07

## 2022-04-07 VITALS
DIASTOLIC BLOOD PRESSURE: 70 MMHG | WEIGHT: 315 LBS | TEMPERATURE: 97.2 F | SYSTOLIC BLOOD PRESSURE: 144 MMHG | HEART RATE: 62 BPM | RESPIRATION RATE: 17 BRPM | OXYGEN SATURATION: 98 % | HEIGHT: 76 IN | BODY MASS INDEX: 38.36 KG/M2

## 2022-04-07 LAB
ANION GAP SERPL CALCULATED.3IONS-SCNC: 13 MEQ/L (ref 9–15)
BASOPHILS ABSOLUTE: 0 K/UL (ref 0–0.2)
BASOPHILS RELATIVE PERCENT: 0.5 %
BUN BLDV-MCNC: 65 MG/DL (ref 8–23)
CALCIUM SERPL-MCNC: 8.5 MG/DL (ref 8.5–9.9)
CHLORIDE BLD-SCNC: 99 MEQ/L (ref 95–107)
CO2: 24 MEQ/L (ref 20–31)
CREAT SERPL-MCNC: 1.7 MG/DL (ref 0.7–1.2)
EOSINOPHILS ABSOLUTE: 0.2 K/UL (ref 0–0.7)
EOSINOPHILS RELATIVE PERCENT: 4.4 %
GFR AFRICAN AMERICAN: 47.8
GFR NON-AFRICAN AMERICAN: 39.5
GLUCOSE BLD-MCNC: 110 MG/DL (ref 70–99)
GLUCOSE BLD-MCNC: 96 MG/DL (ref 70–99)
GLUCOSE BLD-MCNC: 97 MG/DL (ref 70–99)
HCT VFR BLD CALC: 36.9 % (ref 42–52)
HEMOGLOBIN: 11.7 G/DL (ref 14–18)
LYMPHOCYTES ABSOLUTE: 0.8 K/UL (ref 1–4.8)
LYMPHOCYTES RELATIVE PERCENT: 13.5 %
MCH RBC QN AUTO: 27.1 PG (ref 27–31.3)
MCHC RBC AUTO-ENTMCNC: 31.6 % (ref 33–37)
MCV RBC AUTO: 85.7 FL (ref 80–100)
MONOCYTES ABSOLUTE: 0.6 K/UL (ref 0.2–0.8)
MONOCYTES RELATIVE PERCENT: 10.9 %
NEUTROPHILS ABSOLUTE: 4 K/UL (ref 1.4–6.5)
NEUTROPHILS RELATIVE PERCENT: 70.7 %
PDW BLD-RTO: 17.6 % (ref 11.5–14.5)
PERFORMED ON: ABNORMAL
PERFORMED ON: NORMAL
PLATELET # BLD: 123 K/UL (ref 130–400)
POTASSIUM SERPL-SCNC: 4.1 MEQ/L (ref 3.4–4.9)
RBC # BLD: 4.31 M/UL (ref 4.7–6.1)
SODIUM BLD-SCNC: 136 MEQ/L (ref 135–144)
WBC # BLD: 5.6 K/UL (ref 4.8–10.8)

## 2022-04-07 PROCEDURE — 36415 COLL VENOUS BLD VENIPUNCTURE: CPT

## 2022-04-07 PROCEDURE — 99239 HOSP IP/OBS DSCHRG MGMT >30: CPT | Performed by: INTERNAL MEDICINE

## 2022-04-07 PROCEDURE — 80048 BASIC METABOLIC PNL TOTAL CA: CPT

## 2022-04-07 PROCEDURE — 6360000002 HC RX W HCPCS: Performed by: INTERNAL MEDICINE

## 2022-04-07 PROCEDURE — 6360000002 HC RX W HCPCS: Performed by: NURSE PRACTITIONER

## 2022-04-07 PROCEDURE — 2580000003 HC RX 258: Performed by: INTERNAL MEDICINE

## 2022-04-07 PROCEDURE — 6370000000 HC RX 637 (ALT 250 FOR IP): Performed by: INTERNAL MEDICINE

## 2022-04-07 PROCEDURE — 85025 COMPLETE CBC W/AUTO DIFF WBC: CPT

## 2022-04-07 PROCEDURE — APPSS30 APP SPLIT SHARED TIME 16-30 MINUTES: Performed by: NURSE PRACTITIONER

## 2022-04-07 RX ORDER — CARVEDILOL 6.25 MG/1
6.25 TABLET ORAL 2 TIMES DAILY WITH MEALS
Qty: 60 TABLET | Refills: 3 | Status: SHIPPED | OUTPATIENT
Start: 2022-04-07 | End: 2022-05-02 | Stop reason: SDUPTHER

## 2022-04-07 RX ORDER — FUROSEMIDE 40 MG/1
40 TABLET ORAL 2 TIMES DAILY
Qty: 90 TABLET | Refills: 3 | Status: SHIPPED | OUTPATIENT
Start: 2022-04-07 | End: 2022-05-11 | Stop reason: SDUPTHER

## 2022-04-07 RX ADMIN — INSULIN GLARGINE 30 UNITS: 100 INJECTION, SOLUTION SUBCUTANEOUS at 08:48

## 2022-04-07 RX ADMIN — GLIPIZIDE 10 MG: 5 TABLET ORAL at 06:48

## 2022-04-07 RX ADMIN — CARVEDILOL 6.25 MG: 6.25 TABLET, FILM COATED ORAL at 08:43

## 2022-04-07 RX ADMIN — ASPIRIN 81 MG 81 MG: 81 TABLET ORAL at 08:42

## 2022-04-07 RX ADMIN — Medication 10 ML: at 08:43

## 2022-04-07 RX ADMIN — ENOXAPARIN SODIUM 30 MG: 100 INJECTION SUBCUTANEOUS at 08:43

## 2022-04-07 RX ADMIN — TAMSULOSIN HYDROCHLORIDE 0.4 MG: 0.4 CAPSULE ORAL at 08:43

## 2022-04-07 RX ADMIN — FUROSEMIDE 40 MG: 10 INJECTION, SOLUTION INTRAMUSCULAR; INTRAVENOUS at 08:43

## 2022-04-07 RX ADMIN — FAMOTIDINE 20 MG: 20 TABLET ORAL at 08:42

## 2022-04-07 ASSESSMENT — PAIN SCALES - GENERAL
PAINLEVEL_OUTOF10: 0
PAINLEVEL_OUTOF10: 0

## 2022-04-07 NOTE — DISCHARGE SUMMARY
Cardiology Discharge Summary      Patient Identification:  Thalia Dash  :   MRN: [id number]   Account: [de-identified]     Admit date: 2022  Discharge date: 2022  Attending provider: Steve Perez DO        Primary care provider: Derek Terry MD     Admission Diagnoses:  Leg swelling         Discharge Diagnoses: Active Hospital Problems    Diagnosis Date Noted    Leg swelling [M79.89] 2022     Priority: [facility] Course:    Thalia Dash is a 78 y.o., , male who  has a past medical history of CAD (coronary artery disease), CKD (chronic kidney disease), Hypertension, Kidney stones, Left foot drop, Neuropathy, diabetic ([address] Osteoarthritis, S/P CABG (coronary artery bypass graft), and Type II or unspecified type diabetes mellitus without mention of complication, not stated as uncontrolled. that is hospitalized for acute CHF exacerbation. Pt presented to ED with c/o worsening lower extremity edema x 3-4 days and orthopnea. Pt is known to this cardiology service as he follows with Dr. Carlos Clark in office. Most recent echo in  with EF 40-45%. Negative stress test in 2020. Work up in the ED shows pro-BNP 6425, troponin 0.072, BUN 45, Cr 1.83, GFR 43.9. CXR shows a small R effusion.       On exam, pt has 4+ pitting edema to bilat lower ext. Pt reports at home he has been unable to walk more than a few feet without feeling SOB. States he was waking up in the middle of the night gasping for air. Pt has been wearing compression stockings. He is negative 10L total since admission. States he is able to lay flat with no difficulties breathing. Denies CP. Pt does complain of minor, intermittent pain to right thigh from R groin insertion site. Insertion site is soft to palpation, minimal bruising. Today's labs reflect BUN 65, Cr 1.70, GFR 39.5    Pt with new cardiomyopathy, EF 20% per echo on 2022.  Will 20 MG tablet Take 1 tablet by mouth daily  Qty: 180 tablet, Refills: 3      nitroGLYCERIN (NITROSTAT) 0.4 MG SL tablet Place 1 tablet under the tongue See Admin Instructions  Qty: 25 tablet, Refills: 1    Associated Diagnoses: Coronary artery disease involving native coronary artery of native heart, angina presence unspecified      Handicap Placard MISC by Does not apply route Exp 5 years  Qty: 1 each, Refills: 0      aspirin 81 MG tablet Take 81 mg by mouth daily. Significant Diagnostics:   Radiology: Echocardiogram complete 2D with doppler with color    Result Date: 4/2/2022  Transthoracic Echocardiography Report (TTE)  Demographics   Patient Name    Long Shaffer Gender               Male   Patient Number  [id number]       Race                                                   Ethnicity   Visit Number    [id number]      Room Number          O641   Corporate ID                   Date of Study        04/02/2022   Accession       [id number]     Referring Physician  Number   Date of Birth   1945     Sonographer   Age             78 year(s)     [facility]                                 Physician            Cardiology                                                      Kelsey Franco.,   Procedure Type of Study   TTE procedure:ECHO COMPLETE 2D W/DOP W/COLOR. Procedure Date Date: 04/02/2022 Start: 07:52 AM Study Location: Portable Technical Quality: Adequate visualization Indications:Congestive heart failure and LVF. Patient Status: Routine Height: 76 inches Weight: 321 pounds BSA: 2.71 m^2 BMI: 39.07 kg/m^2 BP: 150/72 mmHg  Conclusions   Summary  Left ventricular ejection fraction is visually estimated at 20%. global  hypokinesis. Left ventricular size is moderately increased . Pseudonormal filling pattern noted. Normal right ventricular chamber size and function. The left atrium is Mildly dilated. Mild (1+) mitral regurgitation is present.   No evidence of aortic valve regurgitation . No evidence of aortic valve stenosis. Signature   ----------------------------------------------------------------  Electronically signed by Brina Gonsalves DO(Interpreting  physician) on 04/02/2022 04:21 PM  ----------------------------------------------------------------   Findings  Left Ventricle Left ventricular ejection fraction is visually estimated at 20%. Left ventricular size is moderately increased . Normal left ventricular wall thickness. Pseudonormal filling pattern noted. Right Ventricle Normal right ventricular chamber size and function. Left Atrium The left atrium is Mildly dilated. Right Atrium Normal right atrium. Mitral Valve Diffusely thickened and pliable mitral valve leaflets with normal excursion. Mild (1+) mitral regurgitation is present. No evidence of mitral valve stenosis. Tricuspid Valve Normal tricuspid valve structure and function. Aortic Valve Sclerotic, trileaflet aortic valve with diffusely thickened leaflets with normal cusp separation and excursion. No evidence of aortic valve regurgitation . No evidence of aortic valve stenosis. Pulmonic Valve Normal pulmonic valve structure and function. Pericardial Effusion No evidence of pericardial effusion. Pleural Effusion No evidence of pleural effusion. Aorta \ Miscellaneous Miscellaneous normal findings were found. M-Mode Measurements (cm)   LVIDd: 6.52 cm                         LVIDs: 4.62 cm  IVSd: 1.01 cm                          IVSs: 1.29 cm  LVPWd: 1.04 cm                         AO Root Dimension: 3.68 cm  Rt. Vent.  Dimension: 2.57 cm                                         LVOT: 1.99 cm  Valves  LVOT   LVOT Diameter: 1.99 cm  Structures  Left Atrium   LA Volume/Index: 41.62 ml /15 m^2             LA Area: 14.98 cm^2   Left Ventricle   Diastolic Dimension: 1.86 cm          Systolic Dimension: 4.64 cm  Septum Diastolic: 0.82 cm             Septum Systolic: 1.19 cm  PW Diastolic: 6.11 cm FS: 29.1 %  LV EDV/LV EDV Index: 217.63 ml/80 m^2 LV ESV/LV ESV Index: 98.37 ml/36 m^2  EF Calculated: 54.8 %                 LV Length: 9.22 cm   LVOT Diameter: 1.99 cm   Right Ventricle   Diastolic Dimension: 7.06 cm  Aorta/ Miscellaneous Aorta   Aortic Root: 3.68 cm  LVOT Diameter: 1.99 cm      XR CHEST PORTABLE    Result Date: 4/1/2022  EXAMINATION: XR CHEST PORTABLE CLINICAL HISTORY: DYSPNEA ON EXERTION. SHORTNESS OF BREATH FLAT. COMPARISONS: None available. FINDINGS: Median sternotomy. Cardiopericardial silhouette normal. Aorta calcified. Thickening minor fissure. Blunting right costophrenic angle. Left lung clear. SMALL RIGHT EFFUSION.       Labs:   Recent Results (from the past 72 hour(s))   POCT Glucose    Collection Time: 04/04/22 12:39 PM   Result Value Ref Range    POC Glucose 98 70 - 99 mg/dl    Performed on ACCU-CHEK    POCT Glucose    Collection Time: 04/04/22  4:42 PM   Result Value Ref Range    POC Glucose 303 (H) 70 - 99 mg/dl    Performed on ACCU-CHEK    POCT Glucose    Collection Time: 04/04/22  7:50 PM   Result Value Ref Range    POC Glucose 350 (H) 70 - 99 mg/dl    Performed on ACCU-CHEK    CBC with Auto Differential    Collection Time: 04/05/22  5:09 AM   Result Value Ref Range    WBC 5.2 4.8 - 10.8 K/uL    RBC 4.35 (L) 4.70 - 6.10 M/uL    Hemoglobin 11.8 (L) 14.0 - 18.0 g/dL    Hematocrit 36.7 (L) 42.0 - 52.0 %    MCV 84.3 80.0 - 100.0 fL    MCH 27.2 27.0 - 31.3 pg    MCHC 32.3 (L) 33.0 - 37.0 %    RDW 17.0 (H) 11.5 - 14.5 %    Platelets 617 770 - 101 K/uL    Neutrophils % 85.7 %    Lymphocytes % 9.1 %    Monocytes % 5.0 %    Eosinophils % 0.0 %    Basophils % 0.2 %    Neutrophils Absolute 4.5 1.4 - 6.5 K/uL    Lymphocytes Absolute 0.5 (L) 1.0 - 4.8 K/uL    Monocytes Absolute 0.3 0.2 - 0.8 K/uL    Eosinophils Absolute 0.0 0.0 - 0.7 K/uL    Basophils Absolute 0.0 0.0 - 0.2 K/uL   Basic Metabolic Panel    Collection Time: 04/05/22  5:09 AM   Result Value Ref Range    Sodium 135 135 - 144 mEq/L    Potassium 5.0 (H) 3.4 - 4.9 mEq/L    Chloride 98 95 - 107 mEq/L    CO2 26 20 - 31 mEq/L    Anion Gap 11 9 - 15 mEq/L    Glucose 319 (H) 70 - 99 mg/dL    BUN 48 (H) 8 - 23 mg/dL    CREATININE 1.94 (H) 0.70 - 1.20 mg/dL    GFR Non-African American 34.0 (L) >60    GFR  41.1 (L) >60    Calcium 9.2 8.5 - 9.9 mg/dL   POCT Glucose    Collection Time: 04/05/22  6:22 AM   Result Value Ref Range    POC Glucose 317 (H) 70 - 99 mg/dl    Performed on ACCU-CHEK    POCT Glucose    Collection Time: 04/05/22 10:41 AM   Result Value Ref Range    POC Glucose 270 (H) 70 - 99 mg/dl    Performed on ACCU-CHEK    POCT Glucose    Collection Time: 04/05/22  3:58 PM   Result Value Ref Range    POC Glucose 180 (H) 70 - 99 mg/dl    Performed on ACCU-CHEK    POCT Glucose    Collection Time: 04/05/22  7:46 PM   Result Value Ref Range    POC Glucose 183 (H) 70 - 99 mg/dl    Performed on ACCU-CHEK    CBC with Auto Differential    Collection Time: 04/06/22  5:55 AM   Result Value Ref Range    WBC 6.2 4.8 - 10.8 K/uL    RBC 4.27 (L) 4.70 - 6.10 M/uL    Hemoglobin 11.5 (L) 14.0 - 18.0 g/dL    Hematocrit 36.1 (L) 42.0 - 52.0 %    MCV 84.6 80.0 - 100.0 fL    MCH 27.0 27.0 - 31.3 pg    MCHC 31.9 (L) 33.0 - 37.0 %    RDW 17.3 (H) 11.5 - 14.5 %    Platelets 273 037 - 189 K/uL    Neutrophils % 75.8 %    Lymphocytes % 13.6 %    Monocytes % 7.8 %    Eosinophils % 2.3 %    Basophils % 0.5 %    Neutrophils Absolute 4.7 1.4 - 6.5 K/uL    Lymphocytes Absolute 0.8 (L) 1.0 - 4.8 K/uL    Monocytes Absolute 0.5 0.2 - 0.8 K/uL    Eosinophils Absolute 0.1 0.0 - 0.7 K/uL    Basophils Absolute 0.0 0.0 - 0.2 K/uL   Basic Metabolic Panel    Collection Time: 04/06/22  5:55 AM   Result Value Ref Range    Sodium 141 135 - 144 mEq/L    Potassium 4.5 3.4 - 4.9 mEq/L    Chloride 102 95 - 107 mEq/L    CO2 20 20 - 31 mEq/L    Anion Gap 19 (H) 9 - 15 mEq/L    Glucose 168 (H) 70 - 99 mg/dL    BUN 66 (H) 8 - 23 mg/dL    CREATININE 2.04 (H) 0.70 Performed on [facility]              Follow-up visits:   Victoria Carbajal MD  [phone]  [facility] [address]  [phone]    Go in 1 week  Bring Photo ID, Ins. Card, Meds and D/C papers, Please wear a mask and come 5min early, If you can't make this appointment, Please call the office. Assessment:  Active Hospital Problems    Diagnosis Date Noted    Leg swelling [M79.89] 04/01/2022     Priority: Low     Shortness of breath secondary to acute decompensated combined congestive heart failure.      Elevated cardiac enzymes. -Demand ischemia     New onset cardiomyopathy ejection fraction of 20 to 25% likely progression of CAD.     History of CAD status post CABG 9 years ago. Status post cardiac catheterization with patent grafts     Severe bilateral extremity edema      Essential hypertension     History of diabetes mellitus     Chronic kidney disease stage III     Morbid obesity class II     Hyperlipidemia      Plan:   1. Follow up in CHF clinic - Recheck LV function in the next 3 months for possible AICD if remains below 35%  2. Continue cardiac meds  3. Low sodium diet  4. Compression stockings  5.  start Entresto    Electronically signed by SULAIMAN Howe 4/7/2022 at 9:48 AM    Attending Supervising [de-identified] Attestation Statement  The patient is a 78 y.o. male. I have performed a history and physical examination of the patient. I discussed the case with the nurse practitioner. I reviewed the patient's Past Medical History, Past Surgical History, Medications, and Allergies.      Physical Exam:  Vitals:    04/06/22 1421 04/06/22 1910 04/07/22 0532 04/07/22 0834   BP: 129/66 123/60  (!) 144/70   Pulse: 58 56  62   Resp: 16 16  17   Temp: 97.3 °F (36.3 °C) 97.7 °F (36.5 °C)  97.2 °F (36.2 °C)   TempSrc: Oral Oral  Oral   SpO2: 98% 98%  98%   Weight:   (!) 333 lb (151 kg)    Height:               Pulmonary/Chest: clear to auscultation bilaterally- no wheezes, rales or rhonchi, normal air movement, no respiratory distress  Cardiovascular: normal rate, normal S1 and S2, no gallops, intact distal pulses and no carotid bruits  Abdomen: soft, non-tender, non-distended, normal bowel sounds, no masses or organomegaly    Active Hospital Problems    Diagnosis Date Noted    Leg swelling [M79.89] 04/01/2022     Priority: Low        I reviewed and agree with the findings and plan documented in her note .     Impression     Shortness of breath secondary to acute decompensated combined congestive heart failure.      Elevated cardiac enzymes. -Demand ischemia     New onset cardiomyopathy ejection fraction of 20 to 25% likely progression of CAD.     History of CAD status post CABG 9 years ago. Status post cardiac catheterization with patent grafts     Severe bilateral extremity edema      Essential hypertension     History of diabetes mellitus     Chronic kidney disease stage III     Morbid obesity class II     Hyperlipidemia        Plan     1. Maximize cardiac medications. 2. Continue with diuresis as gen, monitor I/O's, renal function, electrolytes. Keep K+>4 and Mg>2. Ida Gianluca to Lasix 40mg BID  3. Start Entresto. 4. Aldactone as outpatient if renal function is stable. 5. Recheck LV function in the next 3 months for possible AICD if remains below 35%  6. Coreg 6.25 mg twice daily. 7. Continue with statin. 8. Monitor on telemetry  9. GI/DVT prophylaxis  10. CHF teaching  11. Low-sodium diet  12. No nephrotoxic agents due to kidney  13. Will obtain PT/OT. 14. Increase activity as tolerated  15. Follow-up with CHF clinic  16.  Ok to discharge home.         Electronically signed by Nova Bourne DO on 4/7/22 at 10:14 AM EDT

## 2022-04-07 NOTE — CARE COORDINATION
MET W/PT TO ASSESS D/C NEEDS. PT EXPRESSES INTEREST IN GETTING MEALS ON WHEELS. THIS CM PROVIDED A LIST OF AGENCIES. PT DENIES FURTHER NEEDS.

## 2022-04-07 NOTE — DISCHARGE INSTR - DIET
Good nutrition is important when healing from an illness, injury, or surgery. Follow any nutrition recommendations given to you during your hospital stay. If you were given an oral nutrition supplement while in the hospital, continue to take this supplement at home. You can take it with meals, in-between meals, and/or before bedtime. These supplements can be purchased at most local grocery stores, pharmacies, and chain [employer]-stores. If you have any questions about your diet or nutrition, call the hospital and ask for the dietitian.         LOW CARBS/SUGARS/LOW SALT DIET

## 2022-04-07 NOTE — CARE COORDINATION
Chart review completed  Patient just discharged from the hospital  I will follow up with the patient once CTC is complete

## 2022-04-07 NOTE — PROGRESS NOTES
CLINICAL PHARMACY NOTE: MEDS TO BEDS    Total # of Prescriptions Filled: 3   The following medications were delivered to the patient:  · Carvedilol 6.25mg tab  · Entresto 24/26mg tab  · Furosemide 40mg tab    Additional Documentation:

## 2022-04-08 ENCOUNTER — CARE COORDINATION (OUTPATIENT)
Dept: CASE MANAGEMENT | Age: 77
End: 2022-04-08

## 2022-04-08 DIAGNOSIS — M79.89 LEG SWELLING: Primary | ICD-10-CM

## 2022-04-08 PROCEDURE — 1111F DSCHRG MED/CURRENT MED MERGE: CPT | Performed by: FAMILY MEDICINE

## 2022-04-11 NOTE — PROCEDURES
[address]                      [facility], [address] [facility]                                 PROCEDURE NOTE    PATIENT NAME: Jamel Olvera                     :        1945  MED REC NO:   [id number]                            ROOM:       O483  ACCOUNT NO:   [de-identified]                           ADMIT DATE: 2022  PROVIDER:     Meaghan Sorto DO    DATE OF PROCEDURE:  2022    PROCEDURES PERFORMED:  Left heart catheterization, bilateral selective  coronary angiography, left LVEDP measurement as well as selective  coronary artery bypass graft angiogram.    PROCEDURE PERFORMED BY:  Bryce Sorto DO    INDICATIONS:  New onset cardiomyopathy. COMPLICATIONS:  None. ACCESS SITE:  Right common femoral artery. DESCRIPTION OF PROCEDURE:  The patient was brought to the cardiac cath  lab suite where he was sterilely prepped and draped in usual fashion. 1% lidocaine was used to anesthetized the right inguinal area and a  4-Angolan arterial sheath was placed in the left common femoral artery  without any difficulty. Next, moderate IV conscious sedation was  performed with fentanyl and Versed under direct supervision of myself as  well as circulating RN for greater than 50 minutes. Next, a pigtail  catheter was placed in the left ventricle. LVEDP was measured and  pullback was performed across the LV. Next, a diagnostic catheter was  engaged in the left main coronary artery and angiogram of the left  coronary system was performed in multiple different projections. Next,  a diagnostic catheter was engaged in the right coronary artery. Angiogram of the coronary artery was performed. Next, the catheter was  engaged in the SVG graft with RCA as well as SVG jump graft to the  radius intermedius as well as circumflex and angiogram of both of those  grafts were obtained in multiple different projections.   Next, the  catheter was then engaged in the left

## 2022-04-13 ENCOUNTER — OFFICE VISIT (OUTPATIENT)
Dept: CARDIOLOGY CLINIC | Age: 77
End: 2022-04-13
Payer: MEDICARE

## 2022-04-13 VITALS
RESPIRATION RATE: 18 BRPM | HEART RATE: 55 BPM | SYSTOLIC BLOOD PRESSURE: 110 MMHG | DIASTOLIC BLOOD PRESSURE: 62 MMHG | BODY MASS INDEX: 35.68 KG/M2 | WEIGHT: 293 LBS | HEIGHT: 76 IN | OXYGEN SATURATION: 97 %

## 2022-04-13 DIAGNOSIS — I42.8 NONISCHEMIC CARDIOMYOPATHY (HCC): ICD-10-CM

## 2022-04-13 DIAGNOSIS — I50.21 ACUTE SYSTOLIC CHF (CONGESTIVE HEART FAILURE), NYHA CLASS 2 (HCC): ICD-10-CM

## 2022-04-13 DIAGNOSIS — E78.5 DYSLIPIDEMIA: ICD-10-CM

## 2022-04-13 DIAGNOSIS — E11.69 DIABETES MELLITUS TYPE 2 IN OBESE (HCC): ICD-10-CM

## 2022-04-13 DIAGNOSIS — E66.01 SEVERE OBESITY (BMI 35.0-39.9) WITH COMORBIDITY (HCC): ICD-10-CM

## 2022-04-13 DIAGNOSIS — I10 HYPERTENSION, UNSPECIFIED TYPE: ICD-10-CM

## 2022-04-13 DIAGNOSIS — N18.30 STAGE 3 CHRONIC KIDNEY DISEASE, UNSPECIFIED WHETHER STAGE 3A OR 3B CKD (HCC): ICD-10-CM

## 2022-04-13 DIAGNOSIS — E66.9 DIABETES MELLITUS TYPE 2 IN OBESE (HCC): ICD-10-CM

## 2022-04-13 DIAGNOSIS — R94.31 ABNORMAL EKG: ICD-10-CM

## 2022-04-13 DIAGNOSIS — Z95.1 HX OF CABG: ICD-10-CM

## 2022-04-13 LAB
ANION GAP SERPL CALCULATED.3IONS-SCNC: 15 MEQ/L (ref 9–15)
BUN BLDV-MCNC: 44 MG/DL (ref 8–23)
CALCIUM SERPL-MCNC: 8.9 MG/DL (ref 8.5–9.9)
CHLORIDE BLD-SCNC: 101 MEQ/L (ref 95–107)
CO2: 23 MEQ/L (ref 20–31)
CREAT SERPL-MCNC: 1.61 MG/DL (ref 0.7–1.2)
GFR AFRICAN AMERICAN: 50.9
GFR NON-AFRICAN AMERICAN: 42.1
GLUCOSE BLD-MCNC: 165 MG/DL (ref 70–99)
POTASSIUM SERPL-SCNC: 4.8 MEQ/L (ref 3.4–4.9)
PRO-BNP: 3628 PG/ML
SODIUM BLD-SCNC: 139 MEQ/L (ref 135–144)

## 2022-04-13 PROCEDURE — 3017F COLORECTAL CA SCREEN DOC REV: CPT | Performed by: PHYSICIAN ASSISTANT

## 2022-04-13 PROCEDURE — 1123F ACP DISCUSS/DSCN MKR DOCD: CPT | Performed by: PHYSICIAN ASSISTANT

## 2022-04-13 PROCEDURE — 4040F PNEUMOC VAC/ADMIN/RCVD: CPT | Performed by: PHYSICIAN ASSISTANT

## 2022-04-13 PROCEDURE — 1111F DSCHRG MED/CURRENT MED MERGE: CPT | Performed by: PHYSICIAN ASSISTANT

## 2022-04-13 PROCEDURE — 1036F TOBACCO NON-USER: CPT | Performed by: PHYSICIAN ASSISTANT

## 2022-04-13 PROCEDURE — 99215 OFFICE O/P EST HI 40 MIN: CPT | Performed by: PHYSICIAN ASSISTANT

## 2022-04-13 PROCEDURE — 3046F HEMOGLOBIN A1C LEVEL >9.0%: CPT | Performed by: PHYSICIAN ASSISTANT

## 2022-04-13 PROCEDURE — 2022F DILAT RTA XM EVC RTNOPTHY: CPT | Performed by: PHYSICIAN ASSISTANT

## 2022-04-13 PROCEDURE — G8417 CALC BMI ABV UP PARAM F/U: HCPCS | Performed by: PHYSICIAN ASSISTANT

## 2022-04-13 PROCEDURE — G8427 DOCREV CUR MEDS BY ELIG CLIN: HCPCS | Performed by: PHYSICIAN ASSISTANT

## 2022-04-13 ASSESSMENT — ENCOUNTER SYMPTOMS
NAUSEA: 0
VOMITING: 0
ABDOMINAL PAIN: 0
ABDOMINAL DISTENTION: 0
BLOOD IN STOOL: 0
COUGH: 1
CHEST TIGHTNESS: 0
COLOR CHANGE: 0
SHORTNESS OF BREATH: 1

## 2022-04-13 NOTE — PROGRESS NOTES
Patient: Zackery Noel  YOB: 1945  MRN: [id number]    Chief Complaint:  Chief Complaint   Patient presents with    Congestive Heart Failure         Subjective/HPI         4/13/22: This is a very pleasant 68-year-old  male with past medical history significant for coronary artery disease status post CABG x4 in 2013 at [facility], hypertension, dyslipidemia, diabetes, CKD and history of cardiomyopathy with EF of 40 to 45% per prior echo from 11/9/2020 who presents for initial CHF clinic evaluation following recent hospital admission for acute decompensated systolic heart failure and worsening cardiomyopathy. He originally presented to [facility] ER on 4/1/2022 with complaints of shortness of breath, orthopnea and bilateral lower extremity edema for approximately 1 week prior to presentation. proBNP was elevated at 6425. Initial troponin mildly elevated 0.072. Baseline CKD with creatinine on admission elevated 1.83. Chest x-ray on 4/1/2022 showed small right pleural effusion. He was treated with 40 mg Lasix IV in the ER and was admitted for further evaluation. He was diuresed aggressively with IV Lasix and experienced approximately 10 L negative fluid balance during the course of his admission. Echocardiogram completed on 4/2/2022 revealed severely reduced LV systolic function with EF of 20%, mild mitral valve regurgitation. Given worsening cardiomyopathy with EF now 20% compared to prior EF of 40 to 45% per echo in 2020 as well as mild troponin elevation and acute heart failure, cardiac catheterization recommended to rule out underlying progression of CAD. He underwent cardiac catheterization on 4/4/2022 which revealed severe native three-vessel CAD with patent LIMA to LAD, SVG jump graft to RI and distal circumflex which was patent, SVG to PDA with mild to moderate disease and no focal stenosis for which medical therapy advised.   His symptoms improved significantly during the course of his admission. He was also prescribed compression stockings to help with lower extremity edema. He was eventually discharged home in stable condition on 4/7/2022. Since discharge, patient states he is doing very well overall with significant improvement in bilateral lower extremity edema as well as shortness of breath complaints. He complains of shortness of breath with moderate exertion but this is definitely improved since hospital admission. His orthopnea has resolved and he is now able to lie flat in bed. He denies chest pain, palpitations, diaphoresis, paroxysmal nocturnal dyspnea, orthopnea, dizziness, lightheadedness, syncope, fever or chills. He is compliant with all of his medications. I reviewed all of patient's medications with him and it appears that he was recently prescribed carvedilol but was previously taking metoprolol and is currently taking both at home. He has been advised to stop taking metoprolol at this time. Also it appears patient was started on Entresto but was previously taking losartan and again has continued taking both medications. He has been advised to stop losartan at this time as well. He was educated at length regarding low-sodium diet under 2000 mg a day as well as fluid restriction of approximately 1500 mL daily. He is scheduled to start cardiac rehab on 5/19/2022. Most recent labs reviewed and documented below. BMP on 4/7/2022: Sodium 136, potassium 4.1, chloride 99, total CO2 24, BUN elevated 65, creatinine elevated 1.70, GFR low at 39.5, glucose 96. --On review of prior labs, it appears the patient's baseline creatinine is around 1.6 dating back to at least 2020  CBC on 4/7/2022: WBC 5.6, hemoglobin 11.7, hematocrit 36.9, platelets 075  proBNP on 4/1/2022: 6425. Hemoglobin A1c on 11/19/2021: Elevated at 7.7      Most recent diagnostic testing reviewed and documented below.     EKG 4/1/22: SR 74, 1st degree AVB, LBBB, T wave inversion lead I and aVL, poor R wave progression V2-V6, QTc 503ms    Echocardiogram 4/2/22:   Conclusions   Summary   Left ventricular ejection fraction is visually estimated at 20%. global   hypokinesis. Left ventricular size is moderately increased . Pseudonormal filling pattern noted. Normal right ventricular chamber size and function. The left atrium is Mildly dilated. Mild (1+) mitral regurgitation is present. No evidence of aortic valve regurgitation . No evidence of aortic valve stenosis. Signature   ----------------------------------------------------------------   Electronically signed by Grace Rinne., DO(Interpreting   physician) on 04/02/2022 04:21 PM   ----------------------------------------------------------------    LHC 4/4/22:  Procedure(s):  LHC, b/l coronary angio, grafts  Pre-operative Diagnosis:  New CMP  H&P Status: Completed and reviewed.      Post-operative Diagnosis:    LV LVEDP=23mmhg  LM normal   % mid  % prox  RCA severe disease, 100% distal     LIMA to LAD patent  SVG jump graft to R. I and distal CX patent  SVG to PDA mild to mod disease, no focal stenosis, patent      Findings:  See full report  Complications:  none     Primary Proceduralist:   Dr.Wes Sorto DO     Plan  Max med rx  rfm  Recheck LVF in 3 months. ? AICD in future. Vital signs stable in office today. Blood pressure 110/62, heart rate 55, pulse ox 97% on room air. Weight is 293 pounds. --Patient reports his weight in the hospital was around 321 pounds        PMHx:  Worsening cardiomyopathy EF 20% per echo 4/2/22  Hx CAD s/p CABG--9 years ago at [facility]  HTN  Dyslipidemia  CKD  Spinal stenosis  DM--on insulin      PSHx:  Hx CABG x4--9 years ago at [facility]  Left TKA  Right forearm sx  Tonsillectomy  Lumbar spine surgery x 4--most recently 9/2021      Social Hx:  Non-smoker  No alcohol  No drugs    Family Hx:  Father with MI in his 62s  Mother with cancer  Brother and sister passing from cancer    Allergies Allergen Reactions    Dye [Iodides]        Current Outpatient Medications   Medication Sig Dispense Refill    carvedilol (COREG) 6.25 MG tablet Take 1 tablet by mouth 2 times daily (with meals) 60 tablet 3    sacubitril-valsartan (ENTRESTO) 24-26 MG per tablet Take 1 tablet by mouth 2 times daily 60 tablet 3    furosemide (LASIX) 40 MG tablet Take 1 tablet by mouth 2 times daily 90 tablet 3    atorvastatin (LIPITOR) 40 MG tablet Take 1 tablet by mouth nightly 90 tablet 3    glimepiride (AMARYL) 4 MG tablet Take 1 tablet by mouth 2 times daily 180 tablet 3    insulin glargine (LANTUS SOLOSTAR) 100 UNIT/ML injection pen Inject 30 Units into the skin 2 times daily 10 pen 5    tamsulosin (FLOMAX) 0.4 MG capsule Take 1 capsule by mouth daily 90 capsule 3    famotidine (PEPCID) 20 MG tablet Take 1 tablet by mouth daily 180 tablet 3    nitroGLYCERIN (NITROSTAT) 0.4 MG SL tablet Place 1 tablet under the tongue See Admin Instructions 25 tablet 1    Handicap Placard MISC by Does not apply route Exp 5 years 1 each 0    aspirin 81 MG tablet Take 81 mg by mouth daily. No current facility-administered medications for this visit. Past Medical History:   Diagnosis Date    CAD (coronary artery disease)     Dr. Serge Spring, Dr. Park Rea CKD (chronic kidney disease)     Hypertension     Kidney stones     Left foot drop     Neuropathy, diabetic (HCC)     mild    Osteoarthritis     hip, knee    S/P CABG (coronary artery bypass graft)     Type II or unspecified type diabetes mellitus without mention of complication, not stated as uncontrolled        Past Surgical History:   Procedure Laterality Date    BACK SURGERY N/A     BICEPS TENDON REPAIR  01/01/1997    CARDIAC CATHETERIZATION  06/05/2013    CORONARY ARTERY BYPASS GRAFT  06/07/2013    DR. PERALES    KNEE SURGERY  01/01/2006    left replacement    NECK SURGERY  2020    ROTATOR CUFF REPAIR  01/01/1996    TONSILLECTOMY  01/01/1966       Social History     Socioeconomic History    Marital status:      Spouse name: None    Number of children: None    Years of education: None    Highest education level: None   Occupational History    None   Tobacco Use    Smoking status: Never Smoker    Smokeless tobacco: Never Used   Substance and Sexual Activity    Alcohol use: No    Drug use: None    Sexual activity: None   Other Topics Concern    None   Social History Narrative    None     Social Determinants of Health     Financial Resource Strain: Low Risk     Difficulty of Paying Living Expenses: Not hard at all   Food Insecurity: No Food Insecurity    Worried About Running Out of Food in the Last Year: Never true    [address] in the Last Year: Never true   Transportation Needs:     Lack of Transportation (Medical): Not on file    Lack of Transportation (Non-Medical):  Not on file   Physical Activity:     Days of Exercise per Week: Not on file    Minutes of Exercise per Session: Not on file   Stress:     Feeling of Stress : Not on file   Social Connections:     Frequency of Communication with Friends and Family: Not on file    Frequency of Social Gatherings with Friends and Family: Not on file    Attends [employer] Services: Not on file    Active Member of [address] [place] [address] or Organizations: Not on file    Attends Club or Organization Meetings: Not on file    Marital Status: Not on file   Intimate Partner Violence:     Fear of Current or Ex-Partner: Not on file    Emotionally Abused: Not on file    Physically Abused: Not on file    Sexually Abused: Not on file   Housing Stability:     Unable to Pay for Housing in the Last Year: Not on file    Number of [place] in the Last Year: Not on file    Unstable Housing in the Last Year: Not on file       Family History   Problem Relation Age of Onset    Cancer Mother         intestinal, skin    Heart Disease Father     Stroke Father          Review of Systems:   Review of Systems Constitutional: Positive for fatigue. Negative for chills, fever and unexpected weight change. HENT: Negative for congestion. Respiratory: Positive for cough (occasional--improved from prior) and shortness of breath (with prolonged/moderate exertion). Negative for chest tightness. Cardiovascular: Positive for leg swelling (R>L--improved since hospitalization). Negative for chest pain and palpitations. No orthopnea or PND   Gastrointestinal: Negative for abdominal distention, abdominal pain, blood in stool, nausea and vomiting. Genitourinary: Negative for difficulty urinating and hematuria. Musculoskeletal: Negative for arthralgias and myalgias. Skin: Negative for color change, pallor and rash. Neurological: Negative for dizziness, syncope and light-headedness. Psychiatric/Behavioral: Negative for agitation. Physical Examination:    /62 (Site: Left Upper Arm, Position: Sitting, Cuff Size: Large Adult)   Pulse 55   Resp 18   Ht 6' 4\" (1.93 m)   Wt 293 lb (132.9 kg)   SpO2 97%   BMI 35.67 kg/m²    Physical Exam  Constitutional:       General: He is not in acute distress. Appearance: He is obese. HENT:      Head: Normocephalic and atraumatic. Cardiovascular:      Rate and Rhythm: Regular rhythm. Bradycardia present. Pulmonary:      Effort: Pulmonary effort is normal. No respiratory distress. Breath sounds: No wheezing, rhonchi or rales. Abdominal:      Palpations: Abdomen is soft. Tenderness: There is no abdominal tenderness. Musculoskeletal:         General: Normal range of motion. Right lower leg: Edema (1+) present. Left lower leg: Edema (trace) present. Skin:     General: Skin is warm and dry. Neurological:      General: No focal deficit present. Mental Status: He is alert and oriented to person, place, and time. Cranial Nerves: No cranial nerve deficit.    Psychiatric:         Mood and Affect: Mood normal. Behavior: Behavior normal.         LABS:  CBC:   Lab Results   Component Value Date    WBC 5.6 04/07/2022    RBC 4.31 04/07/2022    HGB 11.7 04/07/2022    HCT 36.9 04/07/2022    MCV 85.7 04/07/2022    MCH 27.1 04/07/2022    MCHC 31.6 04/07/2022    RDW 17.6 04/07/2022     04/07/2022    MPV 10.2 07/11/2015     Lipids:  Lab Results   Component Value Date    CHOL 148 07/07/2021    CHOL 166 02/12/2020    CHOL 155 02/16/2018     Lab Results   Component Value Date    TRIG 213 (H) 07/07/2021    TRIG 225 (H) 02/12/2020    TRIG 215 (H) 02/16/2018     Lab Results   Component Value Date    HDL 39 (L) 07/07/2021    HDL 44 02/12/2020    HDL 43 02/16/2018     Lab Results   Component Value Date    LDLCALC 66 07/07/2021    LDLCALC 77 02/12/2020    LDLCALC 69 02/16/2018     No results found for: LABVLDL, VLDL  No results found for: CHOLHDLRATIO  CMP:    Lab Results   Component Value Date     04/07/2022    K 4.1 04/07/2022    CL 99 04/07/2022    CO2 24 04/07/2022    BUN 65 04/07/2022    CREATININE 1.70 04/07/2022    GFRAA 47.8 04/07/2022    LABGLOM 39.5 04/07/2022    GLUCOSE 96 04/07/2022    GLUCOSE 154 09/21/2021    PROT 7.0 04/01/2022    LABALBU 3.7 04/01/2022    CALCIUM 8.5 04/07/2022    BILITOT 0.6 04/01/2022    ALKPHOS 187 04/01/2022    AST 29 04/01/2022    ALT 24 04/01/2022     BMP:    Lab Results   Component Value Date     04/07/2022    K 4.1 04/07/2022    CL 99 04/07/2022    CO2 24 04/07/2022    BUN 65 04/07/2022    LABALBU 3.7 04/01/2022    CREATININE 1.70 04/07/2022    CALCIUM 8.5 04/07/2022    GFRAA 47.8 04/07/2022    LABGLOM 39.5 04/07/2022    GLUCOSE 96 04/07/2022    GLUCOSE 154 09/21/2021     Magnesium:    Lab Results   Component Value Date    MG 1.9 04/01/2022     TSH:  Lab Results   Component Value Date    TSH 1.190 05/07/2019     . result  No results for input(s): PROBNP in the last 72 hours. No results for input(s): INR in the last 72 hours.             Patient Active Problem List   Diagnosis    Hypertension    Diabetes mellitus ([facility]    Kidney stone    S/P CABG x 4    JAZZMINE (obstructive sleep apnea)    Morbid obesity ([facility])    Iron deficiency anemia    CAD (coronary artery disease)    BPH (benign prostatic hyperplasia)    Hyperlipidemia    Stage 3 chronic kidney disease    Type 2 diabetes mellitus with stage 3 chronic kidney disease, with long-term current use of insulin ([facility])    Left foot drop    PVD (peripheral vascular disease) ([facility])    Leg swelling       Assessment/Plan:     Diagnosis Orders   1. Acute systolic CHF (congestive heart failure), NYHA class 2 ([facility])  Basic Metabolic Panel    Brain Natriuretic Peptide    Compensated now. Continue current meds   2. Hx of CABG      in 2013 at [facility]. [facility] 4/4/22: severe native 3V CAD; patent LIMA to LAD, SVG jump graft to RI and distal circ was patent, SVG to PDA with mild to mod dz   3. Nonischemic cardiomyopathy ([facility]      wprsening LVF with EF 20% per cho 4/2/22 compared to prior EF 40-45% per echo in 2020. [facility] 4/4/22 with patent grafts   4. Hypertension, unspecified type      Stable. Continue current meds   5. Dyslipidemia      Continue Lipitor 40mg PO daily   6. Diabetes mellitus type 2 in obese ([facility])      On insulin. Management per PCP   7. Abnormal EKG      LBBB   8. Stage 3 chronic kidney disease, unspecified whether stage 3a or 3b CKD ([facility])      Repeat BMP today to re-evaluate. Consider referral to nephrology in future   9. Severe obesity (BMI 35.0-39. 9) with comorbidity ([facility]         -Maximize medical therapy--aspirin 81 mg p.o. daily, Coreg 6.25 mg p.o. twice daily, discontinue Lopressor as patient on Coreg, Entresto 24/26 mg p.o. twice daily, discontinue losartan as patient started on Entresto at hospital discharge, Lasix 40 mg p.o. twice daily, Lipitor 40 mg p.o. daily, sublingual nitroglycerin as needed for chest pain  -Consider addition of Jardiance in future to further optimize heart failure medications  -Heart failure appears compensated at this time.  -Check BMP today to reevaluate renal function electrolytes following recent medication adjustments including addition of Lasix and Entresto. Patient known to have baseline CKD with creatinine around 1.6  -Check proBNP today to reevaluate fluid status  -Cardiac/<2 gram sodium diet recommended  -Recommend 1500mL fluid restriction   -Weight loss recommended  -Instructed patient to weigh self daily every morning upon waking and keep log book of daily weights. Notify office if gaining more than 3 pounds in 48 hours. --Scale provided  -Recommend routine blood pressure monitoring at home. Advised patient check blood pressure twice daily in a.m. and p.m. prior to taking medications and record log of blood pressure trends to review at next office visit. --BP machine provided  Advised to record heart rate with each blood pressure reading  -Advised patient to notify office immediately if experiencing any progressive SOB, orthopnea, PND, LE edema or weight gain  -Educated patient on importance of fluid and salt restriction as well as lifestyle modification  -Scheduled to start cardiac rehab on 5/19/2022  -Maintain routine outpatient follow-up with general cardiologist, Dr. Dai Andrews  Echocardiogram 4/2/2022: Severely reduced LV systolic function with EF of 20%, global hypokinesis, mild 1+ mitral regurgitation  LHC 4/4/2022: Severe native three-vessel CAD, patent LIMA to LAD, SVG jump graft to RI and distal circumflex which was patent, SVG to PDA with mild to moderate disease and no focal stenosis for which medical therapy advised  **Plan to reevaluate LV function by repeat echocardiogram or MUGA scan in 3 months (July 2022) and if EF remains severely reduced at less than or equal to 35%, will need referral to electrophysiology for AICD evaluation if patient agreeable.   He does seem reluctant for ICD upon discussion at this time.  -Consider referral to nephrology in future for evaluation and management of CKD  -Maintain routine outpatient follow-up with PCP--has f/u with Dr. Reuben Kawasaki on 4/14/22  -F/U with CHF clinic in 4 weeks or sooner if needed        Counseling: The importance of daily weights, dietary sodium restriction, and contact with cardiology if weight is increased more than 3 lbs in any 48 hour period was stressed. The patient has been advised to contact us if theyexperience progressive SOB, orthopnea, PND or progressive edema.       > 40 minutes spent on chart review and face-to-face time with patient reviewing history, reviewing recent diagnostic testing, discussing medication adjustments, providing patient education and discussing ongoing plan of care

## 2022-04-13 NOTE — PATIENT INSTRUCTIONS
-STOP losartan 25mg PO daily--should not be taking with Entresto  -STOP Lopressor (metoprolol tartrate) 25mg PO twice daily--Should not be taking with with Coreg (carvedilol)    Check labs today (BMP and BNP)      -Check daily weight every morning and notify CHF clinic if gaining more than 3 pounds in 2 days    -Check blood pressure twice daily in a.m. and p.m. prior to taking medications and keep log of blood pressure trends to review at next office visit  Notify office if BP running low with  mmHg or below prior to taking medications  Notify office if BP running high with  mmHg or above or if DBP 90 mmHg or above  -Please record heart rate with each BP reading    -Recommend 2000 mg daily sodium restriction    -Recommend 1.5 to 2 liter (48 to 64 ounces) daily fluid restriction

## 2022-04-14 ENCOUNTER — CARE COORDINATION (OUTPATIENT)
Dept: CASE MANAGEMENT | Age: 77
End: 2022-04-14

## 2022-04-14 ENCOUNTER — OFFICE VISIT (OUTPATIENT)
Dept: FAMILY MEDICINE CLINIC | Age: 77
End: 2022-04-14
Payer: MEDICARE

## 2022-04-14 VITALS
OXYGEN SATURATION: 97 % | HEIGHT: 76 IN | SYSTOLIC BLOOD PRESSURE: 112 MMHG | HEART RATE: 54 BPM | DIASTOLIC BLOOD PRESSURE: 70 MMHG | WEIGHT: 294 LBS | TEMPERATURE: 97.4 F | BODY MASS INDEX: 35.8 KG/M2

## 2022-04-14 DIAGNOSIS — I48.91 ATRIAL FIBRILLATION, UNSPECIFIED TYPE (HCC): ICD-10-CM

## 2022-04-14 DIAGNOSIS — I73.9 PVD (PERIPHERAL VASCULAR DISEASE) (HCC): ICD-10-CM

## 2022-04-14 DIAGNOSIS — N18.30 TYPE 2 DIABETES MELLITUS WITH STAGE 3 CHRONIC KIDNEY DISEASE, WITH LONG-TERM CURRENT USE OF INSULIN, UNSPECIFIED WHETHER STAGE 3A OR 3B CKD (HCC): ICD-10-CM

## 2022-04-14 DIAGNOSIS — Z79.4 TYPE 2 DIABETES MELLITUS WITH STAGE 3 CHRONIC KIDNEY DISEASE, WITH LONG-TERM CURRENT USE OF INSULIN, UNSPECIFIED WHETHER STAGE 3A OR 3B CKD (HCC): ICD-10-CM

## 2022-04-14 DIAGNOSIS — I10 ESSENTIAL HYPERTENSION: ICD-10-CM

## 2022-04-14 DIAGNOSIS — E11.22 TYPE 2 DIABETES MELLITUS WITH STAGE 3 CHRONIC KIDNEY DISEASE, WITH LONG-TERM CURRENT USE OF INSULIN, UNSPECIFIED WHETHER STAGE 3A OR 3B CKD (HCC): ICD-10-CM

## 2022-04-14 DIAGNOSIS — I50.9 ACUTE ON CHRONIC CONGESTIVE HEART FAILURE, UNSPECIFIED HEART FAILURE TYPE (HCC): Primary | ICD-10-CM

## 2022-04-14 DIAGNOSIS — E66.01 MORBID OBESITY (HCC): ICD-10-CM

## 2022-04-14 DIAGNOSIS — G47.33 OSA (OBSTRUCTIVE SLEEP APNEA): ICD-10-CM

## 2022-04-14 DIAGNOSIS — I25.10 CORONARY ARTERY DISEASE INVOLVING NATIVE CORONARY ARTERY OF NATIVE HEART WITHOUT ANGINA PECTORIS: ICD-10-CM

## 2022-04-14 PROCEDURE — 99495 TRANSJ CARE MGMT MOD F2F 14D: CPT | Performed by: FAMILY MEDICINE

## 2022-04-14 NOTE — PROGRESS NOTES
Post-Discharge Transitional Care  Follow Up      Darrius Daily   YOB: 1945    Date of Office Visit:  4/14/2022  Date of Hospital Admission: 4/1/22  Date of Hospital Discharge: 4/7/22  Risk of hospital readmission (high >=14%. Medium >=10%) :Readmission Risk Score: 13.2 ( )       Care management risk score Rising risk (score 2-5) and Complex Care (Scores >=6): 3     Non face to face  following discharge, date last encounter closed (first attempt may have been earlier): 4/8/2022 10:23 AM    Call initiated 2 business days of discharge: Yes    ASSESSMENT/PLAN:   Acute on chronic congestive heart failure, unspecified heart failure type (HCC)  PVD (peripheral vascular disease) ([facility])  Atrial fibrillation, unspecified type ([facility] [address]  Type 2 diabetes mellitus with stage 3 chronic kidney disease, with long-term current use of insulin, unspecified whether stage 3a or 3b CKD ([address]  -     Microalbumin / Creatinine Urine Ratio; Future  -     Hemoglobin A1C; Future  Coronary artery disease involving native coronary artery of native heart without angina pectoris  Essential hypertension  Morbid obesity (HCC)  JAZZMINE (obstructive sleep apnea)      Medical Decision Making: moderate complexity  No follow-ups on file. On this date 4/14/2022 I have spent 20 minutes reviewing previous notes, test results and face to face with the patient discussing the diagnosis and importance of compliance with the treatment plan as well as documenting on the day of the visit. He starts cardiac rehab next month   Now on entresto and carvedilol  No more metoprolol or losartan  Reviewed meds  Answered questions  Doing much better at this time      Subjective:   HPI:  Follow up of Hospital problems/diagnosis(es): CHF exacerbation    Inpatient course: Discharge summary reviewed- see chart.   Hospital Course:   Kelli Dia a 78 y.o., Gladys decker past medical history of CAD (coronary artery disease), CKD (chronic kidney disease), Hypertension, Kidney stones, Left foot drop, Neuropathy, diabetic ([address] Osteoarthritis, S/P CABG (coronary artery bypass graft), and Type II or unspecified type diabetes mellitus without mention of complication, not stated as uncontrolled. that is hospitalized for acute CHF exacerbation.  Pt presented to ED with c/o worsening lower extremity edema x 3-4 days and orthopnea.  Pt is known to this cardiology service as he follows with Dr. Doc Bhat in office.  Most recent echo in 2020 with EF 40-45%.  Negative stress test in 2020.  Work up in the ED shows pro-BNP 6425, troponin 0.072, BUN 45, Cr 1.83, GFR 43.9. CXR shows a small R effusion.       On exam, pt has 4+ pitting edema to bilat lower ext.  Pt reports at home he has been unable to walk more than a few feet without feeling SOB.  States he was waking up in the middle of the night gasping for air.      Pt has been wearing compression stockings. He is negative 10L total since admission. States he is able to lay flat with no difficulties breathing. Denies CP. Pt does complain of minor, intermittent pain to right thigh from R groin insertion site. Insertion site is soft to palpation, minimal bruising. Today's labs reflect BUN 65, Cr 1.70, GFR 39.5     Pt with new cardiomyopathy, EF 20% per echo on 4/2/2022.  Will plan for OP follow up with CHF clinic and repeat echo in 3 months to re-eval LVEF              Procedures:   [facility] b/l coronary angio, grafts on 4/4/2022      Interval history/Current status:     CHF clinic note reviewed    Weight is down and stable    Wt Readings from Last 3 Encounters:   04/14/22 294 lb (133.4 kg)   04/13/22 293 lb (132.9 kg)   04/07/22 (!) 333 lb (151 kg)     Sugars have been ok       Lab Results   Component Value Date    LABA1C 7.7 (H) 11/19/2021     No results found for: EAG      Patient Active Problem List   Diagnosis    Hypertension    Diabetes mellitus ([address]    Kidney stone    S/P CABG x 4    JAZZMINE (obstructive sleep apnea)    Morbid obesity (HCC)    Iron deficiency anemia    CAD (coronary artery disease)    BPH (benign prostatic hyperplasia)    Hyperlipidemia    Stage 3 chronic kidney disease    Type 2 diabetes mellitus with stage 3 chronic kidney disease, with long-term current use of insulin (HCC)    Left foot drop    PVD (peripheral vascular disease) ([facility] [address]    Leg swelling       Medications listed as ordered at the time of discharge from hospital     Medication List          Accurate as of April 14, 2022 11:58 AM. If you have any questions, ask your nurse or doctor.             CONTINUE taking these medications    aspirin 81 MG tablet     atorvastatin 40 MG tablet  Commonly known as: LIPITOR  Take 1 tablet by mouth nightly     carvedilol 6.25 MG tablet  Commonly known as: COREG  Take 1 tablet by mouth 2 times daily (with meals)     famotidine 20 MG tablet  Commonly known as: PEPCID  Take 1 tablet by mouth daily     furosemide 40 MG tablet  Commonly known as: LASIX  Take 1 tablet by mouth 2 times daily     glimepiride 4 MG tablet  Commonly known as: AMARYL  Take 1 tablet by mouth 2 times daily     Handicap Placard Misc  by Does not apply route Exp 5 years     Lantus SoloStar 100 UNIT/ML injection pen  Generic drug: insulin glargine  Inject 30 Units into the skin 2 times daily     nitroGLYCERIN 0.4 MG SL tablet  Commonly known as: Nitrostat  Place 1 tablet under the tongue See Admin Instructions     sacubitril-valsartan 24-26 MG per tablet  Commonly known as: ENTRESTO  Take 1 tablet by mouth 2 times daily     tamsulosin 0.4 MG capsule  Commonly known as: FLOMAX  Take 1 capsule by mouth daily              Medications marked \"taking\" at this time  Outpatient Medications Marked as Taking for the 4/14/22 encounter (Office Visit) with Virgie Ross MD   Medication Sig Dispense Refill    carvedilol (COREG) 6.25 MG tablet Take 1 tablet by mouth 2 times daily (with meals) 60 tablet 3    sacubitril-valsartan (ENTRESTO) 24-26 MG per tablet Take 1 tablet by mouth 2 times daily 60 tablet 3    furosemide (LASIX) 40 MG tablet Take 1 tablet by mouth 2 times daily 90 tablet 3    atorvastatin (LIPITOR) 40 MG tablet Take 1 tablet by mouth nightly 90 tablet 3    glimepiride (AMARYL) 4 MG tablet Take 1 tablet by mouth 2 times daily 180 tablet 3    insulin glargine (LANTUS SOLOSTAR) 100 UNIT/ML injection pen Inject 30 Units into the skin 2 times daily 10 pen 5    tamsulosin (FLOMAX) 0.4 MG capsule Take 1 capsule by mouth daily 90 capsule 3    famotidine (PEPCID) 20 MG tablet Take 1 tablet by mouth daily 180 tablet 3    nitroGLYCERIN (NITROSTAT) 0.4 MG SL tablet Place 1 tablet under the tongue See Admin Instructions 25 tablet 1    Handicap Placard MISC by Does not apply route Exp 5 years 1 each 0    aspirin 81 MG tablet Take 81 mg by mouth daily. Medications patient taking as of now reconciled against medications ordered at time of hospital discharge: yes, but this was billed by CHF clinic    ROS  Improved DAUGHERTY/SOB  Denies palpitations/chest pain/f/c/n/v/weight gain/weight loss/abd pain      Objective:    /70 (Site: Right Upper Arm)   Pulse 54   Temp 97.4 °F (36.3 °C)   Ht 6' 4\" (1.93 m)   Wt 294 lb (133.4 kg)   SpO2 97%   BMI 35.79 kg/m²     Physical Exam:  /70 (Site: Right Upper Arm)   Pulse 54   Temp 97.4 °F (36.3 °C)   Ht 6' 4\" (1.93 m)   Wt 294 lb (133.4 kg)   SpO2 97%   BMI 35.79 kg/m²     Gen: Well, NAD, Alert, Oriented x 3   HEENT: EOMI, eyes clear, MMM  Skin: without rash or jaundice  Neck: no significant lymphadenopathy or thyromegaly  Lungs: CTA B w/out Rales/Wheezes/Rhonchi, Good respiratory effort   Heart: RRR, S1S2, w/out M/R/G, non-displaced PMI   Ext: No C/C/E Bilaterally. Neuro: Neurovascularly intact w/ Sensory/Motor intact UE/LE Bilaterally. An electronic signature was used to authenticate this note.   --Claribel Donovan MD

## 2022-04-14 NOTE — CARE COORDINATION
[facility] Transitions Follow Up Call    2022    Patient: Ragini Arnett  Patient :    MRN: [id number]  Reason for Admission: Leg swelling  Discharge Date: 22 RARS: Readmission Risk Score: 13.2 ( )    Attempted to contact patient today 22 for TCM/hospital discharge (low risk) follow up sub call. Left message on both home/mobile numbers requesting a return call back to CTN and provided contact information. Noted patient completed HFU appt today with Dr. Elisabeth Scott (PCP) today and with cardiology yesterday. CTN will continue to follow for Care Transition.       Tony Graham, APRN

## 2022-04-14 NOTE — CARE COORDINATION
Received a voicemail from patient who states he seen cardio yesterday and PCP today and got BP machine which is \"fine\". States weight is down 28 lbs since discharge and denies any complaint or needs. CTN will continue to follow.

## 2022-04-21 ENCOUNTER — CARE COORDINATION (OUTPATIENT)
Dept: CASE MANAGEMENT | Age: 77
End: 2022-04-21

## 2022-04-21 NOTE — CARE COORDINATION
No  Do you currently have any active services?: Yes  Are you currently active with any services?: Outpatient/Community Services  Do you have any needs or concerns that I can assist you with?: No  Identified Barriers: Lack of Motivation  Care Transitions Interventions    Registered Dietician: Declined    Other Interventions:         Spoke with patient today 4/21/22 for TCM/hospital discharge follow up sub call. Patient states he continues doing well and offers no complaints at this time. Denies any shortness of breath, chest pain, chest discomfort, LE swelling or sudden weight gain. States weight today is 285 lbs. States he is compliant with taking Lasix and following a low sodium diet. Patient states no issues with right groin site from cardiac catheterization. Confirmed with patient he completed follow up with FORTINO Becker at [address] on 4/13/22 and is scheduled to begin cardio rehab on 5/19/22. States not checking BG routinely despite being on Glimepiride and Lantus therapy. States follow low sugar/low carbohydrate diet. Reiterated DM/CHF zone tools and knowing when to seek medical attention. Denies any needs or concerns at this time. CTN will continue to follow for Care Transition. Follow Up  Future Appointments   Date Time Provider Jacob Bautista   5/11/2022  1:00 PM Lien Coy, [address]   5/19/2022  1:00 PM SCHEDULE, [address] [facility]     CTN provided contact information for future needs.      SULAIMAN Oneill

## 2022-04-28 ENCOUNTER — CARE COORDINATION (OUTPATIENT)
Dept: CASE MANAGEMENT | Age: 77
End: 2022-04-28

## 2022-04-28 NOTE — CARE COORDINATION
[facility] Transitions Follow Up Call    2022    Patient: Brandi Palma  Patient :    MRN: [id number]  Reason for Admission: Leg swelling  Discharge Date: 22 RARS: Readmission Risk Score: 13.2 ( )         Spoke with: [address] Transitions Subsequent and Final Call    Subsequent and Final Calls  Do you have any ongoing symptoms?: Yes  Onset of Patient-reported symptoms: In the past 7 days  Patient-reported symptoms: Other  Have your medications changed?: No  Do you have any questions related to your medications?: No  Do you currently have any active services?: Yes  Are you currently active with any services?: Outpatient/Community Services  Do you have any needs or concerns that I can assist you with?: No  Identified Barriers: None  Care Transitions Interventions    Registered Dietician: Declined    Other Interventions:         Spoke with patient today 22 for TCM/hospital discharge (low risk) follow up sub call. Patient states having a an open area to left great toe from a scab that fell off from shoe rubbing and is scheduled to see podiatrist tomorrow. Denies any pain, discomfort or difficulty walking on foot. Denies any leg swelling today. States weight today is 281 which is a decrease from last week. States he's compliant with taking Lasix and following a low sodium diet. Denies any shortness of breath, chest pain, chest discomfort, nausea, vomiting, chills or fever. Confirmed with patient he is scheduled to follow up with CHF clinic again on 22 and start cardio rehab on 22. Denies any needs or concerns at this time. CTN will continue to follow.      Follow Up  Future Appointments   Date Time Provider Jacob Bautista   2022  1:00 PM Donaldo Paz [facility] EMERGENCY [facility] AT [place]   2022  1:00 PM SCHEDULE, [address], APRN

## 2022-05-02 RX ORDER — CARVEDILOL 6.25 MG/1
6.25 TABLET ORAL 2 TIMES DAILY WITH MEALS
Qty: 180 TABLET | Refills: 1 | Status: SHIPPED | OUTPATIENT
Start: 2022-05-02 | End: 2022-05-11 | Stop reason: SDUPTHER

## 2022-05-02 NOTE — TELEPHONE ENCOUNTER
Please approve or deny this refill request. The order is pended. Thank you. LOV 4/13/2022    Next Visit Date:  Future Appointments   Date Time Provider Jacob Bautista   5/11/2022  1:00 PM [address], [place] [address]   5/19/2022  1:00 PM SCHEDULE, [address] [facility]       Refused to make an appt with Dr Charles Vital.  LOV 7/2021

## 2022-05-11 ENCOUNTER — OFFICE VISIT (OUTPATIENT)
Dept: CARDIOLOGY CLINIC | Age: 77
End: 2022-05-11
Payer: MEDICARE

## 2022-05-11 VITALS
BODY MASS INDEX: 34.83 KG/M2 | RESPIRATION RATE: 18 BRPM | HEIGHT: 76 IN | SYSTOLIC BLOOD PRESSURE: 131 MMHG | DIASTOLIC BLOOD PRESSURE: 76 MMHG | HEART RATE: 53 BPM | OXYGEN SATURATION: 99 % | WEIGHT: 286 LBS

## 2022-05-11 DIAGNOSIS — Z95.1 HX OF CABG: ICD-10-CM

## 2022-05-11 DIAGNOSIS — E66.9 DIABETES MELLITUS TYPE 2 IN OBESE (HCC): ICD-10-CM

## 2022-05-11 DIAGNOSIS — E11.69 DIABETES MELLITUS TYPE 2 IN OBESE (HCC): ICD-10-CM

## 2022-05-11 DIAGNOSIS — I50.21 ACUTE SYSTOLIC CHF (CONGESTIVE HEART FAILURE), NYHA CLASS 2 (HCC): ICD-10-CM

## 2022-05-11 DIAGNOSIS — R94.31 ABNORMAL EKG: ICD-10-CM

## 2022-05-11 DIAGNOSIS — E66.01 SEVERE OBESITY (BMI 35.0-39.9) WITH COMORBIDITY (HCC): ICD-10-CM

## 2022-05-11 DIAGNOSIS — N18.30 STAGE 3 CHRONIC KIDNEY DISEASE, UNSPECIFIED WHETHER STAGE 3A OR 3B CKD (HCC): ICD-10-CM

## 2022-05-11 DIAGNOSIS — E78.5 DYSLIPIDEMIA: ICD-10-CM

## 2022-05-11 DIAGNOSIS — I42.8 NONISCHEMIC CARDIOMYOPATHY (HCC): ICD-10-CM

## 2022-05-11 DIAGNOSIS — I10 HYPERTENSION, UNSPECIFIED TYPE: ICD-10-CM

## 2022-05-11 PROCEDURE — 4040F PNEUMOC VAC/ADMIN/RCVD: CPT | Performed by: PHYSICIAN ASSISTANT

## 2022-05-11 PROCEDURE — 3046F HEMOGLOBIN A1C LEVEL >9.0%: CPT | Performed by: PHYSICIAN ASSISTANT

## 2022-05-11 PROCEDURE — 1036F TOBACCO NON-USER: CPT | Performed by: PHYSICIAN ASSISTANT

## 2022-05-11 PROCEDURE — 2022F DILAT RTA XM EVC RTNOPTHY: CPT | Performed by: PHYSICIAN ASSISTANT

## 2022-05-11 PROCEDURE — G8427 DOCREV CUR MEDS BY ELIG CLIN: HCPCS | Performed by: PHYSICIAN ASSISTANT

## 2022-05-11 PROCEDURE — 99214 OFFICE O/P EST MOD 30 MIN: CPT | Performed by: PHYSICIAN ASSISTANT

## 2022-05-11 PROCEDURE — G8417 CALC BMI ABV UP PARAM F/U: HCPCS | Performed by: PHYSICIAN ASSISTANT

## 2022-05-11 PROCEDURE — 1123F ACP DISCUSS/DSCN MKR DOCD: CPT | Performed by: PHYSICIAN ASSISTANT

## 2022-05-11 PROCEDURE — 3017F COLORECTAL CA SCREEN DOC REV: CPT | Performed by: PHYSICIAN ASSISTANT

## 2022-05-11 RX ORDER — FUROSEMIDE 40 MG/1
40 TABLET ORAL 2 TIMES DAILY
Qty: 180 TABLET | Refills: 2 | Status: SHIPPED | OUTPATIENT
Start: 2022-05-11

## 2022-05-11 RX ORDER — HYDRALAZINE HYDROCHLORIDE 25 MG/1
25 TABLET, FILM COATED ORAL 2 TIMES DAILY
Qty: 180 TABLET | Refills: 3 | Status: SHIPPED | OUTPATIENT
Start: 2022-05-11 | End: 2022-11-04 | Stop reason: ALTCHOICE

## 2022-05-11 RX ORDER — CARVEDILOL 6.25 MG/1
6.25 TABLET ORAL 2 TIMES DAILY WITH MEALS
Qty: 180 TABLET | Refills: 2 | Status: SHIPPED | OUTPATIENT
Start: 2022-05-11

## 2022-05-11 ASSESSMENT — ENCOUNTER SYMPTOMS
COLOR CHANGE: 0
BLOOD IN STOOL: 0
COUGH: 0
CHEST TIGHTNESS: 0
ABDOMINAL PAIN: 0
ABDOMINAL DISTENTION: 0
SHORTNESS OF BREATH: 1
VOMITING: 0
NAUSEA: 0

## 2022-05-11 NOTE — PROGRESS NOTES
Patient: Rosas Owen  YOB: 1945  MRN: [id number]    Chief Complaint:  Chief Complaint   Patient presents with    Congestive Heart Failure     systolic    Shortness of Breath     with exertion    Fatigue         Subjective/HPI       5/11/22: Here for follow-up of systolic congestive heart failure. Was seen for initial CHF clinic evaluation on 4/13/2022 at which time his losartan was discontinued as he was already on Entresto since hospital discharge. He has been doing very well since last office visit with no new or worsening heart failure symptoms. He is compliant with all of his medications and with low-sodium diet and fluid restriction. He does experience shortness of breath with more moderate exertion but this is unchanged. He denies any shortness of breath at rest or with normal daily activities. He reports 2 episodes of orthopnea since his last visit but denies any persistent or orthopnea complaints. He denies chest pain, palpitations, diaphoresis, paroxysmal nocturnal dyspnea, worsening lower extremity edema, dizziness, lightheadedness, syncope, fever or chills. He is scheduled to start cardiac rehab next week. He is checking daily weights and routine blood pressures at home. Typically blood pressure has been mildly elevated with systolic in the 432Z to 715R range with diastolic in the 96L to 52W range. Heart rate is typically in the 60s at home. Most recent labs reviewed and documented below. BMP 4/13/2022: Sodium 139, potassium 4.8, chloride 101, total CO2 23, BUN elevated at 44, creatinine elevated 1.61, GFR low at 42.1, glucose 165. --Renal function stable when compared to prior. Baseline creatinine appears to be around 1.7 previously  proBNP on 4/13/2022: 3628 compared to 6425 on 4/1/2022. Vital signs stable in office today. Blood pressure 131/76, heart rate bradycardic at 53, pulse ox 99% on room air.   Weight is 286 pounds compared to 293 pounds at last office visit on 4/13/2022.      4/13/22 (CHF clinic visit #1): This is a very pleasant 68-year-old  male with past medical history significant for coronary artery disease status post CABG x4 in 2013 at [facility], hypertension, dyslipidemia, diabetes, CKD and history of cardiomyopathy with EF of 40 to 45% per prior echo from 11/9/2020 who presents for initial CHF clinic evaluation following recent hospital admission for acute decompensated systolic heart failure and worsening cardiomyopathy. He originally presented to [facility] ER on 4/1/2022 with complaints of shortness of breath, orthopnea and bilateral lower extremity edema for approximately 1 week prior to presentation. proBNP was elevated at 6425. Initial troponin mildly elevated 0.072. Baseline CKD with creatinine on admission elevated 1.83. Chest x-ray on 4/1/2022 showed small right pleural effusion. He was treated with 40 mg Lasix IV in the ER and was admitted for further evaluation. He was diuresed aggressively with IV Lasix and experienced approximately 10 L negative fluid balance during the course of his admission. Echocardiogram completed on 4/2/2022 revealed severely reduced LV systolic function with EF of 20%, mild mitral valve regurgitation. Given worsening cardiomyopathy with EF now 20% compared to prior EF of 40 to 45% per echo in 2020 as well as mild troponin elevation and acute heart failure, cardiac catheterization recommended to rule out underlying progression of CAD. He underwent cardiac catheterization on 4/4/2022 which revealed severe native three-vessel CAD with patent LIMA to LAD, SVG jump graft to RI and distal circumflex which was patent, SVG to PDA with mild to moderate disease and no focal stenosis for which medical therapy advised. His symptoms improved significantly during the course of his admission. He was also prescribed compression stockings to help with lower extremity edema.   He was eventually discharged home in stable condition on 4/7/2022. Since discharge, patient states he is doing very well overall with significant improvement in bilateral lower extremity edema as well as shortness of breath complaints. He complains of shortness of breath with moderate exertion but this is definitely improved since hospital admission. His orthopnea has resolved and he is now able to lie flat in bed. He denies chest pain, palpitations, diaphoresis, paroxysmal nocturnal dyspnea, orthopnea, dizziness, lightheadedness, syncope, fever or chills. He is compliant with all of his medications. I reviewed all of patient's medications with him and it appears that he was recently prescribed carvedilol but was previously taking metoprolol and is currently taking both at home. He has been advised to stop taking metoprolol at this time. Also it appears patient was started on Entresto but was previously taking losartan and again has continued taking both medications. He has been advised to stop losartan at this time as well. He was educated at length regarding low-sodium diet under 2000 mg a day as well as fluid restriction of approximately 1500 mL daily. He is scheduled to start cardiac rehab on 5/19/2022. Most recent labs reviewed and documented below. BMP on 4/7/2022: Sodium 136, potassium 4.1, chloride 99, total CO2 24, BUN elevated 65, creatinine elevated 1.70, GFR low at 39.5, glucose 96. --On review of prior labs, it appears the patient's baseline creatinine is around 1.6 dating back to at least 2020  CBC on 4/7/2022: WBC 5.6, hemoglobin 11.7, hematocrit 36.9, platelets 776  proBNP on 4/1/2022: 6425. Hemoglobin A1c on 11/19/2021: Elevated at 7.7      Most recent diagnostic testing reviewed and documented below.     EKG 4/1/22: SR 74, 1st degree AVB, LBBB, T wave inversion lead I and aVL, poor R wave progression V2-V6, QTc 503ms    Echocardiogram 4/2/22:   Conclusions   Summary   Left ventricular ejection fraction is visually estimated at 20%. global   hypokinesis. Left ventricular size is moderately increased . Pseudonormal filling pattern noted. Normal right ventricular chamber size and function. The left atrium is Mildly dilated. Mild (1+) mitral regurgitation is present. No evidence of aortic valve regurgitation . No evidence of aortic valve stenosis. Signature   ----------------------------------------------------------------   Electronically signed by Amie Rivero DO(Interpreting   physician) on 04/02/2022 04:21 PM   ----------------------------------------------------------------    C 4/4/22:  Procedure(s):  LHC, b/l coronary angio, grafts  Pre-operative Diagnosis:  New CMP  H&P Status: Completed and reviewed.      Post-operative Diagnosis:    LV LVEDP=23mmhg  LM normal   % mid  % prox  RCA severe disease, 100% distal     LIMA to LAD patent  SVG jump graft to R. I and distal CX patent  SVG to PDA mild to mod disease, no focal stenosis, patent      Findings:  See full report  Complications:  none     Primary Proceduralist:   Dr.Wes Sorto DO     Plan  Max med rx  rfm  Recheck LVF in 3 months. ? AICD in future. Vital signs stable in office today. Blood pressure 110/62, heart rate 55, pulse ox 97% on room air. Weight is 293 pounds. --Patient reports his weight in the hospital was around 321 pounds        PMHx:  Worsening cardiomyopathy EF 20% per echo 4/2/22  Hx CAD s/p CABG--9 years ago at [facility]  HTN  Dyslipidemia  CKD  Spinal stenosis  DM--on insulin      PSHx:  Hx CABG x4--9 years ago at [facility]  Left TKA  Right forearm sx  Tonsillectomy  Lumbar spine surgery x 4--most recently 9/2021      Social Hx:  Non-smoker  No alcohol  No drugs    Family Hx:  Father with MI in his 62s  Mother with cancer  Brother and sister passing from cancer    Allergies   Allergen Reactions    Dye [Iodides]        Current Outpatient Medications   Medication Sig Dispense Refill    sacubitril-valsartan (ENTRESTO) 24-26 MG per tablet Take 1 tablet by mouth 2 times daily 180 tablet 2    carvedilol (COREG) 6.25 MG tablet Take 1 tablet by mouth 2 times daily (with meals) 180 tablet 2    furosemide (LASIX) 40 MG tablet Take 1 tablet by mouth 2 times daily 180 tablet 2    hydrALAZINE (APRESOLINE) 25 MG tablet Take 1 tablet by mouth in the morning and at bedtime 180 tablet 3    atorvastatin (LIPITOR) 40 MG tablet Take 1 tablet by mouth nightly 90 tablet 3    glimepiride (AMARYL) 4 MG tablet Take 1 tablet by mouth 2 times daily 180 tablet 3    insulin glargine (LANTUS SOLOSTAR) 100 UNIT/ML injection pen Inject 30 Units into the skin 2 times daily 10 pen 5    tamsulosin (FLOMAX) 0.4 MG capsule Take 1 capsule by mouth daily 90 capsule 3    famotidine (PEPCID) 20 MG tablet Take 1 tablet by mouth daily 180 tablet 3    nitroGLYCERIN (NITROSTAT) 0.4 MG SL tablet Place 1 tablet under the tongue See Admin Instructions 25 tablet 1    Handicap Placard MISC by Does not apply route Exp 5 years 1 each 0    aspirin 81 MG tablet Take 81 mg by mouth daily. No current facility-administered medications for this visit. Past Medical History:   Diagnosis Date    CAD (coronary artery disease)     Dr. Enrico Avery, Dr. Osmel Hahn CKD (chronic kidney disease)     Hypertension     Kidney stones     Left foot drop     Neuropathy, diabetic (HCC)     mild    Osteoarthritis     hip, knee    S/P CABG (coronary artery bypass graft)     Type II or unspecified type diabetes mellitus without mention of complication, not stated as uncontrolled        Past Surgical History:   Procedure Laterality Date    BACK SURGERY N/A     BICEPS TENDON REPAIR  01/01/1997    CARDIAC CATHETERIZATION  06/05/2013    CORONARY ARTERY BYPASS GRAFT  06/07/2013    DR. PERALES    KNEE SURGERY  01/01/2006    left replacement    NECK SURGERY  2020    ROTATOR CUFF REPAIR  01/01/1996    TONSILLECTOMY  01/01/1966       Social History     Socioeconomic History    Marital status:      Spouse name: None    Number of children: None    Years of education: None    Highest education level: None   Occupational History    None   Tobacco Use    Smoking status: Never Smoker    Smokeless tobacco: Never Used   Substance and Sexual Activity    Alcohol use: No    Drug use: None    Sexual activity: None   Other Topics Concern    None   Social History Narrative    None     Social Determinants of Health     Financial Resource Strain: Low Risk     Difficulty of Paying Living Expenses: Not hard at all   Food Insecurity: No Food Insecurity    Worried About Running Out of Food in the Last Year: Never true    [address] in the Last Year: Never true   Transportation Needs:     Lack of Transportation (Medical): Not on file    Lack of Transportation (Non-Medical):  Not on file   Physical Activity:     Days of Exercise per Week: Not on file    Minutes of Exercise per Session: Not on file   Stress:     Feeling of Stress : Not on file   Social Connections:     Frequency of Communication with Friends and Family: Not on file    Frequency of Social Gatherings with Friends and Family: Not on file    Attends Hoahaoism Services: Not on file    Active Member of [address] [place] [address] or Organizations: Not on file    Attends Club or Organization Meetings: Not on file    Marital Status: Not on file   Intimate Partner Violence:     Fear of Current or Ex-Partner: Not on file    Emotionally Abused: Not on file    Physically Abused: Not on file    Sexually Abused: Not on file   Housing Stability:     Unable to Pay for Housing in the Last Year: Not on file    Number of [place] in the Last Year: Not on file    Unstable Housing in the Last Year: Not on file       Family History   Problem Relation Age of Onset    Cancer Mother         intestinal, skin    Heart Disease Father     Stroke Father          Review of Systems:   Review of Systems   Constitutional: Positive for fatigue (improving). Negative for chills, fever and unexpected weight change. HENT: Negative for congestion. Respiratory: Positive for shortness of breath (with prolonged/moderate exertion). Negative for cough and chest tightness. Cardiovascular: Negative for chest pain, palpitations and leg swelling. Two isolated episodes of orthopnea but not persistent; no PND   Gastrointestinal: Negative for abdominal distention, abdominal pain, blood in stool, nausea and vomiting. Genitourinary: Negative for difficulty urinating and hematuria. Musculoskeletal: Negative for arthralgias and myalgias. Skin: Negative for color change, pallor and rash. Neurological: Negative for dizziness, syncope and light-headedness. Psychiatric/Behavioral: Negative for agitation. Physical Examination:    /76 (Site: Left Upper Arm, Position: Sitting, Cuff Size: Large Adult)   Pulse 53   Resp 18   Ht 6' 4\" (1.93 m)   Wt 286 lb (129.7 kg)   SpO2 99%   BMI 34.81 kg/m²    Physical Exam  Constitutional:       General: He is not in acute distress. Appearance: He is obese. HENT:      Head: Normocephalic and atraumatic. Cardiovascular:      Rate and Rhythm: Regular rhythm. Bradycardia present. Pulmonary:      Effort: Pulmonary effort is normal. No respiratory distress. Breath sounds: No wheezing, rhonchi or rales. Abdominal:      Palpations: Abdomen is soft. Tenderness: There is no abdominal tenderness. Musculoskeletal:         General: Normal range of motion. Right lower leg: Edema (trace) present. Left lower leg: No edema. Skin:     General: Skin is warm and dry. Neurological:      General: No focal deficit present. Mental Status: He is alert and oriented to person, place, and time. Cranial Nerves: No cranial nerve deficit.    Psychiatric:         Mood and Affect: Mood normal.         Behavior: Behavior normal.         LABS:  CBC:   Lab Results   Component Value Date    WBC 5.6 04/07/2022    RBC 4.31 04/07/2022    HGB 11.7 04/07/2022    HCT 36.9 04/07/2022    MCV 85.7 04/07/2022    MCH 27.1 04/07/2022    MCHC 31.6 04/07/2022    RDW 17.6 04/07/2022     04/07/2022    MPV 10.2 07/11/2015     Lipids:  Lab Results   Component Value Date    CHOL 148 07/07/2021    CHOL 166 02/12/2020    CHOL 155 02/16/2018     Lab Results   Component Value Date    TRIG 213 (H) 07/07/2021    TRIG 225 (H) 02/12/2020    TRIG 215 (H) 02/16/2018     Lab Results   Component Value Date    HDL 39 (L) 07/07/2021    HDL 44 02/12/2020    HDL 43 02/16/2018     Lab Results   Component Value Date    LDLCALC 66 07/07/2021    LDLCALC 77 02/12/2020    LDLCALC 69 02/16/2018     No results found for: LABVLDL, VLDL  No results found for: CHOLHDLRATIO  CMP:    Lab Results   Component Value Date     04/13/2022    K 4.8 04/13/2022     04/13/2022    CO2 23 04/13/2022    BUN 44 04/13/2022    CREATININE 1.61 04/13/2022    GFRAA 50.9 04/13/2022    LABGLOM 42.1 04/13/2022    GLUCOSE 165 04/13/2022    GLUCOSE 154 09/21/2021    PROT 7.0 04/01/2022    LABALBU 3.7 04/01/2022    CALCIUM 8.9 04/13/2022    BILITOT 0.6 04/01/2022    ALKPHOS 187 04/01/2022    AST 29 04/01/2022    ALT 24 04/01/2022     BMP:    Lab Results   Component Value Date     04/13/2022    K 4.8 04/13/2022     04/13/2022    CO2 23 04/13/2022    BUN 44 04/13/2022    LABALBU 3.7 04/01/2022    CREATININE 1.61 04/13/2022    CALCIUM 8.9 04/13/2022    GFRAA 50.9 04/13/2022    LABGLOM 42.1 04/13/2022    GLUCOSE 165 04/13/2022    GLUCOSE 154 09/21/2021     Magnesium:    Lab Results   Component Value Date    MG 1.9 04/01/2022     TSH:  Lab Results   Component Value Date    TSH 1.190 05/07/2019     . result  No results for input(s): PROBNP in the last 72 hours. No results for input(s): INR in the last 72 hours.             Patient Active Problem List   Diagnosis    Hypertension    Diabetes mellitus ([facility]    Kidney stone    S/P CABG x 4  JAZZMINE (obstructive sleep apnea)    Morbid obesity ([facility])    Iron deficiency anemia    CAD (coronary artery disease)    BPH (benign prostatic hyperplasia)    Hyperlipidemia    Stage 3 chronic kidney disease    Type 2 diabetes mellitus with stage 3 chronic kidney disease, with long-term current use of insulin ([facility])    Left foot drop    PVD (peripheral vascular disease) ([facility])    Leg swelling       Assessment/Plan:     Diagnosis Orders   1. Acute systolic CHF (congestive heart failure), NYHA class 2 ([facility]      Compensated currently. Continue current meds   2. Hx of CABG      [facility] 4/4/22: patent LIMA to LAD/SVG jump graft to R. I and distal circ/SVG to PDA. No angina   3. Nonischemic cardiomyopathy ([facility]  Echo limited    EF 20% per echo 4/2/22 compared to EF 40-45% on 11/9/20. Repeat limited echo in 7/2022 to re-evaluate LVF   4. Hypertension, unspecified type      Add hydralazine 25mg PO BID for improved BP management   5. Dyslipidemia      Continue Lipitor   6. Diabetes mellitus type 2 in obese ([facility])      on Amaryl and lantus. Management per PCP   7. Stage 3 chronic kidney disease, unspecified whether stage 3a or 3b CKD ([facility]      stable per BMP on 4/13/22. Consider referral to nephro in future if worsening decline in renal function   8. Severe obesity (BMI 35.0-39. 9) with comorbidity ([facility]      Weight loss recommended   9. Abnormal EKG         -Maximize medical therapy--aspirin 81 mg p.o. daily, Coreg 6.25 mg p.o. twice daily, Entresto 24/26 mg p.o. twice daily, Lasix 40 mg p.o. twice daily, Lipitor 40 mg p.o. daily, sublingual nitroglycerin as needed for chest pain, add hydralazine 25 mg p.o. twice daily for improved BP management as patient having mildly elevated blood pressures on a.m. BP monitoring  -Consider addition of Jardiance in future to further optimize heart failure medications  -Heart failure appears compensated at this time. -BMP from 4/13/2022 reviewed. Renal function electrolytes stable. Patient with baseline CKD with prior baseline creatinine around 1.7. Creatinine on 4/13/2022 was 1.6.  -Consider repeat BMP at next office visit in 2 months if not completed prior  -Cardiac/<2 gram sodium diet recommended  -Recommend 1500mL fluid restriction   -Weight loss recommended  -Instructed patient to weigh self daily every morning upon waking and keep log book of daily weights. Notify office if gaining more than 3 pounds in 48 hours. -Recommend routine blood pressure monitoring at home. Advised patient check blood pressure twice daily in a.m. and p.m. prior to taking medications and record log of blood pressure trends to review at next office visit. --BP log reviewed and blood pressure mildly elevated with systolic in the 465A to 274D range. Advised to record heart rate with each blood pressure reading  -Advised patient to notify office immediately if experiencing any progressive SOB, orthopnea, PND, LE edema or weight gain  -Educated patient on importance of fluid and salt restriction as well as lifestyle modification  -Scheduled to start cardiac rehab on 5/19/2022  -Maintain routine outpatient follow-up with general cardiologist, Dr. Torres Re  **Limited echocardiogram ordered to be completed in first week of July 2020 to reevaluate LV function. If EF remains severely reduced at less than or equal to 35%, will need referral to electrophysiology for AICD evaluation if patient agreeable. --Patient seems reluctant to consider ICD at time of discussion today  Echocardiogram 4/2/2022: Severely reduced LV systolic function with EF of 20%, global hypokinesis, mild 1+ mitral regurgitation  [facility] 4/4/2022: Severe native three-vessel CAD, patent LIMA to LAD, SVG jump graft to RI and distal circumflex which was patent, SVG to PDA with mild to moderate disease and no focal stenosis for which medical therapy advised  -Consider referral to nephrology in future for evaluation and management of CKD if further decline in renal function in future  -Maintain routine outpatient follow-up with PCP, Dr. Reveles [facility]  -F/U with CHF clinic in 2 months or sooner if needed        Counseling: The importance of daily weights, dietary sodium restriction, and contact with cardiology if weight is increased more than 3 lbs in any 48 hour period was stressed. The patient has been advised to contact us if theyexperience progressive SOB, orthopnea, PND or progressive edema.

## 2022-05-19 ENCOUNTER — HOSPITAL ENCOUNTER (OUTPATIENT)
Dept: CARDIAC REHAB | Age: 77
Setting detail: THERAPIES SERIES
Discharge: HOME | End: 2022-05-19
Payer: MEDICARE

## 2022-05-19 VITALS — HEIGHT: 76 IN | OXYGEN SATURATION: 98 % | RESPIRATION RATE: 16 BRPM | BODY MASS INDEX: 34.83 KG/M2 | WEIGHT: 286 LBS

## 2022-05-19 PROCEDURE — 93798 PHYS/QHP OP CAR RHAB W/ECG: CPT

## 2022-05-19 ASSESSMENT — EJECTION FRACTION
EF_VALUE: 20
EF_VALUE: 20

## 2022-05-19 ASSESSMENT — NEW YORK HEART ASSOCIATION (NYHA) CLASSIFICATION: NYHA FUNCTIONAL CLASS: NO NYHA CLASS OR UNABLE TO DETERMINE

## 2022-05-19 ASSESSMENT — EXERCISE STRESS TEST
PEAK_HR: 76
PEAK_RPE: 12
PEAK_BP: 142/70

## 2022-05-19 ASSESSMENT — PATIENT HEALTH QUESTIONNAIRE - PHQ9
SUM OF ALL RESPONSES TO PHQ QUESTIONS 1-9: 0
SUM OF ALL RESPONSES TO PHQ QUESTIONS 1-9: 0
1. LITTLE INTEREST OR PLEASURE IN DOING THINGS: 0
SUM OF ALL RESPONSES TO PHQ QUESTIONS 1-9: 0
SUM OF ALL RESPONSES TO PHQ QUESTIONS 1-9: 0
SUM OF ALL RESPONSES TO PHQ9 QUESTIONS 1 & 2: 0
2. FEELING DOWN, DEPRESSED OR HOPELESS: 0

## 2022-05-19 NOTE — TELEPHONE ENCOUNTER
Patient asking for a script for a Rolator. And would like script mailed to him. Address in chart current - confirmed. Pt ph. [phone]    Please advise.      Lov: 4/14/22

## 2022-05-20 ENCOUNTER — APPOINTMENT (OUTPATIENT)
Dept: CARDIAC REHAB | Age: 77
End: 2022-05-20
Payer: MEDICARE

## 2022-05-20 ENCOUNTER — HOSPITAL ENCOUNTER (OUTPATIENT)
Dept: CARDIAC REHAB | Age: 77
Setting detail: THERAPIES SERIES
Discharge: HOME | End: 2022-05-20
Payer: MEDICARE

## 2022-05-20 PROCEDURE — 93798 PHYS/QHP OP CAR RHAB W/ECG: CPT

## 2022-05-20 NOTE — CARDIO/PULMONARY
COVID Screening completed. Patient returns to CR for 2nd session. Patient complains of general soreness from 1st session. Patient reoriented to gym, machine safety, and s/s to report. Patient denies changes to PMH or medications. Patient increases walking duration well.  Electronically signed by Adina Magana RN on 5/20/2022 at 3:26 PM

## 2022-05-23 ENCOUNTER — HOSPITAL ENCOUNTER (OUTPATIENT)
Dept: CARDIAC REHAB | Age: 77
Setting detail: THERAPIES SERIES
Discharge: HOME | End: 2022-05-23
Payer: MEDICARE

## 2022-05-23 ENCOUNTER — APPOINTMENT (OUTPATIENT)
Dept: CARDIAC REHAB | Age: 77
End: 2022-05-23
Payer: MEDICARE

## 2022-05-23 PROCEDURE — 93798 PHYS/QHP OP CAR RHAB W/ECG: CPT

## 2022-05-23 NOTE — CARDIO/PULMONARY
COVID Screening completed. Patient denies complaints, no changes to PMH or medications. Patients second session at CR. Patient tolerates exercise well.  Electronically signed by Toi Oviedo RN on 5/23/2022 at 4:56 PM

## 2022-05-25 ENCOUNTER — APPOINTMENT (OUTPATIENT)
Dept: CARDIAC REHAB | Age: 77
End: 2022-05-25
Payer: MEDICARE

## 2022-05-25 ENCOUNTER — HOSPITAL ENCOUNTER (OUTPATIENT)
Dept: CARDIAC REHAB | Age: 77
Setting detail: THERAPIES SERIES
Discharge: HOME | End: 2022-05-25
Payer: MEDICARE

## 2022-05-25 PROCEDURE — 93798 PHYS/QHP OP CAR RHAB W/ECG: CPT

## 2022-05-25 NOTE — PROGRESS NOTES
COVID Screening completed. Patient denies complaints, no changes to PMH or medications. Patient is progressing well with exercise, tolerating without complaint.

## 2022-05-27 ENCOUNTER — HOSPITAL ENCOUNTER (OUTPATIENT)
Dept: CARDIAC REHAB | Age: 77
Setting detail: THERAPIES SERIES
Discharge: HOME | End: 2022-05-27
Payer: MEDICARE

## 2022-05-27 ENCOUNTER — APPOINTMENT (OUTPATIENT)
Dept: CARDIAC REHAB | Age: 77
End: 2022-05-27
Payer: MEDICARE

## 2022-05-27 PROCEDURE — 93798 PHYS/QHP OP CAR RHAB W/ECG: CPT

## 2022-06-01 ENCOUNTER — HOSPITAL ENCOUNTER (OUTPATIENT)
Dept: CARDIAC REHAB | Age: 77
Setting detail: THERAPIES SERIES
Discharge: HOME | End: 2022-06-01
Payer: MEDICARE

## 2022-06-01 ENCOUNTER — APPOINTMENT (OUTPATIENT)
Dept: CARDIAC REHAB | Age: 77
End: 2022-06-01
Payer: MEDICARE

## 2022-06-01 PROCEDURE — 93798 PHYS/QHP OP CAR RHAB W/ECG: CPT

## 2022-06-01 NOTE — CARDIO/PULMONARY
Nutrition Recommendations - Rate Your Plate Assessment             Rate Your Plate:    Pt with score of 53 on initial rate your plate. Nutrition Recommendations/Plan: This score indicates that there are some ways that the patient can make their eating habits healthier. Will encourage pt to remove skin from poultry, choose lean sources of ground meat/poultry, reduce processed meats, reduce portion size, increase risk/shellfish consumption, and choose more low-fat dairy. Will explain importance of choosing more whole grains, fruits and veggies, and nuts and seeds. Will encourage pt to rarely cook with added fat and choose healthier alternatives for smith and salad dressings. Pt did not write down any goals.           Electronically signed by Africa Ford RD, LD on 6/1/22 at 10:48 AM EDT

## 2022-06-01 NOTE — CARDIO/PULMONARY
COVID Screening completed. Patient denies complaints, no changes to PMH or medications. Patient tolerates exercise well.  Electronically signed by Sasha Martinez RN on 6/1/2022 at 11:27 AM

## 2022-06-03 ENCOUNTER — APPOINTMENT (OUTPATIENT)
Dept: CARDIAC REHAB | Age: 77
End: 2022-06-03
Payer: MEDICARE

## 2022-06-03 ENCOUNTER — HOSPITAL ENCOUNTER (OUTPATIENT)
Dept: CARDIAC REHAB | Age: 77
Setting detail: THERAPIES SERIES
Discharge: HOME | End: 2022-06-03
Payer: MEDICARE

## 2022-06-03 PROCEDURE — 93798 PHYS/QHP OP CAR RHAB W/ECG: CPT

## 2022-06-06 ENCOUNTER — APPOINTMENT (OUTPATIENT)
Dept: CARDIAC REHAB | Age: 77
End: 2022-06-06
Payer: MEDICARE

## 2022-06-06 ENCOUNTER — HOSPITAL ENCOUNTER (OUTPATIENT)
Dept: CARDIAC REHAB | Age: 77
Setting detail: THERAPIES SERIES
Discharge: HOME | End: 2022-06-06
Payer: MEDICARE

## 2022-06-06 PROCEDURE — 93798 PHYS/QHP OP CAR RHAB W/ECG: CPT

## 2022-06-06 NOTE — CARDIO/PULMONARY
COVID Screening completed. Patient denies complaints, no changes to PMH or medications. Patient tolerates exercise well.  Electronically signed by Baylee Toussaint RN on 6/6/2022 at 2:10 PM

## 2022-06-08 ENCOUNTER — HOSPITAL ENCOUNTER (OUTPATIENT)
Dept: CARDIAC REHAB | Age: 77
Setting detail: THERAPIES SERIES
Discharge: HOME | End: 2022-06-08
Payer: MEDICARE

## 2022-06-08 ENCOUNTER — APPOINTMENT (OUTPATIENT)
Dept: CARDIAC REHAB | Age: 77
End: 2022-06-08
Payer: MEDICARE

## 2022-06-08 PROCEDURE — 93798 PHYS/QHP OP CAR RHAB W/ECG: CPT

## 2022-06-08 NOTE — CARDIO/PULMONARY
COVID Screening completed. Patient complains of lower back pain, no changes to PMH or medications. Patient tolerates exercise well despite back pain.  Electronically signed by Raul Mendoza RN on 6/8/2022 at 11:47 AM

## 2022-06-10 ENCOUNTER — HOSPITAL ENCOUNTER (OUTPATIENT)
Dept: CARDIAC REHAB | Age: 77
Setting detail: THERAPIES SERIES
Discharge: HOME | End: 2022-06-10
Payer: MEDICARE

## 2022-06-10 ENCOUNTER — APPOINTMENT (OUTPATIENT)
Dept: CARDIAC REHAB | Age: 77
End: 2022-06-10
Payer: MEDICARE

## 2022-06-10 PROCEDURE — 93798 PHYS/QHP OP CAR RHAB W/ECG: CPT

## 2022-06-10 NOTE — CARDIO/PULMONARY
Patient arrived to Phase II OP CR at [facility]. Patient followed COVID-19 safety protocols in place at facility including but not limited to: social distancing during exercise, sanitizing equipment before and after use, and wearing a mask while in facility. Patient still experiencing back pain, says it has not increased or changed. Patient denies cardiac signs/symptoms.    Douglas Carson M.S. Ed  Cardiac Rehab Coordinator

## 2022-06-13 ENCOUNTER — APPOINTMENT (OUTPATIENT)
Dept: CARDIAC REHAB | Age: 77
End: 2022-06-13
Payer: MEDICARE

## 2022-06-13 ENCOUNTER — HOSPITAL ENCOUNTER (OUTPATIENT)
Dept: CARDIAC REHAB | Age: 77
Setting detail: THERAPIES SERIES
Discharge: HOME | End: 2022-06-13
Payer: MEDICARE

## 2022-06-13 PROCEDURE — 93798 PHYS/QHP OP CAR RHAB W/ECG: CPT

## 2022-06-13 NOTE — CARDIO/PULMONARY
COVID Screening completed. Patient has chronic back pain, and states it is worse today. Increased time on Nustep and decreased walking time to accommodate back comfort. No medication changes, and no complaint of chest discomfort or shortness of breath. Patient tolerates exercise well.  Electronically signed by Ilya Wisdom RN on 6/13/2022 at 2:30 PM

## 2022-06-14 ENCOUNTER — CARE COORDINATION (OUTPATIENT)
Dept: CARE COORDINATION | Age: 77
End: 2022-06-14

## 2022-06-14 NOTE — CARE COORDINATION
Ambulatory Care Coordination Note  6/14/2022  CM Risk Score: 4  Charlson 10 Year Mortality Risk Score: 100%     ACC: Gauri Napier, RN    Summary Note:     I called to discuss care coordination with Brandy Tatum  I discussed my role and the process of care coordination. He tells me that he is in the middle of cardiac rehab and he feels that this is helping. He adds that he does have some financial strain with the cost of his medications especially the insulin and entresto. I will reach out to care coordination specialist Benito Rivera to obtain her assistance with this issue. Overall, he feels that he is doing much better with his health and feels that he does not need care coordination at this time. I have provided him with my contact information and I have asked him to call me with any questions or concerns. Lab Results     None              Goals Addressed    None         Prior to Admission medications    Medication Sig Start Date End Date Taking?  Authorizing Provider   sacubitril-valsartan (ENTRESTO) 24-26 MG per tablet Take 1 tablet by mouth 2 times daily 5/11/22   FORTINO Coleman   carvedilol (COREG) 6.25 MG tablet Take 1 tablet by mouth 2 times daily (with meals) 5/11/22   FORTINO Coleman   furosemide (LASIX) 40 MG tablet Take 1 tablet by mouth 2 times daily 5/11/22   FORTINO Coleman   hydrALAZINE (APRESOLINE) 25 MG tablet Take 1 tablet by mouth in the morning and at bedtime 5/11/22   FORTINO Coleman   atorvastatin (LIPITOR) 40 MG tablet Take 1 tablet by mouth nightly 11/19/21   Gordon Gamino MD   glimepiride (AMARYL) 4 MG tablet Take 1 tablet by mouth 2 times daily 11/19/21   Gordon Gamino MD   insulin glargine (LANTUS SOLOSTAR) 100 UNIT/ML injection pen Inject 30 Units into the skin 2 times daily 11/19/21   Gordon Gamino MD   [facility]) 0.4 MG capsule Take 1 capsule by mouth daily 11/19/21   Gordon Gamino MD   famotidine (PEPCID) 20 MG tablet Take 1 tablet by mouth daily 11/19/21   Donato Price MD   nitroGLYCERIN (NITROSTAT) 0.4 MG SL tablet Place 1 tablet under the tongue See Admin Instructions 2/16/18   Donato Price MD   Handicap Placard MISC by Does not apply route Exp 5 years 7/13/15   Donato Price MD   aspirin 81 MG tablet Take 81 mg by mouth daily.     Historical Provider, MD       Future Appointments   Date Time Provider Jacob Bautista   6/15/2022  2:00 PM SCHEDULE, 10 42 [facility]   6/17/2022  2:00 PM SCHEDULE, 10 42 [facility]   6/20/2022  2:00 PM SCHEDULE, 10 42 [facility]   6/22/2022  2:00 PM SCHEDULE, 10 42 [facility]   6/24/2022  2:00 PM SCHEDULE, 10 42 [facility]   6/27/2022  2:00 PM SCHEDULE, 10 42 [facility]   6/29/2022  2:00 PM SCHEDULE, 10 42 [facility]   7/1/2022  2:00 PM SCHEDULE, 10 42 [facility]   7/6/2022  2:00 PM SCHEDULE, 10 42 [facility]   7/8/2022  2:00 PM SCHEDULE, 10 42 [facility]   7/11/2022  2:00 PM SCHEDULE, 10 42 [facility]   7/13/2022  2:00 PM SCHEDULE, 10 42 [facility]   7/14/2022 11:15 AM MLOZ ECHO RM Roddy ECHO [address] [place] [address]   7/15/2022  2:00 PM SCHEDULE, 10 42 [facility]   7/18/2022  2:00 PM SCHEDULE, 10 42 [facility]   7/20/2022  2:00 PM SCHEDULE, 10 42 [facility]   7/22/2022  2:00 PM SCHEDULE, 10 42 [facility]   7/25/2022 12:00 PM Donaldo Noel [facility]   7/25/2022  2:00 PM SCHEDULE, 10 42 [facility]   7/27/2022  2:00 PM SCHEDULE, 10 42 [facility]   7/29/2022  2:00 PM SCHEDULE, 10 42 [facility]   8/1/2022  2:00 PM SCHEDULE, 10 42 [facility]   8/3/2022  2:00 PM SCHEDULE, 10 42 [facility]   8/5/2022  2:00 PM SCHEDULE, 10 42 [facility]   8/8/2022  2:00 PM SCHEDULE, 10 42 [facility]   8/10/2022  2:00 PM SCHEDULE, 10 42 [facility]   8/12/2022  2:00 PM SCHEDULE, 10 42 [facility]   8/15/2022  2:00 PM SCHEDULE, 10 42 [facility]

## 2022-06-14 NOTE — CARE COORDINATION
I left the patient a message to please call me back to discuss medication assistance.         [facility]   Medication Assistance  [address] [facility], and [employer]    (Y) [phone] (F) [phone]

## 2022-06-15 ENCOUNTER — APPOINTMENT (OUTPATIENT)
Dept: CARDIAC REHAB | Age: 77
End: 2022-06-15
Payer: MEDICARE

## 2022-06-15 ENCOUNTER — HOSPITAL ENCOUNTER (OUTPATIENT)
Dept: CARDIAC REHAB | Age: 77
Setting detail: THERAPIES SERIES
Discharge: HOME | End: 2022-06-15
Payer: MEDICARE

## 2022-06-15 PROCEDURE — 93798 PHYS/QHP OP CAR RHAB W/ECG: CPT

## 2022-06-15 NOTE — CARE COORDINATION
Moon Michael called me back and we went over the programs available for Entresto and Lantus (basaglar). I am going to fax him and the providers their portions of the applications.

## 2022-06-17 ENCOUNTER — HOSPITAL ENCOUNTER (OUTPATIENT)
Dept: CARDIAC REHAB | Age: 77
Setting detail: THERAPIES SERIES
Discharge: HOME | End: 2022-06-17
Payer: MEDICARE

## 2022-06-17 ENCOUNTER — APPOINTMENT (OUTPATIENT)
Dept: CARDIAC REHAB | Age: 77
End: 2022-06-17
Payer: MEDICARE

## 2022-06-17 PROCEDURE — 93798 PHYS/QHP OP CAR RHAB W/ECG: CPT

## 2022-06-17 NOTE — CARDIO/PULMONARY
Patient arrived to Phase II OP CR at [facility]. Patient followed COVID-19 safety protocols in place at facility including but not limited to: social distancing during exercise, sanitizing equipment before and after use, and wearing a mask while in facility. Patient denies complaints. Signs and/or symptoms: Patient still reporting back pain- has not increased or decreased.    Hallie Molina M.S. Ed  Cardiac Rehab Coordinator

## 2022-06-20 ENCOUNTER — HOSPITAL ENCOUNTER (OUTPATIENT)
Dept: CARDIAC REHAB | Age: 77
Setting detail: THERAPIES SERIES
Discharge: HOME | End: 2022-06-20
Payer: MEDICARE

## 2022-06-20 ENCOUNTER — APPOINTMENT (OUTPATIENT)
Dept: CARDIAC REHAB | Age: 77
End: 2022-06-20
Payer: MEDICARE

## 2022-06-20 PROCEDURE — 93798 PHYS/QHP OP CAR RHAB W/ECG: CPT

## 2022-06-20 NOTE — CARDIO/PULMONARY
COVID Screening completed. Patient denies changes to PMH or medications. Patient does have chronic back pain, but tolerates exercise well.   Electronically signed by Jacinta Teran RN on 6/20/2022 at 4:12 PM

## 2022-06-21 ENCOUNTER — CARE COORDINATION (OUTPATIENT)
Dept: CARE COORDINATION | Age: 77
End: 2022-06-21

## 2022-06-21 NOTE — CARE COORDINATION
I was able to [facility] [employer] [facility] the [facility] high cost medications.       [address] [facility]   Medication Assistance  [address] [facility], and [employer]    (R) [phone] (A) [phone]

## 2022-06-22 ENCOUNTER — HOSPITAL ENCOUNTER (OUTPATIENT)
Dept: CARDIAC REHAB | Age: 77
Setting detail: THERAPIES SERIES
Discharge: HOME | End: 2022-06-22
Payer: MEDICARE

## 2022-06-22 ENCOUNTER — APPOINTMENT (OUTPATIENT)
Dept: CARDIAC REHAB | Age: 77
End: 2022-06-22
Payer: MEDICARE

## 2022-06-22 ENCOUNTER — CARE COORDINATION (OUTPATIENT)
Dept: CARE COORDINATION | Age: 77
End: 2022-06-22

## 2022-06-22 PROCEDURE — 93798 PHYS/QHP OP CAR RHAB W/ECG: CPT

## 2022-06-22 NOTE — CARE COORDINATION
Patient was approved into the [facility] [employer] for his insulin, I left him a message letting him know and left [employer] number. His other application is still pending.         [address] [facility]   Medication Assistance  [address] [place] [address], and [employer]    (O) [phone]  (O) [phone]

## 2022-06-22 NOTE — CARDIO/PULMONARY
COVID Screening completed. Patient denies complaints, no changes to PMH or medications. Patient tolerates exercise well.  Electronically signed by Jem Coffman RN on 6/22/2022 at 8:14 AM

## 2022-06-24 ENCOUNTER — APPOINTMENT (OUTPATIENT)
Dept: CARDIAC REHAB | Age: 77
End: 2022-06-24
Payer: MEDICARE

## 2022-06-24 ENCOUNTER — HOSPITAL ENCOUNTER (OUTPATIENT)
Dept: CARDIAC REHAB | Age: 77
Setting detail: THERAPIES SERIES
Discharge: HOME | End: 2022-06-24
Payer: MEDICARE

## 2022-06-24 PROCEDURE — 93798 PHYS/QHP OP CAR RHAB W/ECG: CPT

## 2022-06-24 NOTE — CARDIO/PULMONARY
COVID Screening completed. Patient denies complaints, no changes to PMH or medications. Patient tolerates exercise well.  Electronically signed by Fany Giron RN on 6/24/2022 at 12:29 PM

## 2022-06-27 ENCOUNTER — APPOINTMENT (OUTPATIENT)
Dept: CARDIAC REHAB | Age: 77
End: 2022-06-27
Payer: MEDICARE

## 2022-06-27 ENCOUNTER — HOSPITAL ENCOUNTER (OUTPATIENT)
Dept: CARDIAC REHAB | Age: 77
Setting detail: THERAPIES SERIES
Discharge: HOME | End: 2022-06-27
Payer: MEDICARE

## 2022-06-27 ENCOUNTER — CARE COORDINATION (OUTPATIENT)
Dept: CARE COORDINATION | Age: 77
End: 2022-06-27

## 2022-06-27 PROCEDURE — 93798 PHYS/QHP OP CAR RHAB W/ECG: CPT

## 2022-06-27 NOTE — CARE COORDINATION
I called [facility] to check on the status of his application for [employer] [facility] [employer] [facility], it is still pending and was told to call back.         [address] [facility]   Medication Assistance  [address] [place] [address], and [employer]    Y) [phone] (H) [phone]

## 2022-06-27 NOTE — CARDIO/PULMONARY
COVID Screening completed. Patient denies complaints, no changes to PMH or medications. Patient tolerates exercise well.   Electronically signed by Tejal Cho RN on 6/27/2022 at 4:31 PM

## 2022-06-29 ENCOUNTER — HOSPITAL ENCOUNTER (OUTPATIENT)
Dept: CARDIAC REHAB | Age: 77
Setting detail: THERAPIES SERIES
Discharge: HOME | End: 2022-06-29
Payer: MEDICARE

## 2022-06-29 ENCOUNTER — APPOINTMENT (OUTPATIENT)
Dept: CARDIAC REHAB | Age: 77
End: 2022-06-29
Payer: MEDICARE

## 2022-06-29 PROCEDURE — 93798 PHYS/QHP OP CAR RHAB W/ECG: CPT

## 2022-06-29 NOTE — CARDIO/PULMONARY
COVID Screening completed. Patient denies complaints, no changes to PMH or medications. Patient still c/o chronic back pain. Patient tolerates exercise without complaint.  Electronically signed by Brooke Cote RN on 6/29/2022 at 11:14 AM

## 2022-06-30 ASSESSMENT — EXERCISE STRESS TEST: PEAK_BP: 118/58

## 2022-06-30 ASSESSMENT — EJECTION FRACTION: EF_VALUE: 20

## 2022-07-01 ENCOUNTER — HOSPITAL ENCOUNTER (OUTPATIENT)
Dept: CARDIAC REHAB | Age: 77
Setting detail: THERAPIES SERIES
Discharge: HOME | End: 2022-07-01
Payer: MEDICARE

## 2022-07-01 ENCOUNTER — APPOINTMENT (OUTPATIENT)
Dept: CARDIAC REHAB | Age: 77
End: 2022-07-01
Payer: MEDICARE

## 2022-07-01 PROCEDURE — 93798 PHYS/QHP OP CAR RHAB W/ECG: CPT

## 2022-07-01 NOTE — CARDIO/PULMONARY
COVID Screening completed. Patient denies complaints, no changes to PMH or medications. Patient tolerates exercise well.  Electronically signed by Abbi Lewis RN on 7/1/2022 at 12:21 PM

## 2022-07-06 ENCOUNTER — HOSPITAL ENCOUNTER (OUTPATIENT)
Dept: CARDIAC REHAB | Age: 77
Setting detail: THERAPIES SERIES
Discharge: HOME | End: 2022-07-06
Payer: MEDICARE

## 2022-07-06 ENCOUNTER — APPOINTMENT (OUTPATIENT)
Dept: CARDIAC REHAB | Age: 77
End: 2022-07-06
Payer: MEDICARE

## 2022-07-06 PROCEDURE — 93798 PHYS/QHP OP CAR RHAB W/ECG: CPT

## 2022-07-06 NOTE — CARDIO/PULMONARY
COVID Screening completed. Patient denies complaints, no changes to PMH or medications. Patient tolerates exercise well.  Electronically signed by Jose De Jesus Duggan RN on 7/6/2022 at 2:02 PM

## 2022-07-07 ENCOUNTER — CARE COORDINATION (OUTPATIENT)
Dept: CARE COORDINATION | Age: 77
End: 2022-07-07

## 2022-07-07 NOTE — CARE COORDINATION
I called [facility] and he was approved for his Jerrell Greene to get at no charge until 12/31/22, it was delivered to his home on 7/5/22.         [address] [facility]   Medication Assistance  [address] [place] [address], and [employer]    (K) [phone] (W) [phone]

## 2022-07-08 ENCOUNTER — HOSPITAL ENCOUNTER (OUTPATIENT)
Dept: CARDIAC REHAB | Age: 77
Setting detail: THERAPIES SERIES
Discharge: HOME | End: 2022-07-08
Payer: MEDICARE

## 2022-07-08 ENCOUNTER — APPOINTMENT (OUTPATIENT)
Dept: CARDIAC REHAB | Age: 77
End: 2022-07-08
Payer: MEDICARE

## 2022-07-08 PROCEDURE — 93798 PHYS/QHP OP CAR RHAB W/ECG: CPT

## 2022-07-08 NOTE — PROGRESS NOTES
COVID Screening completed. Patient denies complaints, no changes to PMH or medications. Patient tolerates exercise well. Patient's weight is up 2.8lbs but only 1.4lbs at home. Patient is aware of CHF precautions.  Electronically signed by Ioana Anton RN on 7/8/2022 at 11:44 AM

## 2022-07-11 ENCOUNTER — HOSPITAL ENCOUNTER (OUTPATIENT)
Dept: CARDIAC REHAB | Age: 77
Setting detail: THERAPIES SERIES
Discharge: HOME | End: 2022-07-11
Payer: MEDICARE

## 2022-07-11 ENCOUNTER — APPOINTMENT (OUTPATIENT)
Dept: CARDIAC REHAB | Age: 77
End: 2022-07-11
Payer: MEDICARE

## 2022-07-11 PROCEDURE — 93798 PHYS/QHP OP CAR RHAB W/ECG: CPT

## 2022-07-11 NOTE — CARDIO/PULMONARY
COVID Screening completed. Patient denies complaints, no changes to PMH or medications. Patient tolerates exercise well.  Electronically signed by Melo Rios RN on 7/11/2022 at 4:59 PM

## 2022-07-13 ENCOUNTER — HOSPITAL ENCOUNTER (OUTPATIENT)
Dept: CARDIAC REHAB | Age: 77
Setting detail: THERAPIES SERIES
Discharge: HOME | End: 2022-07-13
Payer: MEDICARE

## 2022-07-13 ENCOUNTER — APPOINTMENT (OUTPATIENT)
Dept: CARDIAC REHAB | Age: 77
End: 2022-07-13
Payer: MEDICARE

## 2022-07-13 PROCEDURE — 93798 PHYS/QHP OP CAR RHAB W/ECG: CPT

## 2022-07-13 NOTE — CARDIO/PULMONARY
COVID Screening completed. Patient denies complaints, no changes to PMH or medications. Patient tolerates exercise well. Provided patient with handout on stress, and discussed the effects of stress on heart health. Patient acknowledges and has a good understanding.  Electronically signed by Michael Holland RN on 7/13/2022 at 11:21 AM

## 2022-07-14 ENCOUNTER — HOSPITAL ENCOUNTER (OUTPATIENT)
Dept: NON INVASIVE DIAGNOSTICS | Age: 77
Discharge: HOME | End: 2022-07-14
Payer: MEDICARE

## 2022-07-14 DIAGNOSIS — I42.8 NONISCHEMIC CARDIOMYOPATHY (HCC): ICD-10-CM

## 2022-07-14 PROCEDURE — 93308 TTE F-UP OR LMTD: CPT

## 2022-07-14 PROCEDURE — 93325 DOPPLER ECHO COLOR FLOW MAPG: CPT

## 2022-07-14 PROCEDURE — 93320 DOPPLER ECHO COMPLETE: CPT

## 2022-07-15 ENCOUNTER — APPOINTMENT (OUTPATIENT)
Dept: CARDIAC REHAB | Age: 77
End: 2022-07-15
Payer: MEDICARE

## 2022-07-15 ENCOUNTER — HOSPITAL ENCOUNTER (OUTPATIENT)
Dept: CARDIAC REHAB | Age: 77
Setting detail: THERAPIES SERIES
Discharge: HOME | End: 2022-07-15
Payer: MEDICARE

## 2022-07-15 PROCEDURE — 93798 PHYS/QHP OP CAR RHAB W/ECG: CPT

## 2022-07-15 NOTE — PROGRESS NOTES
COVID Screening completed. Patient denies complaints, no changes to PMH or medications. Patient tolerates exercise well. Echo limited was done yesterday.  Electronically signed by Goergette Zuñiga RN on 7/15/2022 at 12:03 PM

## 2022-07-18 ENCOUNTER — HOSPITAL ENCOUNTER (OUTPATIENT)
Dept: CARDIAC REHAB | Age: 77
Setting detail: THERAPIES SERIES
Discharge: HOME | End: 2022-07-18
Payer: MEDICARE

## 2022-07-18 ENCOUNTER — APPOINTMENT (OUTPATIENT)
Dept: CARDIAC REHAB | Age: 77
End: 2022-07-18
Payer: MEDICARE

## 2022-07-18 PROCEDURE — 93798 PHYS/QHP OP CAR RHAB W/ECG: CPT

## 2022-07-18 NOTE — PROGRESS NOTES
COVID Screening completed. Patient denies complaints, no changes to PMH or medications. Patient tolerates exercise well.   Electronically signed by Melania Camarena RN on 7/18/2022 at 12:20 PM

## 2022-07-20 ENCOUNTER — APPOINTMENT (OUTPATIENT)
Dept: CARDIAC REHAB | Age: 77
End: 2022-07-20
Payer: MEDICARE

## 2022-07-20 ENCOUNTER — HOSPITAL ENCOUNTER (OUTPATIENT)
Dept: CARDIAC REHAB | Age: 77
Setting detail: THERAPIES SERIES
Discharge: HOME | End: 2022-07-20
Payer: MEDICARE

## 2022-07-20 PROCEDURE — 93798 PHYS/QHP OP CAR RHAB W/ECG: CPT

## 2022-07-20 ASSESSMENT — EJECTION FRACTION: EF_VALUE: 30

## 2022-07-20 ASSESSMENT — EXERCISE STRESS TEST: PEAK_BP: 124/62

## 2022-07-20 NOTE — LETTER
Dr Qamar Oliveira,    Please review and sign updated Cardiac ITP. Thank you for your continued support and care of our patients.

## 2022-07-20 NOTE — CARDIO/PULMONARY
COVID Screening completed. Patient denies complaints, no changes to PMH or medications. Patient tolerates exercise well.   Electronically signed by Kym Hdez RN on 7/20/2022 at 12:02 PM

## 2022-07-22 ENCOUNTER — HOSPITAL ENCOUNTER (OUTPATIENT)
Dept: CARDIAC REHAB | Age: 77
Setting detail: THERAPIES SERIES
Discharge: HOME | End: 2022-07-22
Payer: MEDICARE

## 2022-07-22 ENCOUNTER — APPOINTMENT (OUTPATIENT)
Dept: CARDIAC REHAB | Age: 77
End: 2022-07-22
Payer: MEDICARE

## 2022-07-22 PROCEDURE — 93798 PHYS/QHP OP CAR RHAB W/ECG: CPT

## 2022-07-22 NOTE — PROGRESS NOTES
COVID Screening completed. Patient denies complaints, no changes to PMH or medications. Patient tolerates exercise well.   Electronically signed by Jose Power RN on 7/22/2022 at 12:27 PM

## 2022-07-25 ENCOUNTER — APPOINTMENT (OUTPATIENT)
Dept: CARDIAC REHAB | Age: 77
End: 2022-07-25
Payer: MEDICARE

## 2022-07-25 ENCOUNTER — HOSPITAL ENCOUNTER (OUTPATIENT)
Dept: CARDIAC REHAB | Age: 77
Setting detail: THERAPIES SERIES
Discharge: HOME | End: 2022-07-25
Payer: MEDICARE

## 2022-07-25 PROCEDURE — 93798 PHYS/QHP OP CAR RHAB W/ECG: CPT

## 2022-07-25 NOTE — CARDIO/PULMONARY
COVID Screening completed. Patient denies complaints, no changes to PMH or medications. Patient tolerates exercise well, despite chronic back and knee discomfort.  Electronically signed by Vanesa Kelley RN on 7/25/2022 at 1:41 PM

## 2022-07-27 ENCOUNTER — APPOINTMENT (OUTPATIENT)
Dept: CARDIAC REHAB | Age: 77
End: 2022-07-27
Payer: MEDICARE

## 2022-07-27 ENCOUNTER — HOSPITAL ENCOUNTER (OUTPATIENT)
Dept: CARDIAC REHAB | Age: 77
Setting detail: THERAPIES SERIES
Discharge: HOME | End: 2022-07-27
Payer: MEDICARE

## 2022-07-27 PROCEDURE — 93798 PHYS/QHP OP CAR RHAB W/ECG: CPT

## 2022-07-29 ENCOUNTER — HOSPITAL ENCOUNTER (OUTPATIENT)
Dept: CARDIAC REHAB | Age: 77
Setting detail: THERAPIES SERIES
Discharge: HOME | End: 2022-07-29
Payer: MEDICARE

## 2022-07-29 ENCOUNTER — APPOINTMENT (OUTPATIENT)
Dept: CARDIAC REHAB | Age: 77
End: 2022-07-29
Payer: MEDICARE

## 2022-07-29 PROCEDURE — 93798 PHYS/QHP OP CAR RHAB W/ECG: CPT

## 2022-07-29 NOTE — PROGRESS NOTES
COVID Screening completed. Patient denies complaints, no changes to PMH or medications. Patient tolerates exercise well. Patient requests to do his walk test on Monday.  Electronically signed by Marciano Bernheim, RN on 7/29/2022 at 8:59 AM'

## 2022-08-01 ENCOUNTER — APPOINTMENT (OUTPATIENT)
Dept: CARDIAC REHAB | Age: 77
End: 2022-08-01
Payer: MEDICARE

## 2022-08-01 ENCOUNTER — HOSPITAL ENCOUNTER (OUTPATIENT)
Dept: CARDIAC REHAB | Age: 77
Setting detail: THERAPIES SERIES
Discharge: HOME | End: 2022-08-01
Payer: MEDICARE

## 2022-08-01 PROCEDURE — 93798 PHYS/QHP OP CAR RHAB W/ECG: CPT

## 2022-08-01 NOTE — CARDIO/PULMONARY
COVID Screening completed. Patient denies complaints, no changes to PMH or medications. Patient wanted to wait until Wednesday or Friday's CR session to complete 6 MWT due to the worsening back and knee discomfort. Discussed discharge paperwork with patient, patient to return prior to discharge. Patient tolerates exercise well.   Electronically signed by Jeronimo Dodson RN on 8/1/2022 at 1:44 PM

## 2022-08-03 ENCOUNTER — APPOINTMENT (OUTPATIENT)
Dept: CARDIAC REHAB | Age: 77
End: 2022-08-03
Payer: MEDICARE

## 2022-08-03 ENCOUNTER — HOSPITAL ENCOUNTER (OUTPATIENT)
Dept: CARDIAC REHAB | Age: 77
Setting detail: THERAPIES SERIES
Discharge: HOME | End: 2022-08-03
Payer: MEDICARE

## 2022-08-03 ENCOUNTER — OFFICE VISIT (OUTPATIENT)
Dept: CARDIOLOGY CLINIC | Age: 77
End: 2022-08-03
Payer: MEDICARE

## 2022-08-03 VITALS
OXYGEN SATURATION: 97 % | DIASTOLIC BLOOD PRESSURE: 69 MMHG | HEIGHT: 76 IN | BODY MASS INDEX: 35.31 KG/M2 | SYSTOLIC BLOOD PRESSURE: 125 MMHG | HEART RATE: 56 BPM | RESPIRATION RATE: 18 BRPM | WEIGHT: 290 LBS

## 2022-08-03 DIAGNOSIS — E66.9 DIABETES MELLITUS TYPE 2 IN OBESE (HCC): ICD-10-CM

## 2022-08-03 DIAGNOSIS — E66.01 SEVERE OBESITY (BMI 35.0-39.9) WITH COMORBIDITY (HCC): ICD-10-CM

## 2022-08-03 DIAGNOSIS — I27.20 PULMONARY HTN (HCC): ICD-10-CM

## 2022-08-03 DIAGNOSIS — N18.30 STAGE 3 CHRONIC KIDNEY DISEASE, UNSPECIFIED WHETHER STAGE 3A OR 3B CKD (HCC): ICD-10-CM

## 2022-08-03 DIAGNOSIS — I50.22 CHRONIC SYSTOLIC CHF (CONGESTIVE HEART FAILURE), NYHA CLASS 1 (HCC): ICD-10-CM

## 2022-08-03 DIAGNOSIS — E11.69 DIABETES MELLITUS TYPE 2 IN OBESE (HCC): ICD-10-CM

## 2022-08-03 DIAGNOSIS — I42.9 CARDIOMYOPATHY, UNSPECIFIED TYPE (HCC): ICD-10-CM

## 2022-08-03 DIAGNOSIS — E78.5 DYSLIPIDEMIA: ICD-10-CM

## 2022-08-03 DIAGNOSIS — I10 HYPERTENSION, UNSPECIFIED TYPE: ICD-10-CM

## 2022-08-03 DIAGNOSIS — Z95.1 HX OF CABG: ICD-10-CM

## 2022-08-03 PROCEDURE — 3046F HEMOGLOBIN A1C LEVEL >9.0%: CPT | Performed by: PHYSICIAN ASSISTANT

## 2022-08-03 PROCEDURE — G8417 CALC BMI ABV UP PARAM F/U: HCPCS | Performed by: PHYSICIAN ASSISTANT

## 2022-08-03 PROCEDURE — 2022F DILAT RTA XM EVC RTNOPTHY: CPT | Performed by: PHYSICIAN ASSISTANT

## 2022-08-03 PROCEDURE — 99214 OFFICE O/P EST MOD 30 MIN: CPT | Performed by: PHYSICIAN ASSISTANT

## 2022-08-03 PROCEDURE — 93798 PHYS/QHP OP CAR RHAB W/ECG: CPT

## 2022-08-03 PROCEDURE — G8427 DOCREV CUR MEDS BY ELIG CLIN: HCPCS | Performed by: PHYSICIAN ASSISTANT

## 2022-08-03 PROCEDURE — 1123F ACP DISCUSS/DSCN MKR DOCD: CPT | Performed by: PHYSICIAN ASSISTANT

## 2022-08-03 PROCEDURE — 3017F COLORECTAL CA SCREEN DOC REV: CPT | Performed by: PHYSICIAN ASSISTANT

## 2022-08-03 PROCEDURE — 1036F TOBACCO NON-USER: CPT | Performed by: PHYSICIAN ASSISTANT

## 2022-08-03 ASSESSMENT — ENCOUNTER SYMPTOMS
BLOOD IN STOOL: 0
ABDOMINAL DISTENTION: 0
COUGH: 0
CHEST TIGHTNESS: 0
VOMITING: 0
NAUSEA: 0
ABDOMINAL PAIN: 0
SHORTNESS OF BREATH: 0
COLOR CHANGE: 0

## 2022-08-03 NOTE — CARDIO/PULMONARY
COVID Screening completed. Patient did complain of right hip and low back pain today, no changes to PMH or medications. Patient completed six minute walk test, improved by 2 laps from first day walk test.  Reviewed Cholesterol Information with patient, including patient's most recent lipid profile, what it means, cholesterol medication, what causes high cholesterol and understanding CAD Risk Factors. Handout offered. Patient tolerates exercise well.  Electronically signed by Shama Akbar RN on 8/3/2022 at 10:04 AM

## 2022-08-03 NOTE — PROGRESS NOTES
RD spoke with pt today. Pt states heart healthy diet is going well and denies questions or need for education on heart healthy diet at this time. Invited pt to this month's class \"Nutrition Advice for Heart Disease and Diabetes\". Pt states not interested at this time though was interested in receiving a flier in case changes mind.  Encouraged pt to reach out to RD if have any nutrition questions during CR program.

## 2022-08-03 NOTE — PROGRESS NOTES
Patient: Eugenia Moore  YOB: 1945  MRN: [id number]    Chief Complaint:  Chief Complaint   Patient presents with    Congestive Heart Failure     systolic    Shortness of Breath     Occasionally           Subjective/HPI       8/3/22: Here for follow-up of systolic congestive heart failure, history of ischemic cardiomyopathy with worsening LV function with EF 20% per echo on 4/2/2022 and history of CAD status post CABG. Had appointment with cardiac rehab earlier today and states that he had a 6-minute walk test and SpO2 down to 89% post exertion but recovered quickly. He denies any significant shortness of breath or dyspnea complaints with normal activity. He reports chronic back pain issues which occasionally worsen states that his back is really bothering him today. He denies chest pain, palpitations, diaphoresis, paroxysmal nocturnal dyspnea, orthopnea, worsening lower extremity edema, dizziness, lightheadedness, syncope, fever or chills. He does report that he is waking up at least twice a night to urinate which is bothersome to him so he has been advised that he can take 80 mg of Lasix in the morning instead of 40 twice daily to see if this alleviates his nocturia. He is compliant with all of his medications and with low-sodium diet and fluid restriction. He is checking twice daily blood pressures and BP typically well controlled between 130s over 60s but occasionally has elevated BP readings in the 150s over 60s range but attributes his elevated BP readings to being in significant pain related to his back. Has been checking daily weights and weight has been stable. Discussed with patient regarding possible referral to pulmonology for evaluation of exertional hypoxia and pulmonary hypertension with RVSP of 57 mmHg per limited echo from 7/14/2022. Patient is wishing to defer for now but will reconsider in the future.     Recent limited echocardiogram reviewed and documented below.  Limited echo 7/14/22:   Conclusions   Summary   Left ventricular ejection fraction is visually estimated at 30%. Mild tricuspid regurgitation. Moderate pulmonary hypertension   RVSP 57mmHg   Moderate mitral regurgitaton. Mildly dilated left atrium. Signature   ----------------------------------------------------------------   Electronically signed by Dylan Latham(Interpreting physician)   on 07/14/2022 12:06 PM    **Discussed with patient regarding recommendation for referral to electrophysiology for ICD evaluation. He is agreeable to meet with Dr. Erica Berry for evaluation and information regarding ICD so he can make an informed decision. He is uncertain at this point if he wants ICD or not. Vital signs stable in office today. Blood pressure 125/69, heart rate 56, pulse ox 97% on room air. Weight is 290 pounds compared to 286 pounds at last office visit on 5/11/2022.        5/11/22: Here for follow-up of systolic congestive heart failure. Was seen for initial CHF clinic evaluation on 4/13/2022 at which time his losartan was discontinued as he was already on Entresto since hospital discharge. He has been doing very well since last office visit with no new or worsening heart failure symptoms. He is compliant with all of his medications and with low-sodium diet and fluid restriction. He does experience shortness of breath with more moderate exertion but this is unchanged. He denies any shortness of breath at rest or with normal daily activities. He reports 2 episodes of orthopnea since his last visit but denies any persistent or orthopnea complaints. He denies chest pain, palpitations, diaphoresis, paroxysmal nocturnal dyspnea, worsening lower extremity edema, dizziness, lightheadedness, syncope, fever or chills. He is scheduled to start cardiac rehab next week. He is checking daily weights and routine blood pressures at home.   Typically blood pressure has been mildly elevated with systolic in the 259Q to 731T range with diastolic in the 35O to 94A range. Heart rate is typically in the 60s at home. Most recent labs reviewed and documented below. BMP 4/13/2022: Sodium 139, potassium 4.8, chloride 101, total CO2 23, BUN elevated at 44, creatinine elevated 1.61, GFR low at 42.1, glucose 165. --Renal function stable when compared to prior. Baseline creatinine appears to be around 1.7 previously  proBNP on 4/13/2022: 3628 compared to 6425 on 4/1/2022. Vital signs stable in office today. Blood pressure 131/76, heart rate bradycardic at 53, pulse ox 99% on room air. Weight is 286 pounds compared to 293 pounds at last office visit on 4/13/2022.      4/13/22 (CHF clinic visit #1): This is a very pleasant 70-year-old  male with past medical history significant for coronary artery disease status post CABG x4 in 2013 at [facility], hypertension, dyslipidemia, diabetes, CKD and history of cardiomyopathy with EF of 40 to 45% per prior echo from 11/9/2020 who presents for initial CHF clinic evaluation following recent hospital admission for acute decompensated systolic heart failure and worsening cardiomyopathy. He originally presented to [facility] ER on 4/1/2022 with complaints of shortness of breath, orthopnea and bilateral lower extremity edema for approximately 1 week prior to presentation. proBNP was elevated at 6425. Initial troponin mildly elevated 0.072. Baseline CKD with creatinine on admission elevated 1.83. Chest x-ray on 4/1/2022 showed small right pleural effusion. He was treated with 40 mg Lasix IV in the ER and was admitted for further evaluation. He was diuresed aggressively with IV Lasix and experienced approximately 10 L negative fluid balance during the course of his admission. Echocardiogram completed on 4/2/2022 revealed severely reduced LV systolic function with EF of 20%, mild mitral valve regurgitation.   Given worsening cardiomyopathy with EF now 20% compared to prior EF of 40 to 45% per echo in 2020 as well as mild troponin elevation and acute heart failure, cardiac catheterization recommended to rule out underlying progression of CAD. He underwent cardiac catheterization on 4/4/2022 which revealed severe native three-vessel CAD with patent LIMA to LAD, SVG jump graft to RI and distal circumflex which was patent, SVG to PDA with mild to moderate disease and no focal stenosis for which medical therapy advised. His symptoms improved significantly during the course of his admission. He was also prescribed compression stockings to help with lower extremity edema. He was eventually discharged home in stable condition on 4/7/2022. Since discharge, patient states he is doing very well overall with significant improvement in bilateral lower extremity edema as well as shortness of breath complaints. He complains of shortness of breath with moderate exertion but this is definitely improved since hospital admission. His orthopnea has resolved and he is now able to lie flat in bed. He denies chest pain, palpitations, diaphoresis, paroxysmal nocturnal dyspnea, orthopnea, dizziness, lightheadedness, syncope, fever or chills. He is compliant with all of his medications. I reviewed all of patient's medications with him and it appears that he was recently prescribed carvedilol but was previously taking metoprolol and is currently taking both at home. He has been advised to stop taking metoprolol at this time. Also it appears patient was started on Entresto but was previously taking losartan and again has continued taking both medications. He has been advised to stop losartan at this time as well. He was educated at length regarding low-sodium diet under 2000 mg a day as well as fluid restriction of approximately 1500 mL daily. He is scheduled to start cardiac rehab on 5/19/2022. Most recent labs reviewed and documented below.   BMP on 4/7/2022: Sodium 136, potassium 4.1, chloride 99, total CO2 24, BUN elevated 65, creatinine elevated 1.70, GFR low at 39.5, glucose 96. --On review of prior labs, it appears the patient's baseline creatinine is around 1.6 dating back to at least 2020  CBC on 4/7/2022: WBC 5.6, hemoglobin 11.7, hematocrit 36.9, platelets 930  proBNP on 4/1/2022: 6425. Hemoglobin A1c on 11/19/2021: Elevated at 7.7      Most recent diagnostic testing reviewed and documented below. EKG 4/1/22: SR 74, 1st degree AVB, LBBB, T wave inversion lead I and aVL, poor R wave progression V2-V6, QTc 503ms    Echocardiogram 4/2/22:   Conclusions   Summary   Left ventricular ejection fraction is visually estimated at 20%. global   hypokinesis. Left ventricular size is moderately increased . Pseudonormal filling pattern noted. Normal right ventricular chamber size and function. The left atrium is Mildly dilated. Mild (1+) mitral regurgitation is present. No evidence of aortic valve regurgitation . No evidence of aortic valve stenosis. Signature   ----------------------------------------------------------------   Electronically signed by Tatum Gutierrez DO(Interpreting   physician) on 04/02/2022 04:21 PM   ----------------------------------------------------------------    C 4/4/22:  Procedure(s):  LHC, b/l coronary angio, grafts  Pre-operative Diagnosis:  New CMP  H&P Status: Completed and reviewed. Post-operative Diagnosis:    LV LVEDP=23mmhg  LM normal   % mid  % prox  RCA severe disease, 100% distal     LIMA to LAD patent  SVG jump graft to R. I and distal CX patent  SVG to PDA mild to mod disease, no focal stenosis, patent      Findings:  See full report  Complications:  none     Primary Proceduralist:   Dr.Wes Sorto DO     Plan  Max med rx  rfm  Recheck LVF in 3 months. ? AICD in future. Vital signs stable in office today. Blood pressure 110/62, heart rate 55, pulse ox 97% on room air. Weight is 293 pounds. --Patient reports his weight in the hospital was around 321 pounds        PMHx:  Worsening cardiomyopathy EF 20% per echo 4/2/22  Hx CAD s/p CABG--9 years ago at [facility]  HTN  Dyslipidemia  CKD  Spinal stenosis  DM--on insulin      PSHx:  Hx CABG x4--9 years ago at [facility]  Left TKA  Right forearm sx  Tonsillectomy  Lumbar spine surgery x 4--most recently 9/2021      Social Hx:  Non-smoker  No alcohol  No drugs    Family Hx:  Father with MI in his 62s  Mother with cancer  Brother and sister passing from cancer    Allergies   Allergen Reactions    Dye [Iodides]        Current Outpatient Medications   Medication Sig Dispense Refill    sacubitril-valsartan (ENTRESTO) 24-26 MG per tablet Take 1 tablet by mouth 2 times daily 180 tablet 2    carvedilol (COREG) 6.25 MG tablet Take 1 tablet by mouth 2 times daily (with meals) 180 tablet 2    furosemide (LASIX) 40 MG tablet Take 1 tablet by mouth 2 times daily 180 tablet 2    hydrALAZINE (APRESOLINE) 25 MG tablet Take 1 tablet by mouth in the morning and at bedtime 180 tablet 3    atorvastatin (LIPITOR) 40 MG tablet Take 1 tablet by mouth nightly 90 tablet 3    glimepiride (AMARYL) 4 MG tablet Take 1 tablet by mouth 2 times daily 180 tablet 3    insulin glargine (LANTUS SOLOSTAR) 100 UNIT/ML injection pen Inject 30 Units into the skin 2 times daily 10 pen 5    tamsulosin (FLOMAX) 0.4 MG capsule Take 1 capsule by mouth daily 90 capsule 3    famotidine (PEPCID) 20 MG tablet Take 1 tablet by mouth daily 180 tablet 3    nitroGLYCERIN (NITROSTAT) 0.4 MG SL tablet Place 1 tablet under the tongue See Admin Instructions 25 tablet 1    Handicap Placard MISC by Does not apply route Exp 5 years 1 each 0    aspirin 81 MG tablet Take 81 mg by mouth daily. No current facility-administered medications for this visit.        Past Medical History:   Diagnosis Date    CAD (coronary artery disease)     Dr. Leia Kolb, Dr. Marilin Dominique    CKD (chronic kidney disease)     Hypertension     Kidney stones     Left foot drop     Neuropathy, diabetic (HCC)     mild    Osteoarthritis     hip, knee    S/P CABG (coronary artery bypass graft)     Type II or unspecified type diabetes mellitus without mention of complication, not stated as uncontrolled        Past Surgical History:   Procedure Laterality Date    BACK SURGERY N/A     BICEPS TENDON REPAIR  01/01/1997    CARDIAC CATHETERIZATION  06/05/2013    CORONARY ARTERY BYPASS GRAFT  06/07/2013    DR. Posadas [facility]    KNEE SURGERY  01/01/2006    left replacement    NECK SURGERY  2020    ROTATOR CUFF REPAIR  01/01/1996    TONSILLECTOMY  01/01/1966       Social History     Socioeconomic History    Marital status:      Spouse name: None    Number of children: None    Years of education: None    Highest education level: None   Tobacco Use    Smoking status: Never    Smokeless tobacco: Never   Substance and Sexual Activity    Alcohol use: No       Family History   Problem Relation Age of Onset    Cancer Mother         intestinal, skin    Heart Disease Father     Stroke Father          Review of Systems:   Review of Systems   Constitutional:  Negative for chills, fatigue, fever and unexpected weight change. HENT:  Negative for congestion. Respiratory:  Negative for cough, chest tightness and shortness of breath. Cardiovascular:  Negative for chest pain, palpitations and leg swelling. No orthopnea or PND   Gastrointestinal:  Negative for abdominal distention, abdominal pain, blood in stool, nausea and vomiting. Genitourinary:  Negative for difficulty urinating and hematuria. Musculoskeletal:  Negative for arthralgias and myalgias. Skin:  Negative for color change, pallor and rash. Neurological:  Negative for dizziness, syncope and light-headedness. Psychiatric/Behavioral:  Negative for agitation.         Physical Examination:    /69 (Site: Left Upper Arm, Position: Sitting, Cuff Size: Large Adult)   Pulse 56   Resp 18   Ht 6' 4\" (1.93 m)   Wt 290 lb (131.5 kg)   SpO2 97%   BMI 35.30 kg/m²    Physical Exam  Constitutional:       General: He is not in acute distress. Appearance: He is obese. HENT:      Head: Normocephalic and atraumatic. Cardiovascular:      Rate and Rhythm: Regular rhythm. Bradycardia present. Pulmonary:      Effort: Pulmonary effort is normal. No respiratory distress. Breath sounds: No wheezing, rhonchi or rales. Comments: Decreased breath sounds bilateral bases  Abdominal:      Palpations: Abdomen is soft. Tenderness: There is no abdominal tenderness. Musculoskeletal:         General: Normal range of motion. Cervical back: Normal range of motion and neck supple. Right lower leg: Edema (1+) present. Left lower leg: Edema (trace) present. Skin:     General: Skin is warm and dry. Neurological:      General: No focal deficit present. Mental Status: He is alert and oriented to person, place, and time. Cranial Nerves: No cranial nerve deficit.    Psychiatric:         Mood and Affect: Mood normal.         Behavior: Behavior normal.       LABS:  CBC:   Lab Results   Component Value Date/Time    WBC 5.6 04/07/2022 05:47 AM    RBC 4.31 04/07/2022 05:47 AM    HGB 11.7 04/07/2022 05:47 AM    HCT 36.9 04/07/2022 05:47 AM    MCV 85.7 04/07/2022 05:47 AM    MCH 27.1 04/07/2022 05:47 AM    MCHC 31.6 04/07/2022 05:47 AM    RDW 17.6 04/07/2022 05:47 AM     04/07/2022 05:47 AM    MPV 10.2 07/11/2015 10:08 AM     Lipids:  Lab Results   Component Value Date    CHOL 148 07/07/2021    CHOL 166 02/12/2020    CHOL 155 02/16/2018     Lab Results   Component Value Date    TRIG 213 (H) 07/07/2021    TRIG 225 (H) 02/12/2020    TRIG 215 (H) 02/16/2018     Lab Results   Component Value Date    HDL 39 (L) 07/07/2021    HDL 44 02/12/2020    HDL 43 02/16/2018     Lab Results   Component Value Date    LDLCALC 66 07/07/2021    LDLCALC 77 02/12/2020    LDLCALC 69 02/16/2018     No results found for: LABVLDL, VLDL  No results found for: CHOLHDLRATIO  CMP:    Lab Results   Component Value Date/Time     04/13/2022 10:34 AM    K 4.8 04/13/2022 10:34 AM     04/13/2022 10:34 AM    CO2 23 04/13/2022 10:34 AM    BUN 44 04/13/2022 10:34 AM    CREATININE 1.61 04/13/2022 10:34 AM    GFRAA 50.9 04/13/2022 10:34 AM    LABGLOM 42.1 04/13/2022 10:34 AM    GLUCOSE 165 04/13/2022 10:34 AM    GLUCOSE 154 09/21/2021 05:00 AM    PROT 7.0 04/01/2022 11:55 AM    LABALBU 3.7 04/01/2022 11:55 AM    CALCIUM 8.9 04/13/2022 10:34 AM    BILITOT 0.6 04/01/2022 11:55 AM    ALKPHOS 187 04/01/2022 11:55 AM    AST 29 04/01/2022 11:55 AM    ALT 24 04/01/2022 11:55 AM     BMP:    Lab Results   Component Value Date/Time     04/13/2022 10:34 AM    K 4.8 04/13/2022 10:34 AM     04/13/2022 10:34 AM    CO2 23 04/13/2022 10:34 AM    BUN 44 04/13/2022 10:34 AM    LABALBU 3.7 04/01/2022 11:55 AM    CREATININE 1.61 04/13/2022 10:34 AM    CALCIUM 8.9 04/13/2022 10:34 AM    GFRAA 50.9 04/13/2022 10:34 AM    LABGLOM 42.1 04/13/2022 10:34 AM    GLUCOSE 165 04/13/2022 10:34 AM    GLUCOSE 154 09/21/2021 05:00 AM     Magnesium:    Lab Results   Component Value Date/Time    MG 1.9 04/01/2022 11:55 AM     TSH:  Lab Results   Component Value Date    TSH 1.190 05/07/2019     . result  No results for input(s): PROBNP in the last 72 hours. No results for input(s): INR in the last 72 hours. Patient Active Problem List   Diagnosis    Hypertension    Diabetes mellitus ([address]    Kidney stone    S/P CABG x 4    JAZZMINE (obstructive sleep apnea)    Morbid obesity (HCC)    Iron deficiency anemia    CAD (coronary artery disease)    BPH (benign prostatic hyperplasia)    Hyperlipidemia    Stage 3 chronic kidney disease    Type 2 diabetes mellitus with stage 3 chronic kidney disease, with long-term current use of insulin (HCC)    Left foot drop    PVD (peripheral vascular disease) ([facility])    Leg swelling       Assessment/Plan:     Diagnosis Orders   1.  Chronic systolic CHF (congestive heart failure), NYHA class 1 (HCC)      Compensated. Continue current meds      2. Hx of CABG  External Ref Cardiac Electrophysiology - Anand Amezquita MD    s/p CABG 9 years ago. [facility] 4/4/22: Patent LIMA to LAD, patent SVG jump graft to RI and distal circumflex, patent SVG to PDA; severe 3V native CAD. No angina      3. Cardiomyopathy, unspecified type [facility])  External Ref Cardiac Electrophysiology - Anand Amezquita MD    EF 20% per echo 4/2/22 and repeat echo 7/14/22 with EF 30%. Refer to EP for ICD evaluation      4. Hypertension, unspecified type      Stable. Continue current meds      5. Dyslipidemia      Continue Lipitor 40mg PO daily      6. Diabetes mellitus type 2 in obese ([facility])      Management per PCP      7. Stage 3 chronic kidney disease, unspecified whether stage 3a or 3b CKD ([facility]      stable renal function per BMP on 4/13/22. Consider referral to nephro in future      8. Severe obesity (BMI 35.0-39. 9) with comorbidity ([facility]      Weight loss recommended      9. Pulmonary HTN ([facility]      RVSP 57mmHg per echo 7/14/22. Consider referral to pulm for evaluation and possible sleep study--pt wishing to defer for now          -Maximize medical therapy--aspirin 81 mg p.o. daily, Coreg 6.25 mg p.o. twice daily, Entresto 24/26 mg p.o. twice daily, Lasix 40 mg p.o. twice daily, Lipitor 40 mg p.o. daily, sublingual nitroglycerin as needed for chest pain, hydralazine 25 mg p.o. twice daily   -Consider addition of SGLT2 inhibitor ([place] or [place]) in future to further optimize heart failure medications  -Heart failure appears compensated at this time. -BMP from 4/13/2022 reviewed.   Renal function stable with creatinine of 1.61.  -Consider repeat BMP at next office visit in 6 months if not completed prior  -Cardiac/<2 gram sodium diet recommended  -Recommend 1500mL fluid restriction   -Weight loss recommended  -Instructed patient to weigh self daily every morning upon waking and keep log book of daily weights. Notify office if gaining more than 3 pounds in 48 hours. -Recommend routine blood pressure monitoring at home. Advised patient check blood pressure twice daily in a.m. and p.m. prior to taking medications and record log of blood pressure trends to review at next office visit. Advised to record heart rate with each blood pressure reading  -Advised patient to notify office immediately if experiencing any progressive SOB, orthopnea, PND, LE edema or weight gain  -Educated patient on importance of fluid and salt restriction as well as lifestyle modification  -Scheduled to start cardiac rehab on 5/19/2022  -Maintain routine outpatient follow-up with general cardiologist, Dr. Maria Teresa Hamlin appt 11/4/22  **Patient referred to electrophysiology, Dr. Lori Santana for ICD evaluation given persistently reduced LV systolic function with EF less than 35% per repeat limited echocardiogram from 7/14/2022.   Patient appears reluctant to have ICD implant but is willing to have evaluation with EP to provide him with the information so he can make an informed decision  Limited echocardiogram on 7/14/2022: EF 30%, mild TR, moderate MR, moderate pulmonary hypertension with RVSP of 57 mmHg  Echocardiogram 4/2/2022: Severely reduced LV systolic function with EF of 20%, global hypokinesis, mild 1+ mitral regurgitation  LHC 4/4/2022: Severe native three-vessel CAD, patent LIMA to LAD, SVG jump graft to RI and distal circumflex which was patent, SVG to PDA with mild to moderate disease and no focal stenosis for which medical therapy advised  -Consider referral to nephrology in future for evaluation and management of CKD if further decline in renal function in future  -Consider referral to pulmonology in future for evaluation of moderate pulmonary hypertension as well as exertional hypoxia with SPO2 dropping to 89% following walk test with cardiac rehab on 8/3/2022--patient wishing to defer for now  May need to consider sleep study in future  May need to consider PFT in future  -Maintain routine outpatient follow-up with PCP, Dr. Kira Leiva  -F/U with CHF clinic in 6 months or sooner if needed        Counseling: The importance of daily weights, dietary sodium restriction, and contact with cardiology if weight is increased more than 3 lbs in any 48 hour period was stressed. The patient has been advised to contact us if theyexperience progressive SOB, orthopnea, PND or progressive edema.

## 2022-08-03 NOTE — PATIENT INSTRUCTIONS
-Refer to electrophysiology, Dr. Courtney Hernandez for AICD evaluation    -Check daily weight every morning and notify CHF clinic if gaining more than 3 pounds in 2 days    -Check blood pressure twice daily in a.m. and p.m. prior to taking medications and keep log of blood pressure trends to review at next office visit  Notify office if BP running low with  mmHg or below prior to taking medications  Notify office if BP running high with  mmHg or above or if DBP 90 mmHg or above    -Recommend 2000 mg daily sodium restriction    -Recommend 1.5 liter (48 ounces) daily fluid restriction

## 2022-08-04 ASSESSMENT — PATIENT HEALTH QUESTIONNAIRE - PHQ9
SUM OF ALL RESPONSES TO PHQ QUESTIONS 1-9: 0
2. FEELING DOWN, DEPRESSED OR HOPELESS: 0
SUM OF ALL RESPONSES TO PHQ9 QUESTIONS 1 & 2: 0
SUM OF ALL RESPONSES TO PHQ QUESTIONS 1-9: 0
SUM OF ALL RESPONSES TO PHQ QUESTIONS 1-9: 0
1. LITTLE INTEREST OR PLEASURE IN DOING THINGS: 0
SUM OF ALL RESPONSES TO PHQ QUESTIONS 1-9: 0

## 2022-08-05 ENCOUNTER — HOSPITAL ENCOUNTER (OUTPATIENT)
Dept: CARDIAC REHAB | Age: 77
Setting detail: THERAPIES SERIES
Discharge: HOME | End: 2022-08-05
Payer: MEDICARE

## 2022-08-05 ENCOUNTER — APPOINTMENT (OUTPATIENT)
Dept: CARDIAC REHAB | Age: 77
End: 2022-08-05
Payer: MEDICARE

## 2022-08-05 PROCEDURE — 93798 PHYS/QHP OP CAR RHAB W/ECG: CPT

## 2022-08-08 ENCOUNTER — APPOINTMENT (OUTPATIENT)
Dept: CARDIAC REHAB | Age: 77
End: 2022-08-08
Payer: MEDICARE

## 2022-08-08 ENCOUNTER — HOSPITAL ENCOUNTER (OUTPATIENT)
Dept: CARDIAC REHAB | Age: 77
Setting detail: THERAPIES SERIES
Discharge: HOME | End: 2022-08-08
Payer: MEDICARE

## 2022-08-08 PROCEDURE — 93798 PHYS/QHP OP CAR RHAB W/ECG: CPT

## 2022-08-08 NOTE — PROGRESS NOTES
Nutrition Recommendations - Rate Your Plate Assessment             Rate Your Plate:    Pt with a score of 49 on final (post) rate your plate, indicating that there are some ways that the patient can make their eating habits healthier. Nutrition Recommendations/Plan:       Pt indicated that will work on eliminating sugars, reducing portion sizes, reading more labels, choosing more low-fat foods, lose weight, and exercising more. As stated in last RD note, pt declined questions or need for heart healthy diet education and declined attending this month's CR class.  RD available if pt has any nutrition questions during CR program.        Electronically signed by Flynn Spear RD, LD on 8/8/22 at 1:25 PM EDT

## 2022-08-08 NOTE — CARDIO/PULMONARY
COVID Screening completed. Patient denies complaints, no changes to PMH or medications. Patient tolerates exercise well.   Electronically signed by Joyce Briseno RN on 8/8/2022 at 11:47 AM

## 2022-08-10 ENCOUNTER — HOSPITAL ENCOUNTER (OUTPATIENT)
Dept: CARDIAC REHAB | Age: 77
Setting detail: THERAPIES SERIES
Discharge: HOME | End: 2022-08-10
Payer: MEDICARE

## 2022-08-10 ENCOUNTER — APPOINTMENT (OUTPATIENT)
Dept: CARDIAC REHAB | Age: 77
End: 2022-08-10
Payer: MEDICARE

## 2022-08-10 PROCEDURE — 93798 PHYS/QHP OP CAR RHAB W/ECG: CPT

## 2022-08-10 ASSESSMENT — EJECTION FRACTION: EF_VALUE: 30

## 2022-08-10 NOTE — PROGRESS NOTES
COVID Screening completed. Patient denies complaints, no changes to PMH or medications. Patient tolerates exercise well. Patient's last day is Friday. Patient has an appointment with Dr Deborah Victoria for Pacemaker/AICD insertion. Patient is hoping to get another referral for CR.  Electronically signed by Alvaro Wilson RN on 8/10/2022 at 10:59 AM

## 2022-08-12 ENCOUNTER — APPOINTMENT (OUTPATIENT)
Dept: CARDIAC REHAB | Age: 77
End: 2022-08-12
Payer: MEDICARE

## 2022-08-12 ENCOUNTER — HOSPITAL ENCOUNTER (OUTPATIENT)
Dept: CARDIAC REHAB | Age: 77
Setting detail: THERAPIES SERIES
Discharge: HOME | End: 2022-08-12
Payer: MEDICARE

## 2022-08-12 PROCEDURE — 93798 PHYS/QHP OP CAR RHAB W/ECG: CPT

## 2022-08-12 ASSESSMENT — NEW YORK HEART ASSOCIATION (NYHA) CLASSIFICATION: NYHA FUNCTIONAL CLASS: NO NYHA CLASS OR UNABLE TO DETERMINE

## 2022-08-12 ASSESSMENT — EXERCISE STRESS TEST
PEAK_RPE: 12
PEAK_HR: 73
PEAK_BP: 136/74
PEAK_RPD: 3
PEAK_BP: 132/64

## 2022-08-12 ASSESSMENT — EJECTION FRACTION: EF_VALUE: 30

## 2022-08-12 NOTE — CARDIO/PULMONARY
COVID Screening completed. Patient denies complaints, no changes to PMH or medications. Patient tolerates exercise well. Patient's last session today. Patient knows s/s to report to his physician, when to go to ER, and when to call 9-1-1. Patient has improved met level despite chronic back and knee pain. Weight did not decrease, but also did not increase during his time in cardiac rehab. Patient is scheduled for an AICD/PM insertion next week, and will most likely return to Cardiac Rehab following.     Electronically signed by Junior Best RN on 8/12/2022 at 12:16 PM

## 2022-08-15 ENCOUNTER — APPOINTMENT (OUTPATIENT)
Dept: CARDIAC REHAB | Age: 77
End: 2022-08-15
Payer: MEDICARE

## 2022-08-17 ENCOUNTER — APPOINTMENT (OUTPATIENT)
Dept: CARDIAC REHAB | Age: 77
End: 2022-08-17
Payer: MEDICARE

## 2022-08-17 ENCOUNTER — HOSPITAL ENCOUNTER (OUTPATIENT)
Dept: CARDIAC CATH/INVASIVE PROCEDURES | Age: 77
Discharge: HOME | End: 2022-08-18
Attending: SPECIALIST | Admitting: SPECIALIST
Payer: MEDICARE

## 2022-08-17 ENCOUNTER — APPOINTMENT (OUTPATIENT)
Dept: GENERAL RADIOLOGY | Age: 77
End: 2022-08-17
Attending: SPECIALIST
Payer: MEDICARE

## 2022-08-17 LAB
GLUCOSE BLD-MCNC: 269 MG/DL (ref 70–99)
GLUCOSE BLD-MCNC: 278 MG/DL (ref 70–99)
PERFORMED ON: ABNORMAL
PERFORMED ON: ABNORMAL

## 2022-08-17 PROCEDURE — C1730 CATH, EP, 19 OR FEW ELECT: HCPCS

## 2022-08-17 PROCEDURE — 2500000003 HC RX 250 WO HCPCS

## 2022-08-17 PROCEDURE — 33249 INSJ/RPLCMT DEFIB W/LEAD(S): CPT

## 2022-08-17 PROCEDURE — C1892 INTRO/SHEATH,FIXED,PEEL-AWAY: HCPCS

## 2022-08-17 PROCEDURE — 6360000002 HC RX W HCPCS: Performed by: SPECIALIST

## 2022-08-17 PROCEDURE — 99152 MOD SED SAME PHYS/QHP 5/>YRS: CPT

## 2022-08-17 PROCEDURE — 2580000003 HC RX 258: Performed by: SPECIALIST

## 2022-08-17 PROCEDURE — C1777 LEAD, AICD, ENDO SINGLE COIL: HCPCS

## 2022-08-17 PROCEDURE — C1889 IMPLANT/INSERT DEVICE, NOC: HCPCS

## 2022-08-17 PROCEDURE — 2709999900 HC NON-CHARGEABLE SUPPLY

## 2022-08-17 PROCEDURE — 99153 MOD SED SAME PHYS/QHP EA: CPT

## 2022-08-17 PROCEDURE — 94761 N-INVAS EAR/PLS OXIMETRY MLT: CPT

## 2022-08-17 PROCEDURE — C1898 LEAD, PMKR, OTHER THAN TRANS: HCPCS

## 2022-08-17 PROCEDURE — 6370000000 HC RX 637 (ALT 250 FOR IP): Performed by: SPECIALIST

## 2022-08-17 PROCEDURE — 6360000002 HC RX W HCPCS

## 2022-08-17 PROCEDURE — C1725 CATH, TRANSLUMIN NON-LASER: HCPCS

## 2022-08-17 PROCEDURE — 6360000004 HC RX CONTRAST MEDICATION: Performed by: SPECIALIST

## 2022-08-17 PROCEDURE — 71045 X-RAY EXAM CHEST 1 VIEW: CPT

## 2022-08-17 PROCEDURE — C1721 AICD, DUAL CHAMBER: HCPCS

## 2022-08-17 PROCEDURE — C1769 GUIDE WIRE: HCPCS

## 2022-08-17 RX ORDER — INSULIN GLARGINE 100 [IU]/ML
30 INJECTION, SOLUTION SUBCUTANEOUS 2 TIMES DAILY
Status: DISCONTINUED | OUTPATIENT
Start: 2022-08-17 | End: 2022-08-18 | Stop reason: HOSPADM

## 2022-08-17 RX ORDER — DIPHENHYDRAMINE HYDROCHLORIDE 50 MG/ML
50 INJECTION INTRAMUSCULAR; INTRAVENOUS ONCE
Status: COMPLETED | OUTPATIENT
Start: 2022-08-17 | End: 2022-08-17

## 2022-08-17 RX ORDER — ASPIRIN 81 MG/1
81 TABLET ORAL DAILY
Status: DISCONTINUED | OUTPATIENT
Start: 2022-08-17 | End: 2022-08-18 | Stop reason: HOSPADM

## 2022-08-17 RX ORDER — FUROSEMIDE 40 MG/1
40 TABLET ORAL 2 TIMES DAILY
Status: DISCONTINUED | OUTPATIENT
Start: 2022-08-17 | End: 2022-08-18 | Stop reason: HOSPADM

## 2022-08-17 RX ORDER — HYDROCODONE BITARTRATE AND ACETAMINOPHEN 5; 325 MG/1; MG/1
2 TABLET ORAL EVERY 4 HOURS PRN
Status: DISCONTINUED | OUTPATIENT
Start: 2022-08-17 | End: 2022-08-18 | Stop reason: HOSPADM

## 2022-08-17 RX ORDER — ATORVASTATIN CALCIUM 40 MG/1
40 TABLET, FILM COATED ORAL NIGHTLY
Status: DISCONTINUED | OUTPATIENT
Start: 2022-08-17 | End: 2022-08-18 | Stop reason: HOSPADM

## 2022-08-17 RX ORDER — SODIUM CHLORIDE 450 MG/100ML
INJECTION, SOLUTION INTRAVENOUS CONTINUOUS
Status: DISCONTINUED | OUTPATIENT
Start: 2022-08-17 | End: 2022-08-18 | Stop reason: HOSPADM

## 2022-08-17 RX ORDER — TAMSULOSIN HYDROCHLORIDE 0.4 MG/1
0.4 CAPSULE ORAL DAILY
Status: DISCONTINUED | OUTPATIENT
Start: 2022-08-17 | End: 2022-08-18 | Stop reason: HOSPADM

## 2022-08-17 RX ORDER — FAMOTIDINE 20 MG/1
20 TABLET, FILM COATED ORAL DAILY
Status: DISCONTINUED | OUTPATIENT
Start: 2022-08-17 | End: 2022-08-18 | Stop reason: HOSPADM

## 2022-08-17 RX ORDER — GLIMEPIRIDE 2 MG/1
4 TABLET ORAL 2 TIMES DAILY WITH MEALS
Status: DISCONTINUED | OUTPATIENT
Start: 2022-08-17 | End: 2022-08-18 | Stop reason: HOSPADM

## 2022-08-17 RX ORDER — HYDRALAZINE HYDROCHLORIDE 25 MG/1
25 TABLET, FILM COATED ORAL 2 TIMES DAILY
Status: DISCONTINUED | OUTPATIENT
Start: 2022-08-17 | End: 2022-08-18 | Stop reason: HOSPADM

## 2022-08-17 RX ORDER — NITROGLYCERIN 0.4 MG/1
0.4 TABLET SUBLINGUAL EVERY 5 MIN PRN
Status: DISCONTINUED | OUTPATIENT
Start: 2022-08-17 | End: 2022-08-18 | Stop reason: HOSPADM

## 2022-08-17 RX ORDER — DIPHENHYDRAMINE HYDROCHLORIDE 50 MG/ML
INJECTION INTRAMUSCULAR; INTRAVENOUS
Status: COMPLETED
Start: 2022-08-17 | End: 2022-08-17

## 2022-08-17 RX ORDER — CARVEDILOL 6.25 MG/1
6.25 TABLET ORAL 2 TIMES DAILY WITH MEALS
Status: DISCONTINUED | OUTPATIENT
Start: 2022-08-17 | End: 2022-08-18 | Stop reason: HOSPADM

## 2022-08-17 RX ORDER — HYDROCODONE BITARTRATE AND ACETAMINOPHEN 5; 325 MG/1; MG/1
1 TABLET ORAL EVERY 4 HOURS PRN
Status: DISCONTINUED | OUTPATIENT
Start: 2022-08-17 | End: 2022-08-18 | Stop reason: HOSPADM

## 2022-08-17 RX ADMIN — FAMOTIDINE 20 MG: 20 TABLET ORAL at 21:21

## 2022-08-17 RX ADMIN — FUROSEMIDE 40 MG: 40 TABLET ORAL at 21:21

## 2022-08-17 RX ADMIN — DIPHENHYDRAMINE HYDROCHLORIDE 50 MG: 50 INJECTION, SOLUTION INTRAMUSCULAR; INTRAVENOUS at 09:03

## 2022-08-17 RX ADMIN — FUROSEMIDE 40 MG: 40 TABLET ORAL at 15:24

## 2022-08-17 RX ADMIN — ASPIRIN 81 MG: 81 TABLET, COATED ORAL at 15:26

## 2022-08-17 RX ADMIN — SACUBITRIL AND VALSARTAN 1 TABLET: 24; 26 TABLET, FILM COATED ORAL at 21:21

## 2022-08-17 RX ADMIN — SODIUM CHLORIDE: 4.5 INJECTION, SOLUTION INTRAVENOUS at 08:44

## 2022-08-17 RX ADMIN — SODIUM CHLORIDE: 4.5 INJECTION, SOLUTION INTRAVENOUS at 21:29

## 2022-08-17 RX ADMIN — DIPHENHYDRAMINE HYDROCHLORIDE 50 MG: 50 INJECTION INTRAMUSCULAR; INTRAVENOUS at 09:03

## 2022-08-17 RX ADMIN — TAMSULOSIN HYDROCHLORIDE 0.4 MG: 0.4 CAPSULE ORAL at 15:25

## 2022-08-17 RX ADMIN — VANCOMYCIN HYDROCHLORIDE 1000 MG: 1 INJECTION, POWDER, LYOPHILIZED, FOR SOLUTION INTRAVENOUS at 08:43

## 2022-08-17 RX ADMIN — HYDROCODONE BITARTRATE AND ACETAMINOPHEN 1 TABLET: 5; 325 TABLET ORAL at 15:58

## 2022-08-17 RX ADMIN — ATORVASTATIN CALCIUM 40 MG: 40 TABLET, FILM COATED ORAL at 21:21

## 2022-08-17 RX ADMIN — HYDROCODONE BITARTRATE AND ACETAMINOPHEN 2 TABLET: 5; 325 TABLET ORAL at 21:21

## 2022-08-17 RX ADMIN — SACUBITRIL AND VALSARTAN 1 TABLET: 24; 26 TABLET, FILM COATED ORAL at 15:26

## 2022-08-17 RX ADMIN — IOPAMIDOL 20 ML: 612 INJECTION, SOLUTION INTRAVENOUS at 13:26

## 2022-08-17 RX ADMIN — HYDROCORTISONE SODIUM SUCCINATE 200 MG: 100 INJECTION, POWDER, FOR SOLUTION INTRAMUSCULAR; INTRAVENOUS at 09:04

## 2022-08-17 RX ADMIN — INSULIN GLARGINE 30 UNITS: 100 INJECTION, SOLUTION SUBCUTANEOUS at 21:24

## 2022-08-17 RX ADMIN — CARVEDILOL 6.25 MG: 6.25 TABLET, FILM COATED ORAL at 16:28

## 2022-08-17 RX ADMIN — GLIMEPIRIDE 4 MG: 2 TABLET ORAL at 16:28

## 2022-08-17 RX ADMIN — HYDRALAZINE HYDROCHLORIDE 25 MG: 25 TABLET, FILM COATED ORAL at 21:23

## 2022-08-17 ASSESSMENT — PAIN SCALES - GENERAL
PAINLEVEL_OUTOF10: 8
PAINLEVEL_OUTOF10: 3
PAINLEVEL_OUTOF10: 5
PAINLEVEL_OUTOF10: 5
PAINLEVEL_OUTOF10: 2

## 2022-08-17 ASSESSMENT — PAIN DESCRIPTION - ORIENTATION
ORIENTATION: RIGHT

## 2022-08-17 ASSESSMENT — PAIN DESCRIPTION - LOCATION
LOCATION: LEG
LOCATION: HIP
LOCATION: HIP
LOCATION: LEG

## 2022-08-17 ASSESSMENT — PAIN DESCRIPTION - DESCRIPTORS
DESCRIPTORS: ACHING
DESCRIPTORS: ACHING

## 2022-08-17 NOTE — PROGRESS NOTES
Portable chest x ray done. Patient eating and taking fluids. Left upper chest dressing is dry and intact with no bleeding or hematoma. Vitals are stable.   Report called to [facility] on [facility]

## 2022-08-17 NOTE — PROGRESS NOTES
Arrived to pre/post from the cath lab and report from [facility]. Left upper chest dressing is dry and intact with no bleeding or hematoma. Attached to monitor and vitals are stable. Sling to left arm.   Dr. Karla Barry at bedside reviewing results with patient at this time

## 2022-08-17 NOTE — PROGRESS NOTES
Pt. Arrived from home to pre/post cath lab. Alert and oriented. Consent signed. Vitals obtained. IV access initiated with pre-op Vancomycin and IVF infusing. Chlorhexidine prep completed. Pt resting comfortably. 0900 Pt. Pre medicated for dye allergy. 0945 Pt taken to cath lab for procedure.

## 2022-08-17 NOTE — OP NOTE
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 Alternative therapies were discussed with the patient and his  family. He opted for the ICD. 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 Alternative therapies were discussed with the patient and his  family. He opted for the ICD. The LV lead after it was placed properly in the CS with proper number it  came loose at the end of the procedure out of its position, attempts to  reposition it failed, so we removed it and we ended up with dual-chamber  ICD implant not BiV, although the LV lead was in place with excellent  numbers as the report will show. PROCEDURE PERFORMED:  BiV ICD implant. SURGEON:  Vandana Chavez MD    OPERATIVE PROCEDURE:  The patient was brought to the EP lab in the  postabsorptive state. Vitals were stable. Informed consent was  obtained. The left hemithorax was prepared and draped in the usual  manner. The patient has looked to be in some kind of distress when he  is flat. He is obese, overweight and with the advance CHF. The left  hemithorax was prepared and draped in the usual manner. The left  infraclavicular fossa was then treated with 2% Xylocaine.   Incision was  made two fingerbreadths below the left clavicle and deepened down to the  pectoralis fascia. Pocket was made by dissection. Hemostasis was  secured. Using the Seldinger technique in a caudal view of 35 degrees  the left cephalic axillary venous system was captured twice very easily,  the third one give us some harder time, but finally we were able to get  into it. There is significant elevated venous pressure. Each guidewire  was advanced under direct fluoroscopy to the junction of the superior  vena cava and right atrium. Two 7-Citizen of [place] dilators and introducers were  advanced over one of the guidewire and the Ricardo sheath and introducer  was advanced over the long guiding wire. The dilators and the  guidewires were removed. RV lead manufactured by [facility]. Michael, model number  0058G-49, serial number of [id number] was advanced under direct  fluoroscopy to RV septal position. The lead was advanced under direct  fluoroscopy to RV septal position. R-wave of 10.5 mV, impedance 660  ohms, slew rate of 3.4 volts per second. At 0.4 milliseconds, voltage  threshold was 0.5 V and the current of 0.6 mA. 10-volt testing was  done. No diaphragmatic pacing was documented. The lead was secured to  the pectoralis fascia muscle using two separate 0-silk sutures. The  atrial lead is by [facility]. Michael, model number 0328BL-62, serial number of  [id number]. The lead was advanced under direct fluoroscopy to the right  atrial appendage remnant. P-wave of 2.0 mV, slew rate of 0.6 volts per  second, impedance 506 ohms. At 1.5 milliseconds, voltage threshold was  0.5 V and the current of 0.9 mA. 10-volt testing was done. No  diaphragmatic pacing was documented. The lead was secured to the  pectoralis fascia muscle using two separate 0-silk sutures. Over the long wire, a [place] sheath and introducer were advanced to the  junction of the superior vena cava in the right atrium. The dilator and  the guidewires were removed.   A Mariano catheter was used to cannulate  the coronary sinus. Once in the coronary sinus we advanced the [facility]  over the proximal portion of the CS. The Annitta Basset was removed and  replaced with [place]-Zach balloon-tipped catheter. Venography of the CS  was done in two branches in two positions. The proper vein was  identified. The Russella Pott was removed and replaced with the proper guiding  wires. Manipulation of the system was done and we advanced the LV lead  by [facility], model number [id number], serial number of [id number] and we  advanced that finally after repeated attempts to the target vein and  branch with excellent sensing and pacing parameters. R-wave was around  8. Pacing threshold at 1.5 milliseconds was around 1.2 volts. Maximum  device output did not result in diaphragmatic pacing. Numbers remained  the same or even gotten better in all leads. After we attempted to  remove the introducer and we were attempting to put the sutures to keep  the LV lead in position. The lead came out of its position. We  re-attempted with using different guiding wires for the lead to advance  it back into position and we failed. By that his time, the patient had  been on the table for significant amount of time. To start with he was  in some kind of respiratory distress. I was concerned pushing more will  get into trouble, so we decided to abort the attempt at reinserting the  system for the left ventricular lead. Hemostasis was secured. All  leads were secured to the pectoralis fascia muscle using two separate  0-silk sutures. His venous pressure is significantly elevated. Hemostasis was achieved. The pocket was irrigated with copious amount  of antibiotic. The generator which is now only dual-chamber ICD by [facility].  Michael, model number [id number] and serial number of [id number] was hooked up  to the device. Device based testing showed P-wave of 1.4, R-wave of  11.8 mV. Atrial lead impedance 400, RV of 480 ohms.   In the atrium at  1.5 milliseconds, voltage threshold was 0.5 volts in the ventricle at  0.5 milliseconds, voltage threshold was 0.5 volts. I decided not to  test the ICD and concerned the patient is not going to be stable for  long time on the table. Hemostasis was evident. The TYRX pouch by  [employer] was used. The subcutaneous tissue was closed using 3-0 Vicryl  in two separate layers. The subcuticular tissue was closed using 4-0  Vicryl. The wound was bandaged in the usual fashion. The patient  tolerated the procedure well and is going to be admitted and observed  overnight and will proceed from there.         Gissel Hernandez MD    D: 08/17/2022 13:19:51       T: 08/17/2022 13:26:14     [id number]  Job#: [id number]     Doc#: [id number]    CC:

## 2022-08-18 ENCOUNTER — APPOINTMENT (OUTPATIENT)
Dept: GENERAL RADIOLOGY | Age: 77
End: 2022-08-18
Attending: SPECIALIST
Payer: MEDICARE

## 2022-08-18 VITALS
OXYGEN SATURATION: 95 % | TEMPERATURE: 97.5 F | RESPIRATION RATE: 18 BRPM | HEIGHT: 76 IN | WEIGHT: 290 LBS | SYSTOLIC BLOOD PRESSURE: 137 MMHG | HEART RATE: 60 BPM | BODY MASS INDEX: 35.31 KG/M2 | DIASTOLIC BLOOD PRESSURE: 63 MMHG

## 2022-08-18 LAB
GLUCOSE BLD-MCNC: 104 MG/DL (ref 70–99)
GLUCOSE BLD-MCNC: 77 MG/DL (ref 70–99)
PERFORMED ON: ABNORMAL
PERFORMED ON: NORMAL

## 2022-08-18 PROCEDURE — 6370000000 HC RX 637 (ALT 250 FOR IP): Performed by: SPECIALIST

## 2022-08-18 PROCEDURE — 6360000002 HC RX W HCPCS: Performed by: SPECIALIST

## 2022-08-18 PROCEDURE — 71046 X-RAY EXAM CHEST 2 VIEWS: CPT

## 2022-08-18 PROCEDURE — 2580000003 HC RX 258: Performed by: SPECIALIST

## 2022-08-18 RX ADMIN — INSULIN GLARGINE 30 UNITS: 100 INJECTION, SOLUTION SUBCUTANEOUS at 07:55

## 2022-08-18 RX ADMIN — GLIMEPIRIDE 4 MG: 2 TABLET ORAL at 07:53

## 2022-08-18 RX ADMIN — SACUBITRIL AND VALSARTAN 1 TABLET: 24; 26 TABLET, FILM COATED ORAL at 07:52

## 2022-08-18 RX ADMIN — VANCOMYCIN HYDROCHLORIDE 1000 MG: 1 INJECTION, POWDER, LYOPHILIZED, FOR SOLUTION INTRAVENOUS at 12:35

## 2022-08-18 RX ADMIN — FUROSEMIDE 40 MG: 40 TABLET ORAL at 07:53

## 2022-08-18 RX ADMIN — CARVEDILOL 6.25 MG: 6.25 TABLET, FILM COATED ORAL at 07:53

## 2022-08-18 RX ADMIN — TAMSULOSIN HYDROCHLORIDE 0.4 MG: 0.4 CAPSULE ORAL at 07:53

## 2022-08-18 RX ADMIN — ASPIRIN 81 MG: 81 TABLET, COATED ORAL at 07:53

## 2022-08-18 RX ADMIN — HYDRALAZINE HYDROCHLORIDE 25 MG: 25 TABLET, FILM COATED ORAL at 07:53

## 2022-08-18 ASSESSMENT — PAIN SCALES - GENERAL
PAINLEVEL_OUTOF10: 0

## 2022-08-18 NOTE — DISCHARGE INSTR - DIET
Good nutrition is important when healing from an illness, injury, or surgery. Follow any nutrition recommendations given to you during your hospital stay. If you were given an oral nutrition supplement while in the hospital, continue to take this supplement at home. You can take it with meals, in-between meals, and/or before bedtime. These supplements can be purchased at most local grocery stores, pharmacies, and chain [employer]-stores. If you have any questions about your diet or nutrition, call the hospital and ask for the dietitian.       Resume normal diet as tolerated

## 2022-08-18 NOTE — PLAN OF CARE
Problem: Safety - Adult  Goal: Free from fall injury  Outcome: Progressing  Flowsheets (Taken 8/17/2022 1804 by Ara Devi RN)  Free From Fall Injury: Instruct family/caregiver on patient safety

## 2022-08-18 NOTE — DISCHARGE INSTR - ACTIVITY
Avoid streneous lt arm movements x 30 days.   Do not get the wound wet x 30 days  If fever, chills, increase swelling, any discharge call MD  If one shock call Dr Jovanni Dunn [phone], if two or more call [phone]

## 2022-08-18 NOTE — PROGRESS NOTES
Patient is resting in bed no complaints of pain/discomfort noted. Bed lock low and call button within reach. IV fluids infusing per order IV remains intact.

## 2022-08-19 ENCOUNTER — APPOINTMENT (OUTPATIENT)
Dept: CARDIAC REHAB | Age: 77
End: 2022-08-19
Payer: MEDICARE

## 2022-10-03 ENCOUNTER — HOSPITAL ENCOUNTER (OUTPATIENT)
Dept: CARDIAC REHAB | Age: 77
Setting detail: THERAPIES SERIES
Discharge: HOME | End: 2022-10-03
Payer: MEDICARE

## 2022-10-03 PROCEDURE — 93798 PHYS/QHP OP CAR RHAB W/ECG: CPT

## 2022-10-03 ASSESSMENT — EXERCISE STRESS TEST
PEAK_BP: 128/60
PEAK_HR: 88
PEAK_RPD: 3
PEAK_RPE: 11
PEAK_BP: 128/60

## 2022-10-03 ASSESSMENT — EJECTION FRACTION: EF_VALUE: 20

## 2022-10-03 ASSESSMENT — NEW YORK HEART ASSOCIATION (NYHA) CLASSIFICATION: NYHA FUNCTIONAL CLASS: NO NYHA CLASS OR UNABLE TO DETERMINE

## 2022-10-03 NOTE — PROGRESS NOTES
COVID Screening completed. Patient returns to Cardiac Rehab post ICD  placement on 8/17/2022 by Dr Bernarod Mulligan. Left chest incision healing well, noted 1 dissolvable suture still in place. Will continue to monitor. Patient reoriented to gym, machine safety, and s/s to report. 6MWT completed, patient completed 2.5 laps/415ft. 3.5 laps less than 1st discharge walk test. Patient has not been doing any home exercise and rest as been instructed post op. Will continue to work on patient's exercise tolerance.  Electronically signed by Marion Ortiz RN on 10/3/2022 at 11:37 AM

## 2022-10-05 ENCOUNTER — HOSPITAL ENCOUNTER (OUTPATIENT)
Dept: CARDIAC REHAB | Age: 77
Setting detail: THERAPIES SERIES
Discharge: HOME | End: 2022-10-05
Payer: MEDICARE

## 2022-10-05 PROCEDURE — 93798 PHYS/QHP OP CAR RHAB W/ECG: CPT

## 2022-10-05 NOTE — CARDIO/PULMONARY
COVID Screening completed. Patient denies complaints, no changes to PMH or medications. Patient tolerates exercise well. Patient educated on heart healthy label reading. Handout offered.    Electronically signed by Henrique Jeffrey RN on 10/5/2022 at 9:44 AM

## 2022-10-07 ENCOUNTER — HOSPITAL ENCOUNTER (OUTPATIENT)
Dept: CARDIAC REHAB | Age: 77
Setting detail: THERAPIES SERIES
Discharge: HOME | End: 2022-10-07
Payer: MEDICARE

## 2022-10-07 PROCEDURE — 93798 PHYS/QHP OP CAR RHAB W/ECG: CPT

## 2022-10-10 ENCOUNTER — HOSPITAL ENCOUNTER (OUTPATIENT)
Dept: CARDIAC REHAB | Age: 77
Setting detail: THERAPIES SERIES
Discharge: HOME | End: 2022-10-10
Payer: MEDICARE

## 2022-10-10 PROCEDURE — 93798 PHYS/QHP OP CAR RHAB W/ECG: CPT

## 2022-10-10 NOTE — CARDIO/PULMONARY
COVID Screening completed. Patient denies complaints, no changes to PMH or medications. Patient tolerates exercise well.   Electronically signed by Ryan Jean RN on 10/10/2022 at 11:28 AM

## 2022-10-12 ENCOUNTER — HOSPITAL ENCOUNTER (OUTPATIENT)
Dept: CARDIAC REHAB | Age: 77
Setting detail: THERAPIES SERIES
Discharge: HOME | End: 2022-10-12
Payer: MEDICARE

## 2022-10-12 PROCEDURE — 93798 PHYS/QHP OP CAR RHAB W/ECG: CPT

## 2022-10-12 NOTE — CARDIO/PULMONARY
COVID Screening completed. Patient denies complaints, no changes to PMH or medications. Patient tolerates exercise well. Discussed heart health, cold weather precautions with patient. Handout given.  Electronically signed by Danielle Estrella RN on 10/12/2022 at 2:38 PM

## 2022-10-14 ENCOUNTER — HOSPITAL ENCOUNTER (OUTPATIENT)
Dept: CARDIAC REHAB | Age: 77
Setting detail: THERAPIES SERIES
Discharge: HOME | End: 2022-10-14
Payer: MEDICARE

## 2022-10-14 PROCEDURE — 93798 PHYS/QHP OP CAR RHAB W/ECG: CPT

## 2022-10-14 NOTE — PROGRESS NOTES
COVID Screening completed. Patient denies complaints, no changes to PMH or medications. Patient tolerates exercise well.   Electronically signed by Mirtha Estrada RN on 10/14/2022 at 9:44 AM

## 2022-10-17 ENCOUNTER — HOSPITAL ENCOUNTER (OUTPATIENT)
Dept: CARDIAC REHAB | Age: 77
Setting detail: THERAPIES SERIES
Discharge: HOME | End: 2022-10-17
Payer: MEDICARE

## 2022-10-17 PROCEDURE — 93798 PHYS/QHP OP CAR RHAB W/ECG: CPT

## 2022-10-17 NOTE — CARDIO/PULMONARY
COVID Screening completed. Patient denies complaints, no changes to PMH or medications. Patient tolerates exercise well. Noted patient had a lower BP at arrival to CR session, and again after exercise. Patient offered water and rested and BP improved. Patient denies any complaints, no new medication at home. Patient states that his neck is hurting a lot lately and he sees his doctor soon to discuss alternative pain management.  Electronically signed by Joslyn Nugent RN on 10/17/2022 at 11:53 AM

## 2022-10-19 ENCOUNTER — HOSPITAL ENCOUNTER (OUTPATIENT)
Dept: CARDIAC REHAB | Age: 77
Setting detail: THERAPIES SERIES
Discharge: HOME | End: 2022-10-19
Payer: MEDICARE

## 2022-10-19 PROCEDURE — 93798 PHYS/QHP OP CAR RHAB W/ECG: CPT

## 2022-10-19 NOTE — PROGRESS NOTES
COVID Screening completed. Patient denies complaints, no changes to PMH or medications. Patient tolerates exercise well. Patient educated on stress and give the Life Stress Test Handout. Patient's BP has been trending lower post exercise. Patient is asymptomatic. Report will be sent to cardiologist for records.  Electronically signed by Mansi Gold RN on 10/19/2022 at 10:19 AM

## 2022-10-21 ENCOUNTER — HOSPITAL ENCOUNTER (OUTPATIENT)
Dept: CARDIAC REHAB | Age: 77
Setting detail: THERAPIES SERIES
Discharge: HOME | End: 2022-10-21
Payer: MEDICARE

## 2022-10-21 PROCEDURE — 93798 PHYS/QHP OP CAR RHAB W/ECG: CPT

## 2022-10-21 NOTE — PROGRESS NOTES
COVID Screening completed. Patient denies complaints, no changes to PMH or medications. Patient tolerates exercise well. Discussed with patient trending lower BP after exercise. Patient states he only drinks a 1/2 a glass of water prior to session. Patient is encourage to drink atleast 24oz prior to exercise. Patient is going to work that in over the weekend and when he returns Monday. Patient denies symptoms of low BP.  Electronically signed by Jeri Jacobsen RN on 10/21/2022 at 10:46 AM

## 2022-10-24 ENCOUNTER — HOSPITAL ENCOUNTER (OUTPATIENT)
Dept: CARDIAC REHAB | Age: 77
Setting detail: THERAPIES SERIES
Discharge: HOME | End: 2022-10-24
Payer: MEDICARE

## 2022-10-24 PROCEDURE — 93798 PHYS/QHP OP CAR RHAB W/ECG: CPT

## 2022-10-24 ASSESSMENT — EJECTION FRACTION: EF_VALUE: 20

## 2022-10-24 ASSESSMENT — EXERCISE STRESS TEST: PEAK_BP: 122/54

## 2022-10-24 NOTE — CARDIO/PULMONARY
COVID Screening completed. Patient denies complaints, no changes to PMH or medications. Patient tolerates exercise well. Patient increasing in time and intensity on equipment. Patient noted to have low BP after exercise. BP improved with rest and hydration.  Electronically signed by Severino Peña RN on 10/24/2022 at 10:32 AM

## 2022-10-24 NOTE — LETTER
Fer Smith,     Please review and sign patients recent ITP review. Thank you,   Tosin Montes RN  Cardiac Rehab.

## 2022-10-26 ENCOUNTER — HOSPITAL ENCOUNTER (OUTPATIENT)
Dept: CARDIAC REHAB | Age: 77
Setting detail: THERAPIES SERIES
End: 2022-10-26
Payer: MEDICARE

## 2022-10-26 PROCEDURE — 93798 PHYS/QHP OP CAR RHAB W/ECG: CPT

## 2022-10-26 NOTE — CARDIO/PULMONARY
COVID Screening completed. Patient denies complaints, no changes to PMH or medications. Patient tolerates exercise well. Discussed healthy sleep habits. Handout given.  Electronically signed by Love Villarreal RN on 10/26/2022 at 1:45 PM

## 2022-10-28 ENCOUNTER — APPOINTMENT (OUTPATIENT)
Dept: CARDIAC REHAB | Age: 77
End: 2022-10-28
Payer: MEDICARE

## 2022-10-28 PROCEDURE — 93798 PHYS/QHP OP CAR RHAB W/ECG: CPT

## 2022-10-28 NOTE — CARDIO/PULMONARY
COVID Screening completed. Patient denies complaints, no changes to PMH. Patient tolerates exercise well. Patient reports starting gabapentin recently. Patient is also scheduled for injections into his back in a few weeks.  Electronically signed by Fiona Lipscomb RN on 10/28/2022 at 9:36 AM

## 2022-10-31 ENCOUNTER — APPOINTMENT (OUTPATIENT)
Dept: CARDIAC REHAB | Age: 77
End: 2022-10-31
Payer: MEDICARE

## 2022-10-31 PROCEDURE — 93798 PHYS/QHP OP CAR RHAB W/ECG: CPT

## 2022-10-31 NOTE — CARDIO/PULMONARY
COVID Screening completed. Patient denies complaints, no changes to PMH or medications. Patient tolerates exercise well.   Electronically signed by Joslyn Nugent RN on 10/31/2022 at 12:00 PM

## 2022-11-02 ENCOUNTER — HOSPITAL ENCOUNTER (OUTPATIENT)
Dept: CARDIAC REHAB | Age: 77
Setting detail: THERAPIES SERIES
Discharge: HOME | End: 2022-11-02
Payer: MEDICARE

## 2022-11-02 PROCEDURE — 93798 PHYS/QHP OP CAR RHAB W/ECG: CPT

## 2022-11-02 NOTE — CARDIO/PULMONARY
COVID Screening completed. Patient denies complaints, no changes to PMH or medications. Patient tolerates exercise well. Flexibility and stretching handout offered to patient.  Electronically signed by Lyn Grover RN on 11/2/2022 at 10:21 AM

## 2022-11-04 ENCOUNTER — HOSPITAL ENCOUNTER (OUTPATIENT)
Dept: CARDIAC REHAB | Age: 77
Setting detail: THERAPIES SERIES
Discharge: HOME | End: 2022-11-04
Payer: MEDICARE

## 2022-11-04 ENCOUNTER — OFFICE VISIT (OUTPATIENT)
Dept: CARDIOLOGY CLINIC | Age: 77
End: 2022-11-04
Payer: MEDICARE

## 2022-11-04 VITALS
BODY MASS INDEX: 36.64 KG/M2 | DIASTOLIC BLOOD PRESSURE: 52 MMHG | SYSTOLIC BLOOD PRESSURE: 110 MMHG | HEART RATE: 63 BPM | WEIGHT: 301 LBS

## 2022-11-04 DIAGNOSIS — I25.810 CORONARY ARTERY DISEASE INVOLVING CORONARY BYPASS GRAFT OF NATIVE HEART WITHOUT ANGINA PECTORIS: ICD-10-CM

## 2022-11-04 DIAGNOSIS — I10 HYPERTENSION, UNSPECIFIED TYPE: ICD-10-CM

## 2022-11-04 DIAGNOSIS — I50.22 CHRONIC SYSTOLIC CHF (CONGESTIVE HEART FAILURE), NYHA CLASS 1 (HCC): Primary | ICD-10-CM

## 2022-11-04 DIAGNOSIS — E66.01 MORBID OBESITY (HCC): Chronic | ICD-10-CM

## 2022-11-04 DIAGNOSIS — G47.33 OSA (OBSTRUCTIVE SLEEP APNEA): Chronic | ICD-10-CM

## 2022-11-04 DIAGNOSIS — E78.2 MIXED HYPERLIPIDEMIA: ICD-10-CM

## 2022-11-04 DIAGNOSIS — Z95.1 S/P CABG X 4: ICD-10-CM

## 2022-11-04 PROCEDURE — 1123F ACP DISCUSS/DSCN MKR DOCD: CPT | Performed by: INTERNAL MEDICINE

## 2022-11-04 PROCEDURE — G8484 FLU IMMUNIZE NO ADMIN: HCPCS | Performed by: INTERNAL MEDICINE

## 2022-11-04 PROCEDURE — G8417 CALC BMI ABV UP PARAM F/U: HCPCS | Performed by: INTERNAL MEDICINE

## 2022-11-04 PROCEDURE — G8427 DOCREV CUR MEDS BY ELIG CLIN: HCPCS | Performed by: INTERNAL MEDICINE

## 2022-11-04 PROCEDURE — 99214 OFFICE O/P EST MOD 30 MIN: CPT | Performed by: INTERNAL MEDICINE

## 2022-11-04 PROCEDURE — 3078F DIAST BP <80 MM HG: CPT | Performed by: INTERNAL MEDICINE

## 2022-11-04 PROCEDURE — 3017F COLORECTAL CA SCREEN DOC REV: CPT | Performed by: INTERNAL MEDICINE

## 2022-11-04 PROCEDURE — 93000 ELECTROCARDIOGRAM COMPLETE: CPT | Performed by: INTERNAL MEDICINE

## 2022-11-04 PROCEDURE — 93798 PHYS/QHP OP CAR RHAB W/ECG: CPT

## 2022-11-04 PROCEDURE — 3074F SYST BP LT 130 MM HG: CPT | Performed by: INTERNAL MEDICINE

## 2022-11-04 PROCEDURE — 1036F TOBACCO NON-USER: CPT | Performed by: INTERNAL MEDICINE

## 2022-11-04 RX ORDER — GABAPENTIN 300 MG/1
CAPSULE ORAL
COMMUNITY
Start: 2022-10-20

## 2022-11-04 NOTE — PROGRESS NOTES
Chief Complaint   Patient presents with    Coronary Artery Disease    Cardiomyopathy    Other     PVD  Pt wants to discus the stitch from pacemaker one will not dissolve. Pt states that his blood pressure has been low therapy. [facility] at [employer]. Patient presents for follow up medical evaluation. Patient is followed on a regular basis by Dr. Willow Carbajal MD. 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 Patient was found to have severe CAD and underwent CABGx4 in 6/2013 at [facility]. Echo with EF of 50-60%, grade III DD. Mildly dilated ascending aorta. Doing well overall. Would like to undergo cardiac rehab. Now allowed to lift up to 20pounds. Compliant with meds, no bleeding issues, no SL nitro use. Pt denies chest pain, dyspnea, dyspnea on exertion, change in exercise capacity, fatigue, nausea, vomiting, diarrhea, constipation, motor weakness, insomnia, weight loss, syncope, dizziness, lightheadedness, palpitations, PND, orthopnea, or claudication. + snoring. Patient had CHRISTIANO post op, was anemic s/p PRBC.     10-17-13: as above, doing well overall. Finished cardiac rehab, has lost 33 pounds since surgery. Exercises 3x a week without any issues. Compliant with meds, no bleeding issues. Pt denies chest pain, dyspnea, dyspnea on exertion, change in exercise capacity, fatigue,  nausea, vomiting, diarrhea, constipation, motor weakness, insomnia, weight loss, syncope, dizziness, lightheadedness, palpitations, PND, orthopnea, or claudication. No Nitro use.    5-1-14: as above, doing good overall, no recent hospitalizations. No change in meds. No bleeding issues. No SL nitro use. Pt denies chest pain, dyspnea, dyspnea on exertion, change in exercise capacity, fatigue,  nausea, vomiting, diarrhea, constipation, motor weakness, insomnia, weight loss, syncope, dizziness, lightheadedness, palpitations, PND, orthopnea, or claudication.  Following up with PCP on renal insufficiency. 11-13-14: as above, doing great. Got a new job at [employer]. Pt denies chest pain, dyspnea, dyspnea on exertion, change in exercise capacity, fatigue,  nausea, vomiting, diarrhea, constipation, motor weakness, insomnia, weight loss, syncope, dizziness, lightheadedness, palpitations, PND, orthopnea, or claudication. Compliant with meds. S/p normal KSENIA. States he's active at work, having a hard time losing weight, always have. States he's careful with diet. No SL nitro use. No recent hospitalizations. No LE edema. 6-2-15: as above, Pt denies chest pain, dyspnea, dyspnea on exertion, change in exercise capacity, fatigue,  nausea, vomiting, diarrhea, constipation, motor weakness, insomnia, weight loss, syncope, dizziness, lightheadedness, palpitations, PND, orthopnea, or claudication. No SL nitro use. No hospitalizations. No LE edema. Did see Dr. Susanna Torres and tried an injectable for weight loss that did not work. 12-8-15: as above, did have right LE cellulitis. Doing better now. Pt denies chest pain, dyspnea, dyspnea on exertion, change in exercise capacity, fatigue,  nausea, vomiting, diarrhea, constipation, motor weakness, insomnia, weight loss, syncope, dizziness, lightheadedness, palpitations, PND, orthopnea, or claudication. States he's exercising.      7-22-16; as above, s/p echo with EF of 60%, mild LAE, mild LVH, grade IDD. Pt denies chest pain, dyspnea, dyspnea on exertion, change in exercise capacity, fatigue,  nausea, vomiting, diarrhea, constipation, motor weakness, insomnia, weight loss, syncope, dizziness, lightheadedness, palpitations, PND, orthopnea, or claudication. EKG: first degree AVB. BP is good. 2-17-17: states he is having hip pain and may need replacement. His BP is high today but normal at PCP office last week.  Pt denies chest pain, dyspnea, dyspnea on exertion, change in exercise capacity, fatigue,  nausea, vomiting, diarrhea, constipation, motor weakness, insomnia, weight loss, syncope, dizziness, lightheadedness, palpitations, PND, orthopnea. No nitro use. BP and hr are good. CAD is stable. No LE discoloration or ulcers. No LE edema. No CHF type symptoms. Lipid profile is normal.  Follows with Pod DR prasad for diabetic foot care. Does have CKD, CR of 1.6. Has gained two pounds. 11-6-20: Needs preoperative clearance for back surgery at [facility] as well as neck surgery. Patient has not been seen since 2017. Patient with history of Crohn disease status post CABG x4 in 2013 at [facility].  He denies any cardiac symptoms such as chest pain chest pressure heaviness or shortness of breath. Denies any nitro use. Blood pressure and heart rate are under control. Denies any recent cardiac testing. Patient with history of diabetes, hypertension, hyperlipidemia. EKG with normal sinus rhythm, left bundle branch block. BMI of 36.     2021: Status post normal nuclear stress test in November 2020. Underlying left bundle branch block. Normal ejection fraction status post echo at that time ejection fraction of 40 to 92% grade 1 diastolic dysfunction. Pt denies chest pain, dyspnea, dyspnea on exertion, change in exercise capacity, fatigue,  nausea, vomiting, diarrhea, constipation, motor weakness, insomnia, weight loss, syncope, dizziness, lightheadedness, palpitations, PND, orthopnea, or claudication. Patient with history of coronary artery disease status post CABG x4 in 2013 at [facility].  He denies any cardiac symptoms such as chest pain chest pressure heaviness or shortness of breath. LDL was 77 in 2/12/2020. Has stage III CKD. GFR of 43.    11-4-22: Patient is status post echo in April 2022 ejection fraction of 20%. He is status post AICD placement recently. He is in cardiac rehab. Patient states his low blood pressure has been in the 69L systolic at times. He was placed on hydralazine 25 mg twice daily by CHF clinic previously.   Patient with history of CAD status post CABG x4 in 2013 at [facility]. Last stress test was negative in November 2020. Does have a history of left bundle branch block. He does have a history of stage III chronic kidney disease. He is follow-up with CHF clinic.   EKG with normal sinus rhythm, V paced        Patient Active Problem List   Diagnosis    Hypertension    Diabetes mellitus ([facility] [address]    Kidney stone    S/P CABG x 4    JAZZMINE (obstructive sleep apnea)    Morbid obesity ([facility])    Iron deficiency anemia    BPH (benign prostatic hyperplasia)    Hyperlipidemia    Stage 3 chronic kidney disease    Type 2 diabetes mellitus with stage 3 chronic kidney disease, with long-term current use of insulin ([facility])    Left foot drop    PVD (peripheral vascular disease) ([facility])    Leg swelling    Coronary artery disease involving coronary bypass graft of native heart without angina pectoris       Current Outpatient Medications   Medication Sig Dispense Refill    gabapentin (NEURONTIN) 300 MG capsule TAKE 1 CAPSULE BY MOUTH DAILY 15 MINUTES BEFORE DINNER      sacubitril-valsartan (ENTRESTO) 24-26 MG per tablet Take 1 tablet by mouth 2 times daily 180 tablet 2    carvedilol (COREG) 6.25 MG tablet Take 1 tablet by mouth 2 times daily (with meals) 180 tablet 2    furosemide (LASIX) 40 MG tablet Take 1 tablet by mouth 2 times daily 180 tablet 2    hydrALAZINE (APRESOLINE) 25 MG tablet Take 1 tablet by mouth in the morning and at bedtime 180 tablet 3    atorvastatin (LIPITOR) 40 MG tablet Take 1 tablet by mouth nightly 90 tablet 3    glimepiride (AMARYL) 4 MG tablet Take 1 tablet by mouth 2 times daily 180 tablet 3    insulin glargine (LANTUS SOLOSTAR) 100 UNIT/ML injection pen Inject 30 Units into the skin 2 times daily 10 pen 5    tamsulosin (FLOMAX) 0.4 MG capsule Take 1 capsule by mouth daily 90 capsule 3    famotidine (PEPCID) 20 MG tablet Take 1 tablet by mouth daily 180 tablet 3    Handicap Placard MISC by Does not apply route Exp 5 years 1 each 0 aspirin 81 MG tablet Take 81 mg by mouth daily. nitroGLYCERIN (NITROSTAT) 0.4 MG SL tablet Place 1 tablet under the tongue See Admin Instructions (Patient not taking: Reported on 11/4/2022) 25 tablet 1     No current facility-administered medications for this visit. ALLERGIES: Dye [iodides]    Review of Systems:  General ROS: negative  Psychological ROS: negative  Hematological and Lymphatic ROS: No history of blood clots or bleeding disorder. Respiratory ROS: no cough, shortness of breath, or wheezing  Cardiovascular ROS: no chest pain or dyspnea on exertion  Gastrointestinal ROS: no abdominal pain, change in bowel habits, or black or bloody stools  Genito-Urinary ROS: no dysuria, trouble voiding, or hematuria  Musculoskeletal ROS: negative  Neurological ROS: no TIA or stroke symptoms  Dermatological ROS: negative    VITALS:  Blood pressure (!) 110/52, pulse 63, weight (!) 301 lb (136.5 kg). Body mass index is 36.64 kg/m². Physical Examination:  General appearance - alert, well appearing, and in no distress  Mental status - alert, oriented to person, place, and time  Neck - Neck is supple, no JVD or carotid bruits. No thyromegaly or adenopathy. Chest - clear to auscultation, no wheezes, rales or rhonchi, symmetric air entry  Heart - normal rate, regular rhythm, normal S1, S2, no murmurs, rubs, clicks or gallops  Abdomen - soft, nontender, nondistended, no masses or organomegaly  Neurological - alert, oriented, normal speech, no focal findings or movement disorder noted  Extremities - peripheral pulses normal, no pedal edema, no clubbing or cyanosis  Skin - normal coloration and turgor, no rashes, no suspicious skin lesions noted    Pulses:   Carotid pulses are 3+ on the right side, and 3+ on the left side. Radial pulses are 3+ on the right side, and 3+ on the left side. Femoral pulses are 3+ on the right side, and 3+ on the left side.    Popliteal pulses are 3+ on the right side, and 3+ on the left side. LABS:    Chemistry        Component Value Date/Time     08/09/2022 1234    K 4.9 08/09/2022 1234     (H) 08/09/2022 1234    CO2 19 (L) 08/09/2022 1234    BUN 43 (H) 08/09/2022 1234    CREATININE 1.45 (H) 08/09/2022 1234        Component Value Date/Time    CALCIUM 9.0 08/09/2022 1234    ALKPHOS 187 (H) 04/01/2022 1155    AST 29 04/01/2022 1155    ALT 24 04/01/2022 1155    BILITOT 0.6 04/01/2022 1155            Lab Results   Component Value Date    WBC 4.9 08/09/2022    HGB 12.0 (L) 08/09/2022    HCT 36.0 (L) 08/09/2022    MCV 92.0 08/09/2022     (L) 08/09/2022       No components found for: CHLPL  Lab Results   Component Value Date    TRIG 213 (H) 07/07/2021    TRIG 225 (H) 02/12/2020    TRIG 215 (H) 02/16/2018     Lab Results   Component Value Date    HDL 39 (L) 07/07/2021    HDL 44 02/12/2020    HDL 43 02/16/2018     Lab Results   Component Value Date    LDLCALC 66 07/07/2021    LDLCALC 77 02/12/2020    LDLCALC 69 02/16/2018       No results found for: LABVLDL     EKG: NSR. First degree AVB. ASSESSMENT:     Diagnosis Orders   1. Chronic systolic CHF (congestive heart failure), NYHA class 1 ([facility])  EKG 12 lead      2. Coronary artery disease involving coronary bypass graft of native heart without angina pectoris        3. Mixed hyperlipidemia        4. Hypertension, unspecified type        5. S/P CABG x 4        6. JAZZMINE (obstructive sleep apnea)        7. Morbid obesity ([address]              PLAN:     Patient will need to continue to follow up with you for their general medical care and return to see me in the office in 6 months    As always, aggressive risk factor modification is strongly recommended. We should adhere to the [address] guidelines for HTN management and the NCEP ATP III guidelines for LDL-C management. The nature of cardiac risk has been fully discussed with this patient. I have made him aware of his LDL target goal given his cardiovascular risk analysis.  I have discussed the appropriate diet. The need for lifelong compliance in order to reduce risk is stressed. A regular exercise program is recommended to help achieve and maintain normal body weight, fitness and improve lipid balance. Continue medications at current doses. Discontinue hydralazine secondary to hypotension per patient    Follow-up with the EP for AICD checks    Check EKG    Follow-up in CHF clinic    Consider PAD evaluation in future if warranted by symptoms    Patient was advised and encouraged to check blood pressure at home or at a pharmacy, maintain a logbook, and also call us back if blood pressure are above the target ranges or if it is low. Patient clearly understands and agrees to the instructions. We will need to continue to monitor muscle and liver enzymes, BUN, CR, and electrolytes. The proper use and anticipated side effects of nitroglycerine has been carefully explained. If a single episode of chest pain is not relieved by one tablet, the patient will try another within 5 minutes; and if this doesn't relieve the pain, the patient is instructed to call 911 for transportation to an emergency department. Patient was advised to go to the ER if he starts experiencing adverse effects of the medications. patient was instructed to call us back or go to nearby emergency room immediately if symptoms get worse or do not improve. Details of medical condition explained and patient was warned about adverse consequences of uncontrolled medical conditions and possible side effects of prescribed medications. Thank you for allowing me to participate in the care of your patient, please don't hesitate to contact me if you have any further questions.

## 2022-11-04 NOTE — CARDIO/PULMONARY
COVID Screening completed. Patient denies complaints, no changes to PMH or medications. Patient tolerates exercise well.   Electronically signed by Sukumar Mccoy RN on 11/4/2022 at 10:51 AM

## 2022-11-07 ENCOUNTER — HOSPITAL ENCOUNTER (OUTPATIENT)
Dept: CARDIAC REHAB | Age: 77
Setting detail: THERAPIES SERIES
Discharge: HOME | End: 2022-11-07
Payer: MEDICARE

## 2022-11-07 PROCEDURE — 93798 PHYS/QHP OP CAR RHAB W/ECG: CPT

## 2022-11-07 NOTE — CARDIO/PULMONARY
COVID Screening completed. Patient denies complaints, no changes to PMH or medications. Patient tolerates exercise well.   Electronically signed by Will Stephenson RN on 11/7/2022 at 10:12 AM

## 2022-11-09 ENCOUNTER — HOSPITAL ENCOUNTER (OUTPATIENT)
Dept: CARDIAC REHAB | Age: 77
Setting detail: THERAPIES SERIES
Discharge: HOME | End: 2022-11-09
Payer: MEDICARE

## 2022-11-09 PROCEDURE — 93798 PHYS/QHP OP CAR RHAB W/ECG: CPT

## 2022-11-09 NOTE — CARDIO/PULMONARY
COVID Screening completed. Patient denies complaints, no changes to PMH or medications. Patient tolerates exercise well.   Electronically signed by Efra Bey RN on 11/9/2022 at 11:34 AM

## 2022-11-11 ENCOUNTER — HOSPITAL ENCOUNTER (OUTPATIENT)
Dept: CARDIAC REHAB | Age: 77
Setting detail: THERAPIES SERIES
Discharge: HOME | End: 2022-11-11
Payer: MEDICARE

## 2022-11-11 PROCEDURE — 93798 PHYS/QHP OP CAR RHAB W/ECG: CPT

## 2022-11-11 NOTE — CARDIO/PULMONARY
COVID Screening completed. Patient denies complaints, no changes to PMH or medications. Patient tolerates exercise well. Discussed educational Acid Labs Tips, handout offered.   Electronically signed by Storm Chapa RN on 11/11/2022 at 10:18 AM

## 2022-11-14 ENCOUNTER — HOSPITAL ENCOUNTER (OUTPATIENT)
Dept: CARDIAC REHAB | Age: 77
Setting detail: THERAPIES SERIES
Discharge: HOME | End: 2022-11-14
Payer: MEDICARE

## 2022-11-14 PROCEDURE — 93798 PHYS/QHP OP CAR RHAB W/ECG: CPT

## 2022-11-14 ASSESSMENT — EJECTION FRACTION: EF_VALUE: 20

## 2022-11-14 ASSESSMENT — EXERCISE STRESS TEST: PEAK_BP: 124/68

## 2022-11-14 NOTE — CARDIO/PULMONARY
COVID Screening completed. Patient denies complaints, no changes to PMH or medications. BP lower after exercise, encouraged water intake and BP improved. Patient tolerates exercise well.   Electronically signed by Cris Hodgson RN on 11/14/2022 at 10:50 AM

## 2022-11-16 ENCOUNTER — HOSPITAL ENCOUNTER (OUTPATIENT)
Dept: CARDIAC REHAB | Age: 77
Setting detail: THERAPIES SERIES
Discharge: HOME | End: 2022-11-16
Payer: MEDICARE

## 2022-11-16 PROCEDURE — 93798 PHYS/QHP OP CAR RHAB W/ECG: CPT

## 2022-11-16 NOTE — CARDIO/PULMONARY
COVID Screening completed. Patient denies complaints, no changes to PMH or medications. Patient reports receiving two injections into his back yesterday. Patient states that he was told 3-5 days before results would be seen, however he states he feeling a little better today. Patient tolerates exercise well. Discussed BP, high BP, and modifiable risk factors. Handout given.  Electronically signed by Casa Acuna RN on 11/16/2022 at 2:00 PM

## 2022-11-17 ENCOUNTER — TELEPHONE (OUTPATIENT)
Dept: CARDIOLOGY CLINIC | Age: 77
End: 2022-11-17

## 2022-11-18 ENCOUNTER — HOSPITAL ENCOUNTER (OUTPATIENT)
Dept: CARDIAC REHAB | Age: 77
Setting detail: THERAPIES SERIES
Discharge: HOME | End: 2022-11-18
Payer: MEDICARE

## 2022-11-18 PROCEDURE — 93798 PHYS/QHP OP CAR RHAB W/ECG: CPT

## 2022-11-18 NOTE — PROGRESS NOTES
COVID Screening completed. Patient denies complaints, no changes to PMH or medications. Patient tolerates exercise well. Plans for 6MWT on Monday.  Electronically signed by Tony Cantu RN on 11/18/2022 at 10:35 AM

## 2022-11-21 ENCOUNTER — HOSPITAL ENCOUNTER (OUTPATIENT)
Dept: CARDIAC REHAB | Age: 77
Setting detail: THERAPIES SERIES
Discharge: HOME | End: 2022-11-21
Payer: MEDICARE

## 2022-11-21 PROCEDURE — 93798 PHYS/QHP OP CAR RHAB W/ECG: CPT

## 2022-11-21 NOTE — CARDIO/PULMONARY
COVID Screening completed. Patient denies complaints, no changes to PMH or medications. Patient tolerates exercise well.   Patient completed 6 MWT with 5 laps, this is an improvement of 2.5 laps from his previous walk test. Electronically signed by Severino Peña RN on 11/21/2022 at 3:16 PM

## 2022-11-23 ENCOUNTER — HOSPITAL ENCOUNTER (OUTPATIENT)
Dept: CARDIAC REHAB | Age: 77
Setting detail: THERAPIES SERIES
Discharge: HOME | End: 2022-11-23
Payer: MEDICARE

## 2022-11-23 PROCEDURE — 93798 PHYS/QHP OP CAR RHAB W/ECG: CPT

## 2022-11-23 NOTE — PROGRESS NOTES
COVID Screening completed. **LAST SESSION** Patient denies complaints, no changes to PMH or medications. Patient tolerates exercise well. Patient plans to continue to walk at home. Patient is encouraged to continue to stay hydrated. Patient is reminded to weigh himself daily, report any abnormal weight gains to dr. Patient is educated on the s/s of angina and appropriate intervention.  Electronically signed by Dutch Amanda RN on 11/23/2022 at 11:45 AM

## 2022-11-23 NOTE — LETTER
Fer Anthony,     Patient completed cardiac rehab with 59/59 sessions. Please review and sign patients final ITP.      Thank you,   [address] [facility]

## 2022-11-25 ENCOUNTER — APPOINTMENT (OUTPATIENT)
Dept: CARDIAC REHAB | Age: 77
End: 2022-11-25
Payer: MEDICARE

## 2022-11-25 ASSESSMENT — EXERCISE STRESS TEST
PEAK_BP: 104/50
PEAK_RPD: 2
PEAK_BP: 126/64
PEAK_RPE: 13
PEAK_HR: 84

## 2022-11-25 ASSESSMENT — EJECTION FRACTION: EF_VALUE: 20

## 2022-11-28 ENCOUNTER — APPOINTMENT (OUTPATIENT)
Dept: CARDIAC REHAB | Age: 77
End: 2022-11-28
Payer: MEDICARE

## 2022-11-29 NOTE — TELEPHONE ENCOUNTER
Rx needed to send with re-enrollment paperwork for patient assistance for [facility]. Forms completed and at  to send with rx. Pt last seen 11/4/22 with Dr. Nguyễn Holman. Rx pended.

## 2023-01-09 SDOH — HEALTH STABILITY: PHYSICAL HEALTH: ON AVERAGE, HOW MANY DAYS PER WEEK DO YOU ENGAGE IN MODERATE TO STRENUOUS EXERCISE (LIKE A BRISK WALK)?: 3 DAYS

## 2023-01-09 SDOH — HEALTH STABILITY: PHYSICAL HEALTH: ON AVERAGE, HOW MANY MINUTES DO YOU ENGAGE IN EXERCISE AT THIS LEVEL?: 10 MIN

## 2023-01-09 ASSESSMENT — PATIENT HEALTH QUESTIONNAIRE - PHQ9
SUM OF ALL RESPONSES TO PHQ QUESTIONS 1-9: 0
SUM OF ALL RESPONSES TO PHQ QUESTIONS 1-9: 0
2. FEELING DOWN, DEPRESSED OR HOPELESS: 0
SUM OF ALL RESPONSES TO PHQ QUESTIONS 1-9: 0
SUM OF ALL RESPONSES TO PHQ9 QUESTIONS 1 & 2: 0
SUM OF ALL RESPONSES TO PHQ QUESTIONS 1-9: 0
1. LITTLE INTEREST OR PLEASURE IN DOING THINGS: 0

## 2023-01-09 ASSESSMENT — LIFESTYLE VARIABLES
HOW OFTEN DO YOU HAVE SIX OR MORE DRINKS ON ONE OCCASION: 1
HOW MANY STANDARD DRINKS CONTAINING ALCOHOL DO YOU HAVE ON A TYPICAL DAY: 0
HOW OFTEN DO YOU HAVE A DRINK CONTAINING ALCOHOL: 1
HOW MANY STANDARD DRINKS CONTAINING ALCOHOL DO YOU HAVE ON A TYPICAL DAY: PATIENT DOES NOT DRINK
HOW OFTEN DO YOU HAVE A DRINK CONTAINING ALCOHOL: NEVER

## 2023-01-11 ENCOUNTER — CARE COORDINATION (OUTPATIENT)
Dept: CARE COORDINATION | Age: 78
End: 2023-01-11

## 2023-01-11 ENCOUNTER — OFFICE VISIT (OUTPATIENT)
Dept: FAMILY MEDICINE CLINIC | Age: 78
End: 2023-01-11

## 2023-01-11 VITALS
BODY MASS INDEX: 36.48 KG/M2 | TEMPERATURE: 97.7 F | HEIGHT: 76 IN | HEART RATE: 68 BPM | WEIGHT: 299.6 LBS | DIASTOLIC BLOOD PRESSURE: 68 MMHG | OXYGEN SATURATION: 98 % | SYSTOLIC BLOOD PRESSURE: 124 MMHG

## 2023-01-11 DIAGNOSIS — M54.41 RIGHT-SIDED LOW BACK PAIN WITH RIGHT-SIDED SCIATICA, UNSPECIFIED CHRONICITY: ICD-10-CM

## 2023-01-11 DIAGNOSIS — E66.9 DIABETES MELLITUS TYPE 2 IN OBESE (HCC): ICD-10-CM

## 2023-01-11 DIAGNOSIS — N18.30 STAGE 3 CHRONIC KIDNEY DISEASE, UNSPECIFIED WHETHER STAGE 3A OR 3B CKD (HCC): ICD-10-CM

## 2023-01-11 DIAGNOSIS — N18.30 TYPE 2 DIABETES MELLITUS WITH STAGE 3 CHRONIC KIDNEY DISEASE, WITH LONG-TERM CURRENT USE OF INSULIN, UNSPECIFIED WHETHER STAGE 3A OR 3B CKD (HCC): ICD-10-CM

## 2023-01-11 DIAGNOSIS — Z00.00 MEDICARE ANNUAL WELLNESS VISIT, SUBSEQUENT: Primary | ICD-10-CM

## 2023-01-11 DIAGNOSIS — I50.22 CHRONIC SYSTOLIC CHF (CONGESTIVE HEART FAILURE), NYHA CLASS 1 (HCC): ICD-10-CM

## 2023-01-11 DIAGNOSIS — N40.1 BENIGN NON-NODULAR PROSTATIC HYPERPLASIA WITH LOWER URINARY TRACT SYMPTOMS: ICD-10-CM

## 2023-01-11 DIAGNOSIS — E11.22 TYPE 2 DIABETES MELLITUS WITH STAGE 3 CHRONIC KIDNEY DISEASE, WITH LONG-TERM CURRENT USE OF INSULIN, UNSPECIFIED WHETHER STAGE 3A OR 3B CKD (HCC): ICD-10-CM

## 2023-01-11 DIAGNOSIS — I25.10 CORONARY ARTERY DISEASE INVOLVING NATIVE CORONARY ARTERY OF NATIVE HEART WITHOUT ANGINA PECTORIS: ICD-10-CM

## 2023-01-11 DIAGNOSIS — I25.10 CORONARY ARTERY DISEASE WITHOUT ANGINA PECTORIS, UNSPECIFIED VESSEL OR LESION TYPE, UNSPECIFIED WHETHER NATIVE OR TRANSPLANTED HEART: ICD-10-CM

## 2023-01-11 DIAGNOSIS — E66.01 SEVERE OBESITY (BMI 35.0-39.9) WITH COMORBIDITY (HCC): ICD-10-CM

## 2023-01-11 DIAGNOSIS — E11.69 DIABETES MELLITUS TYPE 2 IN OBESE (HCC): ICD-10-CM

## 2023-01-11 DIAGNOSIS — I48.91 ATRIAL FIBRILLATION, UNSPECIFIED TYPE (HCC): ICD-10-CM

## 2023-01-11 DIAGNOSIS — I73.9 PVD (PERIPHERAL VASCULAR DISEASE) (HCC): ICD-10-CM

## 2023-01-11 DIAGNOSIS — Z79.4 TYPE 2 DIABETES MELLITUS WITH STAGE 3 CHRONIC KIDNEY DISEASE, WITH LONG-TERM CURRENT USE OF INSULIN, UNSPECIFIED WHETHER STAGE 3A OR 3B CKD (HCC): ICD-10-CM

## 2023-01-11 LAB
ALBUMIN SERPL-MCNC: 3.9 G/DL (ref 3.5–4.6)
ALP BLD-CCNC: 143 U/L (ref 35–104)
ALT SERPL-CCNC: 30 U/L (ref 0–41)
ANION GAP SERPL CALCULATED.3IONS-SCNC: 17 MEQ/L (ref 9–15)
AST SERPL-CCNC: 25 U/L (ref 0–40)
BILIRUB SERPL-MCNC: 0.3 MG/DL (ref 0.2–0.7)
BUN BLDV-MCNC: 68 MG/DL (ref 8–23)
CALCIUM SERPL-MCNC: 9.3 MG/DL (ref 8.5–9.9)
CHLORIDE BLD-SCNC: 109 MEQ/L (ref 95–107)
CHOLESTEROL, TOTAL: 138 MG/DL (ref 0–199)
CO2: 18 MEQ/L (ref 20–31)
CREAT SERPL-MCNC: 2.09 MG/DL (ref 0.7–1.2)
GFR SERPL CREATININE-BSD FRML MDRD: 32.4 ML/MIN/{1.73_M2}
GLOBULIN: 2.9 G/DL (ref 2.3–3.5)
GLUCOSE BLD-MCNC: 180 MG/DL (ref 70–99)
HBA1C MFR BLD: 6.6 % (ref 4.8–5.9)
HCT VFR BLD CALC: 37.6 % (ref 42–52)
HDLC SERPL-MCNC: 46 MG/DL (ref 40–59)
HEMOGLOBIN: 12.5 G/DL (ref 14–18)
LDL CHOLESTEROL CALCULATED: 64 MG/DL (ref 0–129)
MCH RBC QN AUTO: 30.1 PG (ref 27–31.3)
MCHC RBC AUTO-ENTMCNC: 33.2 % (ref 33–37)
MCV RBC AUTO: 90.7 FL (ref 79–92.2)
PDW BLD-RTO: 15.9 % (ref 11.5–14.5)
PLATELET # BLD: 85 K/UL (ref 130–400)
POTASSIUM SERPL-SCNC: 5.4 MEQ/L (ref 3.4–4.9)
RBC # BLD: 4.14 M/UL (ref 4.7–6.1)
SODIUM BLD-SCNC: 144 MEQ/L (ref 135–144)
TOTAL PROTEIN: 6.8 G/DL (ref 6.3–8)
TRIGL SERPL-MCNC: 141 MG/DL (ref 0–150)
WBC # BLD: 7.7 K/UL (ref 4.8–10.8)

## 2023-01-11 RX ORDER — CARVEDILOL 6.25 MG/1
6.25 TABLET ORAL 2 TIMES DAILY WITH MEALS
Qty: 180 TABLET | Refills: 2 | Status: SHIPPED | OUTPATIENT
Start: 2023-01-11

## 2023-01-11 RX ORDER — GLIMEPIRIDE 4 MG/1
4 TABLET ORAL 2 TIMES DAILY
Qty: 180 TABLET | Refills: 3 | Status: SHIPPED | OUTPATIENT
Start: 2023-01-11

## 2023-01-11 RX ORDER — INSULIN GLARGINE 100 [IU]/ML
30 INJECTION, SOLUTION SUBCUTANEOUS 2 TIMES DAILY
Qty: 10 ADJUSTABLE DOSE PRE-FILLED PEN SYRINGE | Refills: 3 | Status: SHIPPED | OUTPATIENT
Start: 2023-01-11

## 2023-01-11 RX ORDER — FAMOTIDINE 20 MG/1
20 TABLET, FILM COATED ORAL DAILY
Qty: 180 TABLET | Refills: 3 | Status: SHIPPED | OUTPATIENT
Start: 2023-01-11

## 2023-01-11 RX ORDER — GABAPENTIN 300 MG/1
CAPSULE ORAL
Qty: 90 CAPSULE | Refills: 3 | Status: SHIPPED | OUTPATIENT
Start: 2023-01-11 | End: 2024-01-06

## 2023-01-11 RX ORDER — TAMSULOSIN HYDROCHLORIDE 0.4 MG/1
0.4 CAPSULE ORAL DAILY
Qty: 90 CAPSULE | Refills: 3 | Status: SHIPPED | OUTPATIENT
Start: 2023-01-11

## 2023-01-11 RX ORDER — HYDRALAZINE HYDROCHLORIDE 25 MG/1
TABLET, FILM COATED ORAL
COMMUNITY
Start: 2022-11-05

## 2023-01-11 RX ORDER — ATORVASTATIN CALCIUM 40 MG/1
40 TABLET, FILM COATED ORAL NIGHTLY
Qty: 90 TABLET | Refills: 3 | Status: SHIPPED | OUTPATIENT
Start: 2023-01-11

## 2023-01-11 RX ORDER — FUROSEMIDE 40 MG/1
40 TABLET ORAL 2 TIMES DAILY
Qty: 180 TABLET | Refills: 3 | Status: SHIPPED | OUTPATIENT
Start: 2023-01-11

## 2023-01-11 RX ORDER — NITROGLYCERIN 0.4 MG/1
0.4 TABLET SUBLINGUAL SEE ADMIN INSTRUCTIONS
Qty: 25 TABLET | Refills: 1 | Status: SHIPPED | OUTPATIENT
Start: 2023-01-11

## 2023-01-11 SDOH — ECONOMIC STABILITY: FOOD INSECURITY: WITHIN THE PAST 12 MONTHS, YOU WORRIED THAT YOUR FOOD WOULD RUN OUT BEFORE YOU GOT MONEY TO BUY MORE.: NEVER TRUE

## 2023-01-11 SDOH — ECONOMIC STABILITY: FOOD INSECURITY: WITHIN THE PAST 12 MONTHS, THE FOOD YOU BOUGHT JUST DIDN'T LAST AND YOU DIDN'T HAVE MONEY TO GET MORE.: NEVER TRUE

## 2023-01-11 ASSESSMENT — SOCIAL DETERMINANTS OF HEALTH (SDOH): HOW HARD IS IT FOR YOU TO PAY FOR THE VERY BASICS LIKE FOOD, HOUSING, MEDICAL CARE, AND HEATING?: NOT HARD AT ALL

## 2023-01-11 NOTE — PATIENT INSTRUCTIONS
Preventing Falls: Care Instructions  Overview     Getting around your home safely can be a challenge if you have injuries or health problems that make it easy for you to fall. Loose rugs and furniture in walkways are among the dangers for many older people who have problems walking or who have poor eyesight. People who have conditions such as arthritis, osteoporosis, or dementia also have to be careful not to fall. You can make your home safer with a few simple measures. Follow-up care is a key part of your treatment and safety. Be sure to make and go to all appointments, and call your doctor if you are having problems. It's also a good idea to know your test results and keep a list of the medicines you take. How can you care for yourself at home? Taking care of yourself  Exercise regularly to improve your strength, muscle tone, and balance. Walk if you can. Swimming may be a good choice if you cannot walk easily. Have your vision and hearing checked each year or any time you notice a change. If you have trouble seeing and hearing, you might not be able to avoid objects and could lose your balance. Know the side effects of the medicines you take. Ask your doctor or pharmacist whether the medicines you take can affect your balance. Sleeping pills or sedatives can affect your balance. Limit the amount of alcohol you drink. Alcohol can impair your balance and other senses. Ask your doctor whether calluses or corns on your feet need to be removed. If you wear loose-fitting shoes because of calluses or corns, you can lose your balance and fall. Talk to your doctor if you have numbness in your feet. You may get dizzy if you do not drink enough water. To prevent dehydration, drink plenty of fluids. Choose water and other clear liquids. If you have kidney, heart, or liver disease and have to limit fluids, talk with your doctor before you increase the amount of fluids you drink.   Preventing falls at home  Remove raised doorway thresholds, throw rugs, and clutter. Repair loose carpet or raised areas in the floor. Move furniture and electrical cords to keep them out of walking paths. Use nonskid floor wax, and wipe up spills right away, especially on ceramic tile floors. If you use a walker or cane, put rubber tips on it. If you use crutches, clean the bottoms of them regularly with an abrasive pad, such as steel wool. Keep your house well lit, especially stairways, porches, and outside walkways. Use night-lights in areas such as hallways and bathrooms. Add extra light switches or use remote switches (such as switches that go on or off when you clap your hands) to make it easier to turn lights on if you have to get up during the night. Install sturdy handrails on stairways. Move items in your cabinets so that the things you use a lot are on the lower shelves (about waist level). Keep a cordless phone and a flashlight with new batteries by your bed. If possible, put a phone in each of the main rooms of your house, or carry a cell phone in case you fall and cannot reach a phone. Or, you can wear a device around your neck or wrist. You push a button that sends a signal for help. Wear low-heeled shoes that fit well and give your feet good support. Use footwear with nonskid soles. Check the heels and soles of your shoes for wear. Repair or replace worn heels or soles. Do not wear socks without shoes on smooth floors, such as wood. Walk on the grass when the sidewalks are slippery. If you live in an area that gets snow and ice in the winter, sprinkle salt on slippery steps and sidewalks. Or ask a family member or friend to do this for you. Preventing falls in the bath  Install grab bars and nonskid mats inside and outside your shower or tub and near the toilet and sinks. Use shower chairs and bath benches. Use a hand-held shower head that will allow you to sit while showering.   Get into a tub or shower by putting the weaker leg in first. Get out of a tub or shower with your strong side first.  Repair loose toilet seats and consider installing a raised toilet seat to make getting on and off the toilet easier. Keep your bathroom door unlocked while you are in the shower. Where can you learn more? Go to http://www.sharma.com/ and enter G117 to learn more about \"Preventing Falls: Care Instructions. \"  Current as of: May 4, 2022               Content Version: 13.5  © 4565-5241 Healthwise, [employer]. Care instructions adapted under license by [facility] ([facility]). If you have questions about a medical condition or this instruction, always ask your healthcare professional. [address] any warranty or liability for your use of this information. Learning About Dental Care for Older Adults  Dental care for older adults: Overview  Dental care for older people is much the same as for younger adults. But older adults do have concerns that younger adults do not. Older adults may have problems with gum disease and decay on the roots of their teeth. They may need missing teeth replaced or broken fillings fixed. Or they may have dentures that need to be cared for. Some older adults may have trouble holding a toothbrush. You can help remind the person you are caring for to brush and floss their teeth or to clean their dentures. In some cases, you may need to do the brushing and other dental care tasks. People who have trouble using their hands or who have dementia may need this extra help. How can you help with dental care? Normal dental care  To keep the teeth and gums healthy:  Brush the teeth with fluoride toothpaste twice a day--in the morning and at night--and floss at least once a day. Plaque can quickly build up on the teeth of older adults. Watch for the signs of gum disease. These signs include gums that bleed after brushing or after eating hard foods, such as apples.   See a dentist regularly. Many experts recommend checkups every 6 months. Keep the dentist up to date on any new medications the person is taking. Encourage a balanced diet that includes whole grains, vegetables, and fruits, and that is low in saturated fat and sodium. Encourage the person you're caring for not to use tobacco products. They can affect dental and general health. Many older adults have a fixed income and feel that they can't afford dental care. But most towns and cities have programs in which dentists help older adults by lowering fees. Contact your area's public health offices or  for information about dental care in your area. Using a toothbrush  Older adults with arthritis sometimes have trouble brushing their teeth because they can't easily hold the toothbrush. Their hands and fingers may be stiff, painful, or weak. If this is the case, you can: Offer an electric toothbrush. Enlarge the handle of a non-electric toothbrush by wrapping a sponge, an elastic bandage, or adhesive tape around it. Push the toothbrush handle through a ball made of rubber or soft foam.  Make the handle longer and thicker by taping Popsicle sticks or tongue depressors to it. You may also be able to buy special toothbrushes, toothpaste dispensers, and floss holders. Your doctor may recommend a soft-bristle toothbrush if the person you care for bleeds easily. Bleeding can happen because of a health problem or from certain medicines. A toothpaste for sensitive teeth may help if the person you care for has sensitive teeth. How do you brush and floss someone's teeth? If the person you are caring for has a hard time cleaning their teeth on their own, you may need to brush and floss their teeth for them. It may be easiest to have the person sit and face away from you, and to sit or stand behind them. That way you can steady their head against your arm as you reach around to floss and brush their teeth.  Choose a place that has good lighting and is comfortable for both of you. Before you begin, gather your supplies. You will need gloves, floss, a toothbrush, and a container to hold water if you are not near a sink. Wash and dry your hands well and put on gloves. Start by flossing:  Gently work a piece of floss between each of the teeth toward the gums. A plastic flossing tool may make this easier, and they are available at most [facility]. Curve the floss around each tooth into a U-shape and gently slide it under the gum line. Move the floss firmly up and down several times to scrape off the plaque. After you've finished flossing, throw away the used floss and begin brushing:  Wet the brush and apply toothpaste. Place the brush at a 45-degree angle where the teeth meet the gums. Press firmly, and move the brush in small circles over the surface of the teeth. Be careful not to brush too hard. Vigorous brushing can make the gums pull away from the teeth and can scratch the tooth enamel. Brush all surfaces of the teeth, on the tongue side and on the cheek side. Pay special attention to the front teeth and all surfaces of the back teeth. Brush chewing surfaces with short back-and-forth strokes. After you've finished, help the person rinse the remaining toothpaste from their mouth. Where can you learn more? Go to http://www.woods.com/ and enter F944 to learn more about \"Learning About Dental Care for Older Adults. \"  Current as of: June 16, 2022               Content Version: 13.5  © 2006-2022 Healthwise, Incorporated. Care instructions adapted under license by [facility] [employer] [facility] ([facility]). If you have questions about a medical condition or this instruction, always ask your healthcare professional. [place] [address] any warranty or liability for your use of this information. Hearing Loss: Care Instructions  Overview     Hearing loss is a sudden or slow decrease in how well you hear.  It can range from mild to severe. Permanent hearing loss can occur with aging. It also can happen when you are exposed long-term to loud noise. Examples include listening to loud music, riding motorcycles, or being around other loud machines. Hearing loss can affect your work and home life. It can make you feel lonely or depressed. You may feel that you have lost your independence. But hearing aids and other devices can help you hear better and feel connected to others. Follow-up care is a key part of your treatment and safety. Be sure to make and go to all appointments, and call your doctor if you are having problems. It's also a good idea to know your test results and keep a list of the medicines you take. How can you care for yourself at home? Avoid loud noises whenever possible. This helps keep your hearing from getting worse. Always wear hearing protection around loud noises. Wear a hearing aid as directed. See a professional who can help you pick a hearing aid that fits you. Have hearing tests as your doctor suggests. They can show whether your hearing has changed. Your hearing aid may need to be adjusted. Use other devices as needed. These may include:  Telephone amplifiers and hearing aids that can connect to a television, stereo, radio, or microphone. Devices that use lights or vibrations. These alert you to the doorbell, a ringing telephone, or a baby monitor. Television closed-captioning. This shows the words at the bottom of the screen. Most new TVs can do this. TTY (text telephone). This lets you type messages back and forth on the telephone instead of talking or listening. These devices are also called TDD. When messages are typed on the keyboard, they are sent over the phone line to a receiving TTY. The message is shown on a monitor. Use text messaging, social media, and email if it is hard for you to communicate by telephone. Try to learn a listening technique called speechreading.  It is not lipreading. You pay attention to people's gestures, expressions, posture, and tone of voice. These clues can help you understand what a person is saying. Face the person you are talking to, and have them face you. Make sure the lighting is good. You need to see the other person's face clearly. Think about counseling if you need help to adjust to your hearing loss. When should you call for help? Watch closely for changes in your health, and be sure to contact your doctor if:    You think your hearing is getting worse.     You have new symptoms, such as dizziness or nausea. Where can you learn more? Go to http://www.sharma.com/ and enter R798 to learn more about \"Hearing Loss: Care Instructions. \"  Current as of: May 4, 2022               Content Version: 13.5  © 2006-2022 Healthwise, Incorporated. Care instructions adapted under license by [facility] ([facility]). If you have questions about a medical condition or this instruction, always ask your healthcare professional. [place] [id number] any warranty or liability for your use of this information. Learning About Vision Tests  What are vision tests? The four most common vision tests are visual acuity tests, refraction, visual field tests, and color vision tests. Visual acuity (sharpness) tests  These tests are used: To see if you need glasses or contact lenses. To monitor an eye problem. To check an eye injury. Visual acuity tests are done as part of routine exams. You may also have this test when you get your 's license or apply for some types of jobs. Visual field tests  These tests are used: To check for vision loss in any area of your range of vision. To screen for certain eye diseases. To look for nerve damage after a stroke, head injury, or other problem that could reduce blood flow to the brain.   Refraction and color tests  A refraction test is done to find the right prescription for glasses and contact lenses. A color vision test is done to check for color blindness. Color vision is often tested as part of a routine exam. You may also have this test when you apply for a job where recognizing different colors is important, such as , electronics, or the [employer]. How are vision tests done? Visual acuity test   You cover one eye at a time. You read aloud from a wall chart across the room. You read aloud from a small card that you hold in your hand. Refraction   You look into a special device. The device puts lenses of different strengths in front of each eye to see how strong your glasses or contact lenses need to be. Visual field tests   Your doctor may have you look through special machines. Or your doctor may simply have you stare straight ahead while they move a finger into and out of your field of vision. Color vision test   You look at pieces of printed test patterns in various colors. You say what number or symbol you see. Your doctor may have you trace the number or symbol using a pointer. How do these tests feel? There is very little chance of having a problem from this test. If dilating drops are used for a vision test, they may make the eyes sting and cause a medicine taste in the mouth. Follow-up care is a key part of your treatment and safety. Be sure to make and go to all appointments, and call your doctor if you are having problems. It's also a good idea to know your test results and keep a list of the medicines you take. Where can you learn more? Go to http://www.sharma.com/ and enter G551 to learn more about \"Learning About Vision Tests. \"  Current as of: October 12, 2022               Content Version: 13.5  © 8848-4282 Healthwise, Incorporated. Care instructions adapted under license by [facility] ([facility]).  If you have questions about a medical condition or this instruction, always ask your healthcare professional. Treasa Goldmann disclaims any warranty or liability for your use of this information. Learning About Activities of Daily Living  What are activities of daily living? Activities of daily living (ADLs) are the basic self-care tasks you do every day. As you age, and if you have health problems, you may find that it's harder to do these things for yourself. That's when you may need some help. Your doctor uses ADLs to measure how much help you need. Knowing what you can and can't do for yourself is an important first step to getting help. And when you have the help you need, you can stay as independent as possible. Your doctor will want to know if you are able to do tasks such as: Take a bath or shower without help. Go to the bathroom by yourself. Dress and undress without help. Shave, comb your hair, and brush teeth on your own. Get in and out of bed or a chair without help. Feed yourself without help. If you are having trouble doing basic self-care tasks, talk with your doctor. You may want to bring a caregiver or family member who can help the doctor understand your needs and abilities. How will a doctor assess your ADLs? Asking about ADLs is part of a routine health checkup your doctor will likely do as you age. Your health check might be done in a doctor's office, in your home, or at a hospital. The goal is to find out if you are having any problems that could make your health problems worse or that make it unsafe for you to be on your own. To measure your ADLs, your doctor will ask how hard it is for you to do routine tasks. He or she may also want to know if you have changed the way you do a task because of a health problem. He or she may watch how you:  Walk back and forth. Keep your balance while you stand or walk. Move from sitting to standing or from a bed to a chair. Button or unbutton a shirt or sweater. Remove and put on your shoes.   It's normal to feel a little worried or anxious if you find you can't do all the things you used to be able to do. Talking with your doctor about ADLs isn't a test that you either pass or fail. It's just a way to get more information about your health and safety. Follow-up care is a key part of your treatment and safety. Be sure to make and go to all appointments, and call your doctor if you are having problems. It's also a good idea to know your test results and keep a list of the medicines you take. Current as of: October 6, 2021               Content Version: 13.5  © 2006-2022 [employer]. Care instructions adapted under license by [facility] ([facility]). If you have questions about a medical condition or this instruction, always ask your healthcare professional. [address] any warranty or liability for your use of this information. Advance Directives: Care Instructions  Overview  An advance directive is a legal way to state your wishes at the end of your life. It tells your family and your doctor what to do if you can't say what you want. There are two main types of advance directives. You can change them any time your wishes change. Living will. This form tells your family and your doctor your wishes about life support and other treatment. The form is also called a declaration. Medical power of . This form lets you name a person to make treatment decisions for you when you can't speak for yourself. This person is called a health care agent (health care proxy, health care surrogate). The form is also called a durable power of  for health care. If you do not have an advance directive, decisions about your medical care may be made by a family member, or by a doctor or a  who doesn't know you. It may help to think of an advance directive as a gift to the people who care for you. If you have one, they won't have to make tough decisions by themselves.   For more information, including forms for your state, see the [facility] website ([email or web address]/advance-[email or web address]). Follow-up care is a key part of your treatment and safety. Be sure to make and go to all appointments, and call your doctor if you are having problems. It's also a good idea to know your test results and keep a list of the medicines you take. What should you include in an advance directive? Many states have a unique advance directive form. (It may ask you to address specific issues.) Or you might use a universal form that's approved by many states. If your form doesn't tell you what to address, it may be hard to know what to include in your advance directive. Use the questions below to help you get started. Who do you want to make decisions about your medical care if you are not able to? What life-support measures do you want if you have a serious illness that gets worse over time or can't be cured? What are you most afraid of that might happen? (Maybe you're afraid of having pain, losing your independence, or being kept alive by machines.)  Where would you prefer to die? (Your home? A hospital? A nursing home?)  Do you want to donate your organs when you die? Do you want certain Mu-ism practices performed before you die? When should you call for help? Be sure to contact your doctor if you have any questions. Where can you learn more? Go to http://www.sharma.com/ and enter R264 to learn more about \"Advance Directives: Care Instructions. \"  Current as of: June 16, 2022               Content Version: 13.5  © 5380-8433 Healthwise, Incorporated. Care instructions adapted under license by [facility] ([facility]). If you have questions about a medical condition or this instruction, always ask your healthcare professional. [address] any warranty or liability for your use of this information. Personalized Preventive Plan for Jeaneth Dawn - 1/11/2023  Medicare offers a range of preventive health benefits.  Some of the tests and screenings are paid in full while other may be subject to a deductible, co-insurance, and/or copay. Some of these benefits include a comprehensive review of your medical history including lifestyle, illnesses that may run in your family, and various assessments and screenings as appropriate. After reviewing your medical record and screening and assessments performed today your provider may have ordered immunizations, labs, imaging, and/or referrals for you. A list of these orders (if applicable) as well as your Preventive Care list are included within your After Visit Summary for your review. Other Preventive Recommendations:    A preventive eye exam performed by an eye specialist is recommended every 1-2 years to screen for glaucoma; cataracts, macular degeneration, and other eye disorders. A preventive dental visit is recommended every 6 months. Try to get at least 150 minutes of exercise per week or 10,000 steps per day on a pedometer . Order or download the FREE \"Exercise & Physical Activity: Your Everyday Guide\" from The [employer]. Call 7-238.636.2567 or search [employer] online. You need 1702-8682 mg of calcium and 8401-7843 IU of vitamin D per day. It is possible to meet your calcium requirement with diet alone, but a vitamin D supplement is usually necessary to meet this goal.  When exposed to the sun, use a sunscreen that protects against both UVA and UVB radiation with an SPF of 30 or greater. Reapply every 2 to 3 hours or after sweating, drying off with a towel, or swimming. Always wear a seat belt when traveling in a car. Always wear a helmet when riding a bicycle or motorcycle.

## 2023-01-11 NOTE — PROGRESS NOTES
Medicare Annual Wellness Visit    Leticia Guardado is here for Medicare AWV and Back Pain    Assessment & Plan   Medicare annual wellness visit, subsequent  Chronic systolic CHF (congestive heart failure), NYHA class 1 ([facility])  -     carvedilol (COREG) 6.25 MG tablet; Take 1 tablet by mouth 2 times daily (with meals), Disp-180 tablet, R-2Print  -     sacubitril-valsartan (ENTRESTO) 24-26 MG per tablet; Take 1 tablet by mouth 2 times daily, Disp-180 tablet, R-3Print  Diabetes mellitus type 2 in obese ([facility])  -     Hemoglobin A1C; Future  -     Lipid Panel; Future  -     Comprehensive Metabolic Panel; Future  -     CBC; Future  PVD (peripheral vascular disease) ([facility])  Stage 3 chronic kidney disease, unspecified whether stage 3a or 3b CKD ([facility])  Atrial fibrillation, unspecified type ([facility]  Right-sided low back pain with right-sided sciatica, unspecified chronicity  -     gabapentin (NEURONTIN) 300 MG capsule; TAKE 1 CAPSULE BY MOUTH DAILY 15 MINUTES BEFORE DINNER, Disp-90 capsule, R-3Print  Severe obesity (BMI 35.0-39. 9) with comorbidity ([facility]  Coronary artery disease involving native coronary artery of native heart without angina pectoris  -     atorvastatin (LIPITOR) 40 MG tablet; Take 1 tablet by mouth nightly, Disp-90 tablet, R-3Print  Type 2 diabetes mellitus with stage 3 chronic kidney disease, with long-term current use of insulin, unspecified whether stage 3a or 3b CKD ([facility])  -     glimepiride (AMARYL) 4 MG tablet; Take 1 tablet by mouth 2 times daily, Disp-180 tablet, R-3Print  -     insulin glargine (LANTUS SOLOSTAR) 100 UNIT/ML injection pen; Inject 30 Units into the skin 2 times daily, Disp-10 Adjustable Dose Pre-filled Pen Syringe, R-3Print  Benign non-nodular prostatic hyperplasia with lower urinary tract symptoms  -     tamsulosin (FLOMAX) 0.4 MG capsule;  Take 1 capsule by mouth daily, Disp-90 capsule, R-3Print  Coronary artery disease without angina pectoris, unspecified vessel or lesion type, unspecified whether native or transplanted heart  -     nitroGLYCERIN (NITROSTAT) 0.4 MG SL tablet; Place 1 tablet under the tongue See Admin Instructions, Disp-25 tablet, R-1Print      Recommendations for Preventive Services Due: see orders and patient instructions/AVS.  Recommended screening schedule for the next 5-10 years is provided to the patient in written form: see Patient Instructions/AVS.     Return for Medicare Annual Wellness Visit in 1 year. Subjective   Chief Complaint   Patient presents with    Medicare AWV    Back Pain       Patient Active Problem List   Diagnosis    Hypertension    Diabetes mellitus ([facility]    Kidney stone    S/P CABG x 4    Atrial fibrillation, unspecified type (HCC)    JAZZMINE (obstructive sleep apnea)    Morbid obesity (HCC)    Iron deficiency anemia    BPH (benign prostatic hyperplasia)    Hyperlipidemia    Stage 3 chronic kidney disease    Type 2 diabetes mellitus with stage 3 chronic kidney disease, with long-term current use of insulin (HCC)    Left foot drop    PVD (peripheral vascular disease) ([facility])    Leg swelling    Coronary artery disease involving coronary bypass graft of native heart without angina pectoris    Chronic systolic CHF (congestive heart failure), NYHA class 1 ([facility]         Patient's complete Health Risk Assessment and screening values have been reviewed and are found in Flowsheets. The following problems were reviewed today and where indicated follow up appointments were made and/or referrals ordered.     Positive Risk Factor Screenings with Interventions:    Fall Risk:  Do you feel unsteady or are you worried about falling? : (!) yes  2 or more falls in past year?: no  Fall with injury in past year?: no     Interventions:    See AVS for additional education material  See A/P for plan and any pertinent orders              Weight and Activity:  Physical Activity: Insufficiently Active    Days of Exercise per Week: 3 days    Minutes of Exercise per Session: 10 min     On average, how many days per week do you engage in moderate to strenuous exercise (like a brisk walk)?: 3 days  Have you lost any weight without trying in the past 3 months?: No  Body mass index: (!) 36.46    Obesity Interventions:  See AVS for additional education material  See A/P for plan and any pertinent orders          Dentist Screen:  Have you seen the dentist within the past year?: (!) No    Intervention:  Advised to schedule with their dentist     Vision Screen:  Do you have difficulty driving, watching TV, or doing any of your daily activities because of your eyesight?: No  Have you had an eye exam within the past year?: (!) No  No results found. Interventions:   Patient encouraged to make appointment with their eye specialist     ADL's:   Patient reports needing help with:  Select all that apply: Vivien Mobley, Housekeeping, Food Preparation  Interventions:  See AVS for additional education material  See A/P for plan and any pertinent orders                    Objective   Vitals:    01/11/23 1215   BP: 124/68   Site: Left Upper Arm   Pulse: 68   Temp: 97.7 °F (36.5 °C)   SpO2: 98%   Weight: 299 lb 9.6 oz (135.9 kg)   Height: 6' 4\" (1.93 m)      Body mass index is 36.47 kg/m². Physical Exam:  /68 (Site: Left Upper Arm)   Pulse 68   Temp 97.7 °F (36.5 °C)   Ht 6' 4\" (1.93 m)   Wt 299 lb 9.6 oz (135.9 kg)   SpO2 98%   BMI 36.47 kg/m²     Gen: Well, NAD, Alert, Oriented x 3   HEENT: EOMI, eyes clear, MMM  Skin: without rash or jaundice  Neck: no significant lymphadenopathy or thyromegaly  Lungs: CTA B w/out Rales/Wheezes/Rhonchi, Good respiratory effort   Heart: RRR, S1S2, w/out M/R/G, non-displaced PMI     Ext: No C/C/E Bilaterally. Neuro: Neurovascularly intact w/ Sensory/Motor intact UE/LE Bilaterally. Allergies   Allergen Reactions    Dye [Iodides]      Prior to Visit Medications    Medication Sig Taking?  Authorizing Provider   hydrALAZINE (APRESOLINE) 25 MG tablet TAKE 1 TABLET BY MOUTH IN THE MORNING AND AT BEDTIME Yes Historical Provider, MD   carvedilol (COREG) 6.25 MG tablet Take 1 tablet by mouth 2 times daily (with meals) Yes Pia Reyna MD   sacubitril-valsartan (ENTRESTO) 24-26 MG per tablet Take 1 tablet by mouth 2 times daily Yes Pia Reyna MD   gabapentin (NEURONTIN) 300 MG capsule TAKE 1 CAPSULE BY MOUTH DAILY 15 MINUTES BEFORE DINNER Yes Pia Reyna MD   furosemide (LASIX) 40 MG tablet Take 1 tablet by mouth 2 times daily Yes Pia Reyna MD   atorvastatin (LIPITOR) 40 MG tablet Take 1 tablet by mouth nightly Yes Pia Reyna MD   glimepiride (AMARYL) 4 MG tablet Take 1 tablet by mouth 2 times daily Yes Pia Reyna MD   insulin glargine (LANTUS SOLOSTAR) 100 UNIT/ML injection pen Inject 30 Units into the skin 2 times daily Yes Pia Reyna MD   tamsulosin (FLOMAX) 0.4 MG capsule Take 1 capsule by mouth daily Yes Pia Reyna MD   famotidine (PEPCID) 20 MG tablet Take 1 tablet by mouth daily Yes Pia Reyna MD   nitroGLYCERIN (NITROSTAT) 0.4 MG SL tablet Place 1 tablet under the tongue See Admin Instructions Yes Pia Reyna MD   Handicap Placard MISC by Does not apply route Exp 5 years Yes Pia Reyna MD   aspirin 81 MG tablet Take 81 mg by mouth daily.  Yes Historical Provider, MD Ramirez (Including outside providers/suppliers regularly involved in providing care):   Patient Care Team:  Pia Reyna MD as PCP - General (Family Medicine)  Pia Reyna MD as PCP - [facility] Empaneled Provider  Manasa Taylor as      Reviewed and updated this visit:  Tobacco  Allergies  Meds  Problems  Med Hx  Surg Hx  Soc Hx  Fam Hx

## 2023-01-11 NOTE — CARE COORDINATION
Doni Gusman reached out asking if I could help him get re-enrolled into the PAP program for his Basaglar. I faxed the application to  to fill out. I will mail him his portion soon.         [facility]   Medication Assistance  [address] [place] [address] and [employer]    (x) [phone] (f) [phone]

## 2023-01-12 ENCOUNTER — CARE COORDINATION (OUTPATIENT)
Dept: CARE COORDINATION | Age: 78
End: 2023-01-12

## 2023-01-12 NOTE — CARE COORDINATION
I was able to mail the patient his portion of the application for assistance.         [facility]   Medication Assistance  [address] [facility], and [employer]    (B) [phone] (j) [phone]

## 2023-01-16 ENCOUNTER — HOSPITAL ENCOUNTER (OUTPATIENT)
Dept: PHYSICAL THERAPY | Age: 78
Setting detail: THERAPIES SERIES
Discharge: HOME | End: 2023-01-16
Payer: MEDICARE

## 2023-01-16 PROCEDURE — 97162 PT EVAL MOD COMPLEX 30 MIN: CPT

## 2023-01-16 ASSESSMENT — PAIN DESCRIPTION - LOCATION
LOCATION_2: NECK
LOCATION: BACK

## 2023-01-16 ASSESSMENT — PAIN DESCRIPTION - DESCRIPTORS
DESCRIPTORS: SHARP
DESCRIPTORS_2: ACHING

## 2023-01-16 ASSESSMENT — PAIN DESCRIPTION - INTENSITY: RATING_2: 7

## 2023-01-16 ASSESSMENT — PAIN SCALES - GENERAL: PAINLEVEL_OUTOF10: 3

## 2023-01-16 NOTE — PROGRESS NOTES
[address]  PHYSICAL THERAPY PLAN OF CARE   [address], [address] [facility]         Ph: [phone]  Fax: [phone]    [x] Certification  [] Recertification [x]  Plan of Care  [] Progress Note [] Discharge      Referring Provider: Juvenal Carvalho MD     From:  Joseph Echeverria, PT, DPT  Patient: Terrell Howell (64 y.o. male) : 1945 Date: 2023  Medical Diagnosis: Other intervertebral disc degeneration, lumbosacral region [M51.37]       Treatment Diagnosis: back pain, neck pain, impaired gait and activity tolerance, back and neck muscle weakness    Plan of Care/Certification Expiration Date: 23   Progress Report Period from:  2023  to 2023    Visits to Date: 1 No Show: 0 Cancelled Appts: 0    OBJECTIVE:   Long Term Goals - Time Frame for Long Term Goals : 6-8 weeks  Goals Current/ Discharge status Status   Long Term Goal 1: Pt will be able to complete > 15 minutes of continuous standing or ambulatory activity with LRAD to improve completion of ADLS/IADLs and community outings. 5-10 min subjective tolerance   New   Long Term Goal 2: Pt will improve neck and back muscle strength to 4+/5 or better to improve posture for carryover to decreased pain with unsupported sitting or standing activity. Strength LLE  Strength LLE: WFL  Comment: grossly 4- to 4/5  Strength RLE  Strength RLE: WFL  Comment: grossly 4- to 4/5      Trunk Strength  Trunk Flexion: 3-/5  Trunk Extension: 3-/5        New   Long Term Goal 3: Pt will improve standing balance to step forward, laterally, and retro to manage vacuum  in assisting spouse with home care tasks. Cane at home    FWW/rollator in community    Pt goal to assist spouse with home care   New   Long Term Goal 4:  Howell with HEP to self manage exercises for progressive strengthening  Reports exercises is currently limited by pain; increased sedentary activity   New   Long Term Goal 5: ASA improvement > 6 points to demo improved functional tolerance with decreased back pain limitations. Exam: ASA: 27/50; chronic pain > 1 year     New     Body Structures, Functions, Activity Limitations Requiring Skilled Therapeutic Intervention: Increased pain, Decreased ROM, Decreased strength, Decreased balance, Decreased functional mobility , Decreased ADL status, Decreased high-level IADLs  Assessment: Pt is a 79 yo male presenting with ongoing back and neck pain x > 1 year duration. Pt displays postural abnormalities, posterior chain weakness, and decreased flexibility. Pt has hx of both neck and lumbar surgeries. Pt can benefit from PT services to work on postural correction, back/neck muscle strengthening, and HEP training to work on the noted deficits and achieving pt's goals of pain mgmt and improved functional activity tolerance. Therapy Prognosis: Fair      PT Education: PT Role;Plan of Care;Goals; Evaluative findings; Anatomy of condition;Posture    PLAN: [x] Evaluate and Treat  Frequency/Duration:  Plan Frequency: 2  Plan weeks: 6-8  Current Treatment Recommendations: ROM, Strengthening, Balance training, Functional mobility training, Endurance training, Gait training, Stair training, Neuromuscular re-education, Manual, Pain management, Home exercise program, Safety education & training, Patient/Caregiver education & training, Equipment evaluation, education, & procurement, Modalities  Modalities: Heat/Cold     Precautions:Restrictions/Precautions: Fall Risk        Other position/activity restrictions: lumbar fusion, pacemaker                  Patient Status:[x] Continue/ Initiate plan of Care     [] Discharge PT. Recommend pt continue with HEP. [] Additional visits requested, Please re-certify for additional visits:     [] Hold        Signature: Electronically signed by Yaritza Wang PT on 1/16/23 at 4:47 PM EST      If you have any questions or concerns, please don't hesitate to call.   Thank you for your referral.    I have reviewed this plan of care and certify a need for medically necessary rehabilitation services.     Physician Signature:__________________________________________________________  Date:  Please sign and return

## 2023-01-16 NOTE — PROGRESS NOTES
[address] [facility]  PHYSICAL THERAPY EVALUATION      Physical Therapy: Initial Evaluation    Patient: Db Goldsmith (89 y.o.     male)   Examination Date: 2023   :  1945 ;    ConfirmedFederico Moore MRN: [id number]  CSN: [id number]   Insurance: Payor: MEDICARE / Plan: MEDICARE PART A AND B / Product Type: *No Product type* /   Insurance ID: [id number] - (Medicare) Secondary Insurance (if applicable): [facility]   Referring Physician: Torri Coates MD  Formerly [facility]       Visits to Date/Visits Approved:  (BMN)    No Show/Cancelled Appts: 0      Medical Diagnosis: Other intervertebral disc degeneration, lumbosacral region [M51.37]        Treatment Diagnosis: back pain, neck pain, impaired gait and activity tolerance, back and neck muscle weakness     PERTINENT MEDICAL HISTORY   Patient Assessed for Rehabilitation Services: Yes       Medical History: Chart Reviewed: Yes   Past Medical History:   Diagnosis Date    CAD (coronary artery disease)     Dr. Joyce Heard, Dr. Phil Aguilar    CKD (chronic kidney disease)     Coronary artery disease involving coronary bypass graft of native heart without angina pectoris 2022    Hypertension     Kidney stones     Left foot drop     Neuropathy, diabetic (HCC)     mild    Osteoarthritis     hip, knee    S/P CABG (coronary artery bypass graft)     Type II or unspecified type diabetes mellitus without mention of complication, not stated as uncontrolled      Surgical History:   Past Surgical History:   Procedure Laterality Date    BACK SURGERY N/A     BICEPS TENDON REPAIR  1997    CARDIAC CATHETERIZATION  2013    CORONARY ARTERY BYPASS GRAFT  2013    DR. PERALES    KNEE SURGERY  2006    left replacement    NECK SURGERY      ROTATOR CUFF REPAIR  1996    TONSILLECTOMY  1966       Medications:   Current Outpatient Medications:     hydrALAZINE (APRESOLINE) 25 MG tablet, TAKE 1 TABLET BY MOUTH IN THE MORNING AND AT BEDTIME, Disp: , Rfl:     carvedilol (COREG) 6.25 MG tablet, Take 1 tablet by mouth 2 times daily (with meals), Disp: 180 tablet, Rfl: 2    sacubitril-valsartan (ENTRESTO) 24-26 MG per tablet, Take 1 tablet by mouth 2 times daily, Disp: 180 tablet, Rfl: 3    gabapentin (NEURONTIN) 300 MG capsule, TAKE 1 CAPSULE BY MOUTH DAILY 15 MINUTES BEFORE DINNER, Disp: 90 capsule, Rfl: 3    furosemide (LASIX) 40 MG tablet, Take 1 tablet by mouth 2 times daily, Disp: 180 tablet, Rfl: 3    atorvastatin (LIPITOR) 40 MG tablet, Take 1 tablet by mouth nightly, Disp: 90 tablet, Rfl: 3    glimepiride (AMARYL) 4 MG tablet, Take 1 tablet by mouth 2 times daily, Disp: 180 tablet, Rfl: 3    insulin glargine (LANTUS SOLOSTAR) 100 UNIT/ML injection pen, Inject 30 Units into the skin 2 times daily, Disp: 10 Adjustable Dose Pre-filled Pen Syringe, Rfl: 3    tamsulosin (FLOMAX) 0.4 MG capsule, Take 1 capsule by mouth daily, Disp: 90 capsule, Rfl: 3    famotidine (PEPCID) 20 MG tablet, Take 1 tablet by mouth daily, Disp: 180 tablet, Rfl: 3    nitroGLYCERIN (NITROSTAT) 0.4 MG SL tablet, Place 1 tablet under the tongue See Admin Instructions, Disp: 25 tablet, Rfl: 1    Handicap Placard MISC, by Does not apply route Exp 5 years, Disp: 1 each, Rfl: 0    aspirin 81 MG tablet, Take 81 mg by mouth daily. , Disp: , Rfl:   Allergies: Dye [iodides]      Imaging (if applicable): No results found. SUBJECTIVE EXAMINATION     History obtained from[de-identified] Patient, Chart Review,      Family/Caregiver Present: No    Subjective History: Onset Date:  (> 1 year duration)  Subjective: Pt presents with c/o ongoing neck and back pain for the > 1 year duration. Pt has hx of lumbar fusion, pacemaker/defibrillator, and neck surgery. Pt is ambulatory with a cane in his home, and walker/rollator outside. Pt has hx of 1 fall, 3 months ago and states his balance feels off/he is concerned for falls.  Pt would like to manage pain w/o any more procedures but states he may have pain controll device implanted into lower back if needed.   Additional Pertinent Hx (if applicable): 4 low back surgeries, pacemaker/defibrillator   Previous treatments prior to current episode?: Injections, Surgery, Medications  Comments: RTD: 2/1/2023      Learning/Language: Learning  Does the patient/guardian have any barriers to learning?: No barriers  Will there be a co-learner?: No  What is the preferred language of the patient/guardian?: English  Is an  required?: No  How does the patient/guardian prefer to learn new concepts?: Listening, Reading, Demonstration, Pictures/Videos     Pain Screening    Pain Screening  Patient Currently in Pain: Yes  Pain Assessment: 0-10  Pain Level: 3  Best Pain Level: 1  Worst Pain Level: 10  Pain Location: Back  Pain Descriptors: Sharp  Aggravating factors: Walking, Standing  Pain Management/Relieving Factors: Rest, Sitting  Multiple Pain Sites: Yes  Pain 2  Pain Rating 2: 7  Pain Location 2: Neck  Pain Descriptors 2: Aching    Functional Status    Social History:    Social History  Lives With: Spouse  Type of Home: House  Home Layout: One level, Work area in basement (9 steps to basement with leyla HR)  Home Access: Stairs to enter with rails  Entrance Stairs - Rails: Both  Entrance Stairs - Number of Steps: 3  Bathroom Shower/Tub: Walk-in shower  Bathroom Equipment: Grab bars in shower, Shower chair, Hand-held shower  Home Equipment: Manju Ferries, Walker, rolling, Reacher, Rollator    Occupation/Interests:   Occupation: Retired  Leisure & Hobbies: sedentary d/t pain and fatigue; occasionally walks up/down driveway for exercise    Prior Level of Function:   100% Independent        Current Level of Function:   < 20%      Receives Help From: Family  ADL Assistance: Independent  Homemaking Assistance: Independent  Ambulation Assistance: Independent  Transfer Assistance: Independent  Active : Yes         OBJECTIVE EXAMINATION Restrictions:   Restrictions/Precautions: Fall Risk     Position Activity Restriction  Other position/activity restrictions: lumbar fusion, pacemaker    Review of Systems:  Vision: Impaired  Visual Deficits: Wears glasses  Hearing: Within functional limits  Follows Commands: Within Functional Limits    Observations:   General Observations  Description: entered clinic with FWW    Palpation:    Low back musculature soft and non-tender when pt sitting    Mobility:   Bed mobility  Supine to Sit: Independent  Sit to Supine: Independent  Bed Mobility Comments: increased effort and time     Transfers  Sit to Stand: Modified independent  Stand to Sit: Modified independent  Comment: required UE assist       Left AROM  Right AROM         AROM LLE (degrees)  LLE AROM : WFL    AROM RLE (degrees)  RLE AROM: WFL      Left Strength  Right Strength         Strength LLE  Strength LLE: WFL  Comment: grossly 4- to 4/5         Strength RLE  Strength RLE: WFL  Comment: grossly 4- to 4/5      Trunk Strength  Trunk Flexion: 3-/5  Trunk Extension: 3-/5            Thoracic Assessment     Thoracic: kyphotic posture with forward head        Lumbar Assessment     AROM Lumbar Spine   Lumbar spine general AROM: seated: hands to feet  PROM Lumbar Spine   Lumbar spine general PROM: supine: LBP when legs extended; decreased pain in hooklying     Trunk Strength     Trunk Strength  Trunk Flexion: 3-/5  Trunk Extension: 3-/5       Outcomes Score:  Exam: ASA: 27/50; chronic pain > 1 year    Treatment:    Exercises:   Exercises  Exercise 1: **close SBA for balance with exercises  Exercise 2: *rows (seated or standing)  Exercise 3: *hip hinges (wall or high/low table supported)  Exercise 4: *seated, cervical retractions  Exercise 5: *unilateral rows and presses with band (simulating vacuum)  Exercise 6: *balance training (foam standing, stepping with weight shift, 4-square multi directional stepping, etc)  Exercise 7: *treadmill walking Modalities:  Modalities  Modalities:  (*MHP, *CP (lumbar))        Manual:  Manual Therapy  Soft Tissue Mobilizaton: *lumbar musculature     *Indicates exercise,modality, or manual techniques to be initiated when appropriate       ASSESSMENT     Impression: Assessment: Pt is a 77 yo male presenting with ongoing back and neck pain x > 1 year duration. Pt displays postural abnormalities, posterior chain weakness, and decreased flexibility. Pt has hx of both neck and lumbar surgeries. Pt can benefit from PT services to work on postural correction, back/neck muscle strengthening, and HEP training to work on the noted deficits and achieving pt's goals of pain mgmt and improved functional activity tolerance. Body Structures, Functions, Activity Limitations Requiring Skilled Therapeutic Intervention: Increased pain, Decreased ROM, Decreased strength, Decreased balance, Decreased functional mobility , Decreased ADL status, Decreased high-level IADLs    Statement of Medical Necessity: Physical Therapy is both indicated and medically necessary as outlined in the POC to increase the likelihood of meeting the functionally related goals stated below. Patient's Activity Tolerance: Patient tolerated evaluation without incident      Patient's rehabilitation potential/prognosis is considered to be: Fair    Factors which may impact rehabilitation potential include: Age, Medical co-morbidities, Impaired previous level of function, Chronicity of problem     Patient Education: PT Education: PT Role, Plan of Care, Goals, Evaluative findings, Anatomy of condition, Posture     GOALS   Patient Goal(s): Patient Goals : \"Lose cane, rollator, and walker\"    Long Term Goals Completed by 6-8 weeks Goal Status   LTG 1 Pt will be able to complete > 15 minutes of continuous standing or ambulatory activity with LRAD to improve completion of ADLS/IADLs and community outings.  New   LTG 2 Pt will improve neck and back muscle strength to 4+/5 or better to improve posture for carryover to decreased pain with unsupported sitting or standing activity. New   LTG 3 Pt will improve standing balance to step forward, laterally, and retro to manage vacuum  in assisting spouse with home care tasks. New   LTG 4 [place] with HEP to self manage exercises for progressive strengthening New   LTG 5 ASA improvement > 6 points to demo improved functional tolerance with decreased back pain limitations. New        TREATMENT PLAN       Requires PT Follow-Up: Yes    Treatment may include any combination of the following: ROM, Strengthening, Balance training, Functional mobility training, Endurance training, Gait training, Stair training, Neuromuscular re-education, Manual, Pain management, Home exercise program, Safety education & training, Patient/Caregiver education & training, Equipment evaluation, education, & procurement, Modalities  Modalities: Heat/Cold     Frequency / Duration:  Patient to be seen 2 times per week for 6-8 weeks            Eval Complexity:   Decision Making: Medium Complexity  History: Personal Factors and/or Comorbidities Impacting POC: High  History: lumbar surgery x 4, neck surgery, pacemaker, DM with neuropathy, L TKA  Examination of body system(s) including body structures and functions, activity limitations, and/or participation restrictions: Medium  Exam: ASA: 27/50; chronic pain > 1 year  Clinical Presentation: Medium  Clinical Presentation: evolving    POST-PAIN     Pain Rating (0-10 pain scale):  no change /10  Location and pain description same as pre-treatment unless indicated. Action: [] NA  [] Call Physician  [] Perform HEP  [x] Meds as prescribed    Evaluation and patient rights have been reviewed and patient agrees with plan of care.   Yes  [x]  No  []   Explain:     Gail Fall Risk Assessment  Risk Factor Scale  Score   History of Falls [x] Yes  [] No 25  0 25   Secondary Diagnosis [x] Yes  (foot drop)  [] No 15  0 15 Ambulatory Aid [] Furniture  [x] Crutches/cane/walker  [] None/bedrest/wheelchair/nurse 30  15  0 15   IV/Heparin Lock [] Yes  [] No 20  0    Gait/Transferring [] Impaired  [x] Weak  [] Normal/bedrest/immobile 20  10  0 10   Mental Status [] Forgets limitations  [] Oriented to own ability 15  0       Total:65     Based on the Assessment score: check the appropriate box.   []  No intervention needed   Low =   Score of 0-24  []  Use standard prevention interventions Moderate =  Score of 24-44   [] Discuss fall prevention strategies   [] Indicate moderate falls risk on eval  [x]  Use high risk prevention interventions High = Score of 45 and higher   [x] Discuss fall prevention strategies   [x] Provide supervision during treatment time      Minutes:  PT Individual Minutes  Time In: 1673  Time Out: 1435  Minutes: 42     Procedure Minutes: 42 min eval     Electronically signed by Sandra Montes PT on 1/16/23 at 4:26 PM EST

## 2023-01-19 ENCOUNTER — HOSPITAL ENCOUNTER (OUTPATIENT)
Dept: PHYSICAL THERAPY | Age: 78
Setting detail: THERAPIES SERIES
Discharge: HOME | End: 2023-01-19
Payer: MEDICARE

## 2023-01-19 PROCEDURE — 97110 THERAPEUTIC EXERCISES: CPT

## 2023-01-19 ASSESSMENT — PAIN DESCRIPTION - DESCRIPTORS
DESCRIPTORS: SHARP;SHOOTING;THROBBING
DESCRIPTORS_2: ACHING

## 2023-01-19 ASSESSMENT — PAIN SCALES - GENERAL: PAINLEVEL_OUTOF10: 7

## 2023-01-19 ASSESSMENT — PAIN DESCRIPTION - INTENSITY: RATING_2: 3

## 2023-01-19 ASSESSMENT — PAIN DESCRIPTION - LOCATION
LOCATION: BACK
LOCATION_2: NECK

## 2023-01-19 ASSESSMENT — PAIN DESCRIPTION - FREQUENCY: FREQUENCY: CONTINUOUS

## 2023-01-19 NOTE — PROGRESS NOTES
[address] [facility]  Outpatient Physical Therapy    Treatment Note        Date: 2023  Patient: Love Manzano  : 1945   Confirmed: Yes  MRN: [id number]  Referring Provider: Sneha Cook MD    Medical Diagnosis: Other intervertebral disc degeneration, lumbosacral region [M51.37]       Treatment Diagnosis: back pain, neck pain, impaired gait and activity tolerance, back and neck muscle weakness    Visit Information:  Insurance: Payor: MEDICARE / Plan: MEDICARE PART A AND B / Product Type: *No Product type* /   PT Visit Information  Onset Date:  (> 1 year duration)  PT Insurance Information: Medicare  Total # of Visits Approved: 80 (BMN)  Total # of Visits to Date: 2  Plan of Care/Certification Expiration Date: 23  No Show: 0  Progress Note Due Date: 23  Canceled Appointment: 0  Progress Note Counter: - (30 day PN by 22)    Subjective Information:  Subjective: Patient reports he has constant back pain and it gets really painful if up for longer than 10 minutes.   HEP Compliance:  [x] Good [] Fair [] Poor [] Reports not doing due to:    Pain Screening  Patient Currently in Pain: Yes  Pain Assessment: 0-10  Pain Level: 7  Pain Location: Back  Pain Descriptors: Sharp, Shooting, Throbbing  Pain Frequency: Continuous  Pain 2  Pain Rating 2: 3  Pain Location 2: Neck  Pain Descriptors 2: Aching    Treatment:  Exercises:  Exercises  Exercise 1: **close SBA for balance with exercises  Exercise 2: rows/lat pulls (seated or standing) x 10 -5s RTB  Exercise 3: hip hinges (wall or high/low table supported) x 10 -5s (seated on chiair) x 10 -5s (standing with mat table support from behind)  Exercise 4: seated, cervical retractions x 10- 5s  Exercise 5: Chest presses with band x 10-5s  Exercise 6: balance training (hip width, feet together, modified tandem)   x 20-30s ea( hip width x 2 sets)  Exercise 8: Scapular retractions x 10 -5s     Modalities:  Moist Heat (CPT 27012)  Patient Position: Seated  Number Minutes Moist Heat: 10  Moist heat location: Cervical, Low back  Moist heat specified location: Cervical and Lumbar spine  Post treatment skin assessment: Intact       *Indicates exercise, modality, or manual techniques to be initiated when appropriate    Objective Measures:      N/A    Assessment: Body Structures, Functions, Activity Limitations Requiring Skilled Therapeutic Intervention: Increased pain, Decreased ROM, Decreased strength, Decreased balance, Decreased functional mobility , Decreased ADL status, Decreased high-level IADLs  Assessment: Initiated exercises this date to improve lower back and neck pain and strength. Patient tolerated seated variations of rows and lat pulls, chest pulls, hip hinges, and cervical retractions. Increased discomfort noted in cervical spine noted with increased standing and unsupported sitted. Patient occ requires cues for improving seated posture with good follow through, however, reduced endurance and stamina. Performs balance activities in various LE positions with occ right to left lateral swaying but no LOB. HEP given to patient to begin performing at home to improve postural strength and pain. Treatment Diagnosis: back pain, neck pain, impaired gait and activity tolerance, back and neck muscle weakness  Therapy Prognosis: Fair       Post-Pain Assessment:       Pain Rating (0-10 pain scale):  6/10 - lower back, 5 /10 - cervical spine  Location and pain description same as pre-treatment unless indicated. Action: [] NA   [x] Perform HEP  [] Meds as prescribed  [] Modalities as prescribed   [] Call Physician     GOALS   Patient Goal(s): Patient Goals : \"Lose cane, rollator, and walker\"    Long Term Goals Completed by 6-8 weeks Goal Status   LTG 1 Pt will be able to complete > 15 minutes of continuous standing or ambulatory activity with LRAD to improve completion of ADLS/IADLs and community outings.  In progress   LTG 2 Pt will improve neck and back muscle strength to 4+/5 or better to improve posture for carryover to decreased pain with unsupported sitting or standing activity. In progress   LTG 3 Pt will improve standing balance to step forward, laterally, and retro to manage vacuum  in assisting spouse with home care tasks. In progress   LTG 4 [place] with HEP to self manage exercises for progressive strengthening In progress   LTG 5 ASA improvement > 6 points to demo improved functional tolerance with decreased back pain limitations. In progress     Plan:  Frequency/Duration:  Plan  Plan Frequency: 2  Plan weeks: 6-8  Current Treatment Recommendations: ROM, Strengthening, Balance training, Functional mobility training, Endurance training, Gait training, Stair training, Neuromuscular re-education, Manual, Pain management, Home exercise program, Safety education & training, Patient/Caregiver education & training, Equipment evaluation, education, & procurement, Modalities  Modalities: Heat/Cold  Pt to continue current HEP. See objective section for any therapeutic exercise changes, additions or modifications this date.     Therapy Time:      PT Individual Minutes  Time In: 1300  Time Out: 2486  Minutes: 57  Timed Code Treatment Minutes: 47 Minutes  Procedure Minutes: 10min MHP  Timed Activity Minutes Units   Ther Ex 47 3   Electronically signed by Slava Araujo PTA on 1/19/23 at 2:07 PM EST

## 2023-01-23 ENCOUNTER — CLINICAL DOCUMENTATION (OUTPATIENT)
Dept: CARDIOLOGY CLINIC | Age: 78
End: 2023-01-23

## 2023-01-24 ENCOUNTER — HOSPITAL ENCOUNTER (OUTPATIENT)
Dept: PHYSICAL THERAPY | Age: 78
Setting detail: THERAPIES SERIES
Discharge: HOME | End: 2023-01-24
Payer: MEDICARE

## 2023-01-24 PROCEDURE — 97110 THERAPEUTIC EXERCISES: CPT

## 2023-01-24 ASSESSMENT — PAIN DESCRIPTION - INTENSITY: RATING_2: 4

## 2023-01-24 ASSESSMENT — PAIN DESCRIPTION - DESCRIPTORS
DESCRIPTORS_2: ACHING;SHARP
DESCRIPTORS: SHARP;ACHING

## 2023-01-24 ASSESSMENT — PAIN SCALES - GENERAL: PAINLEVEL_OUTOF10: 7

## 2023-01-24 ASSESSMENT — PAIN DESCRIPTION - LOCATION
LOCATION: BACK
LOCATION_2: NECK

## 2023-01-24 NOTE — PROGRESS NOTES
[address] [facility]  Outpatient Physical Therapy    Treatment Note        Date: 2023  Patient: Markel Bledsoe  : 1945   Confirmed: Yes  MRN: [id number]  Referring Provider: Linda Elam MD    Medical Diagnosis: Other intervertebral disc degeneration, lumbosacral region [M51.37]       Treatment Diagnosis: back pain, neck pain, impaired gait and activity tolerance, back and neck muscle weakness    Visit Information:  Insurance: Payor: MEDICARE / Plan: MEDICARE PART A AND B / Product Type: *No Product type* /   PT Visit Information  Onset Date:  (> 1 year duration)  PT Insurance Information: Medicare  Total # of Visits Approved: 80 (BMN)  Total # of Visits to Date: 3  Plan of Care/Certification Expiration Date: 23  No Show: 0  Progress Note Due Date: 23  Canceled Appointment: 0  Progress Note Counter: - (30 day PN by 22)    Subjective Information:  Subjective: Pt reports good compliant with current exercises, no complaints following last tx. HEP Compliance:  [x] Good [] Fair [] Poor [] Reports not doing due to:    Pain Screening  Patient Currently in Pain: Yes  Pain Assessment: 0-10  Pain Level: 7  Pain Location: Back  Pain Descriptors: Sharp, Aching  Pain 2  Pain Rating 2: 4  Pain Location 2: Neck  Pain Descriptors 2: Aching, Sharp    Treatment:  Exercises:  Exercises  Exercise 2: rows/lat pulls (seated or standing) x 10 -5s RTB  Exercise 3: hip hinges (wall or high/low table supported) x 10 -10s (seated on chiair) x 10 -5s (standing with mat table support from behind)  Exercise 4: seated, cervical retractions x 10- 5s  Exercise 5: Chest presses with band RTB  x 10-5s  Exercise 9: Sit-Stand   2 x 5 with focus on upright posture and standing balance. Exercise 10: Standing Hip ABD, Ext x 10 ea. VCs for correction of posture.          Modalities:  Moist Heat (CPT 40515)  Patient Position: Seated  Number Minutes Moist Heat: 10  Moist heat location: Cervical, Low back  Moist heat specified location: Cervical and Lumbar spine  Post treatment skin assessment: Intact       *Indicates exercise, modality, or manual techniques to be initiated when appropriate    Objective Measures:             Assessment: Body Structures, Functions, Activity Limitations Requiring Skilled Therapeutic Intervention: Increased pain, Decreased ROM, Decreased strength, Decreased balance, Decreased functional mobility , Decreased ADL status, Decreased high-level IADLs  Assessment: Pt with difficutly maintaining upright posture due to increasing back and neck pain that increased with time up on feet. Increased instabiltiy noted with static standing drills in SLS and tandem stance in NBOS today. Incrased cervical pain reported with both trunk rotation and cervical roation exercises performed today. Treatment Diagnosis: back pain, neck pain, impaired gait and activity tolerance, back and neck muscle weakness  Therapy Prognosis: Fair       Patient Education: [x] NA       Post-Pain Assessment:       Pain Rating (0-10 pain scale):   \"same\" /10   Location and pain description same as pre-treatment unless indicated. Action: [] NA   [] Perform HEP  [] Meds as prescribed  [] Modalities as prescribed   [] Call Physician     GOALS   Patient Goal(s): Patient Goals : \"Lose cane, rollator, and walker\"      Long Term Goals Completed by 6-8 weeks Goal Status   LTG 1 Pt will be able to complete > 15 minutes of continuous standing or ambulatory activity with LRAD to improve completion of ADLS/IADLs and community outings. In progress   LTG 2 Pt will improve neck and back muscle strength to 4+/5 or better to improve posture for carryover to decreased pain with unsupported sitting or standing activity. In progress   LTG 3 Pt will improve standing balance to step forward, laterally, and retro to manage vacuum  in assisting spouse with home care tasks.  In progress   LTG 4 [place] with HEP to self manage exercises for progressive strengthening In progress   LTG 5 ASA improvement > 6 points to demo improved functional tolerance with decreased back pain limitations. In progress       Plan:  Frequency/Duration:  Plan  Plan Frequency: 2  Plan weeks: 6-8  Current Treatment Recommendations: ROM, Strengthening, Balance training, Functional mobility training, Endurance training, Gait training, Stair training, Neuromuscular re-education, Manual, Pain management, Home exercise program, Safety education & training, Patient/Caregiver education & training, Equipment evaluation, education, & procurement, Modalities  Modalities: Heat/Cold  Pt to continue current HEP. See objective section for any therapeutic exercise changes, additions or modifications this date.     Therapy Time:      PT Individual Minutes  Time In: 1020  Time Out: 1110  Minutes: 50  Timed Code Treatment Minutes: 40 Minutes  Procedure Minutes:10 MHP   Timed Activity Minutes Units   Ther Ex 40 3     Electronically signed by Pascual Baeza PTA on 1/24/23 at 11:10 AM EST

## 2023-01-26 ENCOUNTER — HOSPITAL ENCOUNTER (OUTPATIENT)
Dept: PHYSICAL THERAPY | Age: 78
Setting detail: THERAPIES SERIES
Discharge: HOME | End: 2023-01-26
Payer: MEDICARE

## 2023-01-26 PROCEDURE — 97140 MANUAL THERAPY 1/> REGIONS: CPT

## 2023-01-26 PROCEDURE — 97110 THERAPEUTIC EXERCISES: CPT

## 2023-01-26 ASSESSMENT — PAIN DESCRIPTION - LOCATION: LOCATION: NECK

## 2023-01-26 ASSESSMENT — PAIN DESCRIPTION - PAIN TYPE: TYPE: CHRONIC PAIN

## 2023-01-26 ASSESSMENT — PAIN DESCRIPTION - INTENSITY: RATING_2: 3

## 2023-01-26 ASSESSMENT — PAIN DESCRIPTION - DESCRIPTORS
DESCRIPTORS: SHARP
DESCRIPTORS_2: ACHING

## 2023-01-26 ASSESSMENT — PAIN SCALES - GENERAL: PAINLEVEL_OUTOF10: 7

## 2023-01-26 ASSESSMENT — PAIN DESCRIPTION - DIRECTION: RADIATING_TOWARDS: BIL SHOULDERS

## 2023-01-26 NOTE — PROGRESS NOTES
[facility]  Outpatient Physical Therapy    Treatment Note        Date: 2023  Patient: Eugenia Moore  : 1945   Confirmed: Yes  MRN: [id number]  Referring Provider: Norah Garcia MD    Medical Diagnosis: Other intervertebral disc degeneration, lumbosacral region [M51.37]       Treatment Diagnosis: back pain, neck pain, impaired gait and activity tolerance, back and neck muscle weakness    Visit Information:  Insurance: Payor: MEDICARE / Plan: MEDICARE PART A AND B / Product Type: *No Product type* /   PT Visit Information  Onset Date:  (> 1 year duration)  PT Insurance Information: Medicare  Total # of Visits Approved: 99 (BMN)  Total # of Visits to Date: 4  Plan of Care/Certification Expiration Date: 23  No Show: 0  Progress Note Due Date: 23  Canceled Appointment: 0  Progress Note Counter: - (30 day PN by 22)    Subjective Information:  Subjective: Pt reports yesterday he felt really good, but upon waking up today had increased neck and shoulder soreness. Reports low back does not feel too bad today.   HEP Compliance:  [x] Good [] Fair [] Poor [] Reports not doing due to:    Pain Screening  Patient Currently in Pain: Yes  Pain Assessment: 0-10  Pain Level: 7  Best Pain Level: 3  Pain Type: Chronic pain  Pain Location: Neck  Pain Radiating Towards: leyla shoulders  Pain Descriptors: Sharp  Multiple Pain Sites: Yes  Pain 2  Pain Rating 2: 3  Pain Descriptors 2: Aching    Treatment:  Exercises:  Exercises  Exercise 1: **close SBA for balance with exercises  Exercise 2: seated cervical retractions x 10  Exercise 3: hooklying leyla shoulder flexion with dowel curry (focus on thoracic extension): 10\" x 15-20  Exercise 4: hooklying bi shoulder horz ABD (T position): x15, x 10 with addition of cervical retraction  Exercise 5: Standing rows w/ green TB x 12  Exercise 6: Seated thoracic extension w/ ball at t-spine x 12  Treatment Reasoning  Limitations addressed: Flexibility, Posture    Manual:   Manual Therapy  Soft Tissue Mobilizaton: cervical paraspinal and leyla upper traps; muscular kneading x 8 min  Other: pt seated, leaning onto tx table with forearms  Treatment Reasoning  Limitations addressed: pain relief    Modalities:  Moist Heat (CPT 22044)  Patient Position: Seated  Number Minutes Moist Heat: 10  Moist heat location: Cervical, Low back  Post treatment skin assessment: Intact  Limitations addressed: Pain modulation       *Indicates exercise, modality, or manual techniques to be initiated when appropriate    Objective Measures:   NT    Assessment: Body Structures, Functions, Activity Limitations Requiring Skilled Therapeutic Intervention: Increased pain, Decreased ROM, Decreased strength, Decreased balance, Decreased functional mobility , Decreased ADL status, Decreased high-level IADLs  Assessment: New therEx started to promote postural extension mobility. Significant thoracic kyphosis displayed. Exercises modified to hooklying and seated positions for patient tolerance. Instability still displayed with standing activity. Pt able to gradually progress from 3 pillows to single pillow, for hooklying exercises, representing gradual improvement in thoracic extension - discussed with pt gradually decreasing head support to further allow upper back extension during completion of HEP. Pain verbalized with attempted neck strengthening activities as well as supine to sit transfer. Manual provided with pt in supported seated flexion, which allowed significant muscle relaxation, although firm muscle contraction noted in cervical extensors and upper traps upon pt sitting back into vertical position. Pt verbalizes most pain relief following MHP modalities.   Treatment Diagnosis: back pain, neck pain, impaired gait and activity tolerance, back and neck muscle weakness  Therapy Prognosis: Fair       Post-Pain Assessment:       Pain Rating (0-10 pain scale):  3 /10 neck, 6/10 low back  Location and pain description same as pre-treatment unless indicated. Action: [] NA   [] Perform HEP  [x] Meds as prescribed  [x] Modalities as prescribed   [] Call Physician     GOALS   Patient Goal(s): Patient Goals : \"Lose cane, rollator, and walker\"    Long Term Goals Completed by 6-8 weeks Goal Status   LTG 1 Pt will be able to complete > 15 minutes of continuous standing or ambulatory activity with LRAD to improve completion of ADLS/IADLs and community outings. In progress   LTG 2 Pt will improve neck and back muscle strength to 4+/5 or better to improve posture for carryover to decreased pain with unsupported sitting or standing activity. In progress   LTG 3 Pt will improve standing balance to step forward, laterally, and retro to manage vacuum  in assisting spouse with home care tasks. In progress   LTG 4 [place] with HEP to self manage exercises for progressive strengthening In progress   LTG 5 ASA improvement > 6 points to demo improved functional tolerance with decreased back pain limitations. In progress          Plan:  Frequency/Duration:  Plan  Plan Frequency: 2  Plan weeks: 6-8  Current Treatment Recommendations: ROM, Strengthening, Balance training, Functional mobility training, Endurance training, Gait training, Stair training, Neuromuscular re-education, Manual, Pain management, Home exercise program, Safety education & training, Patient/Caregiver education & training, Equipment evaluation, education, & procurement, Modalities  Modalities: Heat/Cold  Pt to continue current HEP. See objective section for any therapeutic exercise changes, additions or modifications this date.     Therapy Time:      PT Individual Minutes  Time In: 1100  Time Out: 8961  Minutes: 55  Timed Code Treatment Minutes: 41 Minutes  Procedure Minutes: 10 min MHPs  Timed Activity Minutes Units   Ther Ex 33 2   Manual  8 1     Electronically signed by Lety Mcneal PT on 1/26/23 at 12:05 PM EST

## 2023-01-31 ENCOUNTER — HOSPITAL ENCOUNTER (OUTPATIENT)
Dept: PHYSICAL THERAPY | Age: 78
Setting detail: THERAPIES SERIES
Discharge: HOME | End: 2023-01-31
Payer: MEDICARE

## 2023-01-31 PROCEDURE — 97110 THERAPEUTIC EXERCISES: CPT

## 2023-01-31 PROCEDURE — 97140 MANUAL THERAPY 1/> REGIONS: CPT

## 2023-01-31 ASSESSMENT — PAIN DESCRIPTION - DESCRIPTORS
DESCRIPTORS: SHARP
DESCRIPTORS_2: DULL

## 2023-01-31 ASSESSMENT — PAIN DESCRIPTION - LOCATION
LOCATION_2: BACK
LOCATION: NECK

## 2023-01-31 ASSESSMENT — PAIN DESCRIPTION - INTENSITY: RATING_2: 3

## 2023-01-31 ASSESSMENT — PAIN DESCRIPTION - PAIN TYPE: TYPE: CHRONIC PAIN

## 2023-01-31 ASSESSMENT — PAIN DESCRIPTION - FREQUENCY: FREQUENCY: CONTINUOUS

## 2023-01-31 ASSESSMENT — PAIN SCALES - GENERAL: PAINLEVEL_OUTOF10: 7

## 2023-02-01 ENCOUNTER — OFFICE VISIT (OUTPATIENT)
Dept: CARDIOLOGY CLINIC | Age: 78
End: 2023-02-01
Payer: MEDICARE

## 2023-02-01 ENCOUNTER — TELEPHONE (OUTPATIENT)
Dept: CARDIOLOGY CLINIC | Age: 78
End: 2023-02-01

## 2023-02-01 VITALS
RESPIRATION RATE: 16 BRPM | OXYGEN SATURATION: 100 % | BODY MASS INDEX: 37.25 KG/M2 | SYSTOLIC BLOOD PRESSURE: 124 MMHG | WEIGHT: 306 LBS | HEART RATE: 58 BPM | DIASTOLIC BLOOD PRESSURE: 68 MMHG

## 2023-02-01 DIAGNOSIS — N18.30 STAGE 3 CHRONIC KIDNEY DISEASE, UNSPECIFIED WHETHER STAGE 3A OR 3B CKD (HCC): ICD-10-CM

## 2023-02-01 DIAGNOSIS — E87.5 HYPERKALEMIA: ICD-10-CM

## 2023-02-01 DIAGNOSIS — E78.5 DYSLIPIDEMIA: ICD-10-CM

## 2023-02-01 DIAGNOSIS — E11.69 DIABETES MELLITUS TYPE 2 IN OBESE (HCC): ICD-10-CM

## 2023-02-01 DIAGNOSIS — I50.22 CHRONIC SYSTOLIC CHF (CONGESTIVE HEART FAILURE), NYHA CLASS 1 (HCC): ICD-10-CM

## 2023-02-01 DIAGNOSIS — Z95.810 AICD (AUTOMATIC CARDIOVERTER/DEFIBRILLATOR) PRESENT: ICD-10-CM

## 2023-02-01 DIAGNOSIS — Z95.1 HX OF CABG: ICD-10-CM

## 2023-02-01 DIAGNOSIS — I10 HYPERTENSION, UNSPECIFIED TYPE: ICD-10-CM

## 2023-02-01 DIAGNOSIS — E66.9 DIABETES MELLITUS TYPE 2 IN OBESE (HCC): ICD-10-CM

## 2023-02-01 DIAGNOSIS — I50.22 CHRONIC SYSTOLIC CHF (CONGESTIVE HEART FAILURE), NYHA CLASS 1 (HCC): Primary | ICD-10-CM

## 2023-02-01 DIAGNOSIS — I25.5 ISCHEMIC CARDIOMYOPATHY: ICD-10-CM

## 2023-02-01 DIAGNOSIS — I27.20 PULMONARY HTN (HCC): ICD-10-CM

## 2023-02-01 LAB
ANION GAP SERPL CALCULATED.3IONS-SCNC: 14 MEQ/L (ref 9–15)
BUN BLDV-MCNC: 63 MG/DL (ref 8–23)
CALCIUM SERPL-MCNC: 8.9 MG/DL (ref 8.5–9.9)
CHLORIDE BLD-SCNC: 104 MEQ/L (ref 95–107)
CO2: 22 MEQ/L (ref 20–31)
CREAT SERPL-MCNC: 2.28 MG/DL (ref 0.7–1.2)
GFR SERPL CREATININE-BSD FRML MDRD: 29.1 ML/MIN/{1.73_M2}
GLUCOSE BLD-MCNC: 197 MG/DL (ref 70–99)
POTASSIUM SERPL-SCNC: 5.6 MEQ/L (ref 3.4–4.9)
PRO-BNP: 949 PG/ML
SODIUM BLD-SCNC: 140 MEQ/L (ref 135–144)

## 2023-02-01 PROCEDURE — 3074F SYST BP LT 130 MM HG: CPT | Performed by: PHYSICIAN ASSISTANT

## 2023-02-01 PROCEDURE — G8484 FLU IMMUNIZE NO ADMIN: HCPCS | Performed by: PHYSICIAN ASSISTANT

## 2023-02-01 PROCEDURE — G8417 CALC BMI ABV UP PARAM F/U: HCPCS | Performed by: PHYSICIAN ASSISTANT

## 2023-02-01 PROCEDURE — 1036F TOBACCO NON-USER: CPT | Performed by: PHYSICIAN ASSISTANT

## 2023-02-01 PROCEDURE — 3044F HG A1C LEVEL LT 7.0%: CPT | Performed by: PHYSICIAN ASSISTANT

## 2023-02-01 PROCEDURE — 3017F COLORECTAL CA SCREEN DOC REV: CPT | Performed by: PHYSICIAN ASSISTANT

## 2023-02-01 PROCEDURE — 99214 OFFICE O/P EST MOD 30 MIN: CPT | Performed by: PHYSICIAN ASSISTANT

## 2023-02-01 PROCEDURE — 3078F DIAST BP <80 MM HG: CPT | Performed by: PHYSICIAN ASSISTANT

## 2023-02-01 PROCEDURE — 2022F DILAT RTA XM EVC RTNOPTHY: CPT | Performed by: PHYSICIAN ASSISTANT

## 2023-02-01 PROCEDURE — 1123F ACP DISCUSS/DSCN MKR DOCD: CPT | Performed by: PHYSICIAN ASSISTANT

## 2023-02-01 PROCEDURE — G8427 DOCREV CUR MEDS BY ELIG CLIN: HCPCS | Performed by: PHYSICIAN ASSISTANT

## 2023-02-01 ASSESSMENT — ENCOUNTER SYMPTOMS
ABDOMINAL PAIN: 0
BACK PAIN: 1
NAUSEA: 0
COLOR CHANGE: 0
BLOOD IN STOOL: 0
CHEST TIGHTNESS: 0
SHORTNESS OF BREATH: 0
COUGH: 0
VOMITING: 0
ABDOMINAL DISTENTION: 0

## 2023-02-01 NOTE — PATIENT INSTRUCTIONS
Check labs (BMP) today to re-evaluate renal function and potassium level  Check BNP today to re-evaluate fluid status      -Check daily weight every morning and notify CHF clinic if gaining more than 3 pounds in 2 days    -Check blood pressure twice daily in a.m. and p.m. prior to taking medications and keep log of blood pressure trends to review at next office visit  Notify office if BP running low with  mmHg or below prior to taking medications  Notify office if BP running high with  mmHg or above or if DBP 85 mmHg or above    -Recommend 2000 mg daily sodium restriction    -Recommend 1.5 liter (48 ounces) daily fluid restriction

## 2023-02-01 NOTE — PROGRESS NOTES
Patient: Shelby Lipscomb  YOB: 1945  MRN: [id number]    Chief Complaint:  Chief Complaint   Patient presents with    6 Month Follow-Up    Congestive Heart Failure    Coronary Artery Disease         Subjective/HPI       2/1/23: Here for follow-up of systolic congestive heart failure and ischemic cardiomyopathy with EF of 30% per limited echo on 7/14/2022. At last CHF clinic appointment on 8/3/2022, patient was referred to electrophysiology for AICD evaluation. He subsequently underwent BiV ICD implant with Dr. Kadeem Carlton on 8/17/2022. States he has been doing well since his last office visit with me and has no new or worsening heart failure symptoms. He denies shortness of breath or dyspnea exertion, chest pain, palpitations, diaphoresis, paroxysmal nocturnal dyspnea, orthopnea, worsening lower extremity edema, dizziness, lightheadedness, syncope, hematochezia or melena, hematuria, difficulty urinating, cough, cold, fever or chills. He is compliant with all of his medications and with low-sodium diet and fluid restriction. He does weigh himself periodically at home states that his weight has been stable. He has been encouraged to start weighing himself every morning to keep a better eye on rapid weight gain or signs of fluid overload. He has chronic low back pain as well as neck pain issues and recently started physical therapy within the past 2 weeks. He had blood work completed recently as ordered by his PCP and labs are documented below. Potassium was elevated at 5.4 and mild worsening in renal function compared to prior. He denies PCP adjusting anything based on these lab results. Discussed with him regarding repeating BMP today to reevaluate renal function and electrolytes and he is agreeable.   We will also check proBNP to reevaluate fluid status as patient has had approximately 16 pound weight gain since last office visit albeit not having any symptoms of worsening CHF.    Most recent labs reviewed and documented below. CMP on 1/11/2023: Sodium 144, potassium elevated 5.4, chloride elevated 109, total CO2 18, BUN elevated 68, creatinine elevated at 2.09, GFR low at 32.4, glucose elevated 180. ALT 30, AST 25  Lipid panel 1/11/2023: Total cholesterol 138, triglycerides 141, HDL 46, LDL 64  Hemoglobin A1c on 1/11/2023: Elevated at 6.6 which is improved from 7.7 on 11/19/2021  CBC on 1/11/2023: WBC 7.7, hemoglobin 12.5, hematocrit 37.6, platelets low at 85    Vital signs stable in office today with blood pressure 124/68, heart rate 58, pulse ox 100% on room air. Weight is 306 pounds compared to 290 pounds at last office visit on 8/3/2022.        8/3/22: Here for follow-up of systolic congestive heart failure, history of ischemic cardiomyopathy with worsening LV function with EF 20% per echo on 4/2/2022 and history of CAD status post CABG. Had appointment with cardiac rehab earlier today and states that he had a 6-minute walk test and SpO2 down to 89% post exertion but recovered quickly. He denies any significant shortness of breath or dyspnea complaints with normal activity. He reports chronic back pain issues which occasionally worsen states that his back is really bothering him today. He denies chest pain, palpitations, diaphoresis, paroxysmal nocturnal dyspnea, orthopnea, worsening lower extremity edema, dizziness, lightheadedness, syncope, fever or chills. He does report that he is waking up at least twice a night to urinate which is bothersome to him so he has been advised that he can take 80 mg of Lasix in the morning instead of 40 twice daily to see if this alleviates his nocturia. He is compliant with all of his medications and with low-sodium diet and fluid restriction.   He is checking twice daily blood pressures and BP typically well controlled between 130s over 60s but occasionally has elevated BP readings in the 150s over 60s range but attributes his elevated BP readings to being in significant pain related to his back. Has been checking daily weights and weight has been stable. Discussed with patient regarding possible referral to pulmonology for evaluation of exertional hypoxia and pulmonary hypertension with RVSP of 57 mmHg per limited echo from 7/14/2022. Patient is wishing to defer for now but will reconsider in the future. Recent limited echocardiogram reviewed and documented below. Limited echo 7/14/22:   Conclusions   Summary   Left ventricular ejection fraction is visually estimated at 30%. Mild tricuspid regurgitation. Moderate pulmonary hypertension   RVSP 57mmHg   Moderate mitral regurgitaton. Mildly dilated left atrium. Signature   ----------------------------------------------------------------   Electronically signed by Himanshu Latham(Interpreting physician)   on 07/14/2022 12:06 PM    **Discussed with patient regarding recommendation for referral to electrophysiology for ICD evaluation. He is agreeable to meet with Dr. Eleanor Rogers for evaluation and information regarding ICD so he can make an informed decision. He is uncertain at this point if he wants ICD or not. Vital signs stable in office today. Blood pressure 125/69, heart rate 56, pulse ox 97% on room air. Weight is 290 pounds compared to 286 pounds at last office visit on 5/11/2022.        5/11/22: Here for follow-up of systolic congestive heart failure. Was seen for initial CHF clinic evaluation on 4/13/2022 at which time his losartan was discontinued as he was already on Entresto since hospital discharge. He has been doing very well since last office visit with no new or worsening heart failure symptoms. He is compliant with all of his medications and with low-sodium diet and fluid restriction. He does experience shortness of breath with more moderate exertion but this is unchanged. He denies any shortness of breath at rest or with normal daily activities.   He reports 2 episodes of orthopnea since his last visit but denies any persistent or orthopnea complaints. He denies chest pain, palpitations, diaphoresis, paroxysmal nocturnal dyspnea, worsening lower extremity edema, dizziness, lightheadedness, syncope, fever or chills. He is scheduled to start cardiac rehab next week. He is checking daily weights and routine blood pressures at home. Typically blood pressure has been mildly elevated with systolic in the 775Q to 174A range with diastolic in the 58G to 17J range. Heart rate is typically in the 60s at home. Most recent labs reviewed and documented below. BMP 4/13/2022: Sodium 139, potassium 4.8, chloride 101, total CO2 23, BUN elevated at 44, creatinine elevated 1.61, GFR low at 42.1, glucose 165. --Renal function stable when compared to prior. Baseline creatinine appears to be around 1.7 previously  proBNP on 4/13/2022: 3628 compared to 6425 on 4/1/2022. Vital signs stable in office today. Blood pressure 131/76, heart rate bradycardic at 53, pulse ox 99% on room air. Weight is 286 pounds compared to 293 pounds at last office visit on 4/13/2022.      4/13/22 (CHF clinic visit #1): This is a very pleasant 82-year-old  male with past medical history significant for coronary artery disease status post CABG x4 in 2013 at [facility], hypertension, dyslipidemia, diabetes, CKD and history of cardiomyopathy with EF of 40 to 45% per prior echo from 11/9/2020 who presents for initial CHF clinic evaluation following recent hospital admission for acute decompensated systolic heart failure and worsening cardiomyopathy. He originally presented to [facility] ER on 4/1/2022 with complaints of shortness of breath, orthopnea and bilateral lower extremity edema for approximately 1 week prior to presentation. proBNP was elevated at 6425. Initial troponin mildly elevated 0.072. Baseline CKD with creatinine on admission elevated 1.83.   Chest x-ray on 4/1/2022 showed small right pleural effusion. He was treated with 40 mg Lasix IV in the ER and was admitted for further evaluation. He was diuresed aggressively with IV Lasix and experienced approximately 10 L negative fluid balance during the course of his admission. Echocardiogram completed on 4/2/2022 revealed severely reduced LV systolic function with EF of 20%, mild mitral valve regurgitation. Given worsening cardiomyopathy with EF now 20% compared to prior EF of 40 to 45% per echo in 2020 as well as mild troponin elevation and acute heart failure, cardiac catheterization recommended to rule out underlying progression of CAD. He underwent cardiac catheterization on 4/4/2022 which revealed severe native three-vessel CAD with patent LIMA to LAD, SVG jump graft to RI and distal circumflex which was patent, SVG to PDA with mild to moderate disease and no focal stenosis for which medical therapy advised. His symptoms improved significantly during the course of his admission. He was also prescribed compression stockings to help with lower extremity edema. He was eventually discharged home in stable condition on 4/7/2022. Since discharge, patient states he is doing very well overall with significant improvement in bilateral lower extremity edema as well as shortness of breath complaints. He complains of shortness of breath with moderate exertion but this is definitely improved since hospital admission. His orthopnea has resolved and he is now able to lie flat in bed. He denies chest pain, palpitations, diaphoresis, paroxysmal nocturnal dyspnea, orthopnea, dizziness, lightheadedness, syncope, fever or chills. He is compliant with all of his medications. I reviewed all of patient's medications with him and it appears that he was recently prescribed carvedilol but was previously taking metoprolol and is currently taking both at home. He has been advised to stop taking metoprolol at this time.   Also it appears patient was started on Entresto but was previously taking losartan and again has continued taking both medications. He has been advised to stop losartan at this time as well. He was educated at length regarding low-sodium diet under 2000 mg a day as well as fluid restriction of approximately 1500 mL daily. He is scheduled to start cardiac rehab on 5/19/2022. Most recent labs reviewed and documented below. BMP on 4/7/2022: Sodium 136, potassium 4.1, chloride 99, total CO2 24, BUN elevated 65, creatinine elevated 1.70, GFR low at 39.5, glucose 96. --On review of prior labs, it appears the patient's baseline creatinine is around 1.6 dating back to at least 2020  CBC on 4/7/2022: WBC 5.6, hemoglobin 11.7, hematocrit 36.9, platelets 901  proBNP on 4/1/2022: 6425. Hemoglobin A1c on 11/19/2021: Elevated at 7.7      Most recent diagnostic testing reviewed and documented below. EKG 4/1/22: SR 74, 1st degree AVB, LBBB, T wave inversion lead I and aVL, poor R wave progression V2-V6, QTc 503ms    Echocardiogram 4/2/22:   Conclusions   Summary   Left ventricular ejection fraction is visually estimated at 20%. global   hypokinesis. Left ventricular size is moderately increased . Pseudonormal filling pattern noted. Normal right ventricular chamber size and function. The left atrium is Mildly dilated. Mild (1+) mitral regurgitation is present. No evidence of aortic valve regurgitation . No evidence of aortic valve stenosis. Signature   ----------------------------------------------------------------   Electronically signed by Laly Canela DO(Interpreting   physician) on 04/02/2022 04:21 PM   ----------------------------------------------------------------    C 4/4/22:  Procedure(s):  LHC, b/l coronary angio, grafts  Pre-operative Diagnosis:  New CMP  H&P Status: Completed and reviewed.       Post-operative Diagnosis:    LV LVEDP=23mmhg  LM normal   % mid  % prox  RCA severe disease, 100% distal     LIMA to LAD patent  SVG jump graft to R. I and distal CX patent  SVG to PDA mild to mod disease, no focal stenosis, patent      Findings:  See full report  Complications:  none     Primary Proceduralist:   Dr.Wes Sorto DO     Plan  Max med rx  rfm  Recheck LVF in 3 months. ? AICD in future. Vital signs stable in office today. Blood pressure 110/62, heart rate 55, pulse ox 97% on room air. Weight is 293 pounds. --Patient reports his weight in the hospital was around 321 pounds        PMHx:  Worsening cardiomyopathy EF 20% per echo 4/2/22  Hx CAD s/p CABG--9 years ago at [facility]  HTN  Dyslipidemia  CKD  Spinal stenosis  DM--on insulin      PSHx:  Hx CABG x4--9 years ago at [facility]  Left TKA  Right forearm sx  Tonsillectomy  Lumbar spine surgery x 4--most recently 9/2021      Social Hx:  Non-smoker  No alcohol  No drugs    Family Hx:  Father with MI in his 62s  Mother with cancer  Brother and sister passing from cancer    Allergies   Allergen Reactions    Dye [Iodides]        Current Outpatient Medications   Medication Sig Dispense Refill    hydrALAZINE (APRESOLINE) 25 MG tablet TAKE 1 TABLET BY MOUTH IN THE MORNING AND AT BEDTIME      carvedilol (COREG) 6.25 MG tablet Take 1 tablet by mouth 2 times daily (with meals) 180 tablet 2    sacubitril-valsartan (ENTRESTO) 24-26 MG per tablet Take 1 tablet by mouth 2 times daily 180 tablet 3    gabapentin (NEURONTIN) 300 MG capsule TAKE 1 CAPSULE BY MOUTH DAILY 15 MINUTES BEFORE DINNER (Patient taking differently: in the morning and at bedtime.) 90 capsule 3    furosemide (LASIX) 40 MG tablet Take 1 tablet by mouth 2 times daily 180 tablet 3    atorvastatin (LIPITOR) 40 MG tablet Take 1 tablet by mouth nightly 90 tablet 3    glimepiride (AMARYL) 4 MG tablet Take 1 tablet by mouth 2 times daily 180 tablet 3    insulin glargine (LANTUS SOLOSTAR) 100 UNIT/ML injection pen Inject 30 Units into the skin 2 times daily 10 Adjustable Dose Pre-filled Pen Syringe 3 tamsulosin (FLOMAX) 0.4 MG capsule Take 1 capsule by mouth daily 90 capsule 3    famotidine (PEPCID) 20 MG tablet Take 1 tablet by mouth daily 180 tablet 3    nitroGLYCERIN (NITROSTAT) 0.4 MG SL tablet Place 1 tablet under the tongue See Admin Instructions 25 tablet 1    Handicap Placard MISC by Does not apply route Exp 5 years 1 each 0    aspirin 81 MG tablet Take 81 mg by mouth daily. No current facility-administered medications for this visit. Past Medical History:   Diagnosis Date    CAD (coronary artery disease)     Dr. Libra Craig, Dr. Raynaldo Schwab    CKD (chronic kidney disease)     Coronary artery disease involving coronary bypass graft of native heart without angina pectoris 11/4/2022    Hypertension     Kidney stones     Left foot drop     Neuropathy, diabetic (HCC)     mild    Osteoarthritis     hip, knee    S/P CABG (coronary artery bypass graft)     Type II or unspecified type diabetes mellitus without mention of complication, not stated as uncontrolled        Past Surgical History:   Procedure Laterality Date    BACK SURGERY N/A     BICEPS TENDON REPAIR  01/01/1997    CARDIAC CATHETERIZATION  06/05/2013    CORONARY ARTERY BYPASS GRAFT  06/07/2013      [address] [facility] [facility] SURGERY  01/01/2006    left replacement    NECK SURGERY  2020    ROTATOR CUFF REPAIR  01/01/1996    TONSILLECTOMY  01/01/1966       Social History     Socioeconomic History    Marital status:      Spouse name: None    Number of children: None    Years of education: None    Highest education level: None   Tobacco Use    Smoking status: Never    Smokeless tobacco: Never   Substance and Sexual Activity    Alcohol use: No     Social Determinants of Health     Financial Resource Strain: Low Risk     Difficulty of Paying Living Expenses: Not hard at all   Food Insecurity: No Food Insecurity    Worried About Running Out of Food in the Last Year: Never true    Ran Out of Food in the Last Year: Never true   Transportation Needs: No Transportation Needs    Lack of Transportation (Medical): No    Lack of Transportation (Non-Medical): No   Physical Activity: Insufficiently Active    Days of Exercise per Week: 3 days    Minutes of Exercise per Session: 10 min       Family History   Problem Relation Age of Onset    Cancer Mother         intestinal, skin    Heart Disease Father     Stroke Father          Review of Systems:   Review of Systems   Constitutional:  Negative for chills, fatigue, fever and unexpected weight change. HENT:  Negative for congestion. Respiratory:  Negative for cough, chest tightness and shortness of breath. Cardiovascular:  Negative for chest pain, palpitations and leg swelling. No orthopnea or PND   Gastrointestinal:  Negative for abdominal distention, abdominal pain, blood in stool, nausea and vomiting. Genitourinary:  Negative for difficulty urinating and hematuria. Musculoskeletal:  Positive for back pain (low back--chronic) and neck pain (chronic). Negative for arthralgias and myalgias. Skin:  Negative for color change, pallor and rash. Neurological:  Negative for dizziness, syncope and light-headedness. Psychiatric/Behavioral:  Negative for agitation. Physical Examination:    /68 (Site: Right Upper Arm, Position: Sitting, Cuff Size: Medium Adult)   Pulse 58   Resp 16   Wt (!) 306 lb (138.8 kg)   SpO2 100%   BMI 37.25 kg/m²    Physical Exam  Constitutional:       General: He is not in acute distress. Appearance: He is obese. HENT:      Head: Normocephalic and atraumatic. Cardiovascular:      Rate and Rhythm: Regular rhythm. Bradycardia present. Pulmonary:      Effort: Pulmonary effort is normal. No respiratory distress. Breath sounds: No wheezing, rhonchi or rales. Comments: Decreased breath sounds bilateral bases  Abdominal:      Palpations: Abdomen is soft. Tenderness: There is no abdominal tenderness.    Musculoskeletal:         General: Normal range of motion. Cervical back: Normal range of motion and neck supple. Right lower leg: Edema (1+) present. Left lower leg: Edema (trace) present. Comments: LE edema unchanged from prior   Skin:     General: Skin is warm and dry. Neurological:      General: No focal deficit present. Mental Status: He is alert and oriented to person, place, and time. Cranial Nerves: No cranial nerve deficit.    Psychiatric:         Mood and Affect: Mood normal.         Behavior: Behavior normal.       LABS:  CBC:   Lab Results   Component Value Date/Time    WBC 7.7 01/11/2023 01:08 PM    RBC 4.14 01/11/2023 01:08 PM    HGB 12.5 01/11/2023 01:08 PM    HCT 37.6 01/11/2023 01:08 PM    MCV 90.7 01/11/2023 01:08 PM    MCH 30.1 01/11/2023 01:08 PM    MCHC 33.2 01/11/2023 01:08 PM    RDW 15.9 01/11/2023 01:08 PM    PLT 85 01/11/2023 01:08 PM    MPV 10.2 07/11/2015 10:08 AM     Lipids:  Lab Results   Component Value Date    CHOL 138 01/11/2023    CHOL 148 07/07/2021    CHOL 166 02/12/2020     Lab Results   Component Value Date    TRIG 141 01/11/2023    TRIG 213 (H) 07/07/2021    TRIG 225 (H) 02/12/2020     Lab Results   Component Value Date    HDL 46 01/11/2023    HDL 39 (L) 07/07/2021    HDL 44 02/12/2020     Lab Results   Component Value Date    LDLCALC 64 01/11/2023    LDLCALC 66 07/07/2021    LDLCALC 77 02/12/2020     No results found for: LABVLDL, VLDL  No results found for: CHOLHDLRATIO  CMP:    Lab Results   Component Value Date/Time     01/11/2023 01:08 PM    K 5.4 01/11/2023 01:08 PM     01/11/2023 01:08 PM    CO2 18 01/11/2023 01:08 PM    BUN 68 01/11/2023 01:08 PM    CREATININE 2.09 01/11/2023 01:08 PM    GFRAA 57.4 08/09/2022 12:34 PM    LABGLOM 32.4 01/11/2023 01:08 PM    GLUCOSE 180 01/11/2023 01:08 PM    GLUCOSE 154 09/21/2021 05:00 AM    PROT 6.8 01/11/2023 01:08 PM    LABALBU 3.9 01/11/2023 01:08 PM    CALCIUM 9.3 01/11/2023 01:08 PM    BILITOT 0.3 01/11/2023 01:08 PM    ALKPHOS 143 01/11/2023 01:08 PM    AST 25 01/11/2023 01:08 PM    ALT 30 01/11/2023 01:08 PM     BMP:    Lab Results   Component Value Date/Time     01/11/2023 01:08 PM    K 5.4 01/11/2023 01:08 PM     01/11/2023 01:08 PM    CO2 18 01/11/2023 01:08 PM    BUN 68 01/11/2023 01:08 PM    LABALBU 3.9 01/11/2023 01:08 PM    CREATININE 2.09 01/11/2023 01:08 PM    CALCIUM 9.3 01/11/2023 01:08 PM    GFRAA 57.4 08/09/2022 12:34 PM    LABGLOM 32.4 01/11/2023 01:08 PM    GLUCOSE 180 01/11/2023 01:08 PM    GLUCOSE 154 09/21/2021 05:00 AM     Magnesium:    Lab Results   Component Value Date/Time    MG 1.9 04/01/2022 11:55 AM     TSH:  Lab Results   Component Value Date    TSH 1.190 05/07/2019     . result  No results for input(s): PROBNP in the last 72 hours. No results for input(s): INR in the last 72 hours. Patient Active Problem List   Diagnosis    Hypertension    Diabetes mellitus ([facility] [address]    Kidney stone    S/P CABG x 4    Atrial fibrillation, unspecified type ([facility])    JAZZMINE (obstructive sleep apnea)    Morbid obesity ([facility])    Iron deficiency anemia    BPH (benign prostatic hyperplasia)    Hyperlipidemia    Stage 3 chronic kidney disease    Type 2 diabetes mellitus with stage 3 chronic kidney disease, with long-term current use of insulin ([facility])    Left foot drop    PVD (peripheral vascular disease) ([facility])    Leg swelling    Coronary artery disease involving coronary bypass graft of native heart without angina pectoris    Chronic systolic CHF (congestive heart failure), NYHA class 1 ([facility])       Assessment/Plan:     Diagnosis Orders   1. Chronic systolic CHF (congestive heart failure), NYHA class 1 ([facility])  Basic Metabolic Panel    Brain Natriuretic Peptide    Stable. Continue current meds      2. Ischemic cardiomyopathy      EF 30% per limited echo 7/14/22      3. AICD (automatic cardioverter/defibrillator) present      BiV ICD implant with Dr. Esvin Richardson on 8/17/22. Maintain routine f/u with EP      4.  Hx of CABG      s/p CABG 9 years ago. [facility] 4/4/22: Patent LIMA to LAD, patent SVG jump graft to RI and distal circumflex, patent SVG to PDA; severe 3V native CAD. No angina          5. Pulmonary HTN ([facility] [address]      Mod PHTN with RVSP 57mmHg per echo 7/14/22      6. Hypertension, unspecified type      BP well controlled. Continue current meds      7. Dyslipidemia      Continue Lipitor 40mg PO daily. Cholesterol well controlled per lipid panel 1/11/23      8. Diabetes mellitus type 2 in obese (HCC)      Weight loss recommended. Recent HgbA1c improved to 6.6. Med management per PCP      9. Stage 3 chronic kidney disease, unspecified whether stage 3a or 3b CKD ([facility] [address]      Worsening renal function per BMP on 1/11/23 with creatinine 2.09. Repeat BMP today to re-evaluate. Consider referral to nephro in future if pt agreeable      10. Hyperkalemia  Basic Metabolic Panel    potassium elevated at 5.4 per BMP 1/11/23. Repeat BMP today to re-evaluate          -Maximize medical therapy--aspirin 81 mg p.o. daily, Coreg 6.25 mg p.o. twice daily, Entresto 24/26 mg p.o. twice daily, Lasix 40 mg p.o. twice daily, Lipitor 40 mg p.o. daily, sublingual nitroglycerin as needed for chest pain, hydralazine 25 mg p.o. twice daily   -Consider addition of SGLT2 inhibitor (Jardiance or Darrin Slate) in future to further optimize heart failure medications  -Heart failure appears compensated at this time despite 16 pound weight gain since last office visit on 8/3/2022. Has no shortness of breath complaints, orthopnea, PND or worsening lower extremity edema. -CMP from 1/11/2023 shows worsening renal function with creatinine of 2.09, BUN elevated at 68 and GFR low at 32.4 compared to 1.45, 43 and 47.5 respectively on 8/9/2022. Potassium was elevated on 1/11/2023 at 5.4. Repeat BMP today to reevaluate renal function and electrolytes. -Check proBNP to reevaluate fluid status as patient with approximately 16 pound weight gain since last office visit on 8/3/2022.   No overt symptoms of heart failure decompensation  -Cardiac/<2 gram sodium diet recommended  -Recommend 1500mL fluid restriction   -Weight loss recommended  -Instructed patient to weigh self daily every morning upon waking and keep log book of daily weights. Notify office if gaining more than 3 pounds in 48 hours. -Recommend routine blood pressure monitoring at home. Advised patient check blood pressure twice daily in a.m. and p.m. prior to taking medications and record log of blood pressure trends to review at next office visit.   Advised to record heart rate with each blood pressure reading  -Advised patient to notify office immediately if experiencing any progressive SOB, orthopnea, PND, LE edema or weight gain  -Educated patient on importance of fluid and salt restriction as well as lifestyle modification  -Scheduled to start cardiac rehab on 5/19/2022  -Maintain routine outpatient follow-up with general cardiologist, Dr. Curt Faria appt 5/5/23  Limited echocardiogram on 7/14/2022: EF 30%, mild TR, moderate MR, moderate pulmonary hypertension with RVSP of 57 mmHg  Echocardiogram 4/2/2022: Severely reduced LV systolic function with EF of 20%, global hypokinesis, mild 1+ mitral regurgitation  LHC 4/4/2022: Severe native three-vessel CAD, patent LIMA to LAD, SVG jump graft to RI and distal circumflex which was patent, SVG to PDA with mild to moderate disease and no focal stenosis for which medical therapy advised  -Maintain follow-up with electrophysiology, Dr. Tiffanie Landon for routine ICD checks  Status post BiV ICD on 8/17/2022  -Consider referral to nephrology in future for evaluation and management of CKD if further decline in renal function in future--patient is declining referral at this time  -Consider referral to pulmonology in future for evaluation of moderate pulmonary hypertension as well as exertional hypoxia with SPO2 dropping to 89% following walk test with cardiac rehab on 8/3/2022--patient still declining referral at this time  May need to consider sleep study in future  May need to consider PFT in future  -Maintain routine outpatient follow-up with PCP, Dr. Teri Simon  -F/U with CHF clinic in 6 months or sooner if needed        Counseling: The importance of daily weights, dietary sodium restriction, and contact with cardiology if weight is increased more than 3 lbs in any 48 hour period was stressed. The patient has been advised to contact us if theyexperience progressive SOB, orthopnea, PND or progressive edema.

## 2023-02-01 NOTE — TELEPHONE ENCOUNTER
----- Message from Tomas Wright, [address] sent at 2/1/2023  2:23 PM EST -----  Call patient and advise to [facility] for now due to worsening in renal function and elevated potassium. Ask if he is using any salt substitutes and if so, advise to stop. Repeat BMP in 1 week to re-evaluate.     Please refer patient to nephrology, Dr. Shae Beebe

## 2023-02-02 ENCOUNTER — HOSPITAL ENCOUNTER (OUTPATIENT)
Dept: PHYSICAL THERAPY | Age: 78
Setting detail: THERAPIES SERIES
Discharge: HOME | End: 2023-02-02
Payer: MEDICARE

## 2023-02-02 PROCEDURE — 97110 THERAPEUTIC EXERCISES: CPT

## 2023-02-02 ASSESSMENT — PAIN DESCRIPTION - LOCATION: LOCATION: NECK;BACK

## 2023-02-02 ASSESSMENT — PAIN DESCRIPTION - PAIN TYPE: TYPE: CHRONIC PAIN

## 2023-02-02 ASSESSMENT — PAIN DESCRIPTION - DESCRIPTORS: DESCRIPTORS: DULL

## 2023-02-02 ASSESSMENT — PAIN SCALES - GENERAL: PAINLEVEL_OUTOF10: 3

## 2023-02-02 NOTE — PROGRESS NOTES
[address] [facility]  Outpatient Physical Therapy    Treatment Note        Date: 2023  Patient: Frankey Daring  : 1945   Confirmed: Yes  MRN: [id number]  Referring Provider: Renee Giang MD    Medical Diagnosis: Other intervertebral disc degeneration, lumbosacral region [M51.37]       Treatment Diagnosis: back pain, neck pain, impaired gait and activity tolerance, back and neck muscle weakness    Visit Information:  Insurance: Payor: MEDICARE / Plan: MEDICARE PART A AND B / Product Type: *No Product type* /   PT Visit Information  Onset Date:  (> 1 year duration)  PT Insurance Information: Medicare  Total # of Visits Approved: 80 (BMN)  Total # of Visits to Date: 6  Plan of Care/Certification Expiration Date: 23  No Show: 0  Progress Note Due Date: 23  Canceled Appointment: 0  Progress Note Counter: - (30 day PN by 22)    Subjective Information:  Subjective: Patient reports he was fatigue after last visit but feeling better today. HEP Compliance:  [x] Good [] Fair [] Poor [] Reports not doing due to:    Pain Screening  Patient Currently in Pain: Yes  Pain Assessment: 0-10  Pain Level: 3  Pain Type: Chronic pain  Pain Location: Neck, Back  Pain Descriptors: Dull    Treatment:  Exercises:  Exercises  Exercise 1: **close SBA for balance with exercises  Exercise 2: supine cervical retractions x 10  Exercise 3: hooking leyla shoulder flexion with wand x 15  Exercise 4: hooklying bi shoulder horz ABD (T position): x12,  Exercise 5: Standing rows/lat w/ green TB x 10 - SBA/CGA, 1 partial lose of balance backwards, able to self correct  Exercise 6: Seated thoracic extension w/ ball(pink) at t-spine x 15  Exercise 7: Seated Shoulder flexion x 10 -focus on improving posture  Exercise 8: Scapular retractions x 15 -5s  Exercise 9: Sit-Stand x10  with focus on upright posture and standing balance. Exercise 10: Standing Hip ABD, Ext x 10 ea.  VCs for correction of posture. Modalities:  Moist Heat (CPT 08006)  Patient Position: Seated  Number Minutes Moist Heat: 10  Moist heat location: Cervical, Low back  Post treatment skin assessment: Intact  Limitations addressed: Pain modulation       *Indicates exercise, modality, or manual techniques to be initiated when appropriate    Objective Measures:      N/a    Assessment: Body Structures, Functions, Activity Limitations Requiring Skilled Therapeutic Intervention: Increased pain, Decreased ROM, Decreased strength, Decreased balance, Decreased functional mobility , Decreased ADL status, Decreased high-level IADLs  Assessment: Continued this date with progressing patient through seated and standing exercises to improve postural alignment of thoracic and cervical spines. Patient displayed a slight LOB x1 posteriorly when completing tband shoulder rows and lat pulls. CGA assist needed to correct. Increased pain in lower back noted when transfering from supine to seated position this date. Able to tolerate exercises to improve thoracic extension in supine with very mild pain in lower back. Treatment Diagnosis: back pain, neck pain, impaired gait and activity tolerance, back and neck muscle weakness  Therapy Prognosis: Fair       Post-Pain Assessment:       Pain Rating (0-10 pain scale):   3/10 neck, 5/10 in LB  Location and pain description same as pre-treatment unless indicated. Action: [] NA   [x] Perform HEP  [] Meds as prescribed  [] Modalities as prescribed   [] Call Physician     GOALS   Patient Goal(s): Patient Goals : \"Lose cane, rollator, and walker\"    Long Term Goals Completed by 6-8 weeks Goal Status   LTG 1 Pt will be able to complete > 15 minutes of continuous standing or ambulatory activity with LRAD to improve completion of ADLS/IADLs and community outings.  In progress   LTG 2 Pt will improve neck and back muscle strength to 4+/5 or better to improve posture for carryover to decreased pain with unsupported sitting or standing activity. In progress   LTG 3 Pt will improve standing balance to step forward, laterally, and retro to manage vacuum  in assisting spouse with home care tasks. In progress   LTG 4 [place] with HEP to self manage exercises for progressive strengthening In progress   LTG 5 ASA improvement > 6 points to demo improved functional tolerance with decreased back pain limitations. In progress   Plan:  Frequency/Duration:  Plan  Plan Frequency: 2  Plan weeks: 6-8  Current Treatment Recommendations: ROM, Strengthening, Balance training, Functional mobility training, Endurance training, Gait training, Stair training, Neuromuscular re-education, Manual, Pain management, Home exercise program, Safety education & training, Patient/Caregiver education & training, Equipment evaluation, education, & procurement, Modalities  Modalities: Heat/Cold  Pt to continue current HEP. See objective section for any therapeutic exercise changes, additions or modifications this date.     Therapy Time:      PT Individual Minutes  Time In: 1110  Time Out: 1201  Minutes: 51  Timed Code Treatment Minutes: 41 Minutes  Procedure Minutes: 10 min MHP  Timed Activity Minutes Units   Ther Ex 41 3   Electronically signed by Deidra Gagnon PTA on 2/2/23 at 12:05 PM EST

## 2023-02-03 ENCOUNTER — CARE COORDINATION (OUTPATIENT)
Dept: CARE COORDINATION | Age: 78
End: 2023-02-03

## 2023-02-03 NOTE — CARE COORDINATION
I was able to fax in his application for Basag[employer] to [facility].       [address] [facility]   Medication Assistance  [address] [facility], and [place]    G) [phone] (W) [phone]

## 2023-02-07 ENCOUNTER — HOSPITAL ENCOUNTER (OUTPATIENT)
Dept: PHYSICAL THERAPY | Age: 78
Setting detail: THERAPIES SERIES
Discharge: HOME | End: 2023-02-07
Payer: MEDICARE

## 2023-02-07 PROCEDURE — 97110 THERAPEUTIC EXERCISES: CPT

## 2023-02-07 ASSESSMENT — PAIN DESCRIPTION - LOCATION: LOCATION: BACK

## 2023-02-07 ASSESSMENT — PAIN DESCRIPTION - PAIN TYPE: TYPE: CHRONIC PAIN

## 2023-02-07 ASSESSMENT — PAIN SCALES - GENERAL: PAINLEVEL_OUTOF10: 9

## 2023-02-07 NOTE — PROGRESS NOTES
[address] [facility]  Outpatient Physical Therapy    Treatment Note        Date: 2023  Patient: Mesha Rubio  : 1945   Confirmed: Yes  MRN: [id number]  Referring Provider: Osmel Perales MD    Medical Diagnosis: Other intervertebral disc degeneration, lumbosacral region [M51.37]       Treatment Diagnosis: back pain, neck pain, impaired gait and activity tolerance, back and neck muscle weakness    Visit Information:  Insurance: Payor: MEDICARE / Plan: MEDICARE PART A AND B / Product Type: *No Product type* /   PT Visit Information  Onset Date:  (> 1 year duration)  PT Insurance Information: Medicare  Total # of Visits Approved: 99 (BMN)  Total # of Visits to Date: 7  Plan of Care/Certification Expiration Date: 23  No Show: 0  Progress Note Due Date: 23  Canceled Appointment: 0  Progress Note Counter: - (30 day PN by 22)    Subjective Information:  Subjective: Pt reports increased back pain today of 9/10 with pain increased since yesterday as pt reports having a pulled muscle while twisiting yesterday.   HEP Compliance:  [x] Good [] Fair [] Poor [] Reports not doing due to:    Pain Screening  Patient Currently in Pain: Yes  Pain Assessment: 0-10  Pain Level: 9  Pain Type: Chronic pain  Pain Location: Back    Treatment:  Exercises:  Exercises  Exercise 1: **close SBA for balance with exercises  Exercise 2: supine cervical retractions x 10  Exercise 3: hooking leyla shoulder flexion with wand x 15  Exercise 4: hooklying bi shoulder horz ABD (T position): x12,  Exercise 5: Standing rows/lat w/ green TB x 10 - SBA/CGA, (performed in seated today due to elevated back pain)  Exercise 6: Seated thoracic extension w/ ball(pink) at t-spine x 15  Exercise 8: Scapular retractions x 15 -5s  Exercise 11: Seated P-ball roll outs 5\" x 10       Manual:           Modalities:  Moist Heat (CPT 89565)  Patient Position: Seated  Number Minutes Moist Heat: 10  Moist heat location: Cervical, Low back  Post treatment skin assessment: Intact  Limitations addressed: Pain modulation       *Indicates exercise, modality, or manual techniques to be initiated when appropriate    Objective Measures:               Assessment: Body Structures, Functions, Activity Limitations Requiring Skilled Therapeutic Intervention: Increased pain, Decreased ROM, Decreased strength, Decreased balance, Decreased functional mobility , Decreased ADL status, Decreased high-level IADLs  Assessment: Modified tx to accomidate pt's increased back pain today. Progressed exercises to include seated flexion stretching with P-ball with increased pain with left side bend vs no pain with right side bend. Treatment Diagnosis: back pain, neck pain, impaired gait and activity tolerance, back and neck muscle weakness  Therapy Prognosis: Fair       Patient Education: [x] NA       Post-Pain Assessment:       Pain Rating (0-10 pain scale):   5/10   Location and pain description same as pre-treatment unless indicated. Action: [] NA   [x] Perform HEP  [] Meds as prescribed  [] Modalities as prescribed   [] Call Physician     GOALS   Patient Goal(s): Patient Goals : \"Lose cane, rollator, and walker\"      Long Term Goals Completed by 6-8 weeks Goal Status   LTG 1 Pt will be able to complete > 15 minutes of continuous standing or ambulatory activity with LRAD to improve completion of ADLS/IADLs and community outings. In progress   LTG 2 Pt will improve neck and back muscle strength to 4+/5 or better to improve posture for carryover to decreased pain with unsupported sitting or standing activity. In progress   LTG 3 Pt will improve standing balance to step forward, laterally, and retro to manage vacuum  in assisting spouse with home care tasks.  In progress   LTG 4 [place] with HEP to self manage exercises for progressive strengthening In progress   LTG 5 ASA improvement > 6 points to demo improved functional tolerance with decreased back pain limitations. In progress       Plan:  Frequency/Duration:  Plan  Plan Frequency: 2  Plan weeks: 6-8  Current Treatment Recommendations: ROM, Strengthening, Balance training, Functional mobility training, Endurance training, Gait training, Stair training, Neuromuscular re-education, Manual, Pain management, Home exercise program, Safety education & training, Patient/Caregiver education & training, Equipment evaluation, education, & procurement, Modalities  Modalities: Heat/Cold  Pt to continue current HEP. See objective section for any therapeutic exercise changes, additions or modifications this date.     Therapy Time:      PT Individual Minutes  Time In: 1100  Time Out: 6198  Minutes: 51  Timed Code Treatment Minutes: 41 Minutes  Procedure Minutes:10 MHP   Timed Activity Minutes Units   Ther Ex 41 3     Electronically signed by Talib Farrell PTA on 2/7/23 at 12:12 PM EST

## 2023-02-09 ENCOUNTER — HOSPITAL ENCOUNTER (OUTPATIENT)
Dept: PHYSICAL THERAPY | Age: 78
Setting detail: THERAPIES SERIES
Discharge: HOME | End: 2023-02-09
Payer: MEDICARE

## 2023-02-09 PROCEDURE — 97110 THERAPEUTIC EXERCISES: CPT

## 2023-02-09 ASSESSMENT — PAIN SCALES - GENERAL: PAINLEVEL_OUTOF10: 3

## 2023-02-09 ASSESSMENT — PAIN DESCRIPTION - PAIN TYPE: TYPE: CHRONIC PAIN

## 2023-02-09 ASSESSMENT — PAIN DESCRIPTION - LOCATION
LOCATION: BACK
LOCATION_2: NECK

## 2023-02-09 ASSESSMENT — PAIN DESCRIPTION - INTENSITY: RATING_2: 4

## 2023-02-09 ASSESSMENT — PAIN DESCRIPTION - DESCRIPTORS
DESCRIPTORS: DULL
DESCRIPTORS_2: DULL

## 2023-02-09 NOTE — PROGRESS NOTES
[address] [facility]  Outpatient Physical Therapy    Treatment Note        Date: 2/10/2023  Patient: Megha Way  : 1945   Confirmed: Yes  MRN: [id number]  Referring Provider: Olga Lidia Langston MD    Medical Diagnosis: Other intervertebral disc degeneration, lumbosacral region [M51.37]       Treatment Diagnosis: back pain, neck pain, impaired gait and activity tolerance, back and neck muscle weakness    Visit Information:  Insurance: Payor: MEDICARE / Plan: MEDICARE PART A AND B / Product Type: *No Product type* /   PT Visit Information  Onset Date:  (> 1 year duration)  PT Insurance Information: Medicare  Total # of Visits Approved: 80 (BMN)  Total # of Visits to Date: 8  Plan of Care/Certification Expiration Date: 23  No Show: 0  Progress Note Due Date: 23  Canceled Appointment: 0  Progress Note Counter: - (30 day PN by 22)    Subjective Information:  Subjective: Pt arrived to therapy today reporting improved pain levels compared to previous visit. HEP Compliance:  [x] Good [] Fair [] Poor [] Reports not doing due to:    Pain Screening  Patient Currently in Pain: Yes  Pain Assessment: 0-10  Pain Level: 3  Pain Type: Chronic pain  Pain Location: Back  Pain Descriptors: Dull  Pain 2  Pain Rating 2: 4  Pain Location 2: Neck  Pain Descriptors 2: Dull    Treatment:  Exercises:  Exercises  Exercise 1: **close SBA for balance with exercises  Exercise 2: seated cervical retractions x 10 (improved tolerance compared to supine)  Exercise 5: Standing rows/lat w/ green TB x 10 - SBA/CGA,  Exercise 6: Seated thoracic extension w/ ball(pink) at t-spine 2 position 5\"x 10, starting low and progressed to top of chair back ( foam half roll used today)  Exercise 7: Seated Shoulder flexion x 10 -focus on improving posture  Exercise 9: Sit-Stand x10  with focus on upright posture and standing balance.   Exercise 11: Seated P-ball roll outs 5\" x 10         Objective Measures: LTG 1 Current Status[de-identified] 2/9/23: Pt reports standing tolerance of 2-3 min and walking tolerance of 5 min before needing to sit and rest.          Assessment: Body Structures, Functions, Activity Limitations Requiring Skilled Therapeutic Intervention: Increased pain, Decreased ROM, Decreased strength, Decreased balance, Decreased functional mobility , Decreased ADL status, Decreased high-level IADLs  Assessment: Pt able to return to standing exercises and return ot previous levels of exercises today with decreased pain levels returning to normal base lines today. Pt continues to requrie SBA/CGA while performing standing t-band rows and lat pulls due to instabaility and balance deficits while in standing position without UE support. Treatment Diagnosis: back pain, neck pain, impaired gait and activity tolerance, back and neck muscle weakness  Therapy Prognosis: Fair       Patient Education: [x] NA       Post-Pain Assessment:       Pain Rating (0-10 pain scale):   2/10   Location and pain description same as pre-treatment unless indicated. Action: [] NA   [x] Perform HEP  [] Meds as prescribed  [] Modalities as prescribed   [] Call Physician     GOALS   Patient Goal(s): Patient Goals : \"Lose cane, rollator, and walker\"      Long Term Goals Completed by 6-8 weeks Goal Status   LTG 1 Pt will be able to complete > 15 minutes of continuous standing or ambulatory activity with LRAD to improve completion of ADLS/IADLs and community outings. In progress   LTG 2 Pt will improve neck and back muscle strength to 4+/5 or better to improve posture for carryover to decreased pain with unsupported sitting or standing activity. In progress   LTG 3 Pt will improve standing balance to step forward, laterally, and retro to manage vacuum  in assisting spouse with home care tasks.  In progress   LTG 4 [place] with HEP to self manage exercises for progressive strengthening In progress   LTG 5 ASA improvement > 6 points to demo improved functional tolerance with decreased back pain limitations. In progress     Plan:  Frequency/Duration:  Plan  Plan Frequency: 2  Plan weeks: 6-8  Current Treatment Recommendations: ROM, Strengthening, Balance training, Functional mobility training, Endurance training, Gait training, Stair training, Neuromuscular re-education, Manual, Pain management, Home exercise program, Safety education & training, Patient/Caregiver education & training, Equipment evaluation, education, & procurement, Modalities  Modalities: Heat/Cold  Pt to continue current HEP. See objective section for any therapeutic exercise changes, additions or modifications this date.     Therapy Time:      PT Individual Minutes  Time In: 8923  Time Out: 1775  Minutes: 49  Timed Code Treatment Minutes: 39 Minutes  Procedure Minutes:10 MHP   Timed Activity Minutes Units   Ther Ex 39 3     Electronically signed by Lynn Slaughter PTA on 2/9/23 at 11:03 AM EST

## 2023-02-13 ENCOUNTER — HOSPITAL ENCOUNTER (OUTPATIENT)
Dept: PHYSICAL THERAPY | Age: 78
Setting detail: THERAPIES SERIES
Discharge: HOME | End: 2023-02-13
Payer: MEDICARE

## 2023-02-13 PROCEDURE — 97110 THERAPEUTIC EXERCISES: CPT

## 2023-02-13 ASSESSMENT — PAIN DESCRIPTION - INTENSITY: RATING_2: 6

## 2023-02-13 ASSESSMENT — PAIN DESCRIPTION - PAIN TYPE: TYPE: CHRONIC PAIN

## 2023-02-13 ASSESSMENT — PAIN DESCRIPTION - ORIENTATION: ORIENTATION: LOWER;RIGHT

## 2023-02-13 ASSESSMENT — PAIN DESCRIPTION - LOCATION
LOCATION: BACK;HIP
LOCATION_2: NECK

## 2023-02-13 ASSESSMENT — PAIN DESCRIPTION - DESCRIPTORS: DESCRIPTORS_2: TIGHTNESS;SORE

## 2023-02-13 ASSESSMENT — PAIN SCALES - GENERAL: PAINLEVEL_OUTOF10: 6

## 2023-02-13 NOTE — PROGRESS NOTES
[address] [facility]  Outpatient Physical Therapy    Treatment Note        Date: 2023  Patient: Yaneth Richardson  : 1945   Confirmed: Yes  MRN: [id number]  Referring Provider: Soheila Crowder MD    Medical Diagnosis: Other intervertebral disc degeneration, lumbosacral region [M51.37]       Treatment Diagnosis: back pain, neck pain, impaired gait and activity tolerance, back and neck muscle weakness    Visit Information:  Insurance: Payor: MEDICARE / Plan: MEDICARE PART A AND B / Product Type: *No Product type* /   PT Visit Information  Onset Date:  (> 1 year duration)  PT Insurance Information: Medicare  Total # of Visits Approved: 99 (BMN)  Total # of Visits to Date: 9  Plan of Care/Certification Expiration Date: 23  No Show: 0  Progress Note Due Date: 23  Canceled Appointment: 0  Progress Note Counter: - (30 day PN by 22)    Subjective Information:  Subjective: Pt reports feeling tired following last tx session, deneis any increased pain levels. HEP Compliance:  [x] Good [] Fair [] Poor [] Reports not doing due to:    Pain Screening  Patient Currently in Pain: Yes  Pain Assessment: 0-10  Pain Level: 6  Pain Type: Chronic pain  Pain Location: Back, Hip  Pain Orientation: Lower, Right  Pain 2  Pain Rating 2: 6  Pain Location 2: Neck  Pain Descriptors 2: Tightness, Sore    Treatment:  Exercises:  Exercises  Exercise 5: Standing rows/lat w/ green TB x 12 - SBA/CGA,  Exercise 6: Seated thoracic extension w/ ball(pink) at t-spine 2 position 5\"x 10, starting low and progressed to top of chair back ( foam half roll used today)  Exercise 9: Sit-Stand x10  with focus on upright posture and standing balance. Exercise 10: Standing Hip ABD, Ext x 10 ea, HRs, Mini Squats x 10 ea. VCs for correction of posture. Exercise 11: Seated P-ball roll outs 5\" x 10  Exercise 12: NuStep Seat 15, L 3  UE/LE x 6 min. NV increase resistance.   Treatment Reasoning  Limitations addressed: Flexibility, Posture      Modalities:  Moist Heat (CPT 22041)  Patient Position: Seated  Number Minutes Moist Heat: 10  Moist heat location: Cervical, Low back  Post treatment skin assessment: Intact  Limitations addressed: Pain modulation       *Indicates exercise, modality, or manual techniques to be initiated when appropriate    Objective Measures:       Assessment: Body Structures, Functions, Activity Limitations Requiring Skilled Therapeutic Intervention: Increased pain, Decreased ROM, Decreased strength, Decreased balance, Decreased functional mobility , Decreased ADL status, Decreased high-level IADLs  Assessment: Continue to progress standing exercises with addition of standing heel raises and Mini Squats with fair tolerance. Pt reports increased fatigue with increased activities. LOB x 2 while performing standing T-band rows with pt able to self recover, continues to require close supervision/SBA for safety concerns when performing exercises without UE support. Treatment Diagnosis: back pain, neck pain, impaired gait and activity tolerance, back and neck muscle weakness  Therapy Prognosis: Fair       Patient Education: [x] NA       Post-Pain Assessment:       Pain Rating (0-10 pain scale):  \"better\" /10   Location and pain description same as pre-treatment unless indicated. Action: [] NA   [x] Perform HEP  [] Meds as prescribed  [] Modalities as prescribed   [] Call Physician     GOALS   Patient Goal(s): Patient Goals : \"Lose cane, rollator, and walker\"        Long Term Goals Completed by 6-8 weeks Goal Status   LTG 1 Pt will be able to complete > 15 minutes of continuous standing or ambulatory activity with LRAD to improve completion of ADLS/IADLs and community outings. In progress   LTG 2 Pt will improve neck and back muscle strength to 4+/5 or better to improve posture for carryover to decreased pain with unsupported sitting or standing activity.  In progress   LTG 3 Pt will improve standing balance to step forward, laterally, and retro to manage vacuum  in assisting spouse with home care tasks. In progress   LTG 4 [place] with HEP to self manage exercises for progressive strengthening In progress   LTG 5 ASA improvement > 6 points to demo improved functional tolerance with decreased back pain limitations. In progress     Plan:  Frequency/Duration:  Plan  Plan Frequency: 2  Plan weeks: 6-8  Specific Instructions for Next Treatment: PN due for [facility] @ [place]  Current Treatment Recommendations: ROM, Strengthening, Balance training, Functional mobility training, Endurance training, Gait training, Stair training, Neuromuscular re-education, Manual, Pain management, Home exercise program, Safety education & training, Patient/Caregiver education & training, Equipment evaluation, education, & procurement, Modalities  Modalities: Heat/Cold  Pt to continue current HEP. See objective section for any therapeutic exercise changes, additions or modifications this date.     Therapy Time:      PT Individual Minutes  Time In: 1101  Time Out: 3393  Minutes: 48  Timed Code Treatment Minutes: 38 Minutes  Procedure Minutes:10 MHP   Timed Activity Minutes Units   Ther Ex 38 3     Electronically signed by Trent Major PTA on 2/13/23 at 11:49 AM EST

## 2023-02-16 ENCOUNTER — HOSPITAL ENCOUNTER (OUTPATIENT)
Dept: PHYSICAL THERAPY | Age: 78
Setting detail: THERAPIES SERIES
Discharge: HOME | End: 2023-02-16
Payer: MEDICARE

## 2023-02-16 PROCEDURE — 97110 THERAPEUTIC EXERCISES: CPT

## 2023-02-16 ASSESSMENT — PAIN DESCRIPTION - LOCATION: LOCATION: BACK;NECK

## 2023-02-16 ASSESSMENT — PAIN DESCRIPTION - DESCRIPTORS: DESCRIPTORS: DULL;SORE

## 2023-02-16 ASSESSMENT — PAIN DESCRIPTION - PAIN TYPE: TYPE: CHRONIC PAIN

## 2023-02-16 ASSESSMENT — PAIN SCALES - GENERAL: PAINLEVEL_OUTOF10: 6

## 2023-02-16 NOTE — PROGRESS NOTES
[facility]  Outpatient Physical Therapy    Treatment Note        Date: 2023  Patient: Wendi Baeza  : 1945   Confirmed: Yes  MRN: [id number]  Referring Provider: Ki Estrada MD    Medical Diagnosis: Other intervertebral disc degeneration, lumbosacral region [M51.37]       Treatment Diagnosis: back pain, neck pain, impaired gait and activity tolerance, back and neck muscle weakness    Visit Information:  Insurance: Payor: MEDICARE / Plan: MEDICARE PART A AND B / Product Type: *No Product type* /   PT Visit Information  Onset Date:  (> 1 year duration)  PT Insurance Information: Medicare  Total # of Visits Approved: 80 (BMN)  Total # of Visits to Date: 10  Plan of Care/Certification Expiration Date: 23  No Show: 0  Progress Note Due Date: 23  Canceled Appointment: 0  Progress Note Counter: 10-16 (30 day PN by 22)    Subjective Information:  Subjective: The back is really sore today, I think it has to do with the weather, my arms and shoulders are tired today, I went shopping yesterday, it might be from that. Pt reports good complaince with HEP, performing stretches and band work daily. HEP Compliance:  [x] Good [] Fair [] Poor [] Reports not doing due to:    Pain Screening  Patient Currently in Pain: Yes  Pain Assessment: 0-10  Pain Level: 6  Pain Type: Chronic pain  Pain Location: Back, Neck  Pain Descriptors: Dull, Sore    Treatment:  Exercises:  Exercises  Exercise 5: Standing rows/lat w/ green TB x 15- SBA/CGA,  Exercise 8: Scapular retractions x 15 -5s  Exercise 9: Sit-Stand x10  with focus on upright posture and standing balance. Exercise 10: Standing Hip ABD, Ext x 10 ea, HRs, March 10 x ea  Exercise 11: Seated P-ball roll outs 5\" x 10  Exercise 12: NuStep Seat 15, L 5 UE/LE x 6 min.          Modalities:  Moist Heat (CPT 96830)  Patient Position: Seated  Number Minutes Moist Heat: 10  Moist heat location: Cervical, Low back  Post treatment skin assessment: Intact  Limitations addressed: Pain modulation       *Indicates exercise, modality, or manual techniques to be initiated when appropriate    Objective Measures:              LTG 1 Current Status[de-identified] 2/163: Pt reports standing tolerance of 2-3 min and walking tolerance of 5 min before needing to sit and rest.     LTG 3 Current Status[de-identified] 2/16/23: Unable to complete tasks without use of AD due to balance deficits. LTG 4 Current Status[de-identified] 2/16/23: Pt preports good compliance with HEP performing daily  LTG 5 Current Status[de-identified] 2/16/23:  ASA Score 28/50 (1 pt decrease from previous score)          Assessment: Body Structures, Functions, Activity Limitations Requiring Skilled Therapeutic Intervention: Increased pain, Decreased ROM, Decreased strength, Decreased balance, Decreased functional mobility , Decreased ADL status, Decreased high-level IADLs  Assessment: Pt with difficulty standing up form chair without arm rest due to back pain and LE weakness, Able to increase reps with exercises and resistance on Nustep to challenge pt's strength and endurance levels. Pt continues to displayed limitation in strength and balance and could benefit from further therapy to address current deficits in order to improve safety and ease of ADLs. Treatment Diagnosis: back pain, neck pain, impaired gait and activity tolerance, back and neck muscle weakness  Therapy Prognosis: Fair       Patient Education: [x] NA       Post-Pain Assessment:       Pain Rating (0-10 pain scale):   5/10   Location and pain description same as pre-treatment unless indicated.    Action: [] NA   [x] Perform HEP  [] Meds as prescribed  [] Modalities as prescribed   [] Call Physician     GOALS   Patient Goal(s): Patient Goals : \"Lose cane, rollator, and walker\"        Long Term Goals Completed by 6-8 weeks Goal Status   LTG 1 Pt will be able to complete > 15 minutes of continuous standing or ambulatory activity with LRAD to improve completion of ADLS/IADLs and community outings. In progress   LTG 2 Pt will improve neck and back muscle strength to 4+/5 or better to improve posture for carryover to decreased pain with unsupported sitting or standing activity. In progress   LTG 3 Pt will improve standing balance to step forward, laterally, and retro to manage vacuum  in assisting spouse with home care tasks. In progress, Partially met   LTG 4 [place] with HEP to self manage exercises for progressive strengthening In progress   LTG 5 ASA improvement > 6 points to demo improved functional tolerance with decreased back pain limitations. In progress     Plan:  Frequency/Duration:  Plan  Plan Frequency: 2  Plan weeks: 6-8  Current Treatment Recommendations: ROM, Strengthening, Balance training, Functional mobility training, Endurance training, Gait training, Stair training, Neuromuscular re-education, Manual, Pain management, Home exercise program, Safety education & training, Patient/Caregiver education & training, Equipment evaluation, education, & procurement, Modalities  Modalities: Heat/Cold  Additional Comments: Continue through current POC then reassess  Pt to continue current HEP. See objective section for any therapeutic exercise changes, additions or modifications this date.     Therapy Time:      PT Individual Minutes  Time In: 3955  Time Out: 2565  Minutes: 48  Timed Code Treatment Minutes: 38 Minutes  Procedure Minutes:10 MHP   Timed Activity Minutes Units   Ther Ex 38 3     Electronically signed by Sandor Montanez PTA on 2/16/23 at 12:07 PM EST

## 2023-02-20 ENCOUNTER — CARE COORDINATION (OUTPATIENT)
Dept: CARE COORDINATION | Age: 78
End: 2023-02-20

## 2023-02-20 NOTE — CARE COORDINATION
I called Randall Ferguson and Tera Ayala to find out his is now enrolled into the program for his [place] until 12/31/23.         [facility]   Medication Assistance  [address] [place] [address], and [place]    (z) [phone] (z) [phone]

## 2023-02-21 ENCOUNTER — HOSPITAL ENCOUNTER (OUTPATIENT)
Dept: PHYSICAL THERAPY | Age: 78
Setting detail: THERAPIES SERIES
Discharge: HOME | End: 2023-02-21
Payer: MEDICARE

## 2023-02-21 PROCEDURE — 97140 MANUAL THERAPY 1/> REGIONS: CPT

## 2023-02-21 PROCEDURE — 97110 THERAPEUTIC EXERCISES: CPT

## 2023-02-21 ASSESSMENT — PAIN DESCRIPTION - ORIENTATION: ORIENTATION: LOWER;LEFT

## 2023-02-21 ASSESSMENT — PAIN DESCRIPTION - LOCATION: LOCATION: BACK;NECK;LEG

## 2023-02-21 ASSESSMENT — PAIN SCALES - GENERAL: PAINLEVEL_OUTOF10: 5

## 2023-02-21 ASSESSMENT — PAIN DESCRIPTION - DESCRIPTORS: DESCRIPTORS: ACHING;SHARP;SORE

## 2023-02-21 NOTE — PROGRESS NOTES
[facility]  Outpatient Physical Therapy    Treatment Note        Date: 2023  Patient: Feliciano Hidalgo  : 1945   Confirmed: Yes  MRN: [id number]  Referring Provider: Ever Matthews MD    Medical Diagnosis: Other intervertebral disc degeneration, lumbosacral region [M51.37]       Treatment Diagnosis: back pain, neck pain, impaired gait and activity tolerance, back and neck muscle weakness    Visit Information:  Insurance: Payor: MEDICARE / Plan: MEDICARE PART A AND B / Product Type: *No Product type* /   PT Visit Information  Onset Date:  (> 1 year duration)  PT Insurance Information: Medicare  Total # of Visits Approved: 99 (BMN)  Total # of Visits to Date: 6  Plan of Care/Certification Expiration Date: 23  No Show: 0  Progress Note Due Date: 23  Canceled Appointment: 0  Progress Note Counter: 11-16 (30 day PN by 22)    Subjective Information:  Subjective: Awoke saturday morning with soreness in right anterior thigh of unknown origin. States 5/10 sore right thigh, 4-5/10 cervical, 5/10 LB sharp and achy. HEP Compliance:  [x] Good [] Fair [] Poor [] Reports not doing due to:    Pain Screening  Patient Currently in Pain: Yes  Pain Level: 5  Pain Location: Back, Neck, Leg  Pain Orientation: Lower, Left  Pain Descriptors: Aching, Sharp, Sore    Treatment:  Exercises:  Exercises  Exercise 2: seated cervical retractions x 15 (improved tolerance compared to supine)  Exercise 5: Standing rows/lat w/ green TB x 20- SBA/CGA,  Exercise 6: Seated thoracic extension w/ ball(pink) at t-spine 2 position 5\"x 15, starting low and progressed to top of chair back ( foam half roll used today)  Exercise 8: Scapular retractions x 20 -5s  Exercise 11: Seated P-ball roll outs 5\" x 15  Exercise 12: NuStep Seat 15, L 5 UE/LE x 6 min.        Manual:   Manual Therapy  Soft Tissue Mobilizaton: cervical paraspinal and leyla upper traps; muscular kneading Right ant thigh with TB x 8 min  Other: pt seated, leaning onto tx table with forearms  Treatment Reasoning  Limitations addressed: Tissue extensibility  Limitations addressed: pain relief    Modalities:  Moist Heat (CPT 90604)  Patient Position: Seated  Number Minutes Moist Heat: 10  Moist heat location: Cervical, Low back  Post treatment skin assessment: Intact  Limitations addressed: Pain modulation       *Indicates exercise, modality, or manual techniques to be initiated when appropriate    Objective Measures:         LTG 4 Current Status[de-identified] 2/21/23: Pt preports good compliance with HEP performing daily             Assessment: Body Structures, Functions, Activity Limitations Requiring Skilled Therapeutic Intervention: Increased pain, Decreased ROM, Decreased strength, Decreased balance, Decreased functional mobility , Decreased ADL status, Decreased high-level IADLs  Assessment: Pt continue to progress current exercises with focus on core strength. States 20% better since starting therapy. Increased reps on most exercises without complaint of increased pain. Concluded with HP to decrease tightness. Treatment Diagnosis: back pain, neck pain, impaired gait and activity tolerance, back and neck muscle weakness  Therapy Prognosis: Fair       Patient Education: [] NA       Post-Pain Assessment:       Pain Rating (0-10 pain scale):   4/10   Location and pain description same as pre-treatment unless indicated. Action: [] NA   [x] Perform HEP  [] Meds as prescribed  [] Modalities as prescribed   [] Call Physician     GOALS   Patient Goal(s): Patient Goals : \"Lose cane, rollator, and walker\"    Long Term Goals Completed by 6-8 weeks Goal Status   LTG 1 Pt will be able to complete > 15 minutes of continuous standing or ambulatory activity with LRAD to improve completion of ADLS/IADLs and community outings.  In progress   LTG 2 Pt will improve neck and back muscle strength to 4+/5 or better to improve posture for carryover to decreased pain with unsupported sitting or standing activity. In progress   LTG 3 Pt will improve standing balance to step forward, laterally, and retro to manage vacuum  in assisting spouse with home care tasks. In progress, Partially met   LTG 4 [place] with HEP to self manage exercises for progressive strengthening In progress   LTG 5 ASA improvement > 6 points to demo improved functional tolerance with decreased back pain limitations. In progress          Plan:  Frequency/Duration:  Plan  Plan Frequency: 2  Plan weeks: 6-8  Current Treatment Recommendations: ROM, Strengthening, Balance training, Functional mobility training, Endurance training, Gait training, Stair training, Neuromuscular re-education, Manual, Pain management, Home exercise program, Safety education & training, Patient/Caregiver education & training, Equipment evaluation, education, & procurement, Modalities  Modalities: Heat/Cold  Additional Comments: Continue through current POC then reassess  Pt to continue current HEP. See objective section for any therapeutic exercise changes, additions or modifications this date.     Therapy Time:      PT Individual Minutes  Time In: 1024  Time Out: 9748  Minutes: 50  Timed Code Treatment Minutes: 39 Minutes  Procedure Minutes:HP 10    Timed Activity Minutes Units   Ther Ex 31 2   Manual  8 1     Electronically signed by Annabel Cohen PTA on 2/21/23 at 11:29 AM EST

## 2023-02-24 ENCOUNTER — HOSPITAL ENCOUNTER (OUTPATIENT)
Dept: PHYSICAL THERAPY | Age: 78
Setting detail: THERAPIES SERIES
Discharge: HOME | End: 2023-02-24
Payer: MEDICARE

## 2023-02-24 PROCEDURE — 97110 THERAPEUTIC EXERCISES: CPT

## 2023-02-24 ASSESSMENT — PAIN DESCRIPTION - ORIENTATION: ORIENTATION: LOWER

## 2023-02-24 ASSESSMENT — PAIN DESCRIPTION - PAIN TYPE: TYPE: CHRONIC PAIN

## 2023-02-24 ASSESSMENT — PAIN DESCRIPTION - LOCATION: LOCATION: BACK

## 2023-02-24 ASSESSMENT — PAIN DESCRIPTION - DESCRIPTORS: DESCRIPTORS: ACHING;SORE;SHARP

## 2023-02-24 ASSESSMENT — PAIN SCALES - GENERAL: PAINLEVEL_OUTOF10: 9

## 2023-02-24 NOTE — PROGRESS NOTES
[facility] [facility]  Outpatient Physical Therapy    Treatment Note        Date: 2023  Patient: Patricia Segura  : 1945   Confirmed: Yes  MRN: [id number]  Referring Provider: Emani Rodriguez MD    Medical Diagnosis: Other intervertebral disc degeneration, lumbosacral region [M51.37]       Treatment Diagnosis: back pain, neck pain, impaired gait and activity tolerance, back and neck muscle weakness    Visit Information:  Insurance: Payor: MEDICARE / Plan: MEDICARE PART A AND B / Product Type: *No Product type* /   PT Visit Information  Onset Date:  (> 1 year duration)  PT Insurance Information: Medicare  Total # of Visits Approved: 80 (BMN)  Total # of Visits to Date: 15  Plan of Care/Certification Expiration Date: 23  No Show: 0  Progress Note Due Date: 23  Canceled Appointment: 0  Progress Note Counter:  (30 day PN by 3/16/22)    Subjective Information:  Subjective: Pt back is really sore today, I had to go to [facility] to a doctor's appointment and had to do a lot of walking. The neck it's bad today the pain in across the lower back.   HEP Compliance:  [x] Good [] Fair [] Poor [] Reports not doing due to:    Pain Screening  Patient Currently in Pain: Yes  Pain Assessment: 0-10  Pain Level: 9  Pain Type: Chronic pain  Pain Location: Back  Pain Orientation: Lower  Pain Descriptors: Aching, Sore, Sharp    Treatment:  Exercises:  Exercises  Exercise 2: seated cervical retractions x 15 (improved tolerance compared to supine)  Exercise 3: T-band Chest Pulls YTB x 10  Exercise 5: Standing rows/lat w/ green TB x 20- SBA/CGA, (performed in seated today due to elevated back pain)  Exercise 6: Seated thoracic extension w/ ball(pink) at t-spine 2 position 5\"x 15, starting low and progressed to top of chair back ( foam half roll used today)  Exercise 7: Seated Shoulder flexion x 10 w/ 2# bar  -focus on improving posture  Exercise 8: Scapular retractions x 20 -5s seated  Exercise 9: Sit-Stand x10  with focus on upright posture and standing balance. Exercise 11: Seated P-ball roll outs 5\" x 15  Exercise 12: NuStep Seat 15, L 5 UE/LE x 6 min. Exercise 13: Modified Sit Ups x 10  performed from Edge of Mat table with table elevated for back support. Therapist assist with supporting at knees  Treatment Reasoning  Limitations addressed: Flexibility, Posture, Strength      Modalities:  Moist Heat (CPT 44335)  Patient Position: Seated  Number Minutes Moist Heat: 10  Moist heat location: Cervical, Low back  Post treatment skin assessment: Intact  Limitations addressed: Pain modulation       *Indicates exercise, modality, or manual techniques to be initiated when appropriate    Objective Measures:          Assessment: Body Structures, Functions, Activity Limitations Requiring Skilled Therapeutic Intervention: Increased pain, Decreased ROM, Decreased strength, Decreased balance, Decreased functional mobility , Decreased ADL status, Decreased high-level IADLs  Assessment: Multiple exercises modified this date due to pt's report of elevated pain levels upon arrival to therapy today. Additional exercises added to program to further focus on improve core strength for spine stability and ease of ADLs. Pt with x 3 LOB while performing sit<>stands today with uncontrolled descent back to seated position. Treatment Diagnosis: back pain, neck pain, impaired gait and activity tolerance, back and neck muscle weakness  Therapy Prognosis: Fair       Patient Education: [x] NA       Post-Pain Assessment:       Pain Rating (0-10 pain scale):   'still hurts\"/10   Location and pain description same as pre-treatment unless indicated.    Action: [] NA   [x] Perform HEP  [] Meds as prescribed  [] Modalities as prescribed   [] Call Physician     GOALS   Patient Goal(s): Patient Goals : \"Lose cane, rollator, and walker\"      Long Term Goals Completed by 6-8 weeks Goal Status   LTG 1 Pt will be able to complete > 15 minutes of continuous standing or ambulatory activity with LRAD to improve completion of ADLS/IADLs and community outings. In progress   LTG 2 Pt will improve neck and back muscle strength to 4+/5 or better to improve posture for carryover to decreased pain with unsupported sitting or standing activity. In progress   LTG 3 Pt will improve standing balance to step forward, laterally, and retro to manage vacuum  in assisting spouse with home care tasks. In progress, Partially met   LTG 4 [place] with HEP to self manage exercises for progressive strengthening In progress   LTG 5 ASA improvement > 6 points to demo improved functional tolerance with decreased back pain limitations. In progress     Plan:  Frequency/Duration:  Plan  Plan Frequency: 2  Plan weeks: 6-8  Current Treatment Recommendations: ROM, Strengthening, Balance training, Functional mobility training, Endurance training, Gait training, Stair training, Neuromuscular re-education, Manual, Pain management, Home exercise program, Safety education & training, Patient/Caregiver education & training, Equipment evaluation, education, & procurement, Modalities  Modalities: Heat/Cold  Additional Comments: Continue through current POC then reassess  Pt to continue current HEP. See objective section for any therapeutic exercise changes, additions or modifications this date.     Therapy Time:      PT Individual Minutes  Time In: 8334  Time Out: 0827  Minutes: 51  Timed Code Treatment Minutes: 41 Minutes  Procedure Minutes:10 MHP   Timed Activity Minutes Units   Ther Ex 41 3     Electronically signed by Yomaira Piper PTA on 2/24/23 at 11:37 AM EST

## 2023-02-28 ENCOUNTER — HOSPITAL ENCOUNTER (OUTPATIENT)
Dept: PHYSICAL THERAPY | Age: 78
Setting detail: THERAPIES SERIES
Discharge: HOME | End: 2023-02-28
Payer: MEDICARE

## 2023-02-28 PROCEDURE — 97110 THERAPEUTIC EXERCISES: CPT

## 2023-02-28 ASSESSMENT — PAIN DESCRIPTION - LOCATION
LOCATION: BACK
LOCATION_2: NECK

## 2023-02-28 ASSESSMENT — PAIN DESCRIPTION - DESCRIPTORS: DESCRIPTORS: SHARP

## 2023-02-28 ASSESSMENT — PAIN DESCRIPTION - ORIENTATION: ORIENTATION: LOWER

## 2023-02-28 ASSESSMENT — PAIN DESCRIPTION - PAIN TYPE: TYPE: CHRONIC PAIN

## 2023-02-28 ASSESSMENT — PAIN DESCRIPTION - INTENSITY: RATING_2: 3

## 2023-02-28 ASSESSMENT — PAIN SCALES - GENERAL: PAINLEVEL_OUTOF10: 8

## 2023-02-28 NOTE — PROGRESS NOTES
[address] [facility]  Outpatient Physical Therapy    Treatment Note        Date: 2023  Patient: Latasha Schrader  : 1945   Confirmed: Yes  MRN: [id number]  Referring Provider: An Titus MD    Medical Diagnosis: Other intervertebral disc degeneration, lumbosacral region [M51.37]       Treatment Diagnosis: back pain, neck pain, impaired gait and activity tolerance, back and neck muscle weakness    Visit Information:  Insurance: Payor: MEDICARE / Plan: MEDICARE PART A AND B / Product Type: *No Product type* /   PT Visit Information  Onset Date:  (> 1 year duration)  PT Insurance Information: Medicare  Total # of Visits Approved: 80 (BMN)  Total # of Visits to Date: 15  Plan of Care/Certification Expiration Date: 23  No Show: 0  Progress Note Due Date: 23  Canceled Appointment: 0  Progress Note Counter:  (30 day PN by 3/16/22)    Subjective Information:  Subjective: Pt reports increased back pain this date compared to the neck. The pain is across the lower back per patient.   HEP Compliance:  [x] Good [] Fair [] Poor [] Reports not doing due to:    Pain Screening  Patient Currently in Pain: Yes  Pain Assessment: 0-10  Pain Level: 8  Pain Type: Chronic pain  Pain Location: Back  Pain Orientation: Lower  Pain Descriptors: Sharp  Pain 2  Pain Rating 2: 3  Pain Location 2: Neck    Treatment:  Exercises:  Exercises  Exercise 1: **close SBA for balance with exercises  Exercise 2: seated cervical retractions + shoulder retraction x 15 -5s  Exercise 3: T-band Chest Pulls RTB x 15  Exercise 5: Standing rows/lat w/ green TB x 20- SBA/CGA, (performed in seated today due to elevated back pain)  Exercise 6: Seated thoracic extension w/ ball(big pink ball) at t-spine 2 position 5\"x 15(sitting mat table with pink ball on lower portion of back)  Exercise 7: Seated Shoulder flexion x 15 w/ 2# bar  -focus on improving posture  Exercise 9: Sit-Stand x10  with focus on upright posture and standing balance. Exercise 11: Seated P-ball roll outs 5\"-10\" x 10 (3 ways)  Exercise 12: NuStep Seat 15, L 5 UE/LE x 6 min. Exercise 14: Seated Good Morning x 10 / Seated Extensions x 10 (arms crossed at chest)     Objective Measures:      LTG 3 Current Status[de-identified] 2/28/23:Admits difficulty when using vacumn around the home. Says he can carry food and other things from the kitchen to the living room. LTG 4 Current Status[de-identified] 2/28/23: Pt preports good compliance with HEP performing daily. Assessment: Body Structures, Functions, Activity Limitations Requiring Skilled Therapeutic Intervention: Increased pain, Decreased ROM, Decreased strength, Decreased balance, Decreased functional mobility , Decreased ADL status, Decreased high-level IADLs  Assessment: Continued to add multiple exercises this date to facilitate improved posture and improved tolerance when standing. Patient noted increased fatigue with unsupported sitting and exercises to improve core and lower back strength. No LOB this date when completing STS this date and demonstrating improved control when performing the eccentric portion of the movement. Noted increased fatigue at the end of the session with mild increases in neck pain. Discussed continuation of cervical strengthening in HEP to improve neck strength and reduce pain. MHP applied to neck and lower back for pain management. Treatment Diagnosis: back pain, neck pain, impaired gait and activity tolerance, back and neck muscle weakness  Therapy Prognosis: Fair       Post-Pain Assessment:       Pain Rating (0-10 pain scale): 6  /10   Location and pain description same as pre-treatment unless indicated.    Action: [] NA   [x] Perform HEP  [] Meds as prescribed  [] Modalities as prescribed   [] Call Physician     GOALS   Patient Goal(s): Patient Goals : \"Lose cane, rollator, and walker\"        Long Term Goals Completed by 6-8 weeks Goal Status   LTG 1 Pt will be able to complete > 15 minutes of continuous standing or ambulatory activity with LRAD to improve completion of ADLS/IADLs and community outings. In progress   LTG 2 Pt will improve neck and back muscle strength to 4+/5 or better to improve posture for carryover to decreased pain with unsupported sitting or standing activity. In progress   LTG 3 Pt will improve standing balance to step forward, laterally, and retro to manage vacuum  in assisting spouse with home care tasks. In progress, Partially met   LTG 4 [place] with HEP to self manage exercises for progressive strengthening In progress   LTG 5 ASA improvement > 6 points to demo improved functional tolerance with decreased back pain limitations. In progress     Plan:  Frequency/Duration:  Plan  Plan Frequency: 2  Plan weeks: 6-8  Current Treatment Recommendations: ROM, Strengthening, Balance training, Functional mobility training, Endurance training, Gait training, Stair training, Neuromuscular re-education, Manual, Pain management, Home exercise program, Safety education & training, Patient/Caregiver education & training, Equipment evaluation, education, & procurement, Modalities  Modalities: Heat/Cold  Additional Comments: Continue through current POC then reassess  Pt to continue current HEP. See objective section for any therapeutic exercise changes, additions or modifications this date.     Therapy Time:      PT Individual Minutes  Time In: 5130  Time Out: 8513  Minutes: 50  Timed Code Treatment Minutes: 40 Minutes  Procedure Minutes: 0  Timed Activity Minutes Units   Ther Ex 40 3   Electronically signed by Arlette Amador PTA on 2/28/23 at 1:46 PM EST

## 2023-03-02 ENCOUNTER — HOSPITAL ENCOUNTER (OUTPATIENT)
Dept: PHYSICAL THERAPY | Age: 78
Setting detail: THERAPIES SERIES
Discharge: HOME | End: 2023-03-02
Payer: MEDICARE

## 2023-03-02 PROCEDURE — 97110 THERAPEUTIC EXERCISES: CPT

## 2023-03-02 ASSESSMENT — PAIN DESCRIPTION - PAIN TYPE: TYPE: CHRONIC PAIN

## 2023-03-02 ASSESSMENT — PAIN DESCRIPTION - DESCRIPTORS: DESCRIPTORS: SHARP

## 2023-03-02 ASSESSMENT — PAIN SCALES - GENERAL: PAINLEVEL_OUTOF10: 10

## 2023-03-02 ASSESSMENT — PAIN DESCRIPTION - ORIENTATION: ORIENTATION: LOWER

## 2023-03-02 ASSESSMENT — PAIN DESCRIPTION - LOCATION: LOCATION: BACK;NECK

## 2023-03-02 NOTE — PROGRESS NOTES
[facility]  Outpatient Physical Therapy    Treatment Note        Date: 3/2/2023  Patient: Artur Olivares  : 1945   Confirmed: Yes  MRN: [id number]  Referring Provider: Lane Mosqueda MD    Medical Diagnosis: Other intervertebral disc degeneration, lumbosacral region [M51.37]       Treatment Diagnosis: back pain, neck pain, impaired gait and activity tolerance, back and neck muscle weakness    Visit Information:  Insurance: Payor: MEDICARE / Plan: MEDICARE PART A AND B / Product Type: *No Product type* /   PT Visit Information  Onset Date:  (> 1 year duration)  PT Insurance Information: Medicare  Total # of Visits Approved: 99 (BMN)  Total # of Visits to Date: 14  Plan of Care/Certification Expiration Date: 23  No Show: 0  Progress Note Due Date: 23  Canceled Appointment: 0  Progress Note Counter:  (30 day PN by 3/16/22)    Subjective Information:  Subjective: Pt report neck pain is about 7/10 while low back pain is really elevated this date at 9-10/10  HEP Compliance:  [x] Good [] Fair [] Poor [] Reports not doing due to:    Pain Screening  Patient Currently in Pain: Yes  Pain Level: 10  Pain Type: Chronic pain  Pain Location: Back, Neck  Pain Orientation: Lower  Pain Descriptors: Sharp    Treatment:  Exercises:  Exercises  Exercise 1: **close SBA for balance with exercises  Exercise 2: seated cervical retractions + shoulder retraction x 15 -5s x 2  Exercise 3: T-band Chest Pulls RTB x 15  Exercise 5: Standing rows/lat w/ green TB x 20- SBA/CGA, (performed in seated today due to elevated back pain)*  Exercise 6: Seated thoracic extension w/ ball(big pink ball) at t-spine 2 position 5\"x 15(sitting mat table with pink ball on lower portion of back)  Exercise 7: Seated Shoulder flexion x 15 w/ 2# bar  -focus on improving posture*  Exercise 9: Sit-Stand x10  with focus on upright posture and standing balance.  Exercise 11: Seated P-ball roll outs 5\"-10\" x 15 (3  ways)  Exercise 12: NuStep Seat 14, L 6 UE/LE x 6 min. Exercise 14: Seated Good Morning x 15 / Seated Extensions x 15 (arms crossed at chest)       Modalities:  Moist Heat (CPT 44186)  Patient Position: Seated  Number Minutes Moist Heat: 10  Moist heat location: Cervical, Low back  Post treatment skin assessment: Intact  Limitations addressed: Pain modulation       *Indicates exercise, modality, or manual techniques to be initiated when appropriate    Objective Measures:        Assessment: Body Structures, Functions, Activity Limitations Requiring Skilled Therapeutic Intervention: Increased pain, Decreased ROM, Decreased strength, Decreased balance, Decreased functional mobility , Decreased ADL status, Decreased high-level IADLs  Assessment: Pt reports extremely high pain this date in both the shoulders and Low back this date. Pt was able to participate in most activities this date with fluctuations in pain throughout neck and back. pt exhibited more fatigue throughout the next as tx progressed causing pain to rise to 8-9/10. back pain decreased throughout dropping down to 7/10. both levels of pain decreased following modalities. Progress as able. Treatment Diagnosis: back pain, neck pain, impaired gait and activity tolerance, back and neck muscle weakness  Therapy Prognosis: Fair       Patient Education: [x] NA       Post-Pain Assessment:       Pain Rating (0-10 pain scale):   neck 8/10 back 6-7/10  Location and pain description same as pre-treatment unless indicated. Action: [] NA   [x] Perform HEP  [] Meds as prescribed  [] Modalities as prescribed   [] Call Physician     GOALS   Patient Goal(s): Patient Goals : \"Lose cane, rollator, and walker\"       Long Term Goals Completed by 6-8 weeks Goal Status   LTG 1 Pt will be able to complete > 15 minutes of continuous standing or ambulatory activity with LRAD to improve completion of ADLS/IADLs and community outings.  In progress   LTG 2 Pt will improve neck and back muscle strength to 4+/5 or better to improve posture for carryover to decreased pain with unsupported sitting or standing activity. In progress   LTG 3 Pt will improve standing balance to step forward, laterally, and retro to manage vacuum  in assisting spouse with home care tasks. In progress, Partially met   LTG 4 [place] with HEP to self manage exercises for progressive strengthening In progress   LTG 5 ASA improvement > 6 points to demo improved functional tolerance with decreased back pain limitations. In progress               Plan:  Frequency/Duration:  Plan  Plan Frequency: 2  Plan weeks: 6-8  Current Treatment Recommendations: ROM, Strengthening, Balance training, Functional mobility training, Endurance training, Gait training, Stair training, Neuromuscular re-education, Manual, Pain management, Home exercise program, Safety education & training, Patient/Caregiver education & training, Equipment evaluation, education, & procurement, Modalities  Modalities: Heat/Cold  Additional Comments: Continue through current POC then reassess  Pt to continue current HEP. See objective section for any therapeutic exercise changes, additions or modifications this date.     Therapy Time:      PT Individual Minutes  Time In: 2717  Time Out: 7306  Minutes: 54  Timed Code Treatment Minutes: 44 Minutes  Procedure Minutes:10 MHP  Timed Activity Minutes Units   Ther Ex 44 3     Electronically signed by Haley Pelletier PTA on 3/2/23 at 12:05 PM EST

## 2023-03-06 ENCOUNTER — HOSPITAL ENCOUNTER (OUTPATIENT)
Dept: ULTRASOUND IMAGING | Age: 78
Discharge: HOME | End: 2023-03-08
Payer: MEDICARE

## 2023-03-06 DIAGNOSIS — N18.30 STAGE 3 CHRONIC KIDNEY DISEASE, UNSPECIFIED WHETHER STAGE 3A OR 3B CKD (HCC): ICD-10-CM

## 2023-03-06 PROCEDURE — 76770 US EXAM ABDO BACK WALL COMP: CPT

## 2023-03-07 ENCOUNTER — HOSPITAL ENCOUNTER (OUTPATIENT)
Dept: PHYSICAL THERAPY | Age: 78
Setting detail: THERAPIES SERIES
Discharge: HOME | End: 2023-03-07
Payer: MEDICARE

## 2023-03-07 PROCEDURE — 97110 THERAPEUTIC EXERCISES: CPT

## 2023-03-07 ASSESSMENT — PAIN DESCRIPTION - DESCRIPTORS: DESCRIPTORS: SHARP

## 2023-03-07 ASSESSMENT — PAIN SCALES - GENERAL: PAINLEVEL_OUTOF10: 7

## 2023-03-07 ASSESSMENT — PAIN DESCRIPTION - LOCATION: LOCATION: BACK;NECK

## 2023-03-07 ASSESSMENT — PAIN DESCRIPTION - PAIN TYPE: TYPE: CHRONIC PAIN

## 2023-03-07 ASSESSMENT — PAIN DESCRIPTION - ORIENTATION: ORIENTATION: LOWER;RIGHT;LEFT

## 2023-03-07 NOTE — PROGRESS NOTES
[facility]  Outpatient Physical Therapy    Treatment Note        Date: 3/7/2023  Patient: Eugenia Moore  : 1945   Confirmed: Yes  MRN: [id number]  Referring Provider: Norah Garcia MD    Medical Diagnosis: Other intervertebral disc degeneration, lumbosacral region [M51.37]       Treatment Diagnosis: back pain, neck pain, impaired gait and activity tolerance, back and neck muscle weakness    Visit Information:  Insurance: Payor: MEDICARE / Plan: MEDICARE PART A AND B / Product Type: *No Product type* /   PT Visit Information  Onset Date:  (> 1 year duration)  PT Insurance Information: Medicare  Total # of Visits Approved: 80 (BMN)  Total # of Visits to Date: 15  Plan of Care/Certification Expiration Date: 23  No Show: 0  Progress Note Due Date: 23  Canceled Appointment: 0  Progress Note Counter: 15-8/16 (30 day PN on NV) patient would like to continue w/ additional visits    Subjective Information:  Subjective: Pain today is 7/10 LB right, LB left and neck is about 3/10  HEP Compliance:  [x] Good [] Fair [] Poor [] Reports not doing due to:    Pain Screening  Patient Currently in Pain: Yes  Pain Level: 7  Pain Type: Chronic pain  Pain Location: Back, Neck  Pain Orientation: Lower, Right, Left  Pain Descriptors: Sharp    Treatment:  Exercises:  Exercises  Exercise 8: stand w/ feet apart 5 sec x 5, unsupported  Exercise 9: Sit-Stand x 7, from chair,  with focus on upright posture and standing balance. Exercise 10: standing unsupported 5 sec x 5, (SBA)  Exercise 11: p-ball roll outs 3-way, 10 sec x 10  Exercise 12: NuStep Seat 14, L 6 UE/LE x 6 min.   Exercise 14: seated ext x 15, arms crossed at chest  Exercise 15: seated, LX ext till almost falling then pull fwd to upright position x 10, w/ arms extended, to improve core  Exercise 16: standing marching x 15, b/l       Manual:           Modalities:  Moist Heat (CPT 84518)  Patient Position: Seated  Number Minutes Moist Heat: 10  Moist heat location: Cervical, Low back  Post treatment skin assessment: Intact  Limitations addressed: Pain modulation       *Indicates exercise, modality, or manual techniques to be initiated when appropriate    Objective Measures:                                                     LTG 2 Current Status[de-identified] lumbar, flexion, extension 3+/5, seated, w/ feet off floor                   Assessment: Body Structures, Functions, Activity Limitations Requiring Skilled Therapeutic Intervention: Increased pain, Decreased ROM, Decreased strength, Decreased balance, Decreased functional mobility , Decreased ADL status, Decreased high-level IADLs  Assessment: Continued to progress exercises to patient's tolerance, performed sit to stand ex from chair, fatigue noted, added, standing unsupported 5 sec x 5 w/ feet apart, seated, lumbar ext till almost falling back then pull fwd to upright position w/ arms extended and standing marches, rest breaks taken during session, no c/o pain increase, conclude w/ HP to neck and LB to reduce mm soreness. Treatment Diagnosis: back pain, neck pain, impaired gait and activity tolerance, back and neck muscle weakness  Therapy Prognosis: Fair       Patient Education: [x] NA       Post-Pain Assessment:       Pain Rating (0-10 pain scale):  \"tired but alittle better\" /10   Location and pain description same as pre-treatment unless indicated. Action: [] NA   [x] Perform HEP  [] Meds as prescribed  [] Modalities as prescribed   [] Call Physician     GOALS   Patient Goal(s): Patient Goals : \"Lose cane, rollator, and walker\"    Short Term Goals Completed by   Goal Status       Long Term Goals Completed by 6-8 weeks Goal Status   LTG 1 Pt will be able to complete > 15 minutes of continuous standing or ambulatory activity with LRAD to improve completion of ADLS/IADLs and community outings.  In progress   LTG 2 Pt will improve neck and back muscle strength to 4+/5 or better to improve posture for carryover to decreased pain with unsupported sitting or standing activity. In progress   LTG 3 Pt will improve standing balance to step forward, laterally, and retro to manage vacuum  in assisting spouse with home care tasks. In progress, Partially met   LTG 4 [place] with HEP to self manage exercises for progressive strengthening In progress   LTG 5 ASA improvement > 6 points to demo improved functional tolerance with decreased back pain limitations. In progress            Plan:  Frequency/Duration:  Plan  Plan Frequency: 2  Plan weeks: 6-8  Current Treatment Recommendations: ROM, Strengthening, Balance training, Functional mobility training, Endurance training, Gait training, Stair training, Neuromuscular re-education, Manual, Pain management, Home exercise program, Safety education & training, Patient/Caregiver education & training, Equipment evaluation, education, & procurement, Modalities  Modalities: Heat/Cold  Additional Comments: PN on NV, patient would like to continue w/ therapy  Pt to continue current HEP. See objective section for any therapeutic exercise changes, additions or modifications this date.     Therapy Time:      PT Individual Minutes  Time In: 9768  Time Out: 9276  Minutes: 48  Timed Code Treatment Minutes: 38 Minutes  Procedure Minutes:HP 10 min  Timed Activity Minutes Units   Ther Ex 38 3     Electronically signed by Yamel Heller PTA on 3/7/23 at 11:02 AM EST

## 2023-03-09 ENCOUNTER — HOSPITAL ENCOUNTER (OUTPATIENT)
Dept: PHYSICAL THERAPY | Age: 78
Setting detail: THERAPIES SERIES
Discharge: HOME | End: 2023-03-09
Payer: MEDICARE

## 2023-03-09 PROCEDURE — 97110 THERAPEUTIC EXERCISES: CPT

## 2023-03-09 ASSESSMENT — PAIN SCALES - GENERAL: PAINLEVEL_OUTOF10: 7

## 2023-03-09 ASSESSMENT — PAIN DESCRIPTION - DESCRIPTORS: DESCRIPTORS: SHARP

## 2023-03-09 ASSESSMENT — PAIN DESCRIPTION - LOCATION: LOCATION: BACK

## 2023-03-09 ASSESSMENT — PAIN DESCRIPTION - PAIN TYPE: TYPE: CHRONIC PAIN

## 2023-03-09 ASSESSMENT — PAIN DESCRIPTION - ORIENTATION: ORIENTATION: RIGHT;LEFT;LOWER

## 2023-03-09 NOTE — PROGRESS NOTES
[address]  PHYSICAL THERAPY PLAN OF CARE   [address], [address] [facility]         Ph: [phone]  Fax: [phone]    [] Certification  [x] Recertification []  Plan of Care  [x] Progress Note [] Discharge      Referring Provider: Holly Carver MD     From:  Rebecca Miguel, PT DPT  Patient: Marian Cason (14 y.o. male) : 1945 Date: 3/9/2023  Medical Diagnosis: Other intervertebral disc degeneration, lumbosacral region [M51.37]       Treatment Diagnosis: back pain, neck pain, impaired gait and activity tolerance, back and neck muscle weakness    Plan of Care/Certification Expiration Date: 2023   Progress Report Period from:  2023  to 3/9/2023    Visits to Date: 16 No Show: 0 Cancelled Appts: 0    OBJECTIVE:   SLong Term Goals - Time Frame for Long Term Goals : 6-8 weeks  Goals Current/ Discharge status Status   Long Term Goal 1: Pt will be able to complete > 15 minutes of continuous standing or ambulatory activity with LRAD to improve completion of ADLS/IADLs and community outings. LTG 1 Current Status[de-identified] 3/9/23: Pt states he's able to ambulate with rollator for no more than about 5 minutes. States he has to get a motorized cart when going to the store. 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 In progress   Long Term Goal 2: Pt will improve neck and back muscle strength to 4+/5 or better to improve posture for carryover to decreased pain with unsupported sitting or standing activity. LTG 2 Current Status[de-identified] 3/9/23: lumbar, flexion, extension, Rotation left/right, lateral (right/left)  4, 4+ /5, seated, w/ feet on floor. In progress, Partially met   Long Term Goal 3: Pt will improve standing balance to step forward, laterally, and retro to manage vacuum  in assisting spouse with home care tasks.  LTG 3 Current Status[de-identified] 3/9/23:Says he has to use AD(SPC) to assist with house cleaning but states its still pretty difficult. In progress   Long Term Goal 4: [place] with HEP to self manage exercises for progressive strengthening LTG 4 Current Status[de-identified] 3/9/23: Pt preports good compliance with HEP performing daily. In progress   Long Term Goal 5: ASA improvement > 6 points to demo improved functional tolerance with decreased back pain limitations. LTG 5 Current Status[de-identified] 3/9/23: ASA: 26/50 (1 points better since eval)   In progress     Body Structures, Functions, Activity Limitations Requiring Skilled Therapeutic Intervention: Increased pain, Decreased ROM, Decreased strength, Decreased balance, Decreased functional mobility , Decreased ADL status, Decreased high-level IADLs    Assessment: Pt has completed 16 PT visits from 1/16/23 to 3/9/23. Pt expresses desire to continue ongoing therapy services to further address his balance and functional activity tolerance deficits. Pt demo's mild improvement in trunk/back strength but still presents as a fall risk when ambulatory without UE support on a walker. Pt reports personal goal to decrease required AD to a cane to be more functional around his household. Pt continues to express ongoing back and neck pain that secondarily impair pt's functional tolerance; unfortunately no significant pain relief has been accomplished with therapeutic interventions. Pt reports he will continue care via the pain mgmt clinic. In order to best address pt's current therapy needs, he will be transferred [facility] under the care of Krystal Mohamud PT where further balance training can be safely completed.        PLAN:   Frequency/Duration:  Plan Frequency: 2  Plan weeks: 6-8; additional 4-5 weeks  Current Treatment Recommendations: ROM, Strengthening, Balance training, Functional mobility training, Endurance training, Gait training, Stair training, Neuromuscular re-education, Manual, Pain management, Home exercise program, Safety education & training, Patient/Caregiver education & training, Equipment evaluation, education, & procurement, Modalities  Modalities: Heat/Cold  Additional Comments: Transfer POC to Tuan Oneal, PT at [facility]     Precautions:  fall risk, lumbar fusion, pacemaker                          Patient Status:[x] Continue/ Initiate plan of Care     [] Discharge PT. Recommend pt continue with HEP. [x] Additional visits requested, Please re-certify for additional visits: 1-2x/wk, 4-5 weeks     [] Hold          Objective information by: Electronically signed by Juvenal Foley PTA on 3/9/23 at 12:19 PM EST    Signature: Electronically signed by Tommy Beard PT on 3/9/2023 at 4:28 PM      If you have any questions or concerns, please don't hesitate to call. Thank you for your referral.    I have reviewed this plan of care and certify a need for medically necessary rehabilitation services.     Physician Signature:__________________________________________________________  Date:  Please sign and return

## 2023-03-09 NOTE — PROGRESS NOTES
[address] [facility]  Outpatient Physical Therapy    Treatment Note        Date: 3/9/2023  Patient: Xavi Puentes  : 1945   Confirmed: Yes  MRN: [id number]  Referring Provider: Zandra Lipscomb MD    Medical Diagnosis: Other intervertebral disc degeneration, lumbosacral region [M51.37]       Treatment Diagnosis: back pain, neck pain, impaired gait and activity tolerance, back and neck muscle weakness    Visit Information:  Insurance: Payor: MEDICARE / Plan: MEDICARE PART A AND B / Product Type: *No Product type* /   PT Visit Information  Onset Date:  (> 1 year duration)  PT Insurance Information: Medicare  Total # of Visits Approved: 80 (BMN)  Total # of Visits to Date: 12  Plan of Care/Certification Expiration Date: 23  No Show: 0  Progress Note Due Date: 23  Canceled Appointment: 0  Progress Note Counter:  (30 day PN on NV) patient would like to continue w/ additional visits    Subjective Information:  Subjective: Pain today is 7/10 LB right, LB left and neck is about 3/10  HEP Compliance:  [x] Good [] Fair [] Poor [] Reports not doing due to:    Pain Screening  Patient Currently in Pain: Yes  Pain Assessment: 0-10  Pain Level: 7  Pain Type: Chronic pain  Pain Location: Back  Pain Orientation: Right, Left, Lower  Pain Descriptors: Sharp    Treatment:  Exercises:  Exercises  Exercise 1: **close SBA for balance with exercises  Exercise 2: Standing with SPC 2 x 20s - increased back pain, and shakiness displayed  Exercise 3: Standing balance 4 x 15-20s (modified tandem and FA)  Exercise 5: Standing rows/lat w/ green TB x 10 ea SBA/CGA  Exercise 11: p-ball roll outs 3-way, 10 sec x 10       Objective Measures:         LTG 1 Current Status[de-identified] 3/9/23: Pt states he's able to ambulate with rollator for no more than about 5 minutes. States he has to get a motorized cart when going to the store. 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   LTG 2 Current Status[de-identified] 3/9/23: lumbar, flexion, extension, Rotation left/right, lateral (right/left)  4, 4+ /5, seated, w/ feet on floor. LTG 3 Current Status[de-identified] 3/9/23:Says he has to use AD(SPC) to assist with house cleaning but states its still pretty difficult. LTG 4 Current Status[de-identified] 3/9/23: Pt preports good compliance with HEP performing daily. LTG 5 Current Status[de-identified] 3/9/23: ASA: 26/50 (1 points better since eval)          Assessment: Body Structures, Functions, Activity Limitations Requiring Skilled Therapeutic Intervention: Increased pain, Decreased ROM, Decreased strength, Decreased balance, Decreased functional mobility , Decreased ADL status, Decreased high-level IADLs  Assessment: Patient continued to display increased pain with standing balance adn strengthening activities requiring occ increased rest breaks. Pt notes increased LBP with static standing balance with SPC as compared to no device, however, able to stand for similar duration. Patient displayed increased strength in trunk this date in all planes in seated position. Patient continues to demonstrate increased limitations d/t lower back pain however. Treatment Diagnosis: back pain, neck pain, impaired gait and activity tolerance, back and neck muscle weakness  Therapy Prognosis: Fair       Post-Pain Assessment:       Pain Rating (0-10 pain scale): 5  /10   Location and pain description same as pre-treatment unless indicated. Action: [] NA   [x] Perform HEP  [] Meds as prescribed  [] Modalities as prescribed   [] Call Physician     GOALS   Patient Goal(s): Patient Goals : \"Lose cane, rollator, and walker\"    Long Term Goals Completed by 6-8 weeks Goal Status   LTG 1 Pt will be able to complete > 15 minutes of continuous standing or ambulatory activity with LRAD to improve completion of ADLS/IADLs and community outings.  In progress   LTG 2 Pt will improve neck and back muscle strength to 4+/5 or better to improve posture for carryover to decreased pain with unsupported sitting or standing activity. In progress, Partially met   LTG 3 Pt will improve standing balance to step forward, laterally, and retro to manage vacuum  in assisting spouse with home care tasks. In progress   LTG 4 [place] with HEP to self manage exercises for progressive strengthening In progress   LTG 5 ASA improvement > 6 points to demo improved functional tolerance with decreased back pain limitations. In progress     Plan:  Frequency/Duration:  Plan  Plan Frequency: 2  Plan weeks: 6-8  Current Treatment Recommendations: ROM, Strengthening, Balance training, Functional mobility training, Endurance training, Gait training, Stair training, Neuromuscular re-education, Manual, Pain management, Home exercise program, Safety education & training, Patient/Caregiver education & training, Equipment evaluation, education, & procurement, Modalities  Modalities: Heat/Cold  Additional Comments: PN on NV, patient would like to continue w/ therapy  Pt to continue current HEP. See objective section for any therapeutic exercise changes, additions or modifications this date.     Therapy Time:      PT Individual Minutes  Time In: 4267  Time Out: 7040  Minutes: 63  Timed Code Treatment Minutes: 48 Minutes  Procedure Minutes: 10 min MHP  Timed Activity Minutes Units   Ther Ex 48 3   Electronically signed by Gordon Ricketts PTA on 3/9/23 at 12:17 PM EST

## 2023-03-15 ENCOUNTER — TELEPHONE (OUTPATIENT)
Dept: GASTROENTEROLOGY | Age: 78
End: 2023-03-15

## 2023-03-16 ENCOUNTER — HOSPITAL ENCOUNTER (OUTPATIENT)
Dept: PHYSICAL THERAPY | Age: 78
Setting detail: THERAPIES SERIES
Discharge: HOME | End: 2023-03-16
Payer: MEDICARE

## 2023-03-16 PROCEDURE — 97112 NEUROMUSCULAR REEDUCATION: CPT

## 2023-03-16 ASSESSMENT — PAIN SCALES - GENERAL: PAINLEVEL_OUTOF10: 7

## 2023-03-16 ASSESSMENT — PAIN DESCRIPTION - LOCATION: LOCATION: BACK;NECK

## 2023-03-16 ASSESSMENT — PAIN DESCRIPTION - ORIENTATION: ORIENTATION: LOWER

## 2023-03-16 NOTE — PROGRESS NOTES
[facility]  Outpatient Physical Therapy    Treatment Note        Date: 3/16/2023  Patient: Janene Quiros  : 1945   Confirmed: Yes  MRN: [id number]  Referring Provider: Diane Jacob MD    Medical Diagnosis: Other intervertebral disc degeneration, lumbosacral region [M51.37]       Treatment Diagnosis: back pain, neck pain, impaired gait and activity tolerance, back and neck muscle weakness    Visit Information:  Insurance: Payor: MEDICARE / Plan: MEDICARE PART A AND B / Product Type: *No Product type* /   PT Visit Information  Onset Date:  (> 1 year duration)  PT Insurance Information: Medicare  Total # of Visits Approved: 80 (BMN)  Total # of Visits to Date: 16  Plan of Care/Certification Expiration Date: 23  No Show: 0  Progress Note Due Date: 04/15/23  Canceled Appointment: 0  Progress Note Counter:     Subjective Information:  Subjective: Has had 3 back surgeries in 4 years. After the first surgery, pt developed a Lt foot drop. Has been doing PT for neck and LB pain. Has an appointment with a psychologist next week to see if he's a good candidate for a pain stmulator. Has been using a [employer] to ambulate for the past 3 years. Uses a rollator for longer distances and can use a cane and furniture to ambulate at home. Pt reports having a fear of falling. Pt reports balance is off with standing still. Has a Lt carbon fiber AFO. Would like to get rid of walker and rollator. Would love to get rid of cane but is unsure if that would be able to happen.   HEP Compliance:  [x] Good [] Fair [] Poor [] Reports not doing due to:    Pain Screening  Patient Currently in Pain: Yes  Pain Assessment: 0-10  Pain Level: 7 (Neck 3-4, LB 6-7)  Pain Location: Back, Neck  Pain Orientation: Lower    Treatment:  Exercises:  Exercises  Exercise 1: Static standing*  Exercise 2: Foam*  Exercise 3: Single stepping*  Exercise 4: Gait drills*  Exercise 5: Step ups*  Exercise 6: STS*  Exercise 7: Hip flexor str*  Exercise 8: 4 way hip*  Exercise 9: Step taps*  Exercise 10: Eccentric step downs*  Exercise 20: HEP: standing FA, FT, semitandem       Objective Measures:   Bed mobility  Supine to Sit: Minimal assistance  Sit to Supine: Stand by assistance  Bed Mobility Comments: increased effort and time    Transfers  Sit to Stand: Modified independent (Heavy reliance on UEs, increased difficulty from a lower surface)  Stand to Sit: Modified independent    Ambulation  Surface: Carpet  Device: Rolling Walker  Assistance: Supervision, Independent  Quality of Gait: FWD flexed posture, heacy reliance on leyla UEs, decreased leyla foot clearance  Gait Deviations: Slow Yvonne  Distance: 80'    Shah Balance Score: 22    Timed Up and Go: 25 (With [employer])    Strength: [] NT  [x] MMT completed:  Strength RLE  R Hip Flexion: 4/5  R Hip ABduction: 4-/5  R Knee Flexion: 5/5  R Knee Extension: 5/5  R Ankle Dorsiflexion: 5/5  Strength LLE  L Hip Flexion: 4/5  L Hip ABduction: 3+/5  L Knee Flexion: 4+/5  L Knee Extension: 4+/5  L Ankle Dorsiflexion: 1/5      Assessment: Body Structures, Functions, Activity Limitations Requiring Skilled Therapeutic Intervention: Increased pain, Decreased ROM, Decreased strength, Decreased balance, Decreased functional mobility , Decreased ADL status, Decreased high-level IADLs, Decreased posture  Assessment: Pt's balance assessed more in depth  as pt is primarily concerned with balance and walking. Pt is at a high right for falls per Lafaye Joseph and TUG and demonstrates decreased leyla ankle strategies. Pt with decreased strength in leyla LEs with Lt worse than Rt likely impacting his stability with standing and walking. Pt ambulates with a heavy reliance on leyla UEs with a WW with decreased leyla foot clearance. Pt would benefit from further skilled PT to improve his strength, balance and safety with all mobility.   Treatment Diagnosis: back pain, neck pain, impaired gait and activity tolerance, back and neck muscle weakness  Therapy Prognosis: Fair     Activity Tolerance  Activity Tolerance: Patient tolerated treatment well    Post-Pain Assessment:       Pain Rating (0-10 pain scale):  7 /10   Location and pain description same as pre-treatment unless indicated. Action: [] NA   [] Perform HEP  [x] Meds as prescribed  [x] Modalities as prescribed   [] Call Physician     GOALS   Patient Goal(s): Patient Goals : \"Lose cane, rollator, and walker\"    Short Term Goals Completed by 2 weeks Goal Status   STG 1 Independent with HEP. New       Long Term Goals Completed by 6 weeks Goal Status   LTG 1 Improve leyla LE strength by >/= 1/2 MMT grade to improve pt's stability with standing and walking. New   LTG 2 Pt will ambulate with LRD >/= 200' with improved leyla foot clearance and upright posture S/I. New   LTG 3 Shah >/= 35/56 to reduce pt's risk for falls. New   LTG 4 TUG with LRD </= 12sec to to improve safety with ambulation. New   LTG 5 STS from chair with decreased reliance on leyla UEs S/I. New            Plan:  Frequency/Duration:  Plan  Plan Frequency: 2  Plan weeks: 6  Current Treatment Recommendations: Strengthening, ROM, Balance training, Functional mobility training, Transfer training, Gait training, Stair training, Neuromuscular re-education, Manual, Home exercise program, Safety education & training, Patient/Caregiver education & training, Equipment evaluation, education, & procurement, Modalities  Modalities: Heat/Cold  Pt to continue current HEP. See objective section for any therapeutic exercise changes, additions or modifications this date.     Therapy Time:      PT Individual Minutes  Time In: 8447  Time Out: 1200  Minutes: 42  Timed Code Treatment Minutes: 42 Minutes  Procedure Minutes:0  Timed Activity Minutes Units   Neuro randy 42 3     Electronically signed by Ney Garza PT on 3/16/23 at 12:11 PM EDT

## 2023-03-16 NOTE — PROGRESS NOTES
[address]     Ph: [phone]  Fax: [phone]      [x] Certification  [] Recertification []  Plan of Care  [] Progress Note [] Discharge      Referring Provider: Michael Pop MD     From:  Mariela Jacobs, PT  Patient: Sigifredo Castro (89 y.o. male) : 1945 Date: 3/16/2023  Medical Diagnosis: Other intervertebral disc degeneration, lumbosacral region [M51.37]       Treatment Diagnosis: back pain, neck pain, impaired gait and activity tolerance, back and neck muscle weakness    Plan of Care/Certification Expiration Date: : 23   Progress Report Period from:  3/16/2023  to 3/16/2023    Visits to Date: 17 No Show: 0 Cancelled Appts: 0    OBJECTIVE:   Short Term Goals - Time Frame for Short Term Goals: 2 weeks    Goals Current/Discharge status  Status   Short Term Goal 1: Independent with HEP. ongoing New     Long Term Goals - Time Frame for Long Term Goals : 6 weeks  Goals Current/ Discharge status Status   Long Term Goal 1: Improve leyla LE strength by >/= 1/2 MMT grade to improve pt's stability with standing and walking. Strength LLE  L Hip Flexion: 4/5  L Hip ABduction: 3+/5  L Knee Flexion: 4+/5  L Knee Extension: 4+/5  L Ankle Dorsiflexion: 1/5  Strength RLE  R Hip Flexion: 4/5  R Hip ABduction: 4-/5  R Knee Flexion: 5/5  R Knee Extension: 5/5  R Ankle Dorsiflexion: 5/5    New   Long Term Goal 2: Pt will ambulate with LRD >/= 200' with improved leyla foot clearance and upright posture S/I. Ambulation  Surface: Carpet  Device: Rolling Walker  Assistance: Supervision, Independent  Quality of Gait: FWD flexed posture, heacy reliance on leyla UEs, decreased leyla foot clearance  Gait Deviations: Slow Yvonne  Distance: [de-identified]'   New   Long Term Goal 3: Shah >/= 35/56 to reduce pt's risk for falls.  Shah Balance Score: 22 New   Long Term Goal 4: TUG with LRD </= 12sec to to improve safety with ambulation. Timed Up and Go: 25 (With WW) New   Long Term Goal 5: STS from chair with decreased reliance on leyla UEs S/I. Transfers  Sit to Stand: Modified independent (Heavy reliance on UEs, increased difficulty from a lower surface)  Stand to Sit: Modified independent New       Body Structures, Functions, Activity Limitations Requiring Skilled Therapeutic Intervention: Increased pain, Decreased ROM, Decreased strength, Decreased balance, Decreased functional mobility , Decreased ADL status, Decreased high-level IADLs, Decreased posture  Assessment: Pt's balance assessed more in depth  as pt is primarily concerned with balance and walking.  Pt is at a high right for falls per Shah and TUG and demonstrates decreased leyla ankle strategies.  Pt with decreased strength in leyla LEs with Lt worse than Rt likely impacting his stability with standing and walking.  Pt ambulates with a heavy reliance on leyla UEs with a WW with decreased leyla foot clearance.  Pt would benefit from further skilled PT to improve his strength, balance and safety with all mobility.  Therapy Prognosis: Fair           PLAN: [x] Evaluate and Treat  Frequency/Duration:  Plan Frequency: 2  Plan weeks: 6  Current Treatment Recommendations: Strengthening, ROM, Balance training, Functional mobility training, Transfer training, Gait training, Stair training, Neuromuscular re-education, Manual, Home exercise program, Safety education & training, Patient/Caregiver education & training, Equipment evaluation, education, & procurement, Modalities  Modalities: Heat/Cold     Precautions:  falls                          Patient Status:[x] Continue/ Initiate plan of Care    [] Discharge PT.  Recommend pt continue with HEP.     [] Additional visits requested, Please re-certify for additional visits:    [] Hold         Signature: Electronically signed by Hiwot Benson PT on 3/16/23 at 1:00 PM EDT      If you have any questions or concerns, please don't hesitate to  call.  Thank you for your referral.    I have reviewed this plan of care and certify a need for medically necessary rehabilitation services.     Physician Signature:__________________________________________________________  Date:  Please sign and return

## 2023-03-21 ENCOUNTER — HOSPITAL ENCOUNTER (OUTPATIENT)
Dept: PHYSICAL THERAPY | Age: 78
Setting detail: THERAPIES SERIES
Discharge: HOME | End: 2023-03-21
Payer: MEDICARE

## 2023-03-21 PROCEDURE — 97112 NEUROMUSCULAR REEDUCATION: CPT

## 2023-03-21 PROCEDURE — 97110 THERAPEUTIC EXERCISES: CPT

## 2023-03-21 ASSESSMENT — PAIN DESCRIPTION - LOCATION: LOCATION: BACK;NECK

## 2023-03-21 ASSESSMENT — PAIN DESCRIPTION - DESCRIPTORS: DESCRIPTORS: SORE

## 2023-03-21 ASSESSMENT — PAIN SCALES - GENERAL: PAINLEVEL_OUTOF10: 7

## 2023-03-21 NOTE — PROGRESS NOTES
S/I. In progress     Plan:  Frequency/Duration:  Plan  Plan Frequency: 2  Plan weeks: 6  Current Treatment Recommendations: Strengthening, ROM, Balance training, Functional mobility training, Transfer training, Gait training, Stair training, Neuromuscular re-education, Manual, Home exercise program, Safety education & training, Patient/Caregiver education & training, Equipment evaluation, education, & procurement, Modalities  Modalities: Heat/Cold  Pt to continue current HEP. See objective section for any therapeutic exercise changes, additions or modifications this date.     Therapy Time:      PT Individual Minutes  Time In: 4250  Time Out: 1401  Minutes: 57  Timed Code Treatment Minutes: 47 Minutes  Procedure Minutes:10 minutes hot pack  Timed Activity Minutes Units   Ther Ex 10 1   Neuro re-ed 37 2     Electronically signed by Stephen Romero PT on 3/21/23 at 2:59 PM EDT

## 2023-03-24 ENCOUNTER — HOSPITAL ENCOUNTER (OUTPATIENT)
Dept: PHYSICAL THERAPY | Age: 78
Setting detail: THERAPIES SERIES
Discharge: HOME | End: 2023-03-24
Payer: MEDICARE

## 2023-03-24 PROCEDURE — 97112 NEUROMUSCULAR REEDUCATION: CPT

## 2023-03-24 PROCEDURE — 97110 THERAPEUTIC EXERCISES: CPT

## 2023-03-24 ASSESSMENT — PAIN DESCRIPTION - LOCATION
LOCATION: BACK;NECK
LOCATION_2: NECK

## 2023-03-24 ASSESSMENT — PAIN SCALES - GENERAL: PAINLEVEL_OUTOF10: 6

## 2023-03-24 ASSESSMENT — PAIN DESCRIPTION - ORIENTATION: ORIENTATION: LOWER

## 2023-03-24 ASSESSMENT — PAIN DESCRIPTION - FREQUENCY: FREQUENCY: CONTINUOUS

## 2023-03-24 ASSESSMENT — PAIN DESCRIPTION - DESCRIPTORS
DESCRIPTORS: SORE
DESCRIPTORS_2: SORE

## 2023-03-24 ASSESSMENT — PAIN DESCRIPTION - INTENSITY: RATING_2: 5

## 2023-03-24 NOTE — PROGRESS NOTES
[facility]  Outpatient Physical Therapy   Treatment Note        Date: 3/24/2023  Patient: Hayden Rojas  : 1945   Confirmed: Yes  MRN: [id number]  Referring Provider: Bryan Cobos MD      Medical Diagnosis: Other intervertebral disc degeneration, lumbosacral region [M51.37]      Treatment Diagnosis: back pain, neck pain, impaired gait and activity tolerance, back and neck muscle weakness    Visit Information:  Insurance: Payor: MEDICARE / Plan: MEDICARE PART A AND B / Product Type: *No Product type* /   PT Visit Information  Onset Date:  (> 1 year duration)  PT Insurance Information: Medicare  Total # of Visits Approved: 80 (BMN)  Total # of Visits to Date:   Plan of Care/Certification Expiration Date: 23  No Show: 0  Progress Note Due Date: 04/15/23  Canceled Appointment: 0  Progress Note Counter: 3/12    Subjective Information:  Subjective: Pt reported having done a lot of walking the day before and feels fatigued. HEP Compliance:  [] Good [x] Fair [] Poor [] Reports not doing due to:    Pain Screening  Patient Currently in Pain: Yes  Pain Assessment: 0-10  Pain Level: 6  Pain Location: Back, Neck  Pain Orientation: Lower  Pain Descriptors: Sore  Pain 2  Pain Rating 2: 5  Pain Location 2: Neck  Pain Descriptors 2: Sore    Treatment:  Exercises:  Exercises  Exercise 2: foam standing: EO, NBOS, Head turns (1 UE support), CGA  Exercise 4: gait drills: fwd 1 UE, marching x 1 laps  Exercise 6: STS x 10  Exercise 7: standing hip flexor stretch 30 sec hold x 3  Exercise 8: seated open chain YTB: Hip- IR (ER unable to complete)  Knee- flexion and ext x10ea  Exercise 9: seated toe taps 2'' step x 10, attempted seated lateral step overs, unable to complete on Lt  Exercise 12: seated cervical retraction: 10'' 10x  Exercise 13: Seated power ups 10x  Exercise 15: Standing fwd flex lumbar flex str.  5'' 5x SBA  Exercise 17: Seated hamstring str 30'' 3x      Modalities:  Moist Heat (CPT

## 2023-03-28 ENCOUNTER — HOSPITAL ENCOUNTER (OUTPATIENT)
Dept: PHYSICAL THERAPY | Age: 78
Setting detail: THERAPIES SERIES
Discharge: HOME | End: 2023-03-28
Payer: MEDICARE

## 2023-03-28 PROCEDURE — 97112 NEUROMUSCULAR REEDUCATION: CPT

## 2023-03-28 PROCEDURE — 97110 THERAPEUTIC EXERCISES: CPT

## 2023-03-28 ASSESSMENT — PAIN DESCRIPTION - INTENSITY: RATING_2: 3

## 2023-03-28 ASSESSMENT — PAIN SCALES - GENERAL: PAINLEVEL_OUTOF10: 3

## 2023-03-28 ASSESSMENT — PAIN DESCRIPTION - LOCATION
LOCATION_2: NECK
LOCATION: BACK

## 2023-03-28 ASSESSMENT — PAIN DESCRIPTION - DESCRIPTORS
DESCRIPTORS_2: SORE
DESCRIPTORS: SORE

## 2023-03-28 ASSESSMENT — PAIN DESCRIPTION - ORIENTATION: ORIENTATION: LOWER

## 2023-03-28 NOTE — PROGRESS NOTES
In progress   LTG 3 Shah >/= 35/56 to reduce pt's risk for falls. In progress   LTG 4 TUG with LRD </= 12sec to to improve safety with ambulation. In progress   LTG 5 STS from chair with decreased reliance on leyla UEs S/I. In progress        Plan:  Frequency/Duration:  Plan  Plan Frequency: 2  Plan weeks: 6  Current Treatment Recommendations: Strengthening, ROM, Balance training, Functional mobility training, Transfer training, Gait training, Stair training, Neuromuscular re-education, Manual, Home exercise program, Safety education & training, Patient/Caregiver education & training, Equipment evaluation, education, & procurement, Modalities  Modalities: Heat/Cold  Pt to continue current HEP. See objective section for any therapeutic exercise changes, additions or modifications this date.     Therapy Time:      PT Individual Minutes  Time In: 1311  Time Out: 6561  Minutes: 52  Timed Code Treatment Minutes: 42 Minutes  Procedure Minutes:10 Moist heat  Timed Activity Minutes Units   Ther Ex 30 2   Neuro 12 1     Electronically signed by Arnulfo Charles on 3/28/23 at 2:18 PM EDT  Treatment directly supervised by Electronically signed by Michelle Tran PT on 3/28/23 at 5:06 PM EDT

## 2023-03-31 ENCOUNTER — HOSPITAL ENCOUNTER (OUTPATIENT)
Dept: PHYSICAL THERAPY | Age: 78
Setting detail: THERAPIES SERIES
Discharge: HOME | End: 2023-03-31
Payer: MEDICARE

## 2023-03-31 PROCEDURE — 97112 NEUROMUSCULAR REEDUCATION: CPT

## 2023-03-31 PROCEDURE — 97110 THERAPEUTIC EXERCISES: CPT

## 2023-03-31 ASSESSMENT — PAIN DESCRIPTION - DESCRIPTORS
DESCRIPTORS_2: SORE
DESCRIPTORS: SORE

## 2023-03-31 ASSESSMENT — PAIN DESCRIPTION - LOCATION
LOCATION_2: NECK
LOCATION: BACK

## 2023-03-31 ASSESSMENT — PAIN SCALES - GENERAL: PAINLEVEL_OUTOF10: 2

## 2023-03-31 ASSESSMENT — PAIN DESCRIPTION - ORIENTATION: ORIENTATION: LOWER

## 2023-03-31 ASSESSMENT — PAIN DESCRIPTION - INTENSITY: RATING_2: 3

## 2023-03-31 NOTE — PROGRESS NOTES
weeks Goal Status   STG 1 Independent with HEP. In progress     Long Term Goals Completed by 6 weeks Goal Status   LTG 1 Improve leyla LE strength by >/= 1/2 MMT grade to improve pt's stability with standing and walking. In progress   LTG 2 Pt will ambulate with LRD >/= 200' with improved leyla foot clearance and upright posture S/I. In progress   LTG 3 Shah >/= 35/56 to reduce pt's risk for falls. In progress   LTG 4 TUG with LRD </= 12sec to to improve safety with ambulation. In progress   LTG 5 STS from chair with decreased reliance on leyla UEs S/I. In progress        Plan:  Frequency/Duration:  Plan  Plan Frequency: 2  Plan weeks: 6  Current Treatment Recommendations: Strengthening, ROM, Balance training, Functional mobility training, Transfer training, Gait training, Stair training, Neuromuscular re-education, Manual, Home exercise program, Safety education & training, Patient/Caregiver education & training, Equipment evaluation, education, & procurement, Modalities  Modalities: Heat/Cold  Pt to continue current HEP. See objective section for any therapeutic exercise changes, additions or modifications this date.     Therapy Time:      PT Individual Minutes  Time In: 1163  Time Out: 1543  Minutes: 71  Timed Code Treatment Minutes: 61 Minutes  Procedure Minutes:10 [facility]  Timed Activity Minutes Units   Ther Ex 21 1   Neuro 40 3     Electronically signed by Yoan Galo on 3/31/23 at 2:44 PM EDT  Treatment directly supervised by Electronically signed by Kaitlin Andres PTA on 3/31/23 at 4:00 PM EDT

## 2023-04-04 ENCOUNTER — HOSPITAL ENCOUNTER (OUTPATIENT)
Dept: PHYSICAL THERAPY | Age: 78
Setting detail: THERAPIES SERIES
Discharge: HOME | End: 2023-04-04
Payer: MEDICARE

## 2023-04-04 PROCEDURE — 97110 THERAPEUTIC EXERCISES: CPT

## 2023-04-04 PROCEDURE — 97112 NEUROMUSCULAR REEDUCATION: CPT

## 2023-04-04 ASSESSMENT — PAIN DESCRIPTION - DESCRIPTORS
DESCRIPTORS: SORE
DESCRIPTORS_2: SORE

## 2023-04-04 ASSESSMENT — PAIN DESCRIPTION - LOCATION
LOCATION_2: NECK
LOCATION: BACK

## 2023-04-04 ASSESSMENT — PAIN DESCRIPTION - ORIENTATION: ORIENTATION: LOWER

## 2023-04-04 ASSESSMENT — PAIN SCALES - GENERAL: PAINLEVEL_OUTOF10: 2

## 2023-04-04 ASSESSMENT — PAIN DESCRIPTION - PAIN TYPE: TYPE: CHRONIC PAIN

## 2023-04-04 ASSESSMENT — PAIN DESCRIPTION - INTENSITY: RATING_2: 3

## 2023-04-04 NOTE — PROGRESS NOTES
[facility]  Outpatient Physical Therapy   Treatment Note        Date: 2023  Patient: Ritika Child  : 1945   Confirmed: Yes  MRN: [id number]  Referring Provider: Blaire Chadwick MD      Medical Diagnosis: Other intervertebral disc degeneration, lumbosacral region [M51.37]      Treatment Diagnosis: back pain, neck pain, impaired gait and activity tolerance, back and neck muscle weakness    Visit Information:  Insurance: Payor: MEDICARE / Plan: MEDICARE PART A AND B / Product Type: *No Product type* /   PT Visit Information  Onset Date:  (> 1 year duration)  PT Insurance Information: Medicare  Total # of Visits Approved: 80 (BMN)  Total # of Visits to Date:   Plan of Care/Certification Expiration Date: 23  No Show: 0  Progress Note Due Date: 04/15/23  Canceled Appointment: 0  Progress Note Counter:     Subjective Information:  Subjective: Pt. no new reports.   HEP Compliance:  [x] Good [] Fair [] Poor [] Reports not doing due to:    Pain Screening  Patient Currently in Pain: Yes  Pain Level: 2  Pain Type: Chronic pain  Pain Location: Back  Pain Orientation: Lower  Pain Descriptors: Sore  Pain 2  Pain Rating 2: 3  Pain Location 2: Neck  Pain Descriptors 2: Sore    Treatment:  Exercises:  Exercises  Exercise 2: foam standing: EO, EC, head turns, Spiritism pews (1 UE support), CGA  Exercise 3: single step overs with weight shifts and reach x10 ea  Exercise 4: gait drills: fwd 1UE, marching 1UE , lateral 1-2 UE, retro 2UE x 1 laps  Exercise 10: Standing box taps 4'' x15, step downs 4\" box RLE x 10, 2\" x 10 LLE (1UE on bar, 1UE on GTB around // bar)  Exercise 12: seated cervical retraction: 10'' 10x  Exercise 17: Seated hamstring str 30'' 3xea  Exercise 18: standing RTB rows/latts x15ea  Exercise 19: bilateral upper traps str 3 x30''     Modalities:  Moist Heat (CPT 17486)  Patient Position: Seated  Number Minutes Moist Heat: 10 minutes  Moist heat location: Cervical, Low back  Post

## 2023-04-06 ENCOUNTER — HOSPITAL ENCOUNTER (OUTPATIENT)
Dept: PHYSICAL THERAPY | Age: 78
Setting detail: THERAPIES SERIES
Discharge: HOME | End: 2023-04-06
Payer: MEDICARE

## 2023-04-06 PROCEDURE — 97112 NEUROMUSCULAR REEDUCATION: CPT

## 2023-04-06 ASSESSMENT — PAIN DESCRIPTION - ORIENTATION: ORIENTATION: LOWER

## 2023-04-06 ASSESSMENT — PAIN DESCRIPTION - LOCATION: LOCATION: BACK;NECK

## 2023-04-06 ASSESSMENT — PAIN SCALES - GENERAL: PAINLEVEL_OUTOF10: 2

## 2023-04-06 ASSESSMENT — PAIN DESCRIPTION - PAIN TYPE: TYPE: CHRONIC PAIN

## 2023-04-06 NOTE — PROGRESS NOTES
[facility]  Outpatient Physical Therapy    Treatment Note        Date: 2023  Patient: Rivera Michael  : 1945   Confirmed: Yes  MRN: [id number]  Referring Provider: Timmy Hassan MD    Medical Diagnosis: Other intervertebral disc degeneration, lumbosacral region [M51.37]       Treatment Diagnosis: back pain, neck pain, impaired gait and activity tolerance, back and neck muscle weakness    Visit Information:  Insurance: Payor: Sergio Holland / Plan: MEDICARE PART A AND B / Product Type: *No Product type* /   PT Visit Information  PT Insurance Information: Medicare  Total # of Visits Approved: 99  Total # of Visits to Date:   Plan of Care/Certification Expiration Date: 23  No Show: 0  Progress Note Due Date: 04/15/23  Canceled Appointment: 0  Progress Note Counter:     Subjective Information:  Subjective: Pt reports soreness following last session but ended this morning. HEP Compliance:  [x] Good [] Fair [] Poor [] Reports not doing due to:    Pain Screening  Patient Currently in Pain: Yes  Pain Level: 2  Pain Type: Chronic pain  Pain Location: Back, Neck  Pain Orientation: Lower    Treatment:  Exercises:  Exercises  Exercise 2: foam standing: EO, EC, trunk rotation, scapular retraction  Exercise 3: s/c step overs frwd/lateral x10 ea 2 UE  Exercise 4: gait drills: fwd 1UE, marching 1UE , lateral 1-2 UE, retro 2UE x 1 laps  Exercise 10: number box taps requires 1UE, RLE able to complete 2 taps without UE support  Exercise 12: seated cervical retraction: 10'' 10x  Exercise 19: bilateral upper traps str 3 x30''  Exercise 20: HEP: hip flexor stretch, sit to stands  Treatment Reasoning  Limitations addressed: Flexibility, Posture, Strength    Modalities:  Moist Heat (CPT 38402)  Patient Position: Seated  Number Minutes Moist Heat: 10 minutes  Moist heat location: Cervical, Low back  Post treatment skin assessment: Intact  Limitations addressed: Pain modulation     Assessment:    Body

## 2023-04-18 ENCOUNTER — HOSPITAL ENCOUNTER (OUTPATIENT)
Dept: PHYSICAL THERAPY | Age: 78
Setting detail: THERAPIES SERIES
Discharge: HOME | End: 2023-04-18
Payer: MEDICARE

## 2023-04-18 PROCEDURE — 97112 NEUROMUSCULAR REEDUCATION: CPT

## 2023-04-18 PROCEDURE — 97116 GAIT TRAINING THERAPY: CPT

## 2023-04-18 PROCEDURE — 97110 THERAPEUTIC EXERCISES: CPT

## 2023-04-18 ASSESSMENT — PAIN DESCRIPTION - ORIENTATION: ORIENTATION: LOWER

## 2023-04-18 ASSESSMENT — PAIN SCALES - GENERAL: PAINLEVEL_OUTOF10: 7

## 2023-04-18 ASSESSMENT — PAIN DESCRIPTION - DESCRIPTORS
DESCRIPTORS_2: SORE
DESCRIPTORS: SORE

## 2023-04-18 ASSESSMENT — PAIN DESCRIPTION - LOCATION
LOCATION_2: NECK
LOCATION: BACK

## 2023-04-18 ASSESSMENT — PAIN DESCRIPTION - INTENSITY: RATING_2: 4

## 2023-04-18 NOTE — PROGRESS NOTES
Patient Goals : \"Lose cane, rollator, and walker\"    Short Term Goals Completed by 2 weeks Goal Status   STG 1 Independent with HEP. In progress     Long Term Goals Completed by 6 weeks Goal Status   LTG 1 Improve leyla LE strength by >/= 1/2 MMT grade to improve pt's stability with standing and walking. In progress   LTG 2 Pt will ambulate with LRD >/= 200' with improved leyla foot clearance and upright posture S/I. In progress   LTG 3 Shah >/= 35/56 to reduce pt's risk for falls. In progress   LTG 4 TUG with LRD </= 12sec to to improve safety with ambulation. In progress   LTG 5 STS from chair with decreased reliance on leyla UEs S/I. In progress        Plan:  Frequency/Duration:  Plan  Plan Frequency: 2  Plan weeks: 6  Current Treatment Recommendations: Strengthening, ROM, Balance training, Functional mobility training, Transfer training, Gait training, Stair training, Neuromuscular re-education, Manual, Home exercise program, Safety education & training, Patient/Caregiver education & training, Equipment evaluation, education, & procurement, Modalities  Modalities: Heat/Cold  Pt to continue current HEP. See objective section for any therapeutic exercise changes, additions or modifications this date.     Therapy Time:   PT Individual Minutes  Time In: 1300  Time Out: 1400  Minutes: 60  Timed Code Treatment Minutes: 50 Minutes    Procedure Minutes:10 MH  Timed Activity Minutes Units   Ther Ex 20 1   Neuro  20 1   Gait 10 1     Electronically signed by John Meyer on 4/18/23 at 5:00 PM EDT  Treatment directly supervised by Electronically signed by Rayfield Harada, PTA on 4/18/23 at 5:09 PM EDT

## 2023-04-20 ENCOUNTER — HOSPITAL ENCOUNTER (OUTPATIENT)
Dept: PHYSICAL THERAPY | Age: 78
Setting detail: THERAPIES SERIES
Discharge: HOME | End: 2023-04-20
Payer: MEDICARE

## 2023-04-20 PROCEDURE — 97110 THERAPEUTIC EXERCISES: CPT

## 2023-04-20 ASSESSMENT — PAIN DESCRIPTION - LOCATION: LOCATION: BACK

## 2023-04-20 ASSESSMENT — PAIN SCALES - GENERAL: PAINLEVEL_OUTOF10: 7

## 2023-04-20 ASSESSMENT — PAIN DESCRIPTION - ORIENTATION: ORIENTATION: LOWER

## 2023-04-20 NOTE — PROGRESS NOTES
[facility]  Outpatient Physical Therapy    Treatment Note        Date: 2023  Patient: Onel Peterson  : 1945   Confirmed: Yes  MRN: [id number]  Referring Provider: Carlos Manuel Lala MD    Medical Diagnosis: Other intervertebral disc degeneration, lumbosacral region [M51.37]       Treatment Diagnosis: back pain, neck pain, impaired gait and activity tolerance, back and neck muscle weakness    Visit Information:  Insurance: Payor: Priya Gross / Plan: MEDICARE PART A AND B / Product Type: *No Product type* /   PT Visit Information  PT Insurance Information: Medicare  Total # of Visits Approved: 99  Total # of Visits to Date: 32  Plan of Care/Certification Expiration Date: 23  No Show: 0  Progress Note Due Date: 23  Canceled Appointment: 0  Progress Note Counter:     Subjective Information:  Subjective: Pt repors still having a hard time with balance. Ambulates within his home with a s/c or holding onto furniture if needed. Would ultimately like to walk with just a s/c vs the [employer] he uses outside and for longer distances. Feels his balance has improved but not to where he would like it to be. HEP Compliance:  [x] Good [] Fair [] Poor [] Reports not doing due to:    Pain Screening  Patient Currently in Pain: Yes  Pain Assessment: 0-10  Pain Level: 7  Pain Location: Back  Pain Orientation: Lower    Treatment:  Exercises:  Exercises  Exercise 1: Static standing with 1 UE on GTB wrap around // bars: FA, FT, semitandem, FA with head turns  Exercise 3: Single stepping over s/c with 1 UE on bar and 1 UE on GTB wrap around // bars x5 leyla - declines more due to increased LBP  Exercise 4: Amb over uneven mat with 1 UE support  Exercise 7: [employer] L2 6' to improve trunk and LE mobility  Exercise 13: Leyla 3 way hip YTB- x10 due to increase pain  Exercise 15: Standing fwd flex lumbar flex str.  5'' 5x SBA    Modalities:  Moist Heat (CPT 53217)  Patient Position: Seated  Number Minutes Moist

## 2023-04-25 ENCOUNTER — HOSPITAL ENCOUNTER (OUTPATIENT)
Dept: PHYSICAL THERAPY | Age: 78
Setting detail: THERAPIES SERIES
Discharge: HOME | End: 2023-04-25
Payer: MEDICARE

## 2023-04-25 PROCEDURE — 97112 NEUROMUSCULAR REEDUCATION: CPT

## 2023-04-25 PROCEDURE — 97110 THERAPEUTIC EXERCISES: CPT

## 2023-04-25 ASSESSMENT — PAIN DESCRIPTION - DESCRIPTORS: DESCRIPTORS: SORE

## 2023-04-25 ASSESSMENT — PAIN DESCRIPTION - LOCATION: LOCATION: BACK

## 2023-04-25 ASSESSMENT — PAIN SCALES - GENERAL: PAINLEVEL_OUTOF10: 7

## 2023-04-25 ASSESSMENT — PAIN DESCRIPTION - ORIENTATION: ORIENTATION: LOWER

## 2023-04-25 NOTE — PROGRESS NOTES
[address]     Ph: [phone]  Fax: [phone]    [] Certification  [x] Recertification []  Plan of Care  [x] Progress Note [] Discharge      Referring Provider: Ever Matthews MD    From: Anthony Gandara, PT, DPT   Patient: Feliciano Hidalgo (78 y.o. male) : 1945 Date: 2023   Medical Diagnosis: Other intervertebral disc degeneration, lumbosacral region [M51.37]    Treatment Diagnosis: back pain, neck pain, impaired gait and activity tolerance, back and neck muscle weakness    Plan of Care/Certification Expiration Date: 23   Progress Report Period from: 23  to 2023    Visits to Date:  No Show: 0 Cancelled Appts: 0    OBJECTIVE:   Short Term Goals - Time Frame for Short Term Goals: 2 weeks    Goals Current/Discharge status  Status   Short Term Goal 1: Independent with HEP. STG 1 Current Status[de-identified] Independent   In progress, Met     Long Term Goals - Time Frame for Long Term Goals : 4 weeks  Goals Current/ Discharge status Status   Long Term Goal 1: Improve leyla LE strength by >/= 1/2 MMT grade to improve pt's stability with standing and walking. Strength LLE  L Hip Flexion: 4/5  L Hip ABduction: 4-/5  L Knee Flexion: 4+/5  L Knee Extension: 4+/5  L Ankle Dorsiflexion: 1+/5  Strength RLE  R Hip Flexion: 4/5, 4+/5  R Hip ABduction: 4-/5, 4/5  R Knee Flexion: 5/5  R Knee Extension: 5/5  R Ankle Dorsiflexion: 5/5     In progress   Long Term Goal 2: Pt will ambulate with LRD >/= 200' with improved leyla foot clearance and upright posture S/I. LTG 2 Current Status[de-identified] Pt able to amb with s/c for short distances with SBA, pt is able to amb 200' with FWW with crouched gait but improved foot clearance   In progress   Long Term Goal 3: Shah >/= 35/56 to reduce pt's risk for falls.  LTG 3 Current Status[de-identified] 23: Leonor Mullins:    In progress   Long Term Goal 4: TUG with LRD </= 12sec to

## 2023-04-25 NOTE — PROGRESS NOTES
[facility]  Outpatient Physical Therapy   Treatment Note        Date: 2023  Patient: Eron Davidson  : 1945   Confirmed: Yes  MRN: [id number]  Referring Provider: Huel Harada, MD  Medical Diagnosis: Other intervertebral disc degeneration, lumbosacral region [M51.37]   Treatment Diagnosis: back pain, neck pain, impaired gait and activity tolerance, back and neck muscle weakness    Visit Information:  Insurance: Payor: MEDICARE / Plan: MEDICARE PART A AND B / Product Type: *No Product type* /   PT Visit Information  PT Insurance Information: Medicare  Total # of Visits Approved: 99  Total # of Visits to Date:   Plan of Care/Certification Expiration Date: 23  No Show: 0  Progress Note Due Date: 23  Canceled Appointment: 0  Progress Note Counter:     Subjective Information:  Subjective: Pt reports he has been moving slow today and his back has been bothering him  HEP Compliance:  [x] Good [x] Fair [] Poor [] Reports not doing due to:    Pain Screening  Patient Currently in Pain: Yes  Pain Level: 7  Pain Location: Back  Pain Orientation: Lower  Pain Descriptors: Sore    Treatment:  Exercises:  Exercises  Exercise 1: Objective measures for PN  Exercise 4: Gait drills: in // bars   Exercise 6: STS x 3 from mat with cues for anterior weight shifting and eccentric control- attempted, discontinued due to fatigue  Exercise 11: LTR 3x30'' hold x 10, SKTC 3x30''  Exercise 17: Seated hamstring str 30'' 3xea  Exercise 18: clamshells supine x10 RTB  Exercise 19: bilateral upper traps str 3 x30''     Modalities:  Moist Heat (CPT 85400)  Patient Position: Seated  Number Minutes Moist Heat: 10 minutes  Moist heat location: Cervical, Low back  Moist heat specified location: Cervical and Lumbar spine  Post treatment skin assessment: Intact  Limitations addressed: Pain modulation     *Indicates exercise, modality, or manual techniques to be initiated when

## 2023-04-28 ENCOUNTER — HOSPITAL ENCOUNTER (OUTPATIENT)
Dept: PHYSICAL THERAPY | Age: 78
Setting detail: THERAPIES SERIES
Discharge: HOME | End: 2023-04-28
Payer: MEDICARE

## 2023-04-28 PROCEDURE — 97110 THERAPEUTIC EXERCISES: CPT

## 2023-04-28 PROCEDURE — 97112 NEUROMUSCULAR REEDUCATION: CPT

## 2023-04-28 ASSESSMENT — PAIN SCALES - GENERAL: PAINLEVEL_OUTOF10: 6

## 2023-04-28 ASSESSMENT — PAIN DESCRIPTION - PAIN TYPE: TYPE: CHRONIC PAIN

## 2023-04-28 ASSESSMENT — PAIN DESCRIPTION - ORIENTATION: ORIENTATION: LOWER;RIGHT

## 2023-04-28 ASSESSMENT — PAIN DESCRIPTION - LOCATION: LOCATION: BACK

## 2023-04-28 ASSESSMENT — PAIN DESCRIPTION - DESCRIPTORS: DESCRIPTORS: SHARP

## 2023-04-28 NOTE — PROGRESS NOTES
posture S/I. In progress   LTG 3 Shah >/= 35/56 to reduce pt's risk for falls. In progress   LTG 4 TUG with LRD </= 12sec to to improve safety with ambulation. In progress   LTG 5 STS from chair with decreased reliance on leyla UEs S/I. In progress, Partially met       Plan:  Frequency/Duration:  Plan  Plan Frequency: 2  Plan weeks: 4  Current Treatment Recommendations: Strengthening, ROM, Balance training, Functional mobility training, Transfer training, Gait training, Stair training, Neuromuscular re-education, Manual, Home exercise program, Safety education & training, Patient/Caregiver education & training, Equipment evaluation, education, & procurement, Modalities  Modalities: Heat/Cold  Pt to continue current HEP. See objective section for any therapeutic exercise changes, additions or modifications this date.     Therapy Time:      PT Individual Minutes  Time In: 8342  Time Out: 3628  Minutes: 67  Timed Code Treatment Minutes: 62 Minutes  Procedure Minutes:10, MH  Timed Activity Minutes Units   Ther Ex 15 1   Neuro 42 3     Electronically signed by Leanne Collier PTA on 4/28/23 at 4:01 PM EDT

## 2023-05-04 ENCOUNTER — HOSPITAL ENCOUNTER (OUTPATIENT)
Dept: PHYSICAL THERAPY | Age: 78
Setting detail: THERAPIES SERIES
Discharge: HOME | End: 2023-05-04
Payer: MEDICARE

## 2023-05-04 PROCEDURE — 97112 NEUROMUSCULAR REEDUCATION: CPT

## 2023-05-04 ASSESSMENT — PAIN DESCRIPTION - LOCATION: LOCATION: BACK;NECK

## 2023-05-04 ASSESSMENT — PAIN SCALES - GENERAL: PAINLEVEL_OUTOF10: 9

## 2023-05-04 ASSESSMENT — PAIN DESCRIPTION - ORIENTATION: ORIENTATION: LOWER

## 2023-05-04 NOTE — PROGRESS NOTES
[facility]  Outpatient Physical Therapy    Treatment Note        Date: 2023  Patient: Mega Montero  : 1945   Confirmed: Yes  MRN: [id number]  Referring Provider: Kobe Servin MD    Medical Diagnosis: Other intervertebral disc degeneration, lumbosacral region [M51.37]       Treatment Diagnosis: back pain, neck pain, impaired gait and activity tolerance, back and neck muscle weakness    Visit Information:  Insurance: Payor: MEDICARE / Plan: MEDICARE PART A AND B / Product Type: *No Product type* /   PT Visit Information  PT Insurance Information: Medicare  Total # of Visits Approved: 99  Total # of Visits to Date:   Plan of Care/Certification Expiration Date: 23  No Show: 0  Progress Note Due Date: 23  Canceled Appointment: 0  Progress Note Counter:     Subjective Information:  Subjective: Pt reports waking up with a lot of back and neck pain today. Reports still having difficulty with static standing balance. Pt reports using s/c to ambulate at home with using other hand on furniture. HEP Compliance:  [x] Good [] Fair [] Poor [] Reports not doing due to: Fall Risk     Position Activity Restriction  Other position/activity restrictions: lumbar fusion, pacemaker    Pain Screening  Patient Currently in Pain: Yes  Pain Assessment: 0-10  Pain Level: 9  Pain Location: Back, Neck  Pain Orientation: Lower    Treatment:  Exercises:  Exercises  Exercise 1: Standing with toes/heels on bean bags, standing ith perturbations  Exercise 2: Foam: alt toe taps, standing with 1 foot on  Exercise 4: Gait drills: in // bars:  F/L with YTB x2  Exercise 7: STS with heels or toes on bean bags x4 ea  Exercise 8: Standing heel/toe raises  Exercise 11: Standing with 1 UE with PNF diagonals x10 georges  Exercise 12: Seated power ups x10  Exercise 17: Georges Seated hamstring str 30'' 3xea       Modalities:  Moist Heat (CPT 77660)  Patient Position: Seated  Number Minutes Moist Heat: 10

## 2023-05-09 ENCOUNTER — HOSPITAL ENCOUNTER (OUTPATIENT)
Dept: PHYSICAL THERAPY | Age: 78
Setting detail: THERAPIES SERIES
Discharge: HOME | End: 2023-05-09
Payer: MEDICARE

## 2023-05-09 PROCEDURE — 97112 NEUROMUSCULAR REEDUCATION: CPT

## 2023-05-09 PROCEDURE — 97140 MANUAL THERAPY 1/> REGIONS: CPT

## 2023-05-09 PROCEDURE — 97110 THERAPEUTIC EXERCISES: CPT

## 2023-05-09 ASSESSMENT — PAIN DESCRIPTION - ORIENTATION: ORIENTATION: LOWER

## 2023-05-09 ASSESSMENT — PAIN DESCRIPTION - DESCRIPTORS: DESCRIPTORS: SHARP

## 2023-05-09 ASSESSMENT — PAIN DESCRIPTION - LOCATION: LOCATION: BACK

## 2023-05-09 ASSESSMENT — PAIN DESCRIPTION - PAIN TYPE: TYPE: CHRONIC PAIN

## 2023-05-09 ASSESSMENT — PAIN SCALES - GENERAL: PAINLEVEL_OUTOF10: 9

## 2023-05-09 NOTE — PROGRESS NOTES
[facility]  Outpatient Physical Therapy    Treatment Note        Date: 2023  Patient: Megha Krishnan  : 1945   Confirmed: Yes  MRN: [id number]  Referring Provider: Leif Lee MD    Medical Diagnosis: Other intervertebral disc degeneration, lumbosacral region [M51.37]       Treatment Diagnosis: back pain, neck pain, impaired gait and activity tolerance, back and neck muscle weakness    Visit Information:  Insurance: Payor: MEDICARE / Plan: MEDICARE PART A AND B / Product Type: *No Product type* /   PT Visit Information  PT Insurance Information: Medicare  Total # of Visits Approved: 99  Total # of Visits to Date: 32  Plan of Care/Certification Expiration Date: 23  No Show: 0  Progress Note Due Date: 23  Canceled Appointment: 0  Progress Note Counter: 3/8    Subjective Information:  Subjective: Pt reports increased pain in LB and \"My drop foot seems to have gotten worse\"  HEP Compliance:  [x] Good [] Fair [] Poor [] Reports not doing due to:        Pain Screening  Patient Currently in Pain: Yes  Pain Assessment: 0-10  Pain Level: 9  Pain Type: Chronic pain  Pain Location: Back  Pain Orientation: Lower  Pain Descriptors: Sharp    Treatment:  Exercises:  Exercises  Exercise 4: Gait drills: in // bars:  F/L with YTB x1-2, March with lelya UE support  Exercise 11: Standing with 1 UE with PNF diagonals x10 leyla, SLS on 2\" box  Exercise 12: Seated power ups x 2, scap retact in standing 5\" x 5  Exercise 15: SKTC 20-30 S x 3, LTR 5\" x 10       Manual:   Manual Therapy  Soft Tissue Mobilizaton: Rt lumbar with TB x 8 min  Treatment Reasoning  Limitations addressed: Tissue extensibility  Limitations addressed: pain relief    Modalities:  Moist Heat (CPT 09704)  Patient Position: Seated  Number Minutes Moist Heat: 10 minutes  Moist heat location: Cervical, Low back  Moist heat specified location: Cervical and Lumbar spine  Post treatment skin assessment: Intact  Limitations addressed: Pain

## 2023-05-12 ENCOUNTER — HOSPITAL ENCOUNTER (OUTPATIENT)
Dept: PHYSICAL THERAPY | Age: 78
Setting detail: THERAPIES SERIES
Discharge: HOME | End: 2023-05-12
Payer: MEDICARE

## 2023-05-12 PROCEDURE — 97116 GAIT TRAINING THERAPY: CPT

## 2023-05-12 PROCEDURE — 97112 NEUROMUSCULAR REEDUCATION: CPT

## 2023-05-12 PROCEDURE — 97110 THERAPEUTIC EXERCISES: CPT

## 2023-05-12 ASSESSMENT — PAIN DESCRIPTION - DESCRIPTORS: DESCRIPTORS: SHARP;DULL;ACHING

## 2023-05-12 ASSESSMENT — PAIN DESCRIPTION - ORIENTATION: ORIENTATION: LOWER;RIGHT

## 2023-05-12 ASSESSMENT — PAIN DESCRIPTION - PAIN TYPE: TYPE: CHRONIC PAIN

## 2023-05-12 ASSESSMENT — PAIN DESCRIPTION - LOCATION: LOCATION: NECK;BACK

## 2023-05-12 ASSESSMENT — PAIN SCALES - GENERAL: PAINLEVEL_OUTOF10: 4

## 2023-05-12 NOTE — PROGRESS NOTES
[facility]  Outpatient Physical Therapy    Treatment Note        Date: 2023  Patient: Kimberly Herron  : 1945   Confirmed: Yes  MRN: [id number]  Referring Provider: Kishor Dumont MD    Medical Diagnosis: Other intervertebral disc degeneration, lumbosacral region [M51.37]       Treatment Diagnosis: back pain, neck pain, impaired gait and activity tolerance, back and neck muscle weakness    Visit Information:  Insurance: Payor: [employer] / Plan: MEDICARE PART A AND B / Product Type: *No Product type* /   PT Visit Information  PT Insurance Information: Medicare  Total # of Visits Approved: 99  Total # of Visits to Date:   Plan of Care/Certification Expiration Date: 23  No Show: 0  Progress Note Due Date: 23  Canceled Appointment: 0  Progress Note Counter:     Subjective Information:  Subjective: Pt reports it took 2 1/2 days for back pain to decrease since LV  HEP Compliance:  [] Good [x] Fair [] Poor [] Reports not doing due to:      Pain Screening  Patient Currently in Pain: Yes  Pain Assessment: 0-10  Pain Level: 4  Pain Type: Chronic pain  Pain Location: Neck, Back  Pain Orientation: Lower, Right  Pain Descriptors: Christian Aparicio    Treatment:  Exercises:  Exercises  Exercise 1: Reaching for cones across all planes0-1 ue support  Exercise 2: Foam: FA/FT/ wt shifts L/A/P, LOB post  Exercise 4: Gait drills: in // bars: F/L/R with YTB x1-2, March with leyla UE support  Exercise 8: Shah tasks: tapping 4\" and Fwd on land with and w/o ue support, fearful, SLS with 0-2 ue support  Exercise 9: 90-360deg tuirns with decreased Leyla foot clearance 0-1 ue support  Exercise 16: TUG see functional       *Indicates exercise, modality, or manual techniques to be initiated when appropriate    Objective Measures:       LTG 4 Current Status[de-identified] 23: TU.71s 2nd attempt 16.90s with [employer]       Assessment:    Body Structures, Functions, Activity Limitations Requiring Skilled Therapeutic

## 2023-05-16 ENCOUNTER — HOSPITAL ENCOUNTER (OUTPATIENT)
Dept: PHYSICAL THERAPY | Age: 78
Setting detail: THERAPIES SERIES
Discharge: HOME | End: 2023-05-16
Payer: MEDICARE

## 2023-05-16 PROCEDURE — 97110 THERAPEUTIC EXERCISES: CPT

## 2023-05-16 ASSESSMENT — PAIN DESCRIPTION - PAIN TYPE: TYPE: CHRONIC PAIN

## 2023-05-16 ASSESSMENT — PAIN DESCRIPTION - ORIENTATION: ORIENTATION: RIGHT;LOWER

## 2023-05-16 ASSESSMENT — PAIN DESCRIPTION - LOCATION: LOCATION: BACK

## 2023-05-16 ASSESSMENT — PAIN SCALES - GENERAL: PAINLEVEL_OUTOF10: 7

## 2023-05-16 NOTE — PROGRESS NOTES
[facility]  Outpatient Physical Therapy    Treatment Note        Date: 2023  Patient: Gloria Cain  : 1945   Confirmed: Yes  MRN: [id number]  Referring Provider: Amanda Adams MD    Medical Diagnosis: Other intervertebral disc degeneration, lumbosacral region [M51.37]       Treatment Diagnosis: back pain, neck pain, impaired gait and activity tolerance, back and neck muscle weakness    Visit Information:  Insurance: Payor: MEDICARE / Plan: MEDICARE PART A AND B / Product Type: *No Product type* /   PT Visit Information  PT Insurance Information: Medicare  Total # of Visits Approved: 99  Total # of Visits to Date: 35  Plan of Care/Certification Expiration Date: 23  No Show: 0  Progress Note Due Date: 23  Canceled Appointment: 0  Progress Note Counter:     Subjective Information:  Subjective: Pt reports he feels okay a little tired. HEP Compliance:  [x] Good [] Fair [] Poor [] Reports not doing due to:    Pain Screening  Patient Currently in Pain: Yes  Pain Assessment: 0-10  Pain Level: 7  Pain Type: Chronic pain  Pain Location: Back  Pain Orientation: Right, Lower    Treatment:  Exercises:  Exercises  Exercise 4: Gait drills: in // bars: F/L/R/march x3 laps  Exercise 5: Step ups 4' box F/L x10- only able to complete lateral on Rt. side  Exercise 7: STS with heels on bean bags x 10ea  Exercise 13: Georges 3 way hip YTB- x10 due to increase pain  Exercise 20: HEP: cont currect     Assessment: Body Structures, Functions, Activity Limitations Requiring Skilled Therapeutic Intervention: Increased pain, Decreased ROM, Decreased strength, Decreased balance, Decreased functional mobility , Decreased ADL status, Decreased high-level IADLs, Decreased posture  Assessment: Focused session on improving bilateral lower extremity strength. Pt tolerated well with sllight increase in back pain during standing activities.  Worked on complete STS with decreased reliance on UE support from

## 2023-05-18 ENCOUNTER — HOSPITAL ENCOUNTER (OUTPATIENT)
Dept: PHYSICAL THERAPY | Age: 78
Setting detail: THERAPIES SERIES
Discharge: HOME | End: 2023-05-18
Payer: MEDICARE

## 2023-05-18 PROCEDURE — 97110 THERAPEUTIC EXERCISES: CPT

## 2023-05-18 PROCEDURE — 97112 NEUROMUSCULAR REEDUCATION: CPT

## 2023-05-18 ASSESSMENT — PAIN SCALES - GENERAL: PAINLEVEL_OUTOF10: 6

## 2023-05-18 ASSESSMENT — PAIN DESCRIPTION - DESCRIPTORS: DESCRIPTORS: SHARP

## 2023-05-18 ASSESSMENT — PAIN DESCRIPTION - LOCATION: LOCATION: NECK;BACK

## 2023-05-18 NOTE — PROGRESS NOTES
fingertip support, patient demonstrates difficulty completing with increased Trendelenburg observed as well as overall decreased balance and stability. Patient able to complete left lateral step ups onto 4'' box, improved since last visit. Patient expressing increased fatigue with standing dynamic activities. Concluded session with HP to lumbar and cervical region to decrease pain and stiffness. Treatment Diagnosis: back pain, neck pain, impaired gait and activity tolerance, back and neck muscle weakness  Therapy Prognosis: Fair          Post-Pain Assessment:       Pain Rating (0-10 pain scale):   4/10   Location and pain description same as pre-treatment unless indicated. Action: [] NA   [] Perform HEP  [x] Meds as prescribed  [] Modalities as prescribed   [] Call Physician     GOALS   Patient Goal(s): Patient Goals : \"Lose cane, rollator, and walker\"    Short Term Goals Completed by 2 weeks Goal Status   STG 1 Independent with HEP. In progress, Met       Long Term Goals Completed by 4 weeks Goal Status   LTG 1 Improve leyla LE strength by >/= 1/2 MMT grade to improve pt's stability with standing and walking. In progress   LTG 2 Pt will ambulate with LRD >/= 200' with improved leyla foot clearance and upright posture S/I. In progress   LTG 3 Shah >/= 35/56 to reduce pt's risk for falls. In progress   LTG 4 TUG with LRD </= 12sec to to improve safety with ambulation. In progress   LTG 5 STS from chair with decreased reliance on leyla UEs S/I.  In progress, Partially met       Plan:  Frequency/Duration:  Plan  Plan Frequency: 2  Plan weeks: 4  Current Treatment Recommendations: Strengthening, ROM, Balance training, Functional mobility training, Transfer training, Gait training, Stair training, Neuromuscular re-education, Manual, Home exercise program, Safety education & training, Patient/Caregiver education & training, Equipment evaluation, education, & procurement, Modalities  Modalities: Heat/Cold  Pt to continue

## 2023-05-23 ENCOUNTER — APPOINTMENT (OUTPATIENT)
Dept: PHYSICAL THERAPY | Age: 78
End: 2023-05-23
Payer: MEDICARE

## 2023-05-23 PROCEDURE — 97116 GAIT TRAINING THERAPY: CPT

## 2023-05-23 PROCEDURE — 97112 NEUROMUSCULAR REEDUCATION: CPT

## 2023-05-23 ASSESSMENT — PAIN DESCRIPTION - LOCATION: LOCATION: BACK

## 2023-05-23 ASSESSMENT — PAIN SCALES - GENERAL: PAINLEVEL_OUTOF10: 5

## 2023-05-23 ASSESSMENT — PAIN DESCRIPTION - PAIN TYPE: TYPE: CHRONIC PAIN

## 2023-05-23 NOTE — PROGRESS NOTES
[facility]  Outpatient Physical Therapy    Treatment Note        Date: 2023  Patient: Nicolasa Hollingsworth  : 1945   Confirmed: Yes  MRN: [id number]  Referring Provider: Bravo Holland MD    Medical Diagnosis: Other intervertebral disc degeneration, lumbosacral region [M51.37]       Treatment Diagnosis: back pain, neck pain, impaired gait and activity tolerance, back and neck muscle weakness    Visit Information:  Insurance: Payor: MEDICARE / Plan: MEDICARE PART A AND B / Product Type: *No Product type* /   PT Visit Information  PT Insurance Information: Medicare  Total # of Visits Approved: 99  Total # of Visits to Date:   Plan of Care/Certification Expiration Date: 23  No Show: 0  Progress Note Due Date: 23  Canceled Appointment: 0  Progress Note Counter:     Subjective Information:  Subjective: Pt reports he cancelled his procedure for back pain wire because he doesnt want to be cut into anymore. Pt reports he would carole to continue with therapy. Pt reports he is using his walker outside and using cane inside.   HEP Compliance:  [x] Good [] Fair [] Poor [] Reports not doing due to:    Pain Screening  Patient Currently in Pain: Yes  Pain Assessment: 0-10  Pain Level: 5  Pain Type: Chronic pain  Pain Location: Back    Treatment:  Exercises:  Exercises  Exercise 1: static standing with 1UE support reaching OOBOS  Exercise 4: Gait drills: in // bars: F/L/R/march x2 laps (fingertip to 1 UE support) (marching with hold requiring BUE support)  Exercise 6: Scifit level 2.5 for 6' to increase strength  Exercise 7: STS x12- requires UE use  Exercise 8: Shah balance: see functional outcomes  Exercise 20: HEP: cont currect    Modalities:  Moist Heat (CPT 24805)  Patient Position: Seated  Number Minutes Moist Heat: 10 minutes  Moist heat location: Cervical, Low back  Moist heat specified location: Cervical and Lumbar spine  Post treatment skin assessment: Intact  Limitations addressed:

## 2023-05-25 ENCOUNTER — HOSPITAL ENCOUNTER (OUTPATIENT)
Dept: PHYSICAL THERAPY | Age: 78
Setting detail: THERAPIES SERIES
Discharge: HOME | End: 2023-05-25
Payer: MEDICARE

## 2023-05-25 ENCOUNTER — APPOINTMENT (OUTPATIENT)
Dept: PHYSICAL THERAPY | Age: 78
End: 2023-05-25
Payer: MEDICARE

## 2023-05-25 PROCEDURE — 97116 GAIT TRAINING THERAPY: CPT

## 2023-05-25 PROCEDURE — 97110 THERAPEUTIC EXERCISES: CPT

## 2023-05-25 ASSESSMENT — PAIN DESCRIPTION - ORIENTATION: ORIENTATION: LOWER

## 2023-05-25 ASSESSMENT — PAIN DESCRIPTION - LOCATION: LOCATION: BACK

## 2023-05-25 ASSESSMENT — PAIN SCALES - GENERAL: PAINLEVEL_OUTOF10: 4

## 2023-05-25 NOTE — PROGRESS NOTES
[facility]  Outpatient Physical Therapy    Treatment Note        Date: 2023  Patient: Houston Steiner  : 1945   Confirmed: Yes  MRN: [id number]  Referring Provider: Otoniel Chatterjee MD    Medical Diagnosis: Other intervertebral disc degeneration, lumbosacral region [M51.37]       Treatment Diagnosis: back pain, neck pain, impaired gait and activity tolerance, back and neck muscle weakness    Visit Information:  Insurance: Payor: MEDICARE / Plan: MEDICARE PART A AND B / Product Type: *No Product type* /   PT Visit Information  PT Insurance Information: Medicare  Total # of Visits Approved: 99  Total # of Visits to Date: 39  Plan of Care/Certification Expiration Date: 23  No Show: 0  Progress Note Due Date: 23  Canceled Appointment: 0  Progress Note Counter:     Subjective Information:  Subjective: Pt reports able to walk more and has an easier time getting up and down.   HEP Compliance:  [x] Good [] Fair [] Poor [] Reports not doing due to:               Pain Screening  Patient Currently in Pain: Yes  Pain Assessment: 0-10  Pain Level: 4 (Neck 4/10, LB 2/10)  Pain Location: Back  Pain Orientation: Lower      Objective Measures:   Timed Up and Go: 17.74 (with [employer], 2nd attempt 16.48sec)    Ambulation  Surface: Carpet  Device: Rolling Walker  Assistance: Supervision  Quality of Gait: FWD flexed posture, heavy reliance on leyla UEs, decreased leyla foot clearance, decreased knee ext control at end swing phase  Gait Deviations: Slow Yvonne, Decreased step height, Decreased step length  Distance: 600'    Ambulation 2  Surface - 2: carpet  Device 2: Single point cane  Assistance 2: Stand by assistance  Quality of Gait 2: FWD flexed posture, decreased leyla foot clearance, decreased Rt step length vs Lt  Gait Deviations: Slow Yvonne, Decreased step length, Decreased step height  Distance: 50' x2      Strength: [] NT  [x] MMT completed:  Strength RLE  R Hip Flexion: 4+/5  R Hip ABduction:

## 2023-05-25 NOTE — PROGRESS NOTES
[address]     Ph: [phone]  Fax: [phone]      [] Certification  [] Recertification []  Plan of Care  [] Progress Note [x] Discharge      Referring Provider: An Titus MD     From:  Padma Arguelles, PT  Patient: Latasha Schrader (80 y.o. male) : 1945 Date: 2023  Medical Diagnosis: Other intervertebral disc degeneration, lumbosacral region [M51.37]       Treatment Diagnosis: back pain, neck pain, impaired gait and activity tolerance, back and neck muscle weakness    Plan of Care/Certification Expiration Date: : 23   Progress Report Period from:  2023  to 2023    Visits to Date: 36 No Show: 0 Cancelled Appts: 0    OBJECTIVE:   Short Term Goals - Time Frame for Short Term Goals: 2 weeks    Goals Current/Discharge status  Status   Short Term Goal 1: Independent with HEP. independent Met     Long Term Goals - Time Frame for Long Term Goals : 4 weeks  Goals Current/ Discharge status Status   Long Term Goal 1: Improve leyla LE strength by >/= 1/2 MMT grade to improve pt's stability with standing and walking. Strength LLE  L Hip Flexion: 4+/5  L Hip ABduction: 3+/5  L Knee Flexion: 5/5  L Knee Extension: 5/5  L Ankle Dorsiflexion: 1+/5  Strength RLE  R Hip Flexion: 4+/5  R Hip ABduction: 4/5  R Knee Flexion: 5/5  R Knee Extension: 5/5  R Ankle Dorsiflexion: 5/5    Partially met   Long Term Goal 2: Pt will ambulate with LRD >/= 200' with improved leyla foot clearance and upright posture S/I.  Ambulation  Surface: Carpet  Device: Rolling Walker  Assistance: Supervision  Quality of Gait: FWD flexed posture, heavy reliance on leyla UEs, decreased leyla foot clearance, decreased knee ext control at end swing phase  Gait Deviations: Slow Yvonne, Decreased step height, Decreased step length  Distance: 600'  Ambulation 2  Surface - 2: carpet  Device 2: Single point cane  Assistance

## 2023-06-01 ENCOUNTER — OFFICE VISIT (OUTPATIENT)
Dept: CARDIOLOGY CLINIC | Age: 78
End: 2023-06-01

## 2023-06-01 VITALS
DIASTOLIC BLOOD PRESSURE: 70 MMHG | SYSTOLIC BLOOD PRESSURE: 130 MMHG | HEART RATE: 62 BPM | WEIGHT: 315 LBS | BODY MASS INDEX: 39.76 KG/M2

## 2023-06-01 DIAGNOSIS — N18.30 STAGE 3 CHRONIC KIDNEY DISEASE, UNSPECIFIED WHETHER STAGE 3A OR 3B CKD (HCC): ICD-10-CM

## 2023-06-01 DIAGNOSIS — Z95.1 S/P CABG X 4: ICD-10-CM

## 2023-06-01 DIAGNOSIS — I25.810 CORONARY ARTERY DISEASE INVOLVING CORONARY BYPASS GRAFT OF NATIVE HEART WITHOUT ANGINA PECTORIS: ICD-10-CM

## 2023-06-01 DIAGNOSIS — I10 ESSENTIAL HYPERTENSION: Primary | ICD-10-CM

## 2023-06-01 DIAGNOSIS — I50.22 CHRONIC SYSTOLIC CHF (CONGESTIVE HEART FAILURE), NYHA CLASS 1 (HCC): ICD-10-CM

## 2023-06-01 DIAGNOSIS — E66.01 MORBID OBESITY (HCC): Chronic | ICD-10-CM

## 2023-06-01 DIAGNOSIS — I48.91 ATRIAL FIBRILLATION, UNSPECIFIED TYPE (HCC): ICD-10-CM

## 2023-06-01 DIAGNOSIS — I10 HYPERTENSION, UNSPECIFIED TYPE: ICD-10-CM

## 2023-06-01 DIAGNOSIS — E78.2 MIXED HYPERLIPIDEMIA: ICD-10-CM

## 2023-06-01 NOTE — PROGRESS NOTES
gallops  Abdomen - soft, nontender, nondistended, no masses or organomegaly  Neurological - alert, oriented, normal speech, no focal findings or movement disorder noted  Extremities - peripheral pulses normal, no pedal edema, no clubbing or cyanosis  Skin - normal coloration and turgor, no rashes, no suspicious skin lesions noted    Pulses:   Carotid pulses are 3+ on the right side, and 3+ on the left side. Radial pulses are 3+ on the right side, and 3+ on the left side. Femoral pulses are 3+ on the right side, and 3+ on the left side. Popliteal pulses are 3+ on the right side, and 3+ on the left side. LABS:    Chemistry        Component Value Date/Time     05/30/2023 1107    K 5.5 (H) 05/30/2023 1107     (H) 05/30/2023 1107    CO2 17 (L) 05/30/2023 1107    BUN 58 (H) 05/30/2023 1107    CREATININE 1.79 (H) 05/30/2023 1107        Component Value Date/Time    CALCIUM 9.0 05/30/2023 1107    ALKPHOS 143 (H) 01/11/2023 1308    AST 25 01/11/2023 1308    ALT 30 01/11/2023 1308    BILITOT 0.3 01/11/2023 1308            Lab Results   Component Value Date    WBC 4.7 (L) 05/30/2023    HGB 11.4 (L) 05/30/2023    HCT 34.1 (L) 05/30/2023    MCV 92.6 (H) 05/30/2023    PLT 77 (L) 05/30/2023       No components found for: CHLPL  Lab Results   Component Value Date    TRIG 141 01/11/2023    TRIG 213 (H) 07/07/2021    TRIG 225 (H) 02/12/2020     Lab Results   Component Value Date    HDL 46 01/11/2023    HDL 39 (L) 07/07/2021    HDL 44 02/12/2020     Lab Results   Component Value Date    LDLCALC 64 01/11/2023    LDLCALC 66 07/07/2021    LDLCALC 77 02/12/2020       No results found for: LABVLDL     EKG: NSR. First degree AVB. ASSESSMENT:     Diagnosis Orders   1. Essential hypertension  EKG 12 Lead      2. Chronic systolic CHF (congestive heart failure), NYHA class 1 ([facility])        3. Atrial fibrillation, unspecified type ([facility] [address]        4.  Coronary artery disease involving coronary bypass graft of native heart

## 2023-06-17 ENCOUNTER — HOSPITAL ENCOUNTER (INPATIENT)
Age: 78
LOS: 3 days | Discharge: NURSING, REHAB OR LONG-TERM CARE | DRG: 638 | End: 2023-06-21
Attending: GENERAL PRACTICE | Admitting: INTERNAL MEDICINE
Payer: MEDICARE

## 2023-06-17 DIAGNOSIS — E87.5 HYPERKALEMIA: ICD-10-CM

## 2023-06-17 DIAGNOSIS — E16.2 HYPOGLYCEMIA: Primary | ICD-10-CM

## 2023-06-17 LAB
ACANTHOCYTES BLD QL SMEAR: ABNORMAL
ANION GAP SERPL CALCULATED.3IONS-SCNC: 11 MEQ/L (ref 9–15)
ANISOCYTOSIS BLD QL SMEAR: ABNORMAL
BASOPHILS # BLD: 0 K/UL (ref 0–0.2)
BASOPHILS NFR BLD: 0.4 %
BUN SERPL-MCNC: 70 MG/DL (ref 8–23)
CALCIUM SERPL-MCNC: 8.7 MG/DL (ref 8.5–9.9)
CHLORIDE SERPL-SCNC: 113 MEQ/L (ref 95–107)
CHP ED QC CHECK: NORMAL
CO2 SERPL-SCNC: 17 MEQ/L (ref 20–31)
CREAT SERPL-MCNC: 1.86 MG/DL (ref 0.7–1.2)
EOSINOPHIL # BLD: 0.2 K/UL (ref 0–0.7)
EOSINOPHIL NFR BLD: 3.9 %
ERYTHROCYTE [DISTWIDTH] IN BLOOD BY AUTOMATED COUNT: 16.6 % (ref 11.5–14.5)
GLUCOSE BLD-MCNC: 145 MG/DL (ref 70–99)
GLUCOSE BLD-MCNC: 159 MG/DL
GLUCOSE BLD-MCNC: 159 MG/DL (ref 70–99)
GLUCOSE BLD-MCNC: 78 MG/DL (ref 70–99)
GLUCOSE SERPL-MCNC: 197 MG/DL (ref 70–99)
HCT VFR BLD AUTO: 31.4 % (ref 42–52)
HGB BLD-MCNC: 10.1 G/DL (ref 14–18)
LYMPHOCYTES # BLD: 0.5 K/UL (ref 1–4.8)
LYMPHOCYTES NFR BLD: 8.2 %
MCH RBC QN AUTO: 30.2 PG (ref 27–31.3)
MCHC RBC AUTO-ENTMCNC: 32.2 % (ref 33–37)
MCV RBC AUTO: 93.7 FL (ref 79–92.2)
MONOCYTES # BLD: 0.4 K/UL (ref 0.2–0.8)
MONOCYTES NFR BLD: 7.3 %
NEUTROPHILS # BLD: 4.8 K/UL (ref 1.4–6.5)
NEUTS SEG NFR BLD: 80.2 %
OVALOCYTES BLD QL SMEAR: ABNORMAL
PERFORMED ON: ABNORMAL
PERFORMED ON: ABNORMAL
PERFORMED ON: NORMAL
PLATELET # BLD AUTO: 89 K/UL (ref 130–400)
PLATELET BLD QL SMEAR: ABNORMAL
POIKILOCYTOSIS BLD QL SMEAR: ABNORMAL
POLYCHROMASIA BLD QL SMEAR: ABNORMAL
POTASSIUM SERPL-SCNC: 6.7 MEQ/L (ref 3.4–4.9)
RBC # BLD AUTO: 3.35 M/UL (ref 4.7–6.1)
SLIDE REVIEW: ABNORMAL
SODIUM SERPL-SCNC: 141 MEQ/L (ref 135–144)
WBC # BLD AUTO: 6 K/UL (ref 4.8–10.8)

## 2023-06-17 PROCEDURE — 85025 COMPLETE CBC W/AUTO DIFF WBC: CPT

## 2023-06-17 PROCEDURE — 93005 ELECTROCARDIOGRAM TRACING: CPT | Performed by: GENERAL PRACTICE

## 2023-06-17 PROCEDURE — 96375 TX/PRO/DX INJ NEW DRUG ADDON: CPT

## 2023-06-17 PROCEDURE — 99285 EMERGENCY DEPT VISIT HI MDM: CPT

## 2023-06-17 PROCEDURE — 96374 THER/PROPH/DIAG INJ IV PUSH: CPT

## 2023-06-17 PROCEDURE — 96361 HYDRATE IV INFUSION ADD-ON: CPT

## 2023-06-17 PROCEDURE — 6370000000 HC RX 637 (ALT 250 FOR IP): Performed by: GENERAL PRACTICE

## 2023-06-17 PROCEDURE — 80048 BASIC METABOLIC PNL TOTAL CA: CPT

## 2023-06-17 PROCEDURE — 36415 COLL VENOUS BLD VENIPUNCTURE: CPT

## 2023-06-17 PROCEDURE — 6360000002 HC RX W HCPCS: Performed by: GENERAL PRACTICE

## 2023-06-17 PROCEDURE — 2580000003 HC RX 258: Performed by: GENERAL PRACTICE

## 2023-06-17 RX ORDER — GLUCAGON 1 MG/ML
1 KIT INJECTION PRN
Status: DISCONTINUED | OUTPATIENT
Start: 2023-06-17 | End: 2023-06-21 | Stop reason: HOSPADM

## 2023-06-17 RX ORDER — CALCIUM GLUCONATE 94 MG/ML
1000 INJECTION, SOLUTION INTRAVENOUS ONCE
Status: COMPLETED | OUTPATIENT
Start: 2023-06-17 | End: 2023-06-17

## 2023-06-17 RX ORDER — SODIUM POLYSTYRENE SULFONATE 15 G/60ML
15 SUSPENSION ORAL; RECTAL ONCE
Status: COMPLETED | OUTPATIENT
Start: 2023-06-17 | End: 2023-06-17

## 2023-06-17 RX ORDER — DEXTROSE MONOHYDRATE 100 MG/ML
INJECTION, SOLUTION INTRAVENOUS CONTINUOUS
Status: DISPENSED | OUTPATIENT
Start: 2023-06-17 | End: 2023-06-17

## 2023-06-17 RX ORDER — DEXTROSE MONOHYDRATE 100 MG/ML
INJECTION, SOLUTION INTRAVENOUS CONTINUOUS PRN
Status: DISCONTINUED | OUTPATIENT
Start: 2023-06-17 | End: 2023-06-21 | Stop reason: HOSPADM

## 2023-06-17 RX ADMIN — INSULIN HUMAN 10 UNITS: 100 INJECTION, SOLUTION PARENTERAL at 22:15

## 2023-06-17 RX ADMIN — CALCIUM GLUCONATE 1000 MG: 98 INJECTION, SOLUTION INTRAVENOUS at 22:12

## 2023-06-17 RX ADMIN — SODIUM POLYSTYRENE SULFONATE 15 G: 15 SUSPENSION ORAL; RECTAL at 22:12

## 2023-06-17 RX ADMIN — DEXTROSE MONOHYDRATE: 100 INJECTION, SOLUTION INTRAVENOUS at 20:43

## 2023-06-17 RX ADMIN — DEXTROSE MONOHYDRATE 250 ML: 100 INJECTION, SOLUTION INTRAVENOUS at 22:00

## 2023-06-17 ASSESSMENT — PAIN SCALES - GENERAL: PAINLEVEL_OUTOF10: 0

## 2023-06-17 ASSESSMENT — PAIN - FUNCTIONAL ASSESSMENT: PAIN_FUNCTIONAL_ASSESSMENT: 0-10

## 2023-06-17 ASSESSMENT — LIFESTYLE VARIABLES
HOW OFTEN DO YOU HAVE A DRINK CONTAINING ALCOHOL: NEVER
HOW MANY STANDARD DRINKS CONTAINING ALCOHOL DO YOU HAVE ON A TYPICAL DAY: PATIENT DOES NOT DRINK

## 2023-06-18 PROBLEM — E87.5 HYPERKALEMIA: Status: ACTIVE | Noted: 2023-06-18

## 2023-06-18 LAB
ANION GAP SERPL CALCULATED.3IONS-SCNC: 12 MEQ/L (ref 9–15)
ANION GAP SERPL CALCULATED.3IONS-SCNC: 12 MEQ/L (ref 9–15)
BUN SERPL-MCNC: 62 MG/DL (ref 8–23)
BUN SERPL-MCNC: 67 MG/DL (ref 8–23)
CALCIUM SERPL-MCNC: 8.6 MG/DL (ref 8.5–9.9)
CALCIUM SERPL-MCNC: 9 MG/DL (ref 8.5–9.9)
CHLORIDE SERPL-SCNC: 108 MEQ/L (ref 95–107)
CHLORIDE SERPL-SCNC: 111 MEQ/L (ref 95–107)
CHP ED QC CHECK: NORMAL
CHP ED QC CHECK: NORMAL
CO2 SERPL-SCNC: 18 MEQ/L (ref 20–31)
CO2 SERPL-SCNC: 19 MEQ/L (ref 20–31)
CREAT SERPL-MCNC: 1.83 MG/DL (ref 0.7–1.2)
CREAT SERPL-MCNC: 1.96 MG/DL (ref 0.7–1.2)
GLUCOSE BLD-MCNC: 144 MG/DL
GLUCOSE BLD-MCNC: 144 MG/DL (ref 70–99)
GLUCOSE BLD-MCNC: 200 MG/DL (ref 70–99)
GLUCOSE BLD-MCNC: 252 MG/DL (ref 70–99)
GLUCOSE BLD-MCNC: 61 MG/DL
GLUCOSE BLD-MCNC: 61 MG/DL (ref 70–99)
GLUCOSE BLD-MCNC: 82 MG/DL (ref 70–99)
GLUCOSE SERPL-MCNC: 139 MG/DL (ref 70–99)
GLUCOSE SERPL-MCNC: 236 MG/DL (ref 70–99)
HBA1C MFR BLD: 5.6 % (ref 4.8–5.9)
PERFORMED ON: ABNORMAL
PERFORMED ON: NORMAL
POTASSIUM SERPL-SCNC: 6.2 MEQ/L (ref 3.4–4.9)
POTASSIUM SERPL-SCNC: 6.5 MEQ/L (ref 3.4–4.9)
SODIUM SERPL-SCNC: 139 MEQ/L (ref 135–144)
SODIUM SERPL-SCNC: 141 MEQ/L (ref 135–144)

## 2023-06-18 PROCEDURE — 6370000000 HC RX 637 (ALT 250 FOR IP): Performed by: GENERAL PRACTICE

## 2023-06-18 PROCEDURE — 6370000000 HC RX 637 (ALT 250 FOR IP): Performed by: INTERNAL MEDICINE

## 2023-06-18 PROCEDURE — 2580000003 HC RX 258: Performed by: INTERNAL MEDICINE

## 2023-06-18 PROCEDURE — 6360000002 HC RX W HCPCS: Performed by: INTERNAL MEDICINE

## 2023-06-18 PROCEDURE — 83036 HEMOGLOBIN GLYCOSYLATED A1C: CPT

## 2023-06-18 PROCEDURE — 1210000000 HC MED SURG R&B

## 2023-06-18 PROCEDURE — 84206 ASSAY OF PROINSULIN: CPT

## 2023-06-18 PROCEDURE — 2500000003 HC RX 250 WO HCPCS: Performed by: NURSE PRACTITIONER

## 2023-06-18 PROCEDURE — 2580000003 HC RX 258: Performed by: GENERAL PRACTICE

## 2023-06-18 PROCEDURE — 84681 ASSAY OF C-PEPTIDE: CPT

## 2023-06-18 PROCEDURE — 36415 COLL VENOUS BLD VENIPUNCTURE: CPT

## 2023-06-18 PROCEDURE — 80048 BASIC METABOLIC PNL TOTAL CA: CPT

## 2023-06-18 RX ORDER — CARVEDILOL 3.12 MG/1
6.25 TABLET ORAL 2 TIMES DAILY WITH MEALS
Status: DISCONTINUED | OUTPATIENT
Start: 2023-06-18 | End: 2023-06-21 | Stop reason: HOSPADM

## 2023-06-18 RX ORDER — FUROSEMIDE 40 MG/1
40 TABLET ORAL 2 TIMES DAILY
Status: DISCONTINUED | OUTPATIENT
Start: 2023-06-18 | End: 2023-06-21 | Stop reason: HOSPADM

## 2023-06-18 RX ORDER — ONDANSETRON 4 MG/1
4 TABLET, ORALLY DISINTEGRATING ORAL EVERY 8 HOURS PRN
Status: DISCONTINUED | OUTPATIENT
Start: 2023-06-18 | End: 2023-06-21 | Stop reason: HOSPADM

## 2023-06-18 RX ORDER — NITROGLYCERIN 0.4 MG/1
0.4 TABLET SUBLINGUAL SEE ADMIN INSTRUCTIONS
Status: DISCONTINUED | OUTPATIENT
Start: 2023-06-18 | End: 2023-06-21 | Stop reason: HOSPADM

## 2023-06-18 RX ORDER — ACETAMINOPHEN 650 MG/1
650 SUPPOSITORY RECTAL EVERY 6 HOURS PRN
Status: DISCONTINUED | OUTPATIENT
Start: 2023-06-18 | End: 2023-06-21 | Stop reason: HOSPADM

## 2023-06-18 RX ORDER — HYDRALAZINE HYDROCHLORIDE 25 MG/1
25 TABLET, FILM COATED ORAL DAILY
Status: DISCONTINUED | OUTPATIENT
Start: 2023-06-18 | End: 2023-06-19

## 2023-06-18 RX ORDER — GABAPENTIN 300 MG/1
300 CAPSULE ORAL 2 TIMES DAILY
Status: DISCONTINUED | OUTPATIENT
Start: 2023-06-18 | End: 2023-06-21 | Stop reason: HOSPADM

## 2023-06-18 RX ORDER — ATORVASTATIN CALCIUM 40 MG/1
40 TABLET, FILM COATED ORAL NIGHTLY
Status: DISCONTINUED | OUTPATIENT
Start: 2023-06-18 | End: 2023-06-21 | Stop reason: HOSPADM

## 2023-06-18 RX ORDER — FAMOTIDINE 20 MG/1
20 TABLET, FILM COATED ORAL DAILY
Status: DISCONTINUED | OUTPATIENT
Start: 2023-06-18 | End: 2023-06-21 | Stop reason: HOSPADM

## 2023-06-18 RX ORDER — SODIUM CHLORIDE 9 MG/ML
INJECTION, SOLUTION INTRAVENOUS PRN
Status: DISCONTINUED | OUTPATIENT
Start: 2023-06-18 | End: 2023-06-21 | Stop reason: HOSPADM

## 2023-06-18 RX ORDER — POLYETHYLENE GLYCOL 3350 17 G/17G
17 POWDER, FOR SOLUTION ORAL DAILY PRN
Status: DISCONTINUED | OUTPATIENT
Start: 2023-06-18 | End: 2023-06-21 | Stop reason: HOSPADM

## 2023-06-18 RX ORDER — ONDANSETRON 2 MG/ML
4 INJECTION INTRAMUSCULAR; INTRAVENOUS EVERY 6 HOURS PRN
Status: DISCONTINUED | OUTPATIENT
Start: 2023-06-18 | End: 2023-06-21 | Stop reason: HOSPADM

## 2023-06-18 RX ORDER — CALCIUM GLUCONATE 94 MG/ML
1000 INJECTION, SOLUTION INTRAVENOUS ONCE
Status: COMPLETED | OUTPATIENT
Start: 2023-06-18 | End: 2023-06-18

## 2023-06-18 RX ORDER — ASPIRIN 81 MG/1
81 TABLET ORAL DAILY
Status: DISCONTINUED | OUTPATIENT
Start: 2023-06-18 | End: 2023-06-21 | Stop reason: HOSPADM

## 2023-06-18 RX ORDER — TAMSULOSIN HYDROCHLORIDE 0.4 MG/1
0.4 CAPSULE ORAL DAILY
Status: DISCONTINUED | OUTPATIENT
Start: 2023-06-18 | End: 2023-06-21 | Stop reason: HOSPADM

## 2023-06-18 RX ORDER — ACETAMINOPHEN 325 MG/1
650 TABLET ORAL EVERY 6 HOURS PRN
Status: DISCONTINUED | OUTPATIENT
Start: 2023-06-18 | End: 2023-06-21 | Stop reason: HOSPADM

## 2023-06-18 RX ORDER — ENOXAPARIN SODIUM 100 MG/ML
30 INJECTION SUBCUTANEOUS 2 TIMES DAILY
Status: DISCONTINUED | OUTPATIENT
Start: 2023-06-18 | End: 2023-06-21 | Stop reason: HOSPADM

## 2023-06-18 RX ORDER — SODIUM CHLORIDE 0.9 % (FLUSH) 0.9 %
5-40 SYRINGE (ML) INJECTION EVERY 12 HOURS SCHEDULED
Status: DISCONTINUED | OUTPATIENT
Start: 2023-06-18 | End: 2023-06-21 | Stop reason: HOSPADM

## 2023-06-18 RX ORDER — SODIUM CHLORIDE 0.9 % (FLUSH) 0.9 %
5-40 SYRINGE (ML) INJECTION PRN
Status: DISCONTINUED | OUTPATIENT
Start: 2023-06-18 | End: 2023-06-21 | Stop reason: HOSPADM

## 2023-06-18 RX ORDER — DEXTROSE MONOHYDRATE 50 MG/ML
INJECTION, SOLUTION INTRAVENOUS CONTINUOUS
Status: DISPENSED | OUTPATIENT
Start: 2023-06-18 | End: 2023-06-18

## 2023-06-18 RX ADMIN — ATORVASTATIN CALCIUM 40 MG: 40 TABLET, FILM COATED ORAL at 20:40

## 2023-06-18 RX ADMIN — ENOXAPARIN SODIUM 30 MG: 100 INJECTION SUBCUTANEOUS at 20:40

## 2023-06-18 RX ADMIN — SODIUM ZIRCONIUM CYCLOSILICATE 5 G: 5 POWDER, FOR SUSPENSION ORAL at 15:59

## 2023-06-18 RX ADMIN — DEXTROSE MONOHYDRATE: 50 INJECTION, SOLUTION INTRAVENOUS at 09:38

## 2023-06-18 RX ADMIN — CARVEDILOL 6.25 MG: 3.12 TABLET, FILM COATED ORAL at 11:33

## 2023-06-18 RX ADMIN — Medication 16 G: at 00:16

## 2023-06-18 RX ADMIN — ENOXAPARIN SODIUM 30 MG: 100 INJECTION SUBCUTANEOUS at 09:31

## 2023-06-18 RX ADMIN — CALCIUM GLUCONATE 1000 MG: 98 INJECTION, SOLUTION INTRAVENOUS at 09:31

## 2023-06-18 RX ADMIN — MICONAZOLE NITRATE: 2 POWDER TOPICAL at 23:06

## 2023-06-18 RX ADMIN — CARVEDILOL 6.25 MG: 3.12 TABLET, FILM COATED ORAL at 18:56

## 2023-06-18 RX ADMIN — ASPIRIN 81 MG: 81 TABLET, COATED ORAL at 18:56

## 2023-06-18 RX ADMIN — Medication 10 ML: at 20:47

## 2023-06-18 RX ADMIN — TAMSULOSIN HYDROCHLORIDE 0.4 MG: 0.4 CAPSULE ORAL at 11:34

## 2023-06-18 RX ADMIN — FAMOTIDINE 20 MG: 20 TABLET ORAL at 18:56

## 2023-06-18 RX ADMIN — SODIUM ZIRCONIUM CYCLOSILICATE 5 G: 5 POWDER, FOR SUSPENSION ORAL at 09:38

## 2023-06-18 RX ADMIN — SODIUM ZIRCONIUM CYCLOSILICATE 5 G: 5 POWDER, FOR SUSPENSION ORAL at 20:40

## 2023-06-18 RX ADMIN — FUROSEMIDE 40 MG: 40 TABLET ORAL at 20:40

## 2023-06-18 RX ADMIN — FUROSEMIDE 40 MG: 40 TABLET ORAL at 11:34

## 2023-06-18 RX ADMIN — GABAPENTIN 300 MG: 300 CAPSULE ORAL at 20:40

## 2023-06-18 RX ADMIN — HYDRALAZINE HYDROCHLORIDE 25 MG: 25 TABLET, FILM COATED ORAL at 18:56

## 2023-06-18 RX ADMIN — GABAPENTIN 300 MG: 300 CAPSULE ORAL at 11:33

## 2023-06-18 RX ADMIN — DEXTROSE MONOHYDRATE 250 ML: 100 INJECTION, SOLUTION INTRAVENOUS at 00:17

## 2023-06-18 ASSESSMENT — PAIN SCALES - GENERAL: PAINLEVEL_OUTOF10: 0

## 2023-06-19 PROBLEM — E16.2 HYPOGLYCEMIA: Status: ACTIVE | Noted: 2023-06-19

## 2023-06-19 PROBLEM — E11.649 HYPOGLYCEMIA DUE TO TYPE 2 DIABETES MELLITUS (HCC): Status: ACTIVE | Noted: 2023-06-19

## 2023-06-19 LAB
ANION GAP SERPL CALCULATED.3IONS-SCNC: 10 MEQ/L (ref 9–15)
ANION GAP SERPL CALCULATED.3IONS-SCNC: 12 MEQ/L (ref 9–15)
BASOPHILS # BLD: 0 K/UL (ref 0–0.2)
BASOPHILS NFR BLD: 0.5 %
BUN SERPL-MCNC: 67 MG/DL (ref 8–23)
BUN SERPL-MCNC: 68 MG/DL (ref 8–23)
C PEPTIDE SERPL-MCNC: 12.4 NG/ML (ref 1.1–4.4)
CALCIUM SERPL-MCNC: 8.9 MG/DL (ref 8.5–9.9)
CALCIUM SERPL-MCNC: 8.9 MG/DL (ref 8.5–9.9)
CHLORIDE SERPL-SCNC: 110 MEQ/L (ref 95–107)
CHLORIDE SERPL-SCNC: 112 MEQ/L (ref 95–107)
CK SERPL-CCNC: 209 U/L (ref 0–190)
CO2 SERPL-SCNC: 18 MEQ/L (ref 20–31)
CO2 SERPL-SCNC: 19 MEQ/L (ref 20–31)
CREAT SERPL-MCNC: 1.89 MG/DL (ref 0.7–1.2)
CREAT SERPL-MCNC: 1.93 MG/DL (ref 0.7–1.2)
EKG ATRIAL RATE: 60 BPM
EKG Q-T INTERVAL: 520 MS
EKG QRS DURATION: 162 MS
EKG QTC CALCULATION (BAZETT): 520 MS
EKG R AXIS: -78 DEGREES
EKG T AXIS: 114 DEGREES
EKG VENTRICULAR RATE: 60 BPM
EOSINOPHIL # BLD: 0.2 K/UL (ref 0–0.7)
EOSINOPHIL NFR BLD: 3.3 %
ERYTHROCYTE [DISTWIDTH] IN BLOOD BY AUTOMATED COUNT: 15.9 % (ref 11.5–14.5)
GLUCOSE BLD-MCNC: 103 MG/DL (ref 70–99)
GLUCOSE BLD-MCNC: 126 MG/DL (ref 70–99)
GLUCOSE BLD-MCNC: 171 MG/DL (ref 70–99)
GLUCOSE BLD-MCNC: 204 MG/DL (ref 70–99)
GLUCOSE BLD-MCNC: 255 MG/DL (ref 70–99)
GLUCOSE SERPL-MCNC: 123 MG/DL (ref 70–99)
GLUCOSE SERPL-MCNC: 198 MG/DL (ref 70–99)
HCT VFR BLD AUTO: 29.3 % (ref 42–52)
HGB BLD-MCNC: 9.5 G/DL (ref 14–18)
LYMPHOCYTES # BLD: 0.6 K/UL (ref 1–4.8)
LYMPHOCYTES NFR BLD: 10.4 %
MCH RBC QN AUTO: 30.7 PG (ref 27–31.3)
MCHC RBC AUTO-ENTMCNC: 32.6 % (ref 33–37)
MCV RBC AUTO: 94.2 FL (ref 79–92.2)
MONOCYTES # BLD: 0.5 K/UL (ref 0.2–0.8)
MONOCYTES NFR BLD: 9.3 %
NEUTROPHILS # BLD: 4.3 K/UL (ref 1.4–6.5)
NEUTS SEG NFR BLD: 76.5 %
PERFORMED ON: ABNORMAL
PLATELET # BLD AUTO: 79 K/UL (ref 130–400)
POTASSIUM SERPL-SCNC: 5.9 MEQ/L (ref 3.4–4.9)
POTASSIUM SERPL-SCNC: 6.6 MEQ/L (ref 3.4–4.9)
RBC # BLD AUTO: 3.11 M/UL (ref 4.7–6.1)
SODIUM SERPL-SCNC: 139 MEQ/L (ref 135–144)
SODIUM SERPL-SCNC: 142 MEQ/L (ref 135–144)
WBC # BLD AUTO: 5.6 K/UL (ref 4.8–10.8)

## 2023-06-19 PROCEDURE — 6370000000 HC RX 637 (ALT 250 FOR IP): Performed by: INTERNAL MEDICINE

## 2023-06-19 PROCEDURE — 82533 TOTAL CORTISOL: CPT

## 2023-06-19 PROCEDURE — 1210000000 HC MED SURG R&B

## 2023-06-19 PROCEDURE — 97162 PT EVAL MOD COMPLEX 30 MIN: CPT

## 2023-06-19 PROCEDURE — 97166 OT EVAL MOD COMPLEX 45 MIN: CPT

## 2023-06-19 PROCEDURE — 2580000003 HC RX 258: Performed by: INTERNAL MEDICINE

## 2023-06-19 PROCEDURE — 82550 ASSAY OF CK (CPK): CPT

## 2023-06-19 PROCEDURE — 80048 BASIC METABOLIC PNL TOTAL CA: CPT

## 2023-06-19 PROCEDURE — 85025 COMPLETE CBC W/AUTO DIFF WBC: CPT

## 2023-06-19 PROCEDURE — 94640 AIRWAY INHALATION TREATMENT: CPT

## 2023-06-19 PROCEDURE — 36415 COLL VENOUS BLD VENIPUNCTURE: CPT

## 2023-06-19 PROCEDURE — 94761 N-INVAS EAR/PLS OXIMETRY MLT: CPT

## 2023-06-19 PROCEDURE — 6360000002 HC RX W HCPCS: Performed by: INTERNAL MEDICINE

## 2023-06-19 PROCEDURE — 2500000003 HC RX 250 WO HCPCS: Performed by: INTERNAL MEDICINE

## 2023-06-19 PROCEDURE — 99222 1ST HOSP IP/OBS MODERATE 55: CPT | Performed by: INTERNAL MEDICINE

## 2023-06-19 RX ORDER — INSULIN LISPRO 100 [IU]/ML
0-4 INJECTION, SOLUTION INTRAVENOUS; SUBCUTANEOUS
Status: DISCONTINUED | OUTPATIENT
Start: 2023-06-20 | End: 2023-06-21 | Stop reason: HOSPADM

## 2023-06-19 RX ORDER — DEXTROSE MONOHYDRATE 25 G/50ML
25 INJECTION, SOLUTION INTRAVENOUS ONCE
Status: COMPLETED | OUTPATIENT
Start: 2023-06-19 | End: 2023-06-19

## 2023-06-19 RX ORDER — INSULIN LISPRO 100 [IU]/ML
0-4 INJECTION, SOLUTION INTRAVENOUS; SUBCUTANEOUS NIGHTLY
Status: DISCONTINUED | OUTPATIENT
Start: 2023-06-19 | End: 2023-06-21 | Stop reason: HOSPADM

## 2023-06-19 RX ADMIN — Medication 10 ML: at 11:23

## 2023-06-19 RX ADMIN — ALBUTEROL SULFATE 10 MG: 2.5 SOLUTION RESPIRATORY (INHALATION) at 02:34

## 2023-06-19 RX ADMIN — INSULIN HUMAN 10 UNITS: 100 INJECTION, SOLUTION PARENTERAL at 02:05

## 2023-06-19 RX ADMIN — ENOXAPARIN SODIUM 30 MG: 100 INJECTION SUBCUTANEOUS at 09:56

## 2023-06-19 RX ADMIN — CARVEDILOL 6.25 MG: 3.12 TABLET, FILM COATED ORAL at 18:32

## 2023-06-19 RX ADMIN — HYDRALAZINE HYDROCHLORIDE 25 MG: 25 TABLET, FILM COATED ORAL at 09:56

## 2023-06-19 RX ADMIN — Medication 10 ML: at 22:54

## 2023-06-19 RX ADMIN — FUROSEMIDE 40 MG: 40 TABLET ORAL at 09:55

## 2023-06-19 RX ADMIN — ATORVASTATIN CALCIUM 40 MG: 40 TABLET, FILM COATED ORAL at 22:52

## 2023-06-19 RX ADMIN — DEXTROSE MONOHYDRATE 25 G: 25 INJECTION, SOLUTION INTRAVENOUS at 02:02

## 2023-06-19 RX ADMIN — ASPIRIN 81 MG: 81 TABLET, COATED ORAL at 09:55

## 2023-06-19 RX ADMIN — MICONAZOLE NITRATE: 2 POWDER TOPICAL at 22:53

## 2023-06-19 RX ADMIN — SODIUM ZIRCONIUM CYCLOSILICATE 5 G: 5 POWDER, FOR SUSPENSION ORAL at 09:57

## 2023-06-19 RX ADMIN — TAMSULOSIN HYDROCHLORIDE 0.4 MG: 0.4 CAPSULE ORAL at 09:55

## 2023-06-19 RX ADMIN — FAMOTIDINE 20 MG: 20 TABLET ORAL at 09:56

## 2023-06-19 RX ADMIN — FUROSEMIDE 40 MG: 40 TABLET ORAL at 22:52

## 2023-06-19 RX ADMIN — GABAPENTIN 300 MG: 300 CAPSULE ORAL at 09:55

## 2023-06-19 RX ADMIN — ENOXAPARIN SODIUM 30 MG: 100 INJECTION SUBCUTANEOUS at 22:53

## 2023-06-19 RX ADMIN — CARVEDILOL 6.25 MG: 3.12 TABLET, FILM COATED ORAL at 09:56

## 2023-06-19 RX ADMIN — GABAPENTIN 300 MG: 300 CAPSULE ORAL at 22:52

## 2023-06-19 RX ADMIN — MICONAZOLE NITRATE: 2 POWDER TOPICAL at 10:09

## 2023-06-19 ASSESSMENT — PAIN SCALES - GENERAL: PAINLEVEL_OUTOF10: 7

## 2023-06-19 ASSESSMENT — PAIN DESCRIPTION - ORIENTATION: ORIENTATION: LOWER

## 2023-06-19 ASSESSMENT — PAIN DESCRIPTION - DESCRIPTORS: DESCRIPTORS: STABBING

## 2023-06-19 ASSESSMENT — PAIN DESCRIPTION - PAIN TYPE: TYPE: CHRONIC PAIN

## 2023-06-19 ASSESSMENT — PAIN DESCRIPTION - LOCATION: LOCATION: BACK

## 2023-06-19 NOTE — PLAN OF CARE
See OT evaluation for all goals and OT POC.  Electronically signed by RENATA Lazo/L on 6/19/2023 at 3:47 PM

## 2023-06-20 PROBLEM — M79.2 NEUROPATHIC PAIN: Status: ACTIVE | Noted: 2022-10-20

## 2023-06-20 PROBLEM — M96.1 FAILED BACK SYNDROME OF LUMBAR SPINE: Status: ACTIVE | Noted: 2022-10-20

## 2023-06-20 PROBLEM — Z74.09 IMPAIRED MOBILITY AND ACTIVITIES OF DAILY LIVING: Status: ACTIVE | Noted: 2023-06-20

## 2023-06-20 PROBLEM — G58.9 ENTRAPMENT NEUROPATHY: Status: ACTIVE | Noted: 2023-02-23

## 2023-06-20 PROBLEM — M48.061 SPINAL STENOSIS OF LUMBAR REGION: Status: ACTIVE | Noted: 2018-09-25

## 2023-06-20 PROBLEM — M48.061 LUMBAR SPINAL STENOSIS: Status: ACTIVE | Noted: 2018-05-21

## 2023-06-20 PROBLEM — Z78.9 IMPAIRED MOBILITY AND ACTIVITIES OF DAILY LIVING: Status: ACTIVE | Noted: 2023-06-20

## 2023-06-20 PROBLEM — E87.70 HYPERVOLEMIA: Status: ACTIVE | Noted: 2023-06-20

## 2023-06-20 PROBLEM — E13.21 NEPHROPATHY DUE TO SECONDARY DIABETES MELLITUS (HCC): Status: ACTIVE | Noted: 2018-10-16

## 2023-06-20 LAB
ALBUMIN SERPL-MCNC: 3.4 G/DL (ref 3.5–4.6)
ANION GAP SERPL CALCULATED.3IONS-SCNC: 10 MEQ/L (ref 9–15)
BASOPHILS # BLD: 0 K/UL (ref 0–0.2)
BASOPHILS NFR BLD: 0.6 %
BILIRUB UR QL STRIP: NEGATIVE
BUN SERPL-MCNC: 69 MG/DL (ref 8–23)
CALCIUM SERPL-MCNC: 8.5 MG/DL (ref 8.5–9.9)
CHLORIDE SERPL-SCNC: 114 MEQ/L (ref 95–107)
CLARITY UR: CLEAR
CO2 SERPL-SCNC: 19 MEQ/L (ref 20–31)
COLOR UR: YELLOW
CORTISOL COLLECTION INFO: NORMAL
CORTISOL: 7 UG/DL (ref 2.7–18.4)
CREAT SERPL-MCNC: 2.19 MG/DL (ref 0.7–1.2)
EOSINOPHIL # BLD: 0.2 K/UL (ref 0–0.7)
EOSINOPHIL NFR BLD: 5 %
ERYTHROCYTE [DISTWIDTH] IN BLOOD BY AUTOMATED COUNT: 16.4 % (ref 11.5–14.5)
FERRITIN: 136 NG/ML (ref 30–400)
FOLATE: 5.9 NG/ML
GLUCOSE BLD-MCNC: 122 MG/DL (ref 70–99)
GLUCOSE BLD-MCNC: 144 MG/DL (ref 70–99)
GLUCOSE BLD-MCNC: 157 MG/DL (ref 70–99)
GLUCOSE BLD-MCNC: 164 MG/DL (ref 70–99)
GLUCOSE SERPL-MCNC: 156 MG/DL (ref 70–99)
GLUCOSE UR STRIP-MCNC: NEGATIVE MG/DL
HCT VFR BLD AUTO: 27.5 % (ref 42–52)
HCT VFR BLD AUTO: 28.2 % (ref 42–52)
HGB BLD-MCNC: 9.1 G/DL (ref 14–18)
HGB UR QL STRIP: NEGATIVE
IRON SATURATION: 23 % (ref 20–55)
IRON: 55 UG/DL (ref 59–158)
KETONES UR STRIP-MCNC: NEGATIVE MG/DL
LEUKOCYTE ESTERASE UR QL STRIP: NEGATIVE
LYMPHOCYTES # BLD: 0.6 K/UL (ref 1–4.8)
LYMPHOCYTES NFR BLD: 13.8 %
MAGNESIUM SERPL-MCNC: 1.8 MG/DL (ref 1.7–2.4)
MCH RBC QN AUTO: 30.1 PG (ref 27–31.3)
MCHC RBC AUTO-ENTMCNC: 32.4 % (ref 33–37)
MCV RBC AUTO: 93 FL (ref 79–92.2)
MONOCYTES # BLD: 0.4 K/UL (ref 0.2–0.8)
MONOCYTES NFR BLD: 10.2 %
NEUTROPHILS # BLD: 3.1 K/UL (ref 1.4–6.5)
NEUTS SEG NFR BLD: 70.4 %
NITRITE UR QL STRIP: NEGATIVE
PERFORMED ON: ABNORMAL
PH UR STRIP: 5 [PH] (ref 5–9)
PHOSPHATE SERPL-MCNC: 4.6 MG/DL (ref 2.3–4.8)
PLATELET # BLD AUTO: 81 K/UL (ref 130–400)
POTASSIUM SERPL-SCNC: 5.9 MEQ/L (ref 3.4–4.9)
PROT UR STRIP-MCNC: NEGATIVE MG/DL
RBC # BLD AUTO: 3.03 M/UL (ref 4.7–6.1)
RETICS # AUTO: 0.06 M/CUMM (ref 0.02–0.11)
RETICS/RBC NFR: 2.1 % (ref 0.6–2.2)
SODIUM SERPL-SCNC: 143 MEQ/L (ref 135–144)
SP GR UR STRIP: 1.01 (ref 1–1.03)
TOTAL IRON BINDING CAPACITY: 235 UG/DL (ref 250–450)
UNSATURATED IRON BINDING CAPACITY: 180 UG/DL (ref 112–347)
UROBILINOGEN UR STRIP-ACNC: 0.2 E.U./DL
VITAMIN B-12: 331 PG/ML (ref 232–1245)
VITAMIN D 25-HYDROXY: 10 NG/ML
WBC # BLD AUTO: 4.4 K/UL (ref 4.8–10.8)

## 2023-06-20 PROCEDURE — 83540 ASSAY OF IRON: CPT

## 2023-06-20 PROCEDURE — 2580000003 HC RX 258: Performed by: INTERNAL MEDICINE

## 2023-06-20 PROCEDURE — 97116 GAIT TRAINING THERAPY: CPT

## 2023-06-20 PROCEDURE — 82728 ASSAY OF FERRITIN: CPT

## 2023-06-20 PROCEDURE — 6370000000 HC RX 637 (ALT 250 FOR IP): Performed by: STUDENT IN AN ORGANIZED HEALTH CARE EDUCATION/TRAINING PROGRAM

## 2023-06-20 PROCEDURE — 1210000000 HC MED SURG R&B

## 2023-06-20 PROCEDURE — 36415 COLL VENOUS BLD VENIPUNCTURE: CPT

## 2023-06-20 PROCEDURE — 97535 SELF CARE MNGMENT TRAINING: CPT

## 2023-06-20 PROCEDURE — 6370000000 HC RX 637 (ALT 250 FOR IP): Performed by: INTERNAL MEDICINE

## 2023-06-20 PROCEDURE — 82746 ASSAY OF FOLIC ACID SERUM: CPT

## 2023-06-20 PROCEDURE — 85025 COMPLETE CBC W/AUTO DIFF WBC: CPT

## 2023-06-20 PROCEDURE — 82607 VITAMIN B-12: CPT

## 2023-06-20 PROCEDURE — 83970 ASSAY OF PARATHORMONE: CPT

## 2023-06-20 PROCEDURE — 99222 1ST HOSP IP/OBS MODERATE 55: CPT | Performed by: PHYSICAL MEDICINE & REHABILITATION

## 2023-06-20 PROCEDURE — 83735 ASSAY OF MAGNESIUM: CPT

## 2023-06-20 PROCEDURE — 85046 RETICYTE/HGB CONCENTRATE: CPT

## 2023-06-20 PROCEDURE — 82306 VITAMIN D 25 HYDROXY: CPT

## 2023-06-20 PROCEDURE — 83550 IRON BINDING TEST: CPT

## 2023-06-20 PROCEDURE — 81003 URINALYSIS AUTO W/O SCOPE: CPT

## 2023-06-20 PROCEDURE — 80069 RENAL FUNCTION PANEL: CPT

## 2023-06-20 PROCEDURE — 6360000002 HC RX W HCPCS: Performed by: STUDENT IN AN ORGANIZED HEALTH CARE EDUCATION/TRAINING PROGRAM

## 2023-06-20 PROCEDURE — 6360000002 HC RX W HCPCS: Performed by: INTERNAL MEDICINE

## 2023-06-20 RX ORDER — ERGOCALCIFEROL 1.25 MG/1
50000 CAPSULE ORAL WEEKLY
Status: DISCONTINUED | OUTPATIENT
Start: 2023-06-20 | End: 2023-06-21 | Stop reason: HOSPADM

## 2023-06-20 RX ORDER — FUROSEMIDE 10 MG/ML
40 INJECTION INTRAMUSCULAR; INTRAVENOUS 2 TIMES DAILY
Status: DISCONTINUED | OUTPATIENT
Start: 2023-06-20 | End: 2023-06-21

## 2023-06-20 RX ADMIN — GABAPENTIN 300 MG: 300 CAPSULE ORAL at 20:07

## 2023-06-20 RX ADMIN — FAMOTIDINE 20 MG: 20 TABLET ORAL at 10:41

## 2023-06-20 RX ADMIN — GABAPENTIN 300 MG: 300 CAPSULE ORAL at 10:40

## 2023-06-20 RX ADMIN — MICONAZOLE NITRATE: 2 POWDER TOPICAL at 20:11

## 2023-06-20 RX ADMIN — CARVEDILOL 6.25 MG: 3.12 TABLET, FILM COATED ORAL at 10:40

## 2023-06-20 RX ADMIN — FUROSEMIDE 40 MG: 10 INJECTION, SOLUTION INTRAMUSCULAR; INTRAVENOUS at 10:43

## 2023-06-20 RX ADMIN — MICONAZOLE NITRATE: 2 POWDER TOPICAL at 11:05

## 2023-06-20 RX ADMIN — ATORVASTATIN CALCIUM 40 MG: 40 TABLET, FILM COATED ORAL at 20:07

## 2023-06-20 RX ADMIN — CARVEDILOL 6.25 MG: 3.12 TABLET, FILM COATED ORAL at 17:41

## 2023-06-20 RX ADMIN — ERGOCALCIFEROL 50000 UNITS: 1.25 CAPSULE ORAL at 17:42

## 2023-06-20 RX ADMIN — ASPIRIN 81 MG: 81 TABLET, COATED ORAL at 10:42

## 2023-06-20 RX ADMIN — FUROSEMIDE 40 MG: 10 INJECTION, SOLUTION INTRAMUSCULAR; INTRAVENOUS at 17:42

## 2023-06-20 RX ADMIN — SODIUM ZIRCONIUM CYCLOSILICATE 10 G: 10 POWDER, FOR SUSPENSION ORAL at 10:42

## 2023-06-20 RX ADMIN — ENOXAPARIN SODIUM 30 MG: 100 INJECTION SUBCUTANEOUS at 20:07

## 2023-06-20 RX ADMIN — TAMSULOSIN HYDROCHLORIDE 0.4 MG: 0.4 CAPSULE ORAL at 10:41

## 2023-06-20 RX ADMIN — Medication 10 ML: at 20:08

## 2023-06-20 RX ADMIN — Medication 10 ML: at 10:43

## 2023-06-20 RX ADMIN — ENOXAPARIN SODIUM 30 MG: 100 INJECTION SUBCUTANEOUS at 10:42

## 2023-06-20 ASSESSMENT — ENCOUNTER SYMPTOMS
COUGH: 0
WHEEZING: 0
ABDOMINAL PAIN: 0
VISUAL CHANGE: 0
CONSTIPATION: 1
DIARRHEA: 0
SORE THROAT: 0
NAUSEA: 0
STRIDOR: 0
EYE PAIN: 0
BACK PAIN: 1
SHORTNESS OF BREATH: 1
EYE REDNESS: 0
PHOTOPHOBIA: 0
VOMITING: 0
BLOOD IN STOOL: 0

## 2023-06-20 NOTE — CONSULTS
[facility]  Consult [facility]           Reason for Consult:  hyperkalemia  Requesting Physician:  Dr. Martín Vizcaino    Chief Complaint:  hypoglycemia  History Obtained From:  patient, electronic medical record    History of Present Ilness:    78 y.o. male with history s/f T2DM, CAD s/p CABG, OA, nephrolithiasis, HTN, CKD stage III who presented for hypoglycemia. On presentation pt hemodynamically stable, Scr at baseline 1.8-2 w/ eGFR low/mid 30s. Also found to be hyperkalemic to 6.7, also acidotic. Has been getting lokelma following a dose of kayexalate. Currently at 5.9. Does have h/o hyperkalemia w/ occasional acidosis as well. On lasix, coreg and entresto as outpatient. Entresto added 1 month ago per pt although seems added a while ago     Past Medical History:        Diagnosis Date    CAD (coronary artery disease)     Dr. Myesha Barrera, Dr. Tiff Gee    CKD (chronic kidney disease)     Coronary artery disease involving coronary bypass graft of native heart without angina pectoris 11/4/2022    Hypertension     Kidney stones     Left foot drop     Neuropathy, diabetic ([facility] [address]     mild    Osteoarthritis     hip, knee    S/P CABG (coronary artery bypass graft)     Type II or unspecified type diabetes mellitus without mention of complication, not stated as uncontrolled        Past Surgical History:        Procedure Laterality Date    BACK SURGERY N/A     BICEPS TENDON REPAIR  01/01/1997    CARDIAC CATHETERIZATION  06/05/2013    CORONARY ARTERY BYPASS GRAFT  06/07/2013    DR. PERALES    KNEE SURGERY  01/01/2006    left replacement    NECK SURGERY  2020    ROTATOR CUFF REPAIR  01/01/1996    TONSILLECTOMY  01/01/1966       Home Medications:    No current facility-administered medications on file prior to encounter.      Current Outpatient Medications on File Prior to Encounter   Medication Sig Dispense Refill    hydrALAZINE (APRESOLINE) 25 MG tablet Take 1 tablet by mouth daily      carvedilol (COREG) 6.25 MG tablet Take 1 tablet by
Physical Medicine & Rehabilitation  Consult Note      Admitting Physician: Chelsey Miguel MD    Primary Care Provider: Joanna Hernandez MD     Reason for Consult:  Asses rehab needs, promote physical and mental function, analyze level of care to determine rehab needs, improve ability to actively participate in the rehabilitation process, and decrease likelihood of re-admit to the hospital after discharge. History of Present Illness:    Tristin Marshall is a 78 y.o. male admitted to [facility] on 6/17/2023. Patient was admitted through the ER at [facility] in [place] with hypoglycemia causing a syncopal episode. He responded to D10. He was admitted under the care of the hospitalist-with nephrology endocrinology and hematology consulting. Blood counts showed severe thrombocytopenia going back to 2012 suspecting ITP hematology consulted-retake count was ordered due to probable myelosuppression from renal disease. Endocrinology was consulted regarding his uncontrolled diabetes mellitus and medications were adjusted    Nephrology was consulted regarding acute on chronic renal failure hyperkalemia and metabolic acidosis. Neurologic Problem  The patient's primary symptoms include an altered mental status, clumsiness, focal sensory loss, focal weakness, syncope and weakness. The patient's pertinent negatives include no loss of balance, memory loss, near-syncope, slurred speech or visual change. This is a chronic problem. The neurological problem developed insidiously. The problem has been gradually worsening since onset. There was left-sided and lower extremity focality noted. Associated symptoms include back pain, fatigue and shortness of breath. Pertinent negatives include no abdominal pain, chest pain, diaphoresis, dizziness, fever, headaches, nausea, neck pain, palpitations or vomiting. Past treatments include walking. The treatment provided mild relief.  There is no history of
[address]                      [address], [address] [facility]                                  CONSULTATION    PATIENT NAME: Gamal Francisco                     :        1945  MED REC NO:   [id number]                            ROOM:       [id number]  ACCOUNT NO:   [de-identified]                           ADMIT DATE: 2023  PROVIDER:     Edmundo Lopez MD    CONSULT DATE:  2023    ENDOCRINE CONSULTATION    REFERRING PROVIDER:  Severo Chaudhary MD    REASON FOR CONSULTATION:  Uncontrolled diabetes with persistent  hypoglycemia. CHIEF COMPLAINT AND HISTORY OF PRESENT ILLNESS:  The patient is a  81-year-old male with known history of diabetes on Lantus 30 units twice  daily, glimepiride 4 mg twice a day, and Farxiga admitted to [facility] because of 5-day history of hypoglycemia came to  the ER after having repeated low blood sugars, poor historian, known  history of chronic kidney disease. He has not been eating well, he is  only eating green vegetables and lot of bananas. Initial admitting  diagnoses were persistent hypoglycemia, hyperkalemia, chronic kidney  disease, morbid obesity. The patient's baseline lab work; sodium was 141, potassium was 6.2,  chloride was 111, BUN 62, creatinine 1.83. Blood sugars were as low as  in the 60s, has come up to 236. The patient's hemoglobin A1c was 5.6. Prior A1c's have been fluctuating between 5.6 to 12 range, probably due  to variable compliance with diet. The patient's C-peptide level was  elevated at 12.4. The patient currently not on any Humalog coverage. PAST MEDICAL HISTORY:  Significant for type 2 diabetes, history of  chronic kidney disease, coronary artery disease, hypertension, left foot  drop, diabetic neuropathy, osteoarthritis, and history of CABG.     PAST SURGICAL HISTORY:  Back surgery, biceps tendon repair, cardiac  catheterization, angioplasty with stent, knee surgery, rotator
Monocytes % 10.2 %    Eosinophils % 5.0 %    Basophils % 0.6 %    Neutrophils Absolute 3.1 1.4 - 6.5 K/uL    Lymphocytes Absolute 0.6 (L) 1.0 - 4.8 K/uL    Monocytes Absolute 0.4 0.2 - 0.8 K/uL    Eosinophils Absolute 0.2 0.0 - 0.7 K/uL    Basophils Absolute 0.0 0.0 - 0.2 K/uL   Renal Function Panel    Collection Time: 06/20/23  5:22 AM   Result Value Ref Range    Sodium 143 135 - 144 mEq/L    Potassium 5.9 (H) 3.4 - 4.9 mEq/L    Chloride 114 (H) 95 - 107 mEq/L    CO2 19 (L) 20 - 31 mEq/L    Anion Gap 10 9 - 15 mEq/L    Glucose 156 (H) 70 - 99 mg/dL    BUN 69 (H) 8 - 23 mg/dL    Creatinine 2.19 (H) 0.70 - 1.20 mg/dL    Est, Glom Filt Rate 30.5 (L) >60    Calcium 8.5 8.5 - 9.9 mg/dL    Phosphorus 4.6 2.3 - 4.8 mg/dL    Albumin 3.4 (L) 3.5 - 4.6 g/dL   Magnesium    Collection Time: 06/20/23  5:22 AM   Result Value Ref Range    Magnesium 1.8 1.7 - 2.4 mg/dL   MISC ARUP 1    Collection Time: 06/20/23  5:22 AM   Result Value Ref Range    [place]pper Prompt 70,346    POCT Glucose    Collection Time: 06/20/23  8:32 AM   Result Value Ref Range    POC Glucose 122 (H) 70 - 99 mg/dl    Performed on ACCU-CHEK      Recent Labs     06/20/23  0522   GLUCOSE 156*        Pathology:       . ASSESSMENT AND PLAN  Anemia, likely due to multifactorial etiology, including myelosuppression from renal insufficiency. Agree with checking iron studies. Ordered retic count. Thrombocytopenia, likely ITP. NO intervention needed.      Electronically signed by Lizeth Barney MD on 6/20/2023 at 8:51 AM

## 2023-06-20 NOTE — CARE COORDINATION
Inpatient Rehab referral received. Met with patient and explained [address] [facility] Acute Inpatient Rehab program and requirements, including 3 hours of intense therapy daily, anticipated length of stay and goal of discharge to home. All questions answered and patient verbalized understanding. Freedom of choice provided and patient requests admit to [facility] if appropriate. PM&R consult pending.   Electronically signed by Chris Chow RN on 6/20/23 at 11:09 AM EDT
PAS reviewed by the PM&R physician and the patient has been accepted to [address] [facility] room 251 pending medical clearance and a negative Covid Screening. Spoke with Padma BONILLA and with [facility] on [address] said that they will keep patient overnight to monitor as he is on IV lasix informed rehab not coming tonight. He may come when cleared medically.   Electronically signed by Laquita Rojas RN on 6/20/23 at 4:23 PM EDT
Pt receiving IV Lasix and managing potassium. Dc plan [facility].
RADHIKAW MET WITH THE PT TO DISCUSS HIS DC PLAN. PT ADMITS TO BEING WEAK AND HE IS AGREEABLE TO ACUTE REHAB HERE AT [facility] AFTER BEING GIVEN FREEDOM OF CHOICE. REFERRAL CALLED TO [facility]
Discharge:  Home entry accessibility  Equipment needs  Caregiver availability  Resource availability      Rehab evaluation plan: Recommend Acute Inpatient Rehab  Rehabilitation Impairment Group Code: 4.130  Rehab Impairment Group: Neurologic: Spinal Cord Injury Non-Traumatic lumbar myelopathy   Estimated Length of Stay (days): 14  Rehab Diagnosis: Impaired mobility and ADL's due to lumbar myelopathy  Reviewer's Signature: Electronically signed by Manuel Hills RN on 6/20/23 at 1:56 PM EDT     I have reviewed and concur with the above Preadmission Screening.    Rehab Admitting Doctor: Dr. Carmen Holt DO

## 2023-06-21 ENCOUNTER — HOSPITAL ENCOUNTER (INPATIENT)
Age: 78
LOS: 8 days | Discharge: HOME | DRG: 092 | End: 2023-06-29
Attending: PHYSICAL MEDICINE & REHABILITATION | Admitting: PHYSICAL MEDICINE & REHABILITATION
Payer: MEDICARE

## 2023-06-21 VITALS
WEIGHT: 315 LBS | RESPIRATION RATE: 20 BRPM | HEART RATE: 57 BPM | HEIGHT: 76 IN | OXYGEN SATURATION: 97 % | SYSTOLIC BLOOD PRESSURE: 144 MMHG | TEMPERATURE: 97.2 F | BODY MASS INDEX: 38.36 KG/M2 | DIASTOLIC BLOOD PRESSURE: 61 MMHG

## 2023-06-21 DIAGNOSIS — N18.30 TYPE 2 DIABETES MELLITUS WITH STAGE 3 CHRONIC KIDNEY DISEASE, WITH LONG-TERM CURRENT USE OF INSULIN, UNSPECIFIED WHETHER STAGE 3A OR 3B CKD (HCC): ICD-10-CM

## 2023-06-21 DIAGNOSIS — Z78.9 IMPAIRED MOBILITY AND ACTIVITIES OF DAILY LIVING: Primary | ICD-10-CM

## 2023-06-21 DIAGNOSIS — Z79.4 TYPE 2 DIABETES MELLITUS WITH STAGE 3 CHRONIC KIDNEY DISEASE, WITH LONG-TERM CURRENT USE OF INSULIN, UNSPECIFIED WHETHER STAGE 3A OR 3B CKD (HCC): ICD-10-CM

## 2023-06-21 DIAGNOSIS — E11.22 TYPE 2 DIABETES MELLITUS WITH STAGE 3 CHRONIC KIDNEY DISEASE, WITH LONG-TERM CURRENT USE OF INSULIN, UNSPECIFIED WHETHER STAGE 3A OR 3B CKD (HCC): ICD-10-CM

## 2023-06-21 DIAGNOSIS — Z74.09 IMPAIRED MOBILITY AND ACTIVITIES OF DAILY LIVING: Primary | ICD-10-CM

## 2023-06-21 LAB
ALBUMIN SERPL-MCNC: 3.6 G/DL (ref 3.5–4.6)
ANION GAP SERPL CALCULATED.3IONS-SCNC: 13 MEQ/L (ref 9–15)
ANISOCYTOSIS BLD QL SMEAR: ABNORMAL
BASOPHILS # BLD: 0 K/UL (ref 0–0.2)
BASOPHILS NFR BLD: 0.5 %
BUN SERPL-MCNC: 72 MG/DL (ref 8–23)
CALCIUM SERPL-MCNC: 8.7 MG/DL (ref 8.5–9.9)
CHLORIDE SERPL-SCNC: 110 MEQ/L (ref 95–107)
CO2 SERPL-SCNC: 20 MEQ/L (ref 20–31)
CREAT SERPL-MCNC: 2.2 MG/DL (ref 0.7–1.2)
EOSINOPHIL # BLD: 0.2 K/UL (ref 0–0.7)
EOSINOPHIL NFR BLD: 4.2 %
ERYTHROCYTE [DISTWIDTH] IN BLOOD BY AUTOMATED COUNT: 16.1 % (ref 11.5–14.5)
GLUCOSE BLD-MCNC: 113 MG/DL (ref 70–99)
GLUCOSE BLD-MCNC: 119 MG/DL (ref 70–99)
GLUCOSE BLD-MCNC: 207 MG/DL (ref 70–99)
GLUCOSE SERPL-MCNC: 127 MG/DL (ref 70–99)
HCT VFR BLD AUTO: 28 % (ref 42–52)
HGB BLD-MCNC: 9.3 G/DL (ref 14–18)
HYPOCHROMIA BLD QL SMEAR: ABNORMAL
LYMPHOCYTES # BLD: 0.6 K/UL (ref 1–4.8)
LYMPHOCYTES NFR BLD: 15.6 %
MAGNESIUM SERPL-MCNC: 1.8 MG/DL (ref 1.7–2.4)
MCH RBC QN AUTO: 30.5 PG (ref 27–31.3)
MCHC RBC AUTO-ENTMCNC: 33 % (ref 33–37)
MCV RBC AUTO: 92.4 FL (ref 79–92.2)
MONOCYTES # BLD: 0.4 K/UL (ref 0.2–0.8)
MONOCYTES NFR BLD: 10.8 %
NEUTROPHILS # BLD: 2.7 K/UL (ref 1.4–6.5)
NEUTS SEG NFR BLD: 68.9 %
PERFORMED ON: ABNORMAL
PHOSPHATE SERPL-MCNC: 4.7 MG/DL (ref 2.3–4.8)
PLATELET # BLD AUTO: 80 K/UL (ref 130–400)
PLATELET BLD QL SMEAR: ABNORMAL
POTASSIUM SERPL-SCNC: 5.5 MEQ/L (ref 3.4–4.9)
PROINSULIN P 12H FAST SERPL-SCNC: 24.4 PMOL/L
RBC # BLD AUTO: 3.03 M/UL (ref 4.7–6.1)
SARS-COV-2 RDRP RESP QL NAA+PROBE: NOT DETECTED
SLIDE REVIEW: ABNORMAL
SODIUM SERPL-SCNC: 143 MEQ/L (ref 135–144)
WBC # BLD AUTO: 3.9 K/UL (ref 4.8–10.8)

## 2023-06-21 PROCEDURE — 2580000003 HC RX 258: Performed by: INTERNAL MEDICINE

## 2023-06-21 PROCEDURE — 85025 COMPLETE CBC W/AUTO DIFF WBC: CPT

## 2023-06-21 PROCEDURE — 97116 GAIT TRAINING THERAPY: CPT

## 2023-06-21 PROCEDURE — 80069 RENAL FUNCTION PANEL: CPT

## 2023-06-21 PROCEDURE — 36415 COLL VENOUS BLD VENIPUNCTURE: CPT

## 2023-06-21 PROCEDURE — 6360000002 HC RX W HCPCS: Performed by: INTERNAL MEDICINE

## 2023-06-21 PROCEDURE — 99232 SBSQ HOSP IP/OBS MODERATE 35: CPT | Performed by: PHYSICAL MEDICINE & REHABILITATION

## 2023-06-21 PROCEDURE — 6360000002 HC RX W HCPCS: Performed by: STUDENT IN AN ORGANIZED HEALTH CARE EDUCATION/TRAINING PROGRAM

## 2023-06-21 PROCEDURE — 6370000000 HC RX 637 (ALT 250 FOR IP): Performed by: STUDENT IN AN ORGANIZED HEALTH CARE EDUCATION/TRAINING PROGRAM

## 2023-06-21 PROCEDURE — 1180000000 HC REHAB R&B

## 2023-06-21 PROCEDURE — 2500000003 HC RX 250 WO HCPCS: Performed by: INTERNAL MEDICINE

## 2023-06-21 PROCEDURE — 6370000000 HC RX 637 (ALT 250 FOR IP): Performed by: INTERNAL MEDICINE

## 2023-06-21 PROCEDURE — 83735 ASSAY OF MAGNESIUM: CPT

## 2023-06-21 PROCEDURE — 87635 SARS-COV-2 COVID-19 AMP PRB: CPT

## 2023-06-21 RX ORDER — NITROGLYCERIN 0.4 MG/1
0.4 TABLET SUBLINGUAL SEE ADMIN INSTRUCTIONS
Status: DISCONTINUED | OUTPATIENT
Start: 2023-06-21 | End: 2023-06-29 | Stop reason: HOSPADM

## 2023-06-21 RX ORDER — INSULIN LISPRO 100 [IU]/ML
0-4 INJECTION, SOLUTION INTRAVENOUS; SUBCUTANEOUS
Status: DISCONTINUED | OUTPATIENT
Start: 2023-06-21 | End: 2023-06-29 | Stop reason: HOSPADM

## 2023-06-21 RX ORDER — GABAPENTIN 300 MG/1
300 CAPSULE ORAL 2 TIMES DAILY
Status: CANCELLED | OUTPATIENT
Start: 2023-06-21

## 2023-06-21 RX ORDER — TORSEMIDE 10 MG/1
40 TABLET ORAL DAILY
Status: CANCELLED | OUTPATIENT
Start: 2023-06-22

## 2023-06-21 RX ORDER — INSULIN LISPRO 100 [IU]/ML
0-4 INJECTION, SOLUTION INTRAVENOUS; SUBCUTANEOUS
Status: CANCELLED | OUTPATIENT
Start: 2023-06-21

## 2023-06-21 RX ORDER — TAMSULOSIN HYDROCHLORIDE 0.4 MG/1
0.4 CAPSULE ORAL DAILY
Status: CANCELLED | OUTPATIENT
Start: 2023-06-22

## 2023-06-21 RX ORDER — ACETAMINOPHEN 650 MG/1
650 SUPPOSITORY RECTAL EVERY 6 HOURS PRN
Status: CANCELLED | OUTPATIENT
Start: 2023-06-21

## 2023-06-21 RX ORDER — INSULIN LISPRO 100 [IU]/ML
0-4 INJECTION, SOLUTION INTRAVENOUS; SUBCUTANEOUS NIGHTLY
Status: CANCELLED | OUTPATIENT
Start: 2023-06-21

## 2023-06-21 RX ORDER — FAMOTIDINE 20 MG/1
20 TABLET, FILM COATED ORAL DAILY
Status: DISCONTINUED | OUTPATIENT
Start: 2023-06-22 | End: 2023-06-29 | Stop reason: HOSPADM

## 2023-06-21 RX ORDER — ASPIRIN 81 MG/1
81 TABLET ORAL DAILY
Status: CANCELLED | OUTPATIENT
Start: 2023-06-22

## 2023-06-21 RX ORDER — ATORVASTATIN CALCIUM 40 MG/1
40 TABLET, FILM COATED ORAL NIGHTLY
Status: CANCELLED | OUTPATIENT
Start: 2023-06-21

## 2023-06-21 RX ORDER — NITROGLYCERIN 0.4 MG/1
0.4 TABLET SUBLINGUAL SEE ADMIN INSTRUCTIONS
Status: CANCELLED | OUTPATIENT
Start: 2023-06-21

## 2023-06-21 RX ORDER — TORSEMIDE 10 MG/1
40 TABLET ORAL DAILY
Status: DISCONTINUED | OUTPATIENT
Start: 2023-06-21 | End: 2023-06-21 | Stop reason: HOSPADM

## 2023-06-21 RX ORDER — CARVEDILOL 6.25 MG/1
6.25 TABLET ORAL 2 TIMES DAILY WITH MEALS
Status: DISCONTINUED | OUTPATIENT
Start: 2023-06-21 | End: 2023-06-29 | Stop reason: HOSPADM

## 2023-06-21 RX ORDER — ENOXAPARIN SODIUM 100 MG/ML
30 INJECTION SUBCUTANEOUS 2 TIMES DAILY
Status: DISCONTINUED | OUTPATIENT
Start: 2023-06-21 | End: 2023-06-27

## 2023-06-21 RX ORDER — GABAPENTIN 300 MG/1
300 CAPSULE ORAL 2 TIMES DAILY
Status: DISCONTINUED | OUTPATIENT
Start: 2023-06-21 | End: 2023-06-29 | Stop reason: HOSPADM

## 2023-06-21 RX ORDER — ACETAMINOPHEN 650 MG/1
650 SUPPOSITORY RECTAL EVERY 6 HOURS PRN
Status: DISCONTINUED | OUTPATIENT
Start: 2023-06-21 | End: 2023-06-22

## 2023-06-21 RX ORDER — ERGOCALCIFEROL 1.25 MG/1
50000 CAPSULE ORAL WEEKLY
Status: DISCONTINUED | OUTPATIENT
Start: 2023-06-27 | End: 2023-06-29 | Stop reason: HOSPADM

## 2023-06-21 RX ORDER — ASPIRIN 81 MG/1
81 TABLET ORAL DAILY
Status: DISCONTINUED | OUTPATIENT
Start: 2023-06-22 | End: 2023-06-29 | Stop reason: HOSPADM

## 2023-06-21 RX ORDER — FAMOTIDINE 20 MG/1
20 TABLET, FILM COATED ORAL DAILY
Status: CANCELLED | OUTPATIENT
Start: 2023-06-22

## 2023-06-21 RX ORDER — INSULIN LISPRO 100 [IU]/ML
0-4 INJECTION, SOLUTION INTRAVENOUS; SUBCUTANEOUS NIGHTLY
Status: DISCONTINUED | OUTPATIENT
Start: 2023-06-21 | End: 2023-06-29 | Stop reason: HOSPADM

## 2023-06-21 RX ORDER — ERGOCALCIFEROL 1.25 MG/1
50000 CAPSULE ORAL WEEKLY
Status: CANCELLED | OUTPATIENT
Start: 2023-06-27

## 2023-06-21 RX ORDER — ACETAMINOPHEN 325 MG/1
650 TABLET ORAL EVERY 6 HOURS PRN
Status: DISCONTINUED | OUTPATIENT
Start: 2023-06-21 | End: 2023-06-22

## 2023-06-21 RX ORDER — GLUCAGON 1 MG/ML
1 KIT INJECTION PRN
Status: DISCONTINUED | OUTPATIENT
Start: 2023-06-21 | End: 2023-06-29 | Stop reason: HOSPADM

## 2023-06-21 RX ORDER — POLYETHYLENE GLYCOL 3350 17 G/17G
17 POWDER, FOR SOLUTION ORAL DAILY PRN
Status: DISCONTINUED | OUTPATIENT
Start: 2023-06-21 | End: 2023-06-29 | Stop reason: HOSPADM

## 2023-06-21 RX ORDER — GLUCAGON 1 MG/ML
1 KIT INJECTION PRN
Status: CANCELLED | OUTPATIENT
Start: 2023-06-21

## 2023-06-21 RX ORDER — POLYETHYLENE GLYCOL 3350 17 G/17G
17 POWDER, FOR SOLUTION ORAL DAILY PRN
Status: CANCELLED | OUTPATIENT
Start: 2023-06-21

## 2023-06-21 RX ORDER — ACETAMINOPHEN 325 MG/1
650 TABLET ORAL EVERY 6 HOURS PRN
Status: CANCELLED | OUTPATIENT
Start: 2023-06-21

## 2023-06-21 RX ORDER — ENOXAPARIN SODIUM 100 MG/ML
30 INJECTION SUBCUTANEOUS 2 TIMES DAILY
Status: CANCELLED | OUTPATIENT
Start: 2023-06-21

## 2023-06-21 RX ORDER — ATORVASTATIN CALCIUM 40 MG/1
40 TABLET, FILM COATED ORAL NIGHTLY
Status: DISCONTINUED | OUTPATIENT
Start: 2023-06-21 | End: 2023-06-29 | Stop reason: HOSPADM

## 2023-06-21 RX ORDER — CARVEDILOL 3.12 MG/1
6.25 TABLET ORAL 2 TIMES DAILY WITH MEALS
Status: CANCELLED | OUTPATIENT
Start: 2023-06-21

## 2023-06-21 RX ORDER — TAMSULOSIN HYDROCHLORIDE 0.4 MG/1
0.4 CAPSULE ORAL DAILY
Status: DISCONTINUED | OUTPATIENT
Start: 2023-06-22 | End: 2023-06-22

## 2023-06-21 RX ORDER — TORSEMIDE 20 MG/1
40 TABLET ORAL DAILY
Status: DISCONTINUED | OUTPATIENT
Start: 2023-06-22 | End: 2023-06-29 | Stop reason: HOSPADM

## 2023-06-21 RX ADMIN — CARVEDILOL 6.25 MG: 6.25 TABLET, FILM COATED ORAL at 18:35

## 2023-06-21 RX ADMIN — ATORVASTATIN CALCIUM 40 MG: 40 TABLET, FILM COATED ORAL at 21:44

## 2023-06-21 RX ADMIN — SODIUM ZIRCONIUM CYCLOSILICATE 10 G: 10 POWDER, FOR SUSPENSION ORAL at 10:40

## 2023-06-21 RX ADMIN — MICONAZOLE NITRATE: 2 POWDER TOPICAL at 10:41

## 2023-06-21 RX ADMIN — ASPIRIN 81 MG: 81 TABLET, COATED ORAL at 10:39

## 2023-06-21 RX ADMIN — CARVEDILOL 6.25 MG: 3.12 TABLET, FILM COATED ORAL at 10:39

## 2023-06-21 RX ADMIN — ENOXAPARIN SODIUM 30 MG: 100 INJECTION SUBCUTANEOUS at 21:44

## 2023-06-21 RX ADMIN — FUROSEMIDE 40 MG: 10 INJECTION, SOLUTION INTRAMUSCULAR; INTRAVENOUS at 10:40

## 2023-06-21 RX ADMIN — MICONAZOLE NITRATE: 2 POWDER TOPICAL at 21:44

## 2023-06-21 RX ADMIN — IRON SUCROSE 200 MG: 20 INJECTION, SOLUTION INTRAVENOUS at 12:36

## 2023-06-21 RX ADMIN — GABAPENTIN 300 MG: 300 CAPSULE ORAL at 10:38

## 2023-06-21 RX ADMIN — GABAPENTIN 300 MG: 300 CAPSULE ORAL at 21:44

## 2023-06-21 RX ADMIN — ENOXAPARIN SODIUM 30 MG: 100 INJECTION SUBCUTANEOUS at 10:40

## 2023-06-21 RX ADMIN — FAMOTIDINE 20 MG: 20 TABLET ORAL at 10:39

## 2023-06-21 RX ADMIN — Medication 10 ML: at 10:41

## 2023-06-21 RX ADMIN — TAMSULOSIN HYDROCHLORIDE 0.4 MG: 0.4 CAPSULE ORAL at 10:39

## 2023-06-21 NOTE — DISCHARGE INSTR - DIET

## 2023-06-21 NOTE — DISCHARGE INSTR - COC
Continuity of Care Form    Patient Name: Miya Cerrato   :    MRN:  [id number]    Admit date:  2023  Discharge date:  2023    Code Status Order: Full Code   Advance Directives:     Admitting Physician:  Isa Long MD  PCP: Ector Metzger MD    Discharging Nurse: [facility] Unit/Room#: H178/K790-38  Discharging Unit Phone Number: [phone]    Emergency Contact:   Extended Emergency Contact Information  Primary Emergency Contact: Frida Olivares  Address: [address] [place] [address] Gamal Simpson of [address] [facility] Phone: [phone]  Relation: Spouse  Secondary Emergency Contact: [address] of [address] [facility] Phone: [phone]  Relation: Child    Past Surgical History:  Past Surgical History:   Procedure Laterality Date    BACK SURGERY N/A     BICEPS TENDON REPAIR  1997    CARDIAC CATHETERIZATION  2013    CORONARY ARTERY BYPASS GRAFT  2013      [address] [facility] [facility] SURGERY  2006    left replacement    NECK SURGERY      ROTATOR CUFF REPAIR  1996    TONSILLECTOMY  1966       Immunization History:   Immunization History   Administered Date(s) Administered    COVID-19, MODERNA BLUE border, Primary or Immunocompromised, (age 12y+), IM, 100 mcg/0.5mL 2021, 2021, 10/31/2021    COVID-19, MODERNA Booster BLUE border, (age 18y+), IM, 50mcg/0.25mL 2022    Influenza Vaccine, unspecified formulation 10/26/2015    Influenza, FLUZONE (age 72 y+), High Dose, 0.7mL 2020, 10/24/2021, 10/05/2022    Influenza, High Dose (Fluzone 65 yrs and older) 10/20/2016, 10/01/2017, 2018, 2019    Pneumococcal, PCV-13, PREVNAR 13, (age 6w+), IM, 0.5mL 2018    Pneumococcal, PPSV23, PNEUMOVAX 23, (age 2y+), SC/IM, 0.5mL 2012    Td, unspecified formulation 07/10/2013       Active Problems:  Patient Active Problem List   Diagnosis Code    Hypertension I10    Diabetes

## 2023-06-21 NOTE — DISCHARGE SUMMARY
Hospital Medicine Discharge Summary    Valeria Sanders  :    MRN:  [id number]    Admit date:  2023  Discharge date:  2023    Admitting Physician:  Annette Mcfarland MD  Primary Care Physician:  Ryan Cevallos MD      Discharge Diagnoses:    Principal Problem:    Hyperkalemia  Active Problems:    Atrial fibrillation, unspecified type ([address]    Hypoglycemia    Hypoglycemia due to type 2 diabetes mellitus ([address]    Impaired mobility and activities of daily living dt lumbar spinal stenosis  Resolved Problems:    * No resolved hospital problems. *      Hospital Course:   Valeria Sanders is a 78 y.o. male that was admitted and treated at [facility] for the following medical issues:     DM2 with hypoglycemia  - in the setting of glimepiride, CKD  - required D5 infusion  - HbA1c was 5.6  - started on ISS  - followed by endocrinology      CKD3b with hyperkalemia  - recently started on Entresto  - started on Lokelma  - slowly improving  - followed by nephrology     Pancytopenia    - baseline Hb around 11-12  - recent baseline platelets in the 474V  - iron, B12 level were WNL  - followed by hematology     HTN, chronic systolic HF  - treated with IV Furosemide  - held Entresto due to hyperkalemia  - switched to Torsemide  - Hydralazine recently stopped by cardiology due to low BPs      Disposition - acute rehab      Patient was seen by the following consultants while admitted to [facility]:   Consults:  [facility]  IP CONSULT TO ONCOLOGY  IP CONSULT TO PHYSICAL MEDICINE REHAB  IP CONSULT TO ENDOCRINOLOGY    Significant Diagnostic Studies:    No results found. Disposition:   Discharged to acute rehab. Any [facility] needs that were indicated and/or required as been addressed and set up by Social Work. Condition at discharge: Pt was medically stable at the time of discharge.  Significant improvement in clinical condition compared to

## 2023-06-22 PROBLEM — Z78.9 IMPAIRED MOBILITY AND ADLS: Status: RESOLVED | Noted: 2023-06-22 | Resolved: 2023-06-22

## 2023-06-22 PROBLEM — Z74.09 IMPAIRED MOBILITY AND ADLS: Status: RESOLVED | Noted: 2023-06-22 | Resolved: 2023-06-22

## 2023-06-22 PROBLEM — Z78.9 IMPAIRED MOBILITY AND ADLS: Status: ACTIVE | Noted: 2023-06-22

## 2023-06-22 PROBLEM — Z74.09 IMPAIRED MOBILITY AND ADLS: Status: ACTIVE | Noted: 2023-06-22

## 2023-06-22 LAB
ALBUMIN SERPL-MCNC: 3.4 G/DL (ref 3.5–4.6)
ANION GAP SERPL CALCULATED.3IONS-SCNC: 13 MEQ/L (ref 9–15)
BASOPHILS # BLD: 0 K/UL (ref 0–0.2)
BASOPHILS NFR BLD: 0.5 %
BUN SERPL-MCNC: 72 MG/DL (ref 8–23)
CALCIUM SERPL-MCNC: 8.7 MG/DL (ref 8.5–9.9)
CHLORIDE SERPL-SCNC: 109 MEQ/L (ref 95–107)
CO2 SERPL-SCNC: 20 MEQ/L (ref 20–31)
CREAT SERPL-MCNC: 2.02 MG/DL (ref 0.7–1.2)
EOSINOPHIL # BLD: 0.2 K/UL (ref 0–0.7)
EOSINOPHIL NFR BLD: 4.3 %
ERYTHROCYTE [DISTWIDTH] IN BLOOD BY AUTOMATED COUNT: 16.5 % (ref 11.5–14.5)
GLUCOSE BLD-MCNC: 111 MG/DL (ref 70–99)
GLUCOSE BLD-MCNC: 160 MG/DL (ref 70–99)
GLUCOSE BLD-MCNC: 185 MG/DL (ref 70–99)
GLUCOSE BLD-MCNC: 192 MG/DL (ref 70–99)
GLUCOSE SERPL-MCNC: 141 MG/DL (ref 70–99)
HCT VFR BLD AUTO: 28 % (ref 42–52)
HGB BLD-MCNC: 9 G/DL (ref 14–18)
LYMPHOCYTES # BLD: 0.6 K/UL (ref 1–4.8)
LYMPHOCYTES NFR BLD: 15.6 %
MCH RBC QN AUTO: 30.1 PG (ref 27–31.3)
MCHC RBC AUTO-ENTMCNC: 32.2 % (ref 33–37)
MCV RBC AUTO: 93.4 FL (ref 79–92.2)
MISCELLANEOUS LAB TEST ORDER: ABNORMAL
MONOCYTES # BLD: 0.4 K/UL (ref 0.2–0.8)
MONOCYTES NFR BLD: 12.2 %
NEUTROPHILS # BLD: 2.4 K/UL (ref 1.4–6.5)
NEUTS SEG NFR BLD: 67.4 %
PERFORMED ON: ABNORMAL
PHOSPHATE SERPL-MCNC: 4.6 MG/DL (ref 2.3–4.8)
PLATELET # BLD AUTO: 83 K/UL (ref 130–400)
POTASSIUM SERPL-SCNC: 5 MEQ/L (ref 3.4–4.9)
RBC # BLD AUTO: 2.99 M/UL (ref 4.7–6.1)
SODIUM SERPL-SCNC: 142 MEQ/L (ref 135–144)
WBC # BLD AUTO: 3.5 K/UL (ref 4.8–10.8)
WHOPPER PROMPT: ABNORMAL

## 2023-06-22 PROCEDURE — 97530 THERAPEUTIC ACTIVITIES: CPT

## 2023-06-22 PROCEDURE — 97129 THER IVNTJ 1ST 15 MIN: CPT

## 2023-06-22 PROCEDURE — 6370000000 HC RX 637 (ALT 250 FOR IP): Performed by: INTERNAL MEDICINE

## 2023-06-22 PROCEDURE — 1180000000 HC REHAB R&B

## 2023-06-22 PROCEDURE — 6360000002 HC RX W HCPCS: Performed by: INTERNAL MEDICINE

## 2023-06-22 PROCEDURE — 97162 PT EVAL MOD COMPLEX 30 MIN: CPT

## 2023-06-22 PROCEDURE — 97116 GAIT TRAINING THERAPY: CPT

## 2023-06-22 PROCEDURE — 80069 RENAL FUNCTION PANEL: CPT

## 2023-06-22 PROCEDURE — 97166 OT EVAL MOD COMPLEX 45 MIN: CPT

## 2023-06-22 PROCEDURE — 6360000002 HC RX W HCPCS: Performed by: PHYSICAL MEDICINE & REHABILITATION

## 2023-06-22 PROCEDURE — 6370000000 HC RX 637 (ALT 250 FOR IP): Performed by: PHYSICAL MEDICINE & REHABILITATION

## 2023-06-22 PROCEDURE — 99222 1ST HOSP IP/OBS MODERATE 55: CPT | Performed by: PHYSICAL MEDICINE & REHABILITATION

## 2023-06-22 PROCEDURE — 36415 COLL VENOUS BLD VENIPUNCTURE: CPT

## 2023-06-22 PROCEDURE — 85025 COMPLETE CBC W/AUTO DIFF WBC: CPT

## 2023-06-22 PROCEDURE — 97110 THERAPEUTIC EXERCISES: CPT

## 2023-06-22 PROCEDURE — 97535 SELF CARE MNGMENT TRAINING: CPT

## 2023-06-22 RX ORDER — UBIDECARENONE 100 MG
100 CAPSULE ORAL DAILY
Status: DISCONTINUED | OUTPATIENT
Start: 2023-06-22 | End: 2023-06-29 | Stop reason: HOSPADM

## 2023-06-22 RX ORDER — LIDOCAINE 4 G/G
3 PATCH TOPICAL DAILY
Status: DISCONTINUED | OUTPATIENT
Start: 2023-06-22 | End: 2023-06-27

## 2023-06-22 RX ORDER — CYANOCOBALAMIN 1000 UG/ML
1000 INJECTION, SOLUTION INTRAMUSCULAR; SUBCUTANEOUS WEEKLY
Status: DISCONTINUED | OUTPATIENT
Start: 2023-06-22 | End: 2023-06-29 | Stop reason: HOSPADM

## 2023-06-22 RX ORDER — TAMSULOSIN HYDROCHLORIDE 0.4 MG/1
0.4 CAPSULE ORAL
Status: DISCONTINUED | OUTPATIENT
Start: 2023-06-22 | End: 2023-06-29 | Stop reason: HOSPADM

## 2023-06-22 RX ORDER — ENEMA 19; 7 G/133ML; G/133ML
1 ENEMA RECTAL DAILY PRN
Status: DISCONTINUED | OUTPATIENT
Start: 2023-06-22 | End: 2023-06-29 | Stop reason: HOSPADM

## 2023-06-22 RX ORDER — ACETAMINOPHEN 325 MG/1
650 TABLET ORAL EVERY 4 HOURS PRN
Status: DISCONTINUED | OUTPATIENT
Start: 2023-06-22 | End: 2023-06-29 | Stop reason: HOSPADM

## 2023-06-22 RX ORDER — BISACODYL 10 MG
10 SUPPOSITORY, RECTAL RECTAL DAILY PRN
Status: DISCONTINUED | OUTPATIENT
Start: 2023-06-22 | End: 2023-06-29 | Stop reason: HOSPADM

## 2023-06-22 RX ORDER — VITAMIN B COMPLEX
2000 TABLET ORAL
Status: DISCONTINUED | OUTPATIENT
Start: 2023-06-22 | End: 2023-06-22

## 2023-06-22 RX ORDER — VITAMIN B COMPLEX
2000 TABLET ORAL 2 TIMES DAILY
Status: DISCONTINUED | OUTPATIENT
Start: 2023-06-22 | End: 2023-06-29 | Stop reason: HOSPADM

## 2023-06-22 RX ADMIN — TORSEMIDE 40 MG: 20 TABLET ORAL at 09:03

## 2023-06-22 RX ADMIN — MICONAZOLE NITRATE: 2 POWDER TOPICAL at 09:24

## 2023-06-22 RX ADMIN — TAMSULOSIN HYDROCHLORIDE 0.4 MG: 0.4 CAPSULE ORAL at 17:06

## 2023-06-22 RX ADMIN — CARVEDILOL 6.25 MG: 6.25 TABLET, FILM COATED ORAL at 09:03

## 2023-06-22 RX ADMIN — SODIUM ZIRCONIUM CYCLOSILICATE 10 G: 10 POWDER, FOR SUSPENSION ORAL at 09:02

## 2023-06-22 RX ADMIN — ENOXAPARIN SODIUM 30 MG: 100 INJECTION SUBCUTANEOUS at 09:02

## 2023-06-22 RX ADMIN — Medication 2000 UNITS: at 21:44

## 2023-06-22 RX ADMIN — GABAPENTIN 300 MG: 300 CAPSULE ORAL at 21:44

## 2023-06-22 RX ADMIN — Medication 2000 UNITS: at 09:18

## 2023-06-22 RX ADMIN — CARVEDILOL 6.25 MG: 6.25 TABLET, FILM COATED ORAL at 17:06

## 2023-06-22 RX ADMIN — ACETAMINOPHEN 650 MG: 325 TABLET ORAL at 09:02

## 2023-06-22 RX ADMIN — FAMOTIDINE 20 MG: 20 TABLET ORAL at 09:19

## 2023-06-22 RX ADMIN — Medication 100 MG: at 09:19

## 2023-06-22 RX ADMIN — GABAPENTIN 300 MG: 300 CAPSULE ORAL at 09:03

## 2023-06-22 RX ADMIN — MICONAZOLE NITRATE: 2 POWDER TOPICAL at 21:45

## 2023-06-22 RX ADMIN — ATORVASTATIN CALCIUM 40 MG: 40 TABLET, FILM COATED ORAL at 21:44

## 2023-06-22 RX ADMIN — ASPIRIN 81 MG: 81 TABLET, COATED ORAL at 09:03

## 2023-06-22 RX ADMIN — CYANOCOBALAMIN 1000 MCG: 1000 INJECTION, SOLUTION INTRAMUSCULAR; SUBCUTANEOUS at 09:18

## 2023-06-22 RX ADMIN — ENOXAPARIN SODIUM 30 MG: 100 INJECTION SUBCUTANEOUS at 21:44

## 2023-06-22 ASSESSMENT — ENCOUNTER SYMPTOMS
SHORTNESS OF BREATH: 0
CHEST TIGHTNESS: 0
EYE PAIN: 0
BACK PAIN: 1
ABDOMINAL DISTENTION: 0
WHEEZING: 0
VOMITING: 0
COLOR CHANGE: 0
COUGH: 0
SORE THROAT: 0
DIARRHEA: 0
NAUSEA: 0
CONSTIPATION: 0
EYE REDNESS: 0
PHOTOPHOBIA: 0
STRIDOR: 0
BLOOD IN STOOL: 0
ABDOMINAL PAIN: 0

## 2023-06-22 ASSESSMENT — PAIN SCALES - GENERAL
PAINLEVEL_OUTOF10: 0
PAINLEVEL_OUTOF10: 0
PAINLEVEL_OUTOF10: 5
PAINLEVEL_OUTOF10: 0

## 2023-06-22 ASSESSMENT — PAIN DESCRIPTION - ORIENTATION: ORIENTATION: LOWER

## 2023-06-22 ASSESSMENT — PAIN DESCRIPTION - LOCATION: LOCATION: BACK

## 2023-06-22 ASSESSMENT — PAIN DESCRIPTION - DESCRIPTORS: DESCRIPTORS: SHARP

## 2023-06-23 LAB
ANION GAP SERPL CALCULATED.3IONS-SCNC: 10 MEQ/L (ref 9–15)
BUN SERPL-MCNC: 68 MG/DL (ref 8–23)
CALCIUM SERPL-MCNC: 8.9 MG/DL (ref 8.5–9.9)
CHLORIDE SERPL-SCNC: 109 MEQ/L (ref 95–107)
CO2 SERPL-SCNC: 22 MEQ/L (ref 20–31)
CREAT SERPL-MCNC: 1.83 MG/DL (ref 0.7–1.2)
GLUCOSE BLD-MCNC: 144 MG/DL (ref 70–99)
GLUCOSE BLD-MCNC: 166 MG/DL (ref 70–99)
GLUCOSE BLD-MCNC: 174 MG/DL (ref 70–99)
GLUCOSE BLD-MCNC: 179 MG/DL (ref 70–99)
GLUCOSE SERPL-MCNC: 157 MG/DL (ref 70–99)
PERFORMED ON: ABNORMAL
POTASSIUM SERPL-SCNC: 4.8 MEQ/L (ref 3.4–4.9)
SODIUM SERPL-SCNC: 141 MEQ/L (ref 135–144)

## 2023-06-23 PROCEDURE — 99233 SBSQ HOSP IP/OBS HIGH 50: CPT | Performed by: PHYSICAL MEDICINE & REHABILITATION

## 2023-06-23 PROCEDURE — 97535 SELF CARE MNGMENT TRAINING: CPT

## 2023-06-23 PROCEDURE — 97530 THERAPEUTIC ACTIVITIES: CPT

## 2023-06-23 PROCEDURE — 97116 GAIT TRAINING THERAPY: CPT

## 2023-06-23 PROCEDURE — 6370000000 HC RX 637 (ALT 250 FOR IP): Performed by: INTERNAL MEDICINE

## 2023-06-23 PROCEDURE — 80048 BASIC METABOLIC PNL TOTAL CA: CPT

## 2023-06-23 PROCEDURE — 1180000000 HC REHAB R&B

## 2023-06-23 PROCEDURE — 97112 NEUROMUSCULAR REEDUCATION: CPT

## 2023-06-23 PROCEDURE — 6360000002 HC RX W HCPCS: Performed by: INTERNAL MEDICINE

## 2023-06-23 PROCEDURE — 6370000000 HC RX 637 (ALT 250 FOR IP): Performed by: PHYSICAL MEDICINE & REHABILITATION

## 2023-06-23 PROCEDURE — 36415 COLL VENOUS BLD VENIPUNCTURE: CPT

## 2023-06-23 PROCEDURE — 97110 THERAPEUTIC EXERCISES: CPT

## 2023-06-23 RX ADMIN — SODIUM ZIRCONIUM CYCLOSILICATE 10 G: 10 POWDER, FOR SUSPENSION ORAL at 08:31

## 2023-06-23 RX ADMIN — GABAPENTIN 300 MG: 300 CAPSULE ORAL at 21:29

## 2023-06-23 RX ADMIN — TAMSULOSIN HYDROCHLORIDE 0.4 MG: 0.4 CAPSULE ORAL at 17:26

## 2023-06-23 RX ADMIN — MICONAZOLE NITRATE: 2 POWDER TOPICAL at 08:33

## 2023-06-23 RX ADMIN — ENOXAPARIN SODIUM 30 MG: 100 INJECTION SUBCUTANEOUS at 21:26

## 2023-06-23 RX ADMIN — Medication 2000 UNITS: at 21:34

## 2023-06-23 RX ADMIN — FAMOTIDINE 20 MG: 20 TABLET ORAL at 08:32

## 2023-06-23 RX ADMIN — ENOXAPARIN SODIUM 30 MG: 100 INJECTION SUBCUTANEOUS at 08:32

## 2023-06-23 RX ADMIN — TORSEMIDE 40 MG: 20 TABLET ORAL at 08:32

## 2023-06-23 RX ADMIN — ATORVASTATIN CALCIUM 40 MG: 40 TABLET, FILM COATED ORAL at 21:25

## 2023-06-23 RX ADMIN — MICONAZOLE NITRATE: 2 POWDER TOPICAL at 21:33

## 2023-06-23 RX ADMIN — ASPIRIN 81 MG: 81 TABLET, COATED ORAL at 08:32

## 2023-06-23 RX ADMIN — Medication 100 MG: at 08:32

## 2023-06-23 RX ADMIN — Medication 2000 UNITS: at 08:32

## 2023-06-23 RX ADMIN — CARVEDILOL 6.25 MG: 6.25 TABLET, FILM COATED ORAL at 17:26

## 2023-06-23 RX ADMIN — ACETAMINOPHEN 650 MG: 325 TABLET ORAL at 08:38

## 2023-06-23 RX ADMIN — GABAPENTIN 300 MG: 300 CAPSULE ORAL at 08:32

## 2023-06-23 RX ADMIN — ACETAMINOPHEN 650 MG: 325 TABLET ORAL at 21:43

## 2023-06-23 RX ADMIN — CARVEDILOL 6.25 MG: 6.25 TABLET, FILM COATED ORAL at 08:32

## 2023-06-23 ASSESSMENT — PAIN DESCRIPTION - DESCRIPTORS
DESCRIPTORS: ACHING
DESCRIPTORS: ACHING

## 2023-06-23 ASSESSMENT — PAIN DESCRIPTION - LOCATION
LOCATION: BACK
LOCATION: BACK

## 2023-06-23 ASSESSMENT — PAIN SCALES - GENERAL
PAINLEVEL_OUTOF10: 7
PAINLEVEL_OUTOF10: 3

## 2023-06-23 NOTE — PROGRESS NOTES
Assessment completed, patient alert and oriented x 4, lungs diminished, room air, heart rate regular, bowel sounds active, last BM 6-18, bilateral lower extremities noted with 3+ pitting edema, external catheter in place, aj area noted with redness, zinc in use, abdomen folds with redness, micotin used to assist with this, patient not moving in the bed independently, message sent to Dr Shane Griffith regarding PT/OT for the patient
Hematology/Oncology   Progress Note        CHIEF COMPLAINT/HPI:  Follow up of anemia. Review of iron studies suggest component of iron deficiency. For venofer today. REVIEW OF SYSTEMS:    Unremarkable except for symptoms mentioned in HPI.     Current Inpatient Medications:    Current Facility-Administered Medications   Medication Dose Route Frequency Provider Last Rate Last Admin    iron sucrose (VENOFER) 200 mg in sodium chloride 0.9 % 100 mL IVPB  200 mg IntraVENous Once Joy Shaw MD        furosemide (LASIX) injection 40 mg  40 mg IntraVENous BID Katy Cassidy MD   40 mg at 06/20/23 1742    vitamin D (ERGOCALCIFEROL) capsule 50,000 Units  50,000 Units Oral Weekly Meme Ruiz MD   50,000 Units at 06/20/23 1742    sodium zirconium cyclosilicate (LOKELMA) oral suspension 10 g  10 g Oral Daily Katy Cassidy MD   10 g at 06/20/23 1042    insulin lispro (HUMALOG) injection vial 0-4 Units  0-4 Units SubCUTAneous TID WC Julius Mccullough MD        insulin lispro (HUMALOG) injection vial 0-4 Units  0-4 Units SubCUTAneous Nightly Julius Mccullough MD        sodium chloride flush 0.9 % injection 5-40 mL  5-40 mL IntraVENous 2 times per day Bretta Galeazzi, MD   10 mL at 06/20/23 2008    sodium chloride flush 0.9 % injection 5-40 mL  5-40 mL IntraVENous PRN Bretta Galeazzi, MD        0.9 % sodium chloride infusion   IntraVENous PRN Bretta Galeazzi, MD        enoxaparin Sodium (LOVENOX) injection 30 mg  30 mg SubCUTAneous BID Bretta Galeazzi, MD   30 mg at 06/20/23 2007    ondansetron (ZOFRAN-ODT) disintegrating tablet 4 mg  4 mg Oral Q8H PRN Bretta Galeazzi, MD        Or    ondansetron (ZOFRAN) injection 4 mg  4 mg IntraVENous Q6H PRN Bretta Galeazzi, MD        polyethylene glycol (GLYCOLAX) packet 17 g  17 g Oral Daily PRN Bretta Galeazzi, MD        acetaminophen (TYLENOL) tablet 650 mg  650 mg Oral Q6H PRN Bretta Galeazzi, MD        Or    acetaminophen (TYLENOL) suppository 650 mg  650 mg Rectal Q6H PRN Bretta Galeazzi, MD
Hospitalist Progress Note      PCP: Kimberly Coronel MD    Date of Admission: 6/17/2023    Chief Complaint:  no acute events, afebrile, stable HD    Medications:  Reviewed    Infusion Medications    sodium chloride      dextrose       Scheduled Medications    iron sucrose  200 mg IntraVENous Once    furosemide  40 mg IntraVENous BID    vitamin D  50,000 Units Oral Weekly    sodium zirconium cyclosilicate  10 g Oral Daily    insulin lispro  0-4 Units SubCUTAneous TID WC    insulin lispro  0-4 Units SubCUTAneous Nightly    sodium chloride flush  5-40 mL IntraVENous 2 times per day    enoxaparin  30 mg SubCUTAneous BID    atorvastatin  40 mg Oral Nightly    carvedilol  6.25 mg Oral BID WC    [Held by provider] furosemide  40 mg Oral BID    tamsulosin  0.4 mg Oral Daily    gabapentin  300 mg Oral BID    aspirin  81 mg Oral Daily    famotidine  20 mg Oral Daily    nitroGLYCERIN  0.4 mg SubLINGual See Admin Instructions    miconazole   Topical BID     PRN Meds: sodium chloride flush, sodium chloride, ondansetron **OR** ondansetron, polyethylene glycol, acetaminophen **OR** acetaminophen, glucose, dextrose bolus **OR** dextrose bolus, glucagon (rDNA), dextrose      Intake/Output Summary (Last 24 hours) at 6/21/2023 1050  Last data filed at 6/20/2023 1749  Gross per 24 hour   Intake 240 ml   Output 1275 ml   Net -1035 ml         Exam:    BP (!) 144/61   Pulse 57   Temp 97.2 °F (36.2 °C) (Oral)   Resp 20   Ht 6' 4\" (1.93 m)   Wt (!) 316 lb (143.3 kg)   SpO2 97%   BMI 38.46 kg/m²     General appearance: appears stated age and cooperative. Respiratory:  clear to auscultation bilaterally . Cardiovascular: Regular rate and rhythm, S1/S2. Abdomen: Soft, active bowel sounds. Musculoskeletal: No edema bilaterally.      Labs:   Recent Labs     06/19/23  0412 06/20/23  0522 06/20/23  1011 06/21/23  0503   WBC 5.6 4.4*  --  3.9*   HGB 9.5* 9.1*  --  9.3*   HCT 29.3* 28.2* 27.5* 28.0*   PLT 79* 81*  --  80*       Recent Labs
Hospitalist Progress Note      PCP: Mariano Orozco MD    Date of Admission: 6/17/2023    Chief Complaint:  no acute events, afebrile, stable HD    Medications:  Reviewed    Infusion Medications    sodium chloride      dextrose       Scheduled Medications    sodium chloride flush  5-40 mL IntraVENous 2 times per day    enoxaparin  30 mg SubCUTAneous BID    sodium zirconium cyclosilicate  5 g Oral TID    atorvastatin  40 mg Oral Nightly    carvedilol  6.25 mg Oral BID WC    furosemide  40 mg Oral BID    tamsulosin  0.4 mg Oral Daily    gabapentin  300 mg Oral BID    aspirin  81 mg Oral Daily    famotidine  20 mg Oral Daily    hydrALAZINE  25 mg Oral Daily    nitroGLYCERIN  0.4 mg SubLINGual See Admin Instructions    miconazole   Topical BID     PRN Meds: sodium chloride flush, sodium chloride, ondansetron **OR** ondansetron, polyethylene glycol, acetaminophen **OR** acetaminophen, glucose, dextrose bolus **OR** dextrose bolus, glucagon (rDNA), dextrose      Intake/Output Summary (Last 24 hours) at 6/19/2023 1139  Last data filed at 6/18/2023 2359  Gross per 24 hour   Intake --   Output 350 ml   Net -350 ml       Exam:    BP (!) 128/56   Pulse 60   Temp 97.4 °F (36.3 °C) (Oral)   Resp 18   Ht 6' 4\" (1.93 m)   Wt (!) 316 lb (143.3 kg)   SpO2 96%   BMI 38.46 kg/m²     General appearance: appears stated age and cooperative. Respiratory:  clear to auscultation bilaterally . Cardiovascular: Regular rate and rhythm, S1/S2. Abdomen: Soft, active bowel sounds. Musculoskeletal: No edema bilaterally. Labs:   Recent Labs     06/17/23 2045 06/19/23  0412   WBC 6.0 5.6   HGB 10.1* 9.5*   HCT 31.4* 29.3*   PLT 89* 79*     Recent Labs     06/18/23 2056 06/19/23  0015 06/19/23  0906    139 142   K 6.5* 6.6* 5.9*   * 110* 112*   CO2 19* 19* 18*   BUN 67* 67* 68*   CREATININE 1.96* 1.89* 1.93*   CALCIUM 9.0 8.9 8.9     No results for input(s): AST, ALT, BILIDIR, BILITOT, ALKPHOS in the last 72 hours.   No
[facility] OCCUPATIONAL THERAPY EVALUATION - ACUTE     NAME: Kena Richardson  :  (71 y.o.)  MRN: [id number]  CODE STATUS: Full Code  Room: Q520/B903-69    Date of Service: 2023    Patient Diagnosis(es): Hyperkalemia [E87.5]  Hypoglycemia [E16.2]   Patient Active Problem List    Diagnosis Date Noted    Chronic systolic CHF (congestive heart failure), NYHA class 1 ([facility] 2023    Coronary artery disease involving coronary bypass graft of native heart without angina pectoris 2022    Hyperkalemia 2023    Leg swelling 2022    PVD (peripheral vascular disease) ([facility] 01/15/2021    Left foot drop     Type 2 diabetes mellitus with stage 3 chronic kidney disease, with long-term current use of insulin ([facility] 2020    Hyperlipidemia 10/14/2013    Stage 3 chronic kidney disease 10/14/2013    Iron deficiency anemia 07/10/2013    BPH (benign prostatic hyperplasia) 07/10/2013    S/P CABG x 4 2013    JAZZMINE (obstructive sleep apnea) 2013    Morbid obesity ([facility] 2013    Atrial fibrillation, unspecified type ([facility]     Kidney stone 2012    Hypertension 2012    Diabetes mellitus ([facility] 2012        Past Medical History:   Diagnosis Date    CAD (coronary artery disease)     Dr. Michele Ramires, Dr. Julio Cesar Damon    CKD (chronic kidney disease)     Coronary artery disease involving coronary bypass graft of native heart without angina pectoris 2022    Hypertension     Kidney stones     Left foot drop     Neuropathy, diabetic (HCC)     mild    Osteoarthritis     hip, knee    S/P CABG (coronary artery bypass graft)     Type II or unspecified type diabetes mellitus without mention of complication, not stated as uncontrolled      Past Surgical History:   Procedure Laterality Date    BACK SURGERY N/A     BICEPS TENDON REPAIR  1997    CARDIAC CATHETERIZATION  2013    CORONARY ARTERY BYPASS GRAFT  2013    DR. Alvarez [facility] SURGERY  2006    left replacement
Nephrology Progress Note    Assessment:  78 y.o. male with history s/f T2DM, CAD s/p CABG, OA, nephrolithiasis, HTN, CKD stage III who presented for hypoglycemia. CKD stage III: baseline Scr ~1.8-2 w/ eGFR low/mid 30s, 2/2 T2DM, HTN  Hyperkalemia: most likely 2/2 entresto (recently started 1 month ago), however w/ acidosis ?  If has hyperkalemic RTA, on coreg, does have CKD   Metabolic acidosis  Hypoglycemia: endocrine onboard   Anemia/thrombocytopenia: hematology onboard  Vitamin D deficiency: Vit D 10      Plan:  - changing lasix 40 mg IV BID to torsemide 40 mg PO QD  - continue ergocalciferol   - will continue to monitor for improvement   - continue lokelma for now  - keep entresto on hold   - ok to discharge to rehab       Patient Active Problem List:     Hypertension     Diabetes mellitus ([facility]     Kidney stone     S/P CABG x 4     Atrial fibrillation, unspecified type (HCC)     JAZZMINE (obstructive sleep apnea)     Morbid obesity ([facility])     Iron deficiency anemia     BPH (benign prostatic hyperplasia)     Hyperlipidemia     Stage 3 chronic kidney disease     Type 2 diabetes mellitus with stage 3 chronic kidney disease, with long-term current use of insulin ([facility])     Left foot drop     PVD (peripheral vascular disease) ([facility])     Leg swelling     Coronary artery disease involving coronary bypass graft of native heart without angina pectoris     Chronic systolic CHF (congestive heart failure), NYHA class 1 ([facility])     Hyperkalemia     Hypoglycemia     Hypoglycemia due to type 2 diabetes mellitus ([facility]     Spinal stenosis of lumbar region     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
Patient slept well throughout night. Specialty bed arrived. Patient stood with walker and two staff assist and sat in chair while bed was exchanged. Patient voices that he is much more comfortable in specialty bed. External [facility]) catheter changed. Patient tolerated well. QS urine noted draining into urine bag. No distress observed during shift.
Physical Therapy  Facility/Department: [facility] MED SURG F188/F871-40  Physical Therapy Discharge      NAME: Priscilla Jones    :  (78 y.o.)  MRN: [id number]    Account: [de-identified]  Gender: male      Patient has been discharged from acute care hospital. DC patient from current PT program.      Electronically signed by Marciano Salter PT on 23 at 3:17 PM EDT
Physical Therapy Med Surg Daily Treatment Note  Facility/Department: [facility]  Room: [place] [address]-       NAME: Kaylan Dear  :  (39 y.o.)  MRN: [id number]  CODE STATUS: Full Code    Date of Service: 2023    Patient Diagnosis(es): Hyperkalemia [E87.5]  Hypoglycemia [E16.2]   Chief Complaint   Patient presents with    Hypoglycemia     Patient Active Problem List    Diagnosis Date Noted    Chronic systolic CHF (congestive heart failure), NYHA class 1 ([facility] 2023    Coronary artery disease involving coronary bypass graft of native heart without angina pectoris 2022    Hypervolemia 2023    Impaired mobility and activities of daily living dt lumbar spinal stenosis 2023    Hypoglycemia 2023    Hypoglycemia due to type 2 diabetes mellitus ([facility] 2023    Hyperkalemia 2023    Entrapment neuropathy 2023    Neuropathic pain 10/20/2022    Failed back syndrome of lumbar spine 10/20/2022    Leg swelling 2022    PVD (peripheral vascular disease) ([facility] 01/15/2021    Left foot drop     Type 2 diabetes mellitus with stage 3 chronic kidney disease, with long-term current use of insulin ([facility] 2020    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 ([facility] 10/16/2018    Spinal stenosis of lumbar region 2018    Lumbar spinal stenosis 2018    Hyperlipidemia 10/14/2013    Stage 3 chronic kidney disease 10/14/2013    Iron deficiency anemia 07/10/2013    BPH (benign prostatic hyperplasia) 07/10/2013    S/P CABG x 4 2013    JAZZMINE (obstructive sleep apnea) 2013    Morbid obesity ([address] 2013    Atrial fibrillation, unspecified type ([address]     Kidney stone 2012    Hypertension 2012    Diabetes mellitus ([address] 2012        Past Medical History:   Diagnosis Date    CAD (coronary artery disease)     Dr. Yanet Hobbs, Dr. Adán Sawyer    CKD (chronic kidney disease)     Coronary artery disease involving coronary bypass graft of native heart
Physical Therapy Med Surg Initial Assessment  Facility/Department: [facility]  Room: [place] [address]       NAME: Tawana Tobias  :  (4 y.o.)  MRN: [id number]  CODE STATUS: Full Code    Date of Service: 2023    Patient Diagnosis(es): Hyperkalemia [E87.5]  Hypoglycemia [E16.2]   Chief Complaint   Patient presents with    Hypoglycemia     Patient Active Problem List    Diagnosis Date Noted    Chronic systolic CHF (congestive heart failure), NYHA class 1 ([facility] 2023    Coronary artery disease involving coronary bypass graft of native heart without angina pectoris 2022    Hyperkalemia 2023    Leg swelling 2022    PVD (peripheral vascular disease) ([facility] 01/15/2021    Left foot drop     Type 2 diabetes mellitus with stage 3 chronic kidney disease, with long-term current use of insulin ([facility] 2020    Hyperlipidemia 10/14/2013    Stage 3 chronic kidney disease 10/14/2013    Iron deficiency anemia 07/10/2013    BPH (benign prostatic hyperplasia) 07/10/2013    S/P CABG x 4 2013    JAZZMINE (obstructive sleep apnea) 2013    Morbid obesity ([facility] 2013    Atrial fibrillation, unspecified type ([facility]     Kidney stone 2012    Hypertension 2012    Diabetes mellitus ([facility] 2012        Past Medical History:   Diagnosis Date    CAD (coronary artery disease)     Dr. Marques Victor, Dr. Gleason     CKD (chronic kidney disease)     Coronary artery disease involving coronary bypass graft of native heart without angina pectoris 2022    Hypertension     Kidney stones     Left foot drop     Neuropathy, diabetic ([address]     mild    Osteoarthritis     hip, knee    S/P CABG (coronary artery bypass graft)     Type II or unspecified type diabetes mellitus without mention of complication, not stated as uncontrolled      Past Surgical History:   Procedure Laterality Date    BACK SURGERY N/A     BICEPS TENDON REPAIR  1997    CARDIAC CATHETERIZATION  2013    CORONARY
Subjective: The patient complains of severe acute on chronic progressive fatigue and low back pain and left foot drop partially relieved by rest, PT, OT and meds   and exacerbated by exertion and recent CAD medical illness. Patient was admitted through the ER at [facility] in [place] with hypoglycemia causing a syncopal episode. He responded to D10. He was admitted under the care of the hospitalist-with nephrology endocrinology and hematology consulting. Blood counts showed severe thrombocytopenia going back to 2012 suspecting ITP hematology consulted-retake count was ordered due to probable myelosuppression from renal disease. Endocrinology was consulted regarding his uncontrolled diabetes mellitus and medications were adjusted     Nephrology was consulted regarding acute on chronic renal failure hyperkalemia and metabolic acidosis. I am concerned about patients medical complexities including:  Principal Problem:    Hyperkalemia  Active Problems:    Atrial fibrillation, unspecified type (HCC)    Hypoglycemia    Hypoglycemia due to type 2 diabetes mellitus (HCC)    Impaired mobility and activities of daily living dt lumbar spinal stenosis  Resolved Problems:    * No resolved hospital problems. *      .    Reviewed recent nursing note and discussed current status and planned care with acute care providers, \"PAS reviewed by the PM&R physician and the patient has been accepted to [facility] room 251 pending medical clearance and a negative Covid Screening. Spoke with Charlotte BONILLA and with [employer] on [address] said that they will keep patient overnight to monitor as he is on IV lasix informed rehab not coming tonight. He may come when cleared medically. \".    ROS x10: The patient also complains of severely impaired mobility and activities of daily living.   Otherwise no new problems with vision, hearing, nose, mouth, throat, dermal, cardiovascular, GI, , pulmonary,
112* 114*   CO2 19* 18* 19*   BUN 67* 68* 69*   CREATININE 1.89* 1.93* 2.19*   CALCIUM 8.9 8.9 8.5   PHOS  --   --  4.6       No results for input(s): AST, ALT, BILIDIR, BILITOT, ALKPHOS in the last 72 hours. No results for input(s): INR in the last 72 hours. Recent Labs     06/19/23  0412   CKTOTAL 209*         Urinalysis:      Lab Results   Component Value Date/Time    NITRU Negative 05/30/2023 02:06 PM    [address] 05/30/2023 02:06 PM    BACTERIA Negative 05/30/2023 02:06 PM    RBCUA 5-10 05/30/2023 02:06 PM    BLOODU Negative 05/30/2023 02:06 PM    SPECGRAV 1.016 05/30/2023 02:06 PM    GLUCOSEU Negative 05/30/2023 02:06 PM       Radiology:  No orders to display           Assessment/Plan:    77 y/o man with history of CAD s/p CABG in 6300, chronic systolic HF s/p AICD, LBBB, HTN, IDDM2, CKD3b, obesity who presented with:    DM2 with hypoglycemia  - in the setting of glimepiride, insulin therapy, CKD  - requiring D5 infusion  - HbA1c is 5.6  - avoid hypoglycemic agents  - endocrinology following     CKD3b with hyperkalemia  - recently started on Entresto  - started on Lokelma  - slowly improving  - nephrology following    Pancytopenia    - baseline Hb around 11-12  - recent baseline platelets in the 31-001U  - iron, B12 level were WNL  - hematology following    CAD  - on ASA, Lipitor    HTN, chronic systolic HF  - on Coreg, Furosemide  - holding Entresto due to hyperkalemia  - Hydralazine recently stopped by cardiology due to low BPs      Diet: ADULT DIET;  Regular; Low Potassium (Less than 3000 mg/day)    Code Status: Full Code      Disposition - acute rehab when medically ready          Electronically signed by Dimitri Aguirre MD on 6/20/2023 at 11:04 AM
Goals  Long Term Goals  Long Term Goal 1: Pt will demonstrate bed mobility indep  Long Term Goal 2: Pt will demonstrate transfers mod indep with safest AD  Long Term Goal 3: Pt will demonstrate amb >/= 75 ft mod indep with safest AD  Long Term Goal 4: Pt will demonstrate stair negotiation 3 steps with 1 rail mod indep    PLAN    General Plan: 1 time a day 3-6 times a week  Safety Devices  Type of Devices: All fall risk precautions in place, Call light within reach, Left in chair, Nurse notified, Chair alarm in place     [facility] (6 CLICK) [address] Mobility Raw Score : 12     Therapy Time   Individual   Time In 1100   Time Out 1115   Minutes 15     Timed Code Treatment Minutes: 15 Minutes     Tr 10  Gt 5   Rupert richards PTA, 06/21/23 at 12:32 PM         Definitions for assistance levels  Independent = pt does not require any physical supervision or assistance from another person for activity completion. Device may be needed.   Stand by assistance = pt requires verbal cues or instructions from another person, close to but not touching, to perform the activity  Minimal assistance= pt performs 75% or more of the activity; assistance is required to complete the activity  Moderate assistance= pt performs 50% of the activity; assistance is required to complete the activity  Maximal assistance = pt performs 25% of the activity; assistance is required to complete the activity  Dependent = pt requires total physical assistance to accomplish the task
ml   Net -850 ml       General: alert, in no apparent distress  HEENT: normocephalic, atraumatic, anicteric  Neck: supple, no mass  Lungs: non-labored respirations, clear to auscultation bilaterally  Heart: regular rate and rhythm, no murmurs or rubs  Abdomen: soft, non-tender, non-distended  Ext: no cyanosis, ++ peripheral edema  Neuro: alert, following commands       Electronically signed by Jennifer Larry MD, MD

## 2023-06-24 LAB
ANION GAP SERPL CALCULATED.3IONS-SCNC: 12 MEQ/L (ref 9–15)
BASOPHILS # BLD: 0 K/UL (ref 0–0.2)
BASOPHILS NFR BLD: 0.9 %
BUN SERPL-MCNC: 69 MG/DL (ref 8–23)
CALCIUM SERPL-MCNC: 8.5 MG/DL (ref 8.5–9.9)
CHLORIDE SERPL-SCNC: 108 MEQ/L (ref 95–107)
CO2 SERPL-SCNC: 22 MEQ/L (ref 20–31)
CREAT SERPL-MCNC: 1.79 MG/DL (ref 0.7–1.2)
EOSINOPHIL # BLD: 0.2 K/UL (ref 0–0.7)
EOSINOPHIL NFR BLD: 4.3 %
ERYTHROCYTE [DISTWIDTH] IN BLOOD BY AUTOMATED COUNT: 15.8 % (ref 11.5–14.5)
GLUCOSE BLD-MCNC: 137 MG/DL (ref 70–99)
GLUCOSE BLD-MCNC: 142 MG/DL (ref 70–99)
GLUCOSE BLD-MCNC: 168 MG/DL (ref 70–99)
GLUCOSE BLD-MCNC: 170 MG/DL (ref 70–99)
GLUCOSE SERPL-MCNC: 170 MG/DL (ref 70–99)
HCT VFR BLD AUTO: 27.2 % (ref 42–52)
HGB BLD-MCNC: 9 G/DL (ref 14–18)
LYMPHOCYTES # BLD: 0.5 K/UL (ref 1–4.8)
LYMPHOCYTES NFR BLD: 13.7 %
MCH RBC QN AUTO: 30.7 PG (ref 27–31.3)
MCHC RBC AUTO-ENTMCNC: 33 % (ref 33–37)
MCV RBC AUTO: 92.8 FL (ref 79–92.2)
MONOCYTES # BLD: 0.4 K/UL (ref 0.2–0.8)
MONOCYTES NFR BLD: 11 %
NEUTROPHILS # BLD: 2.8 K/UL (ref 1.4–6.5)
NEUTS SEG NFR BLD: 70.1 %
PERFORMED ON: ABNORMAL
PLATELET # BLD AUTO: 73 K/UL (ref 130–400)
POTASSIUM SERPL-SCNC: 4.9 MEQ/L (ref 3.4–4.9)
RBC # BLD AUTO: 2.93 M/UL (ref 4.7–6.1)
SODIUM SERPL-SCNC: 142 MEQ/L (ref 135–144)
WBC # BLD AUTO: 4 K/UL (ref 4.8–10.8)

## 2023-06-24 PROCEDURE — 36415 COLL VENOUS BLD VENIPUNCTURE: CPT

## 2023-06-24 PROCEDURE — 85025 COMPLETE CBC W/AUTO DIFF WBC: CPT

## 2023-06-24 PROCEDURE — 6360000002 HC RX W HCPCS: Performed by: INTERNAL MEDICINE

## 2023-06-24 PROCEDURE — 97116 GAIT TRAINING THERAPY: CPT

## 2023-06-24 PROCEDURE — 1180000000 HC REHAB R&B

## 2023-06-24 PROCEDURE — 97535 SELF CARE MNGMENT TRAINING: CPT

## 2023-06-24 PROCEDURE — 80048 BASIC METABOLIC PNL TOTAL CA: CPT

## 2023-06-24 PROCEDURE — 6370000000 HC RX 637 (ALT 250 FOR IP): Performed by: REGISTERED NURSE

## 2023-06-24 PROCEDURE — 6370000000 HC RX 637 (ALT 250 FOR IP): Performed by: INTERNAL MEDICINE

## 2023-06-24 PROCEDURE — 97530 THERAPEUTIC ACTIVITIES: CPT

## 2023-06-24 PROCEDURE — 99232 SBSQ HOSP IP/OBS MODERATE 35: CPT | Performed by: PHYSICAL MEDICINE & REHABILITATION

## 2023-06-24 PROCEDURE — 2580000003 HC RX 258: Performed by: INTERNAL MEDICINE

## 2023-06-24 PROCEDURE — 6370000000 HC RX 637 (ALT 250 FOR IP): Performed by: PHYSICAL MEDICINE & REHABILITATION

## 2023-06-24 PROCEDURE — 97110 THERAPEUTIC EXERCISES: CPT

## 2023-06-24 RX ORDER — TRAMADOL HYDROCHLORIDE 50 MG/1
25 TABLET ORAL EVERY 6 HOURS PRN
Status: DISCONTINUED | OUTPATIENT
Start: 2023-06-24 | End: 2023-06-29 | Stop reason: HOSPADM

## 2023-06-24 RX ADMIN — Medication 2000 UNITS: at 07:50

## 2023-06-24 RX ADMIN — ASPIRIN 81 MG: 81 TABLET, COATED ORAL at 07:49

## 2023-06-24 RX ADMIN — ATORVASTATIN CALCIUM 40 MG: 40 TABLET, FILM COATED ORAL at 20:36

## 2023-06-24 RX ADMIN — TAMSULOSIN HYDROCHLORIDE 0.4 MG: 0.4 CAPSULE ORAL at 16:36

## 2023-06-24 RX ADMIN — ENOXAPARIN SODIUM 30 MG: 100 INJECTION SUBCUTANEOUS at 20:37

## 2023-06-24 RX ADMIN — TORSEMIDE 40 MG: 20 TABLET ORAL at 07:50

## 2023-06-24 RX ADMIN — GABAPENTIN 300 MG: 300 CAPSULE ORAL at 07:50

## 2023-06-24 RX ADMIN — Medication 2000 UNITS: at 20:37

## 2023-06-24 RX ADMIN — Medication 100 MG: at 07:50

## 2023-06-24 RX ADMIN — CARVEDILOL 6.25 MG: 6.25 TABLET, FILM COATED ORAL at 16:36

## 2023-06-24 RX ADMIN — ACETAMINOPHEN 650 MG: 325 TABLET ORAL at 07:59

## 2023-06-24 RX ADMIN — SODIUM ZIRCONIUM CYCLOSILICATE 10 G: 10 POWDER, FOR SUSPENSION ORAL at 07:51

## 2023-06-24 RX ADMIN — IRON SUCROSE 200 MG: 20 INJECTION, SOLUTION INTRAVENOUS at 17:47

## 2023-06-24 RX ADMIN — MICONAZOLE NITRATE: 2 POWDER TOPICAL at 20:42

## 2023-06-24 RX ADMIN — CARVEDILOL 6.25 MG: 6.25 TABLET, FILM COATED ORAL at 07:50

## 2023-06-24 RX ADMIN — FAMOTIDINE 20 MG: 20 TABLET ORAL at 07:50

## 2023-06-24 RX ADMIN — ENOXAPARIN SODIUM 30 MG: 100 INJECTION SUBCUTANEOUS at 07:50

## 2023-06-24 RX ADMIN — GABAPENTIN 300 MG: 300 CAPSULE ORAL at 20:36

## 2023-06-24 RX ADMIN — MICONAZOLE NITRATE: 2 POWDER TOPICAL at 08:01

## 2023-06-24 RX ADMIN — ACETAMINOPHEN 650 MG: 325 TABLET ORAL at 20:36

## 2023-06-24 ASSESSMENT — PAIN DESCRIPTION - LOCATION
LOCATION: BACK
LOCATION: BACK

## 2023-06-24 ASSESSMENT — PAIN DESCRIPTION - DESCRIPTORS: DESCRIPTORS: STABBING

## 2023-06-24 ASSESSMENT — PAIN SCALES - GENERAL
PAINLEVEL_OUTOF10: 7
PAINLEVEL_OUTOF10: 0
PAINLEVEL_OUTOF10: 5

## 2023-06-24 ASSESSMENT — PAIN DESCRIPTION - ORIENTATION
ORIENTATION: LOWER
ORIENTATION: LOWER

## 2023-06-25 LAB
ANION GAP SERPL CALCULATED.3IONS-SCNC: 11 MEQ/L (ref 9–15)
BUN SERPL-MCNC: 67 MG/DL (ref 8–23)
CALCIUM SERPL-MCNC: 8.3 MG/DL (ref 8.5–9.9)
CHLORIDE SERPL-SCNC: 109 MEQ/L (ref 95–107)
CO2 SERPL-SCNC: 23 MEQ/L (ref 20–31)
CREAT SERPL-MCNC: 1.86 MG/DL (ref 0.7–1.2)
GLUCOSE BLD-MCNC: 151 MG/DL (ref 70–99)
GLUCOSE BLD-MCNC: 163 MG/DL (ref 70–99)
GLUCOSE BLD-MCNC: 187 MG/DL (ref 70–99)
GLUCOSE SERPL-MCNC: 156 MG/DL (ref 70–99)
PERFORMED ON: ABNORMAL
POTASSIUM SERPL-SCNC: 4.6 MEQ/L (ref 3.4–4.9)
SODIUM SERPL-SCNC: 143 MEQ/L (ref 135–144)

## 2023-06-25 PROCEDURE — 80048 BASIC METABOLIC PNL TOTAL CA: CPT

## 2023-06-25 PROCEDURE — 6370000000 HC RX 637 (ALT 250 FOR IP): Performed by: INTERNAL MEDICINE

## 2023-06-25 PROCEDURE — 6370000000 HC RX 637 (ALT 250 FOR IP): Performed by: PHYSICAL MEDICINE & REHABILITATION

## 2023-06-25 PROCEDURE — 6360000002 HC RX W HCPCS: Performed by: INTERNAL MEDICINE

## 2023-06-25 PROCEDURE — 1180000000 HC REHAB R&B

## 2023-06-25 PROCEDURE — 36415 COLL VENOUS BLD VENIPUNCTURE: CPT

## 2023-06-25 RX ADMIN — ATORVASTATIN CALCIUM 40 MG: 40 TABLET, FILM COATED ORAL at 20:13

## 2023-06-25 RX ADMIN — FAMOTIDINE 20 MG: 20 TABLET ORAL at 08:12

## 2023-06-25 RX ADMIN — Medication 2000 UNITS: at 08:12

## 2023-06-25 RX ADMIN — Medication 2000 UNITS: at 20:13

## 2023-06-25 RX ADMIN — GABAPENTIN 300 MG: 300 CAPSULE ORAL at 08:12

## 2023-06-25 RX ADMIN — CARVEDILOL 6.25 MG: 6.25 TABLET, FILM COATED ORAL at 17:01

## 2023-06-25 RX ADMIN — ASPIRIN 81 MG: 81 TABLET, COATED ORAL at 08:12

## 2023-06-25 RX ADMIN — ACETAMINOPHEN 650 MG: 325 TABLET ORAL at 08:17

## 2023-06-25 RX ADMIN — MICONAZOLE NITRATE: 2 POWDER TOPICAL at 20:14

## 2023-06-25 RX ADMIN — CARVEDILOL 6.25 MG: 6.25 TABLET, FILM COATED ORAL at 08:12

## 2023-06-25 RX ADMIN — TAMSULOSIN HYDROCHLORIDE 0.4 MG: 0.4 CAPSULE ORAL at 17:01

## 2023-06-25 RX ADMIN — ENOXAPARIN SODIUM 30 MG: 100 INJECTION SUBCUTANEOUS at 08:11

## 2023-06-25 RX ADMIN — GABAPENTIN 300 MG: 300 CAPSULE ORAL at 20:13

## 2023-06-25 RX ADMIN — TORSEMIDE 40 MG: 20 TABLET ORAL at 08:12

## 2023-06-25 RX ADMIN — Medication 100 MG: at 08:12

## 2023-06-25 RX ADMIN — ENOXAPARIN SODIUM 30 MG: 100 INJECTION SUBCUTANEOUS at 20:13

## 2023-06-25 ASSESSMENT — PAIN DESCRIPTION - LOCATION: LOCATION: BACK

## 2023-06-25 ASSESSMENT — PAIN DESCRIPTION - DESCRIPTORS: DESCRIPTORS: ACHING

## 2023-06-25 ASSESSMENT — PAIN DESCRIPTION - ORIENTATION: ORIENTATION: LOWER

## 2023-06-26 LAB
BACTERIA URNS QL MICRO: NEGATIVE /HPF
BILIRUB UR QL STRIP: NEGATIVE
CLARITY UR: CLEAR
COLOR UR: YELLOW
EPI CELLS #/AREA URNS AUTO: ABNORMAL /HPF (ref 0–5)
GLUCOSE BLD-MCNC: 112 MG/DL (ref 70–99)
GLUCOSE BLD-MCNC: 133 MG/DL (ref 70–99)
GLUCOSE BLD-MCNC: 143 MG/DL (ref 70–99)
GLUCOSE BLD-MCNC: 155 MG/DL (ref 70–99)
GLUCOSE UR STRIP-MCNC: NEGATIVE MG/DL
HGB UR QL STRIP: ABNORMAL
HYALINE CASTS #/AREA URNS AUTO: ABNORMAL /HPF (ref 0–5)
KETONES UR STRIP-MCNC: NEGATIVE MG/DL
LEUKOCYTE ESTERASE UR QL STRIP: NEGATIVE
NITRITE UR QL STRIP: NEGATIVE
PERFORMED ON: ABNORMAL
PH UR STRIP: 5 [PH] (ref 5–9)
PROT UR STRIP-MCNC: ABNORMAL MG/DL
RBC #/AREA URNS AUTO: ABNORMAL /HPF (ref 0–5)
SP GR UR STRIP: 1.01 (ref 1–1.03)
UROBILINOGEN UR STRIP-ACNC: 0.2 E.U./DL
WBC #/AREA URNS AUTO: ABNORMAL /HPF (ref 0–5)

## 2023-06-26 PROCEDURE — 97116 GAIT TRAINING THERAPY: CPT

## 2023-06-26 PROCEDURE — 97535 SELF CARE MNGMENT TRAINING: CPT

## 2023-06-26 PROCEDURE — 97530 THERAPEUTIC ACTIVITIES: CPT

## 2023-06-26 PROCEDURE — 97112 NEUROMUSCULAR REEDUCATION: CPT

## 2023-06-26 PROCEDURE — 6370000000 HC RX 637 (ALT 250 FOR IP): Performed by: PHYSICAL MEDICINE & REHABILITATION

## 2023-06-26 PROCEDURE — 1180000000 HC REHAB R&B

## 2023-06-26 PROCEDURE — 81001 URINALYSIS AUTO W/SCOPE: CPT

## 2023-06-26 PROCEDURE — 87086 URINE CULTURE/COLONY COUNT: CPT

## 2023-06-26 PROCEDURE — 97110 THERAPEUTIC EXERCISES: CPT

## 2023-06-26 PROCEDURE — 99232 SBSQ HOSP IP/OBS MODERATE 35: CPT | Performed by: PHYSICAL MEDICINE & REHABILITATION

## 2023-06-26 PROCEDURE — 6370000000 HC RX 637 (ALT 250 FOR IP): Performed by: INTERNAL MEDICINE

## 2023-06-26 RX ORDER — POVIDONE-IODINE 7.5 MG/ML
SOLUTION TOPICAL 2 TIMES DAILY
Status: DISCONTINUED | OUTPATIENT
Start: 2023-06-26 | End: 2023-06-29 | Stop reason: HOSPADM

## 2023-06-26 RX ADMIN — TRAMADOL HYDROCHLORIDE 25 MG: 50 TABLET ORAL at 22:20

## 2023-06-26 RX ADMIN — MICONAZOLE NITRATE: 2 POWDER TOPICAL at 09:10

## 2023-06-26 RX ADMIN — Medication 100 MG: at 09:08

## 2023-06-26 RX ADMIN — TORSEMIDE 40 MG: 20 TABLET ORAL at 09:08

## 2023-06-26 RX ADMIN — TRAMADOL HYDROCHLORIDE 25 MG: 50 TABLET ORAL at 09:43

## 2023-06-26 RX ADMIN — TAMSULOSIN HYDROCHLORIDE 0.4 MG: 0.4 CAPSULE ORAL at 16:10

## 2023-06-26 RX ADMIN — ATORVASTATIN CALCIUM 40 MG: 40 TABLET, FILM COATED ORAL at 22:20

## 2023-06-26 RX ADMIN — Medication 2000 UNITS: at 23:27

## 2023-06-26 RX ADMIN — CARVEDILOL 6.25 MG: 6.25 TABLET, FILM COATED ORAL at 16:10

## 2023-06-26 RX ADMIN — MICONAZOLE NITRATE: 2 POWDER TOPICAL at 22:36

## 2023-06-26 RX ADMIN — ASPIRIN 81 MG: 81 TABLET, COATED ORAL at 09:08

## 2023-06-26 RX ADMIN — GABAPENTIN 300 MG: 300 CAPSULE ORAL at 22:20

## 2023-06-26 RX ADMIN — GABAPENTIN 300 MG: 300 CAPSULE ORAL at 09:08

## 2023-06-26 RX ADMIN — ACETAMINOPHEN 650 MG: 325 TABLET ORAL at 09:11

## 2023-06-26 RX ADMIN — ACETAMINOPHEN 650 MG: 325 TABLET ORAL at 22:20

## 2023-06-26 RX ADMIN — Medication 2000 UNITS: at 09:08

## 2023-06-26 RX ADMIN — CARVEDILOL 6.25 MG: 6.25 TABLET, FILM COATED ORAL at 09:08

## 2023-06-26 RX ADMIN — FAMOTIDINE 20 MG: 20 TABLET ORAL at 09:08

## 2023-06-26 ASSESSMENT — PAIN SCALES - GENERAL
PAINLEVEL_OUTOF10: 5
PAINLEVEL_OUTOF10: 4
PAINLEVEL_OUTOF10: 8
PAINLEVEL_OUTOF10: 9

## 2023-06-26 ASSESSMENT — PAIN DESCRIPTION - ORIENTATION
ORIENTATION: LOWER

## 2023-06-26 ASSESSMENT — PAIN DESCRIPTION - LOCATION
LOCATION: BACK
LOCATION: NECK;BACK
LOCATION: BACK
LOCATION: BACK

## 2023-06-26 ASSESSMENT — PAIN DESCRIPTION - DESCRIPTORS
DESCRIPTORS: SHARP
DESCRIPTORS: ACHING

## 2023-06-26 ASSESSMENT — PAIN DESCRIPTION - PAIN TYPE: TYPE: CHRONIC PAIN

## 2023-06-27 LAB
ANION GAP SERPL CALCULATED.3IONS-SCNC: 16 MEQ/L (ref 9–15)
BACTERIA UR CULT: NORMAL
BUN SERPL-MCNC: 73 MG/DL (ref 8–23)
CALCIUM SERPL-MCNC: 8.6 MG/DL (ref 8.5–9.9)
CHLORIDE SERPL-SCNC: 106 MEQ/L (ref 95–107)
CO2 SERPL-SCNC: 21 MEQ/L (ref 20–31)
CREAT SERPL-MCNC: 1.95 MG/DL (ref 0.7–1.2)
ERYTHROCYTE [DISTWIDTH] IN BLOOD BY AUTOMATED COUNT: 15.8 % (ref 11.5–14.5)
GLUCOSE BLD-MCNC: 139 MG/DL (ref 70–99)
GLUCOSE BLD-MCNC: 143 MG/DL (ref 70–99)
GLUCOSE BLD-MCNC: 186 MG/DL (ref 70–99)
GLUCOSE BLD-MCNC: 203 MG/DL (ref 70–99)
GLUCOSE SERPL-MCNC: 143 MG/DL (ref 70–99)
HCT VFR BLD AUTO: 25.8 % (ref 42–52)
HGB BLD-MCNC: 8.4 G/DL (ref 14–18)
MCH RBC QN AUTO: 30.2 PG (ref 27–31.3)
MCHC RBC AUTO-ENTMCNC: 32.8 % (ref 33–37)
MCV RBC AUTO: 92.3 FL (ref 79–92.2)
PERFORMED ON: ABNORMAL
PLATELET # BLD AUTO: 67 K/UL (ref 130–400)
POTASSIUM SERPL-SCNC: 4.4 MEQ/L (ref 3.4–4.9)
RBC # BLD AUTO: 2.79 M/UL (ref 4.7–6.1)
SODIUM SERPL-SCNC: 143 MEQ/L (ref 135–144)
WBC # BLD AUTO: 3.4 K/UL (ref 4.8–10.8)

## 2023-06-27 PROCEDURE — 97116 GAIT TRAINING THERAPY: CPT

## 2023-06-27 PROCEDURE — 97535 SELF CARE MNGMENT TRAINING: CPT

## 2023-06-27 PROCEDURE — 2500000003 HC RX 250 WO HCPCS: Performed by: PHYSICAL MEDICINE & REHABILITATION

## 2023-06-27 PROCEDURE — 97110 THERAPEUTIC EXERCISES: CPT

## 2023-06-27 PROCEDURE — 80048 BASIC METABOLIC PNL TOTAL CA: CPT

## 2023-06-27 PROCEDURE — 6370000000 HC RX 637 (ALT 250 FOR IP): Performed by: PHYSICAL MEDICINE & REHABILITATION

## 2023-06-27 PROCEDURE — 85027 COMPLETE CBC AUTOMATED: CPT

## 2023-06-27 PROCEDURE — 6370000000 HC RX 637 (ALT 250 FOR IP): Performed by: INTERNAL MEDICINE

## 2023-06-27 PROCEDURE — 36415 COLL VENOUS BLD VENIPUNCTURE: CPT

## 2023-06-27 PROCEDURE — 99232 SBSQ HOSP IP/OBS MODERATE 35: CPT | Performed by: PHYSICAL MEDICINE & REHABILITATION

## 2023-06-27 PROCEDURE — 1180000000 HC REHAB R&B

## 2023-06-27 PROCEDURE — 97530 THERAPEUTIC ACTIVITIES: CPT

## 2023-06-27 RX ORDER — LIDOCAINE 4 G/G
3 PATCH TOPICAL DAILY PRN
Status: DISCONTINUED | OUTPATIENT
Start: 2023-06-27 | End: 2023-06-29 | Stop reason: HOSPADM

## 2023-06-27 RX ADMIN — Medication 2000 UNITS: at 20:56

## 2023-06-27 RX ADMIN — TORSEMIDE 40 MG: 20 TABLET ORAL at 09:43

## 2023-06-27 RX ADMIN — TRAMADOL HYDROCHLORIDE 25 MG: 50 TABLET ORAL at 09:44

## 2023-06-27 RX ADMIN — PROVIDONE IODINE: 7.5 STICK TOPICAL at 09:49

## 2023-06-27 RX ADMIN — Medication 2000 UNITS: at 09:43

## 2023-06-27 RX ADMIN — CARVEDILOL 6.25 MG: 6.25 TABLET, FILM COATED ORAL at 17:03

## 2023-06-27 RX ADMIN — MICONAZOLE NITRATE: 2 POWDER TOPICAL at 09:47

## 2023-06-27 RX ADMIN — INSULIN LISPRO 1 UNITS: 100 INJECTION, SOLUTION INTRAVENOUS; SUBCUTANEOUS at 17:06

## 2023-06-27 RX ADMIN — CARVEDILOL 6.25 MG: 6.25 TABLET, FILM COATED ORAL at 09:43

## 2023-06-27 RX ADMIN — GABAPENTIN 300 MG: 300 CAPSULE ORAL at 09:43

## 2023-06-27 RX ADMIN — MICONAZOLE NITRATE: 2 POWDER TOPICAL at 20:56

## 2023-06-27 RX ADMIN — PROVIDONE IODINE: 7.5 STICK TOPICAL at 22:12

## 2023-06-27 RX ADMIN — TAMSULOSIN HYDROCHLORIDE 0.4 MG: 0.4 CAPSULE ORAL at 17:06

## 2023-06-27 RX ADMIN — ACETAMINOPHEN 650 MG: 325 TABLET ORAL at 09:44

## 2023-06-27 RX ADMIN — Medication 100 MG: at 09:43

## 2023-06-27 RX ADMIN — ASPIRIN 81 MG: 81 TABLET, COATED ORAL at 09:43

## 2023-06-27 RX ADMIN — ATORVASTATIN CALCIUM 40 MG: 40 TABLET, FILM COATED ORAL at 20:56

## 2023-06-27 RX ADMIN — GABAPENTIN 300 MG: 300 CAPSULE ORAL at 20:56

## 2023-06-27 RX ADMIN — FAMOTIDINE 20 MG: 20 TABLET ORAL at 09:43

## 2023-06-27 ASSESSMENT — PAIN DESCRIPTION - DESCRIPTORS: DESCRIPTORS: SHARP;ACHING

## 2023-06-27 ASSESSMENT — PAIN DESCRIPTION - ORIENTATION: ORIENTATION: LOWER

## 2023-06-27 ASSESSMENT — PAIN SCALES - GENERAL: PAINLEVEL_OUTOF10: 4

## 2023-06-27 ASSESSMENT — PAIN DESCRIPTION - LOCATION: LOCATION: BACK

## 2023-06-28 LAB
GLUCOSE BLD-MCNC: 145 MG/DL (ref 70–99)
GLUCOSE BLD-MCNC: 149 MG/DL (ref 70–99)
GLUCOSE BLD-MCNC: 152 MG/DL (ref 70–99)
GLUCOSE BLD-MCNC: 217 MG/DL (ref 70–99)
PERFORMED ON: ABNORMAL

## 2023-06-28 PROCEDURE — 6370000000 HC RX 637 (ALT 250 FOR IP): Performed by: PHYSICAL MEDICINE & REHABILITATION

## 2023-06-28 PROCEDURE — 97535 SELF CARE MNGMENT TRAINING: CPT

## 2023-06-28 PROCEDURE — 97110 THERAPEUTIC EXERCISES: CPT

## 2023-06-28 PROCEDURE — 1180000000 HC REHAB R&B

## 2023-06-28 PROCEDURE — 97530 THERAPEUTIC ACTIVITIES: CPT

## 2023-06-28 PROCEDURE — 97112 NEUROMUSCULAR REEDUCATION: CPT

## 2023-06-28 PROCEDURE — 6370000000 HC RX 637 (ALT 250 FOR IP): Performed by: NURSE PRACTITIONER

## 2023-06-28 PROCEDURE — 6370000000 HC RX 637 (ALT 250 FOR IP): Performed by: INTERNAL MEDICINE

## 2023-06-28 PROCEDURE — 97116 GAIT TRAINING THERAPY: CPT

## 2023-06-28 PROCEDURE — 2500000003 HC RX 250 WO HCPCS: Performed by: PHYSICAL MEDICINE & REHABILITATION

## 2023-06-28 RX ORDER — IBUPROFEN 200 MG
TABLET ORAL 2 TIMES DAILY
Status: DISCONTINUED | OUTPATIENT
Start: 2023-06-28 | End: 2023-06-29 | Stop reason: HOSPADM

## 2023-06-28 RX ORDER — POVIDONE-IODINE 7.5 MG/ML
1 SOLUTION TOPICAL 2 TIMES DAILY
Qty: 14 EACH | Refills: 0 | Status: SHIPPED | OUTPATIENT
Start: 2023-06-28

## 2023-06-28 RX ORDER — CHOLECALCIFEROL (VITAMIN D3) 50 MCG
2000 TABLET ORAL 2 TIMES DAILY
Qty: 60 TABLET | Refills: 1 | Status: SHIPPED | OUTPATIENT
Start: 2023-06-28

## 2023-06-28 RX ADMIN — ATORVASTATIN CALCIUM 40 MG: 40 TABLET, FILM COATED ORAL at 20:35

## 2023-06-28 RX ADMIN — BACITRACIN ZINC, NEOMYCIN, POLYMYXIN B: 400; 3.5; 5 OINTMENT TOPICAL at 20:42

## 2023-06-28 RX ADMIN — CARVEDILOL 6.25 MG: 6.25 TABLET, FILM COATED ORAL at 09:09

## 2023-06-28 RX ADMIN — ASPIRIN 81 MG: 81 TABLET, COATED ORAL at 09:08

## 2023-06-28 RX ADMIN — TRAMADOL HYDROCHLORIDE 25 MG: 50 TABLET ORAL at 20:36

## 2023-06-28 RX ADMIN — TAMSULOSIN HYDROCHLORIDE 0.4 MG: 0.4 CAPSULE ORAL at 18:11

## 2023-06-28 RX ADMIN — PROVIDONE IODINE: 7.5 STICK TOPICAL at 09:12

## 2023-06-28 RX ADMIN — GABAPENTIN 300 MG: 300 CAPSULE ORAL at 20:34

## 2023-06-28 RX ADMIN — MICONAZOLE NITRATE: 2 POWDER TOPICAL at 09:18

## 2023-06-28 RX ADMIN — CARVEDILOL 6.25 MG: 6.25 TABLET, FILM COATED ORAL at 18:11

## 2023-06-28 RX ADMIN — Medication 2000 UNITS: at 20:34

## 2023-06-28 RX ADMIN — MICONAZOLE NITRATE: 2 POWDER TOPICAL at 20:41

## 2023-06-28 RX ADMIN — BACITRACIN ZINC, NEOMYCIN, POLYMYXIN B: 400; 3.5; 5 OINTMENT TOPICAL at 13:10

## 2023-06-28 RX ADMIN — Medication 100 MG: at 09:08

## 2023-06-28 RX ADMIN — Medication 2000 UNITS: at 09:09

## 2023-06-28 RX ADMIN — TORSEMIDE 40 MG: 20 TABLET ORAL at 09:08

## 2023-06-28 RX ADMIN — PROVIDONE IODINE: 7.5 STICK TOPICAL at 20:43

## 2023-06-28 RX ADMIN — ACETAMINOPHEN 650 MG: 325 TABLET ORAL at 20:36

## 2023-06-28 RX ADMIN — FAMOTIDINE 20 MG: 20 TABLET ORAL at 09:08

## 2023-06-28 RX ADMIN — GABAPENTIN 300 MG: 300 CAPSULE ORAL at 09:09

## 2023-06-28 ASSESSMENT — PAIN DESCRIPTION - LOCATION: LOCATION: BACK

## 2023-06-28 ASSESSMENT — PAIN SCALES - GENERAL
PAINLEVEL_OUTOF10: 5
PAINLEVEL_OUTOF10: 0
PAINLEVEL_OUTOF10: 0

## 2023-06-28 ASSESSMENT — PAIN DESCRIPTION - ORIENTATION: ORIENTATION: LOWER

## 2023-06-29 ENCOUNTER — TELEPHONE (OUTPATIENT)
Dept: FAMILY MEDICINE CLINIC | Age: 78
End: 2023-06-29

## 2023-06-29 VITALS
HEIGHT: 76 IN | DIASTOLIC BLOOD PRESSURE: 61 MMHG | OXYGEN SATURATION: 97 % | HEART RATE: 57 BPM | SYSTOLIC BLOOD PRESSURE: 123 MMHG | BODY MASS INDEX: 38.36 KG/M2 | RESPIRATION RATE: 17 BRPM | TEMPERATURE: 94.6 F | WEIGHT: 315 LBS

## 2023-06-29 LAB
GLUCOSE BLD-MCNC: 141 MG/DL (ref 70–99)
GLUCOSE BLD-MCNC: 148 MG/DL (ref 70–99)
PERFORMED ON: ABNORMAL
PERFORMED ON: ABNORMAL

## 2023-06-29 PROCEDURE — 2500000003 HC RX 250 WO HCPCS: Performed by: PHYSICAL MEDICINE & REHABILITATION

## 2023-06-29 PROCEDURE — 97110 THERAPEUTIC EXERCISES: CPT

## 2023-06-29 PROCEDURE — 99232 SBSQ HOSP IP/OBS MODERATE 35: CPT | Performed by: PHYSICIAN ASSISTANT

## 2023-06-29 PROCEDURE — 6370000000 HC RX 637 (ALT 250 FOR IP): Performed by: PHYSICAL MEDICINE & REHABILITATION

## 2023-06-29 PROCEDURE — 97530 THERAPEUTIC ACTIVITIES: CPT

## 2023-06-29 PROCEDURE — 6360000002 HC RX W HCPCS: Performed by: PHYSICAL MEDICINE & REHABILITATION

## 2023-06-29 PROCEDURE — 6370000000 HC RX 637 (ALT 250 FOR IP): Performed by: INTERNAL MEDICINE

## 2023-06-29 PROCEDURE — 6370000000 HC RX 637 (ALT 250 FOR IP): Performed by: NURSE PRACTITIONER

## 2023-06-29 RX ORDER — GLUCOSAMINE HCL/CHONDROITIN SU 500-400 MG
1 CAPSULE ORAL
Qty: 150 STRIP | Refills: 3 | Status: SHIPPED | OUTPATIENT
Start: 2023-06-29

## 2023-06-29 RX ORDER — LANCETS 30 GAUGE
1 EACH MISCELLANEOUS
Qty: 150 EACH | Refills: 3 | Status: SHIPPED | OUTPATIENT
Start: 2023-06-29

## 2023-06-29 RX ORDER — INSULIN GLARGINE 100 [IU]/ML
INJECTION, SOLUTION SUBCUTANEOUS
Qty: 10 ADJUSTABLE DOSE PRE-FILLED PEN SYRINGE | Refills: 3 | Status: SHIPPED | OUTPATIENT
Start: 2023-06-29

## 2023-06-29 RX ADMIN — BACITRACIN ZINC, NEOMYCIN, POLYMYXIN B 1 G: 400; 3.5; 5 OINTMENT TOPICAL at 08:37

## 2023-06-29 RX ADMIN — TORSEMIDE 40 MG: 20 TABLET ORAL at 08:37

## 2023-06-29 RX ADMIN — FAMOTIDINE 20 MG: 20 TABLET ORAL at 08:36

## 2023-06-29 RX ADMIN — Medication 2000 UNITS: at 08:37

## 2023-06-29 RX ADMIN — ASPIRIN 81 MG: 81 TABLET, COATED ORAL at 08:37

## 2023-06-29 RX ADMIN — TRAMADOL HYDROCHLORIDE 25 MG: 50 TABLET ORAL at 08:42

## 2023-06-29 RX ADMIN — GABAPENTIN 300 MG: 300 CAPSULE ORAL at 08:37

## 2023-06-29 RX ADMIN — CARVEDILOL 6.25 MG: 6.25 TABLET, FILM COATED ORAL at 08:37

## 2023-06-29 RX ADMIN — CYANOCOBALAMIN 1000 MCG: 1000 INJECTION, SOLUTION INTRAMUSCULAR; SUBCUTANEOUS at 08:38

## 2023-06-29 RX ADMIN — PROVIDONE IODINE: 7.5 STICK TOPICAL at 08:49

## 2023-06-29 RX ADMIN — Medication 100 MG: at 08:37

## 2023-06-29 ASSESSMENT — PAIN DESCRIPTION - LOCATION: LOCATION: BACK

## 2023-06-29 ASSESSMENT — PAIN SCALES - GENERAL: PAINLEVEL_OUTOF10: 3

## 2023-06-29 ASSESSMENT — PAIN DESCRIPTION - DESCRIPTORS: DESCRIPTORS: SHARP

## 2023-07-01 ENCOUNTER — HOSPITAL ENCOUNTER (OUTPATIENT)
Age: 78
Setting detail: OBSERVATION
Discharge: OTHER INSTITUTION | End: 2023-07-01
Attending: EMERGENCY MEDICINE | Admitting: INTERNAL MEDICINE
Payer: MEDICARE

## 2023-07-01 VITALS
SYSTOLIC BLOOD PRESSURE: 101 MMHG | DIASTOLIC BLOOD PRESSURE: 48 MMHG | HEIGHT: 76 IN | BODY MASS INDEX: 38.36 KG/M2 | WEIGHT: 315 LBS | HEART RATE: 59 BPM | TEMPERATURE: 97.7 F | RESPIRATION RATE: 16 BRPM | OXYGEN SATURATION: 91 %

## 2023-07-01 DIAGNOSIS — Z78.9 IMPAIRED MOBILITY AND ADLS: Primary | ICD-10-CM

## 2023-07-01 DIAGNOSIS — N18.9 CHRONIC KIDNEY DISEASE, UNSPECIFIED CKD STAGE: ICD-10-CM

## 2023-07-01 DIAGNOSIS — Z74.09 IMPAIRED MOBILITY AND ADLS: Primary | ICD-10-CM

## 2023-07-01 PROBLEM — R53.1 GENERALIZED WEAKNESS: Status: ACTIVE | Noted: 2023-07-01

## 2023-07-01 LAB
ALBUMIN SERPL-MCNC: 3.7 G/DL (ref 3.5–4.6)
ALP SERPL-CCNC: 156 U/L (ref 35–104)
ALT SERPL-CCNC: 30 U/L (ref 0–41)
ANION GAP SERPL CALCULATED.3IONS-SCNC: 13 MEQ/L (ref 9–15)
AST SERPL-CCNC: 19 U/L (ref 0–40)
BACTERIA URNS QL MICRO: NEGATIVE /HPF
BASOPHILS # BLD: 0 K/UL (ref 0–0.2)
BASOPHILS NFR BLD: 0.6 %
BILIRUB SERPL-MCNC: 0.5 MG/DL (ref 0.2–0.7)
BILIRUB UR QL STRIP: NEGATIVE
BUN SERPL-MCNC: 78 MG/DL (ref 8–23)
CALCIUM SERPL-MCNC: 8.9 MG/DL (ref 8.5–9.9)
CHLORIDE SERPL-SCNC: 109 MEQ/L (ref 95–107)
CLARITY UR: CLEAR
CO2 SERPL-SCNC: 21 MEQ/L (ref 20–31)
COLOR UR: YELLOW
CREAT SERPL-MCNC: 2.01 MG/DL (ref 0.7–1.2)
EOSINOPHIL # BLD: 0.1 K/UL (ref 0–0.7)
EOSINOPHIL NFR BLD: 3.3 %
EPI CELLS #/AREA URNS AUTO: ABNORMAL /HPF (ref 0–5)
ERYTHROCYTE [DISTWIDTH] IN BLOOD BY AUTOMATED COUNT: 16.6 % (ref 11.5–14.5)
GLOBULIN SER CALC-MCNC: 2.8 G/DL (ref 2.3–3.5)
GLUCOSE SERPL-MCNC: 228 MG/DL (ref 70–99)
GLUCOSE UR STRIP-MCNC: NEGATIVE MG/DL
HCT VFR BLD AUTO: 29 % (ref 42–52)
HGB BLD-MCNC: 9.3 G/DL (ref 14–18)
HGB UR QL STRIP: ABNORMAL
HYALINE CASTS #/AREA URNS AUTO: ABNORMAL /HPF (ref 0–5)
KETONES UR STRIP-MCNC: NEGATIVE MG/DL
LEUKOCYTE ESTERASE UR QL STRIP: NEGATIVE
LYMPHOCYTES # BLD: 0.5 K/UL (ref 1–4.8)
LYMPHOCYTES NFR BLD: 10.3 %
MCH RBC QN AUTO: 30.4 PG (ref 27–31.3)
MCHC RBC AUTO-ENTMCNC: 31.9 % (ref 33–37)
MCV RBC AUTO: 95.2 FL (ref 79–92.2)
MONOCYTES # BLD: 0.3 K/UL (ref 0.2–0.8)
MONOCYTES NFR BLD: 7 %
NEUTROPHILS # BLD: 3.6 K/UL (ref 1.4–6.5)
NEUTS SEG NFR BLD: 78.8 %
NITRITE UR QL STRIP: NEGATIVE
PH UR STRIP: 5 [PH] (ref 5–9)
PLATELET # BLD AUTO: 69 K/UL (ref 130–400)
POTASSIUM SERPL-SCNC: 5.5 MEQ/L (ref 3.4–4.9)
PROT SERPL-MCNC: 6.5 G/DL (ref 6.3–8)
PROT UR STRIP-MCNC: 30 MG/DL
RBC # BLD AUTO: 3.05 M/UL (ref 4.7–6.1)
RBC #/AREA URNS AUTO: ABNORMAL /HPF (ref 0–5)
SARS-COV-2 RDRP RESP QL NAA+PROBE: NOT DETECTED
SODIUM SERPL-SCNC: 143 MEQ/L (ref 135–144)
SP GR UR STRIP: 1.01 (ref 1–1.03)
TSH REFLEX: 3.94 UIU/ML (ref 0.44–3.86)
URINE REFLEX TO CULTURE: ABNORMAL
UROBILINOGEN UR STRIP-ACNC: 0.2 E.U./DL
WBC # BLD AUTO: 4.6 K/UL (ref 4.8–10.8)
WBC #/AREA URNS AUTO: ABNORMAL /HPF (ref 0–5)

## 2023-07-01 PROCEDURE — 97166 OT EVAL MOD COMPLEX 45 MIN: CPT

## 2023-07-01 PROCEDURE — 87635 SARS-COV-2 COVID-19 AMP PRB: CPT

## 2023-07-01 PROCEDURE — G0378 HOSPITAL OBSERVATION PER HR: HCPCS

## 2023-07-01 PROCEDURE — 99284 EMERGENCY DEPT VISIT MOD MDM: CPT

## 2023-07-01 PROCEDURE — 80053 COMPREHEN METABOLIC PANEL: CPT

## 2023-07-01 PROCEDURE — 97162 PT EVAL MOD COMPLEX 30 MIN: CPT

## 2023-07-01 PROCEDURE — 81001 URINALYSIS AUTO W/SCOPE: CPT

## 2023-07-01 PROCEDURE — 85025 COMPLETE CBC W/AUTO DIFF WBC: CPT

## 2023-07-01 PROCEDURE — 84443 ASSAY THYROID STIM HORMONE: CPT

## 2023-07-01 RX ORDER — POLYETHYLENE GLYCOL 3350 17 G/17G
17 POWDER, FOR SOLUTION ORAL DAILY PRN
Status: CANCELLED | OUTPATIENT
Start: 2023-07-01

## 2023-07-01 RX ORDER — CARVEDILOL 3.12 MG/1
6.25 TABLET ORAL 2 TIMES DAILY WITH MEALS
Status: CANCELLED | OUTPATIENT
Start: 2023-07-01

## 2023-07-01 RX ORDER — ACETAMINOPHEN 650 MG/1
650 SUPPOSITORY RECTAL EVERY 6 HOURS PRN
Status: CANCELLED | OUTPATIENT
Start: 2023-07-01

## 2023-07-01 RX ORDER — SODIUM CHLORIDE 0.9 % (FLUSH) 0.9 %
5-40 SYRINGE (ML) INJECTION EVERY 12 HOURS SCHEDULED
Status: CANCELLED | OUTPATIENT
Start: 2023-07-01

## 2023-07-01 RX ORDER — SODIUM CHLORIDE 9 MG/ML
INJECTION, SOLUTION INTRAVENOUS PRN
Status: CANCELLED | OUTPATIENT
Start: 2023-07-01

## 2023-07-01 RX ORDER — ATORVASTATIN CALCIUM 40 MG/1
40 TABLET, FILM COATED ORAL NIGHTLY
Status: CANCELLED | OUTPATIENT
Start: 2023-07-01

## 2023-07-01 RX ORDER — ENOXAPARIN SODIUM 100 MG/ML
30 INJECTION SUBCUTANEOUS 2 TIMES DAILY
Status: CANCELLED | OUTPATIENT
Start: 2023-07-01

## 2023-07-01 RX ORDER — ONDANSETRON 2 MG/ML
4 INJECTION INTRAMUSCULAR; INTRAVENOUS EVERY 6 HOURS PRN
Status: CANCELLED | OUTPATIENT
Start: 2023-07-01

## 2023-07-01 RX ORDER — ONDANSETRON 4 MG/1
4 TABLET, ORALLY DISINTEGRATING ORAL EVERY 8 HOURS PRN
Status: CANCELLED | OUTPATIENT
Start: 2023-07-01

## 2023-07-01 RX ORDER — CHOLECALCIFEROL (VITAMIN D3) 50 MCG
2000 TABLET ORAL 2 TIMES DAILY
Status: CANCELLED | OUTPATIENT
Start: 2023-07-01

## 2023-07-01 RX ORDER — ACETAMINOPHEN 325 MG/1
650 TABLET ORAL EVERY 6 HOURS PRN
Status: CANCELLED | OUTPATIENT
Start: 2023-07-01

## 2023-07-01 RX ORDER — TAMSULOSIN HYDROCHLORIDE 0.4 MG/1
0.4 CAPSULE ORAL DAILY
Status: CANCELLED | OUTPATIENT
Start: 2023-07-01

## 2023-07-01 RX ORDER — FAMOTIDINE 20 MG/1
20 TABLET, FILM COATED ORAL DAILY
Status: CANCELLED | OUTPATIENT
Start: 2023-07-01

## 2023-07-01 RX ORDER — SODIUM CHLORIDE 0.9 % (FLUSH) 0.9 %
5-40 SYRINGE (ML) INJECTION PRN
Status: CANCELLED | OUTPATIENT
Start: 2023-07-01

## 2023-07-01 ASSESSMENT — PAIN - FUNCTIONAL ASSESSMENT: PAIN_FUNCTIONAL_ASSESSMENT: NONE - DENIES PAIN

## 2023-07-04 ENCOUNTER — OFFICE VISIT (OUTPATIENT)
Dept: GERIATRIC MEDICINE | Age: 78
End: 2023-07-04

## 2023-07-04 DIAGNOSIS — G47.33 OSA (OBSTRUCTIVE SLEEP APNEA): Primary | Chronic | ICD-10-CM

## 2023-07-04 DIAGNOSIS — E11.22 TYPE 2 DIABETES MELLITUS WITH STAGE 3B CHRONIC KIDNEY DISEASE, WITH LONG-TERM CURRENT USE OF INSULIN (HCC): ICD-10-CM

## 2023-07-04 DIAGNOSIS — I10 HYPERTENSION, UNSPECIFIED TYPE: ICD-10-CM

## 2023-07-04 DIAGNOSIS — Z79.4 TYPE 2 DIABETES MELLITUS WITH STAGE 3B CHRONIC KIDNEY DISEASE, WITH LONG-TERM CURRENT USE OF INSULIN (HCC): ICD-10-CM

## 2023-07-04 DIAGNOSIS — N18.32 TYPE 2 DIABETES MELLITUS WITH STAGE 3B CHRONIC KIDNEY DISEASE, WITH LONG-TERM CURRENT USE OF INSULIN (HCC): ICD-10-CM

## 2023-07-05 ENCOUNTER — CARE COORDINATION (OUTPATIENT)
Dept: CARE COORDINATION | Age: 78
End: 2023-07-05

## 2023-07-05 NOTE — CARE COORDINATION
I was returning Artur's call and he was asking for assistance on Farxiga.  He is currently in a nursing home so I told him to call me when he goes home to discuss options to help with cost.          [facility]   Medication Assistance  [address] [place], and [place] [employer].    U) [phone] (D) [phone]

## 2023-07-13 ENCOUNTER — OFFICE VISIT (OUTPATIENT)
Dept: GERIATRIC MEDICINE | Age: 78
End: 2023-07-13

## 2023-07-13 DIAGNOSIS — I50.22 CHRONIC SYSTOLIC CHF (CONGESTIVE HEART FAILURE), NYHA CLASS 1 (HCC): Primary | ICD-10-CM

## 2023-07-13 DIAGNOSIS — G47.33 OSA (OBSTRUCTIVE SLEEP APNEA): Chronic | ICD-10-CM

## 2023-07-13 DIAGNOSIS — I48.91 ATRIAL FIBRILLATION, UNSPECIFIED TYPE (HCC): ICD-10-CM

## 2023-07-14 ENCOUNTER — OFFICE VISIT (OUTPATIENT)
Dept: GERIATRIC MEDICINE | Age: 78
End: 2023-07-14

## 2023-07-14 DIAGNOSIS — N18.30 STAGE 3 CHRONIC KIDNEY DISEASE, UNSPECIFIED WHETHER STAGE 3A OR 3B CKD (HCC): ICD-10-CM

## 2023-07-14 DIAGNOSIS — E11.649 HYPOGLYCEMIA DUE TO TYPE 2 DIABETES MELLITUS (HCC): Primary | ICD-10-CM

## 2023-07-14 DIAGNOSIS — E87.5 HYPERKALEMIA: ICD-10-CM

## 2023-07-14 DIAGNOSIS — Z79.4 TYPE 2 DIABETES MELLITUS WITH STAGE 3B CHRONIC KIDNEY DISEASE, WITH LONG-TERM CURRENT USE OF INSULIN (HCC): ICD-10-CM

## 2023-07-14 DIAGNOSIS — N18.32 TYPE 2 DIABETES MELLITUS WITH STAGE 3B CHRONIC KIDNEY DISEASE, WITH LONG-TERM CURRENT USE OF INSULIN (HCC): ICD-10-CM

## 2023-07-14 DIAGNOSIS — E11.22 TYPE 2 DIABETES MELLITUS WITH STAGE 3B CHRONIC KIDNEY DISEASE, WITH LONG-TERM CURRENT USE OF INSULIN (HCC): ICD-10-CM

## 2023-07-17 ENCOUNTER — OFFICE VISIT (OUTPATIENT)
Dept: GERIATRIC MEDICINE | Age: 78
End: 2023-07-17

## 2023-07-17 DIAGNOSIS — E11.22 TYPE 2 DIABETES MELLITUS WITH STAGE 3B CHRONIC KIDNEY DISEASE, WITH LONG-TERM CURRENT USE OF INSULIN (HCC): ICD-10-CM

## 2023-07-17 DIAGNOSIS — G47.33 OSA (OBSTRUCTIVE SLEEP APNEA): Primary | Chronic | ICD-10-CM

## 2023-07-17 DIAGNOSIS — N18.32 TYPE 2 DIABETES MELLITUS WITH STAGE 3B CHRONIC KIDNEY DISEASE, WITH LONG-TERM CURRENT USE OF INSULIN (HCC): ICD-10-CM

## 2023-07-17 DIAGNOSIS — I48.91 ATRIAL FIBRILLATION, UNSPECIFIED TYPE (HCC): ICD-10-CM

## 2023-07-17 DIAGNOSIS — Z79.4 TYPE 2 DIABETES MELLITUS WITH STAGE 3B CHRONIC KIDNEY DISEASE, WITH LONG-TERM CURRENT USE OF INSULIN (HCC): ICD-10-CM

## 2023-07-18 LAB
BUN BLDV-MCNC: 69 MG/DL
CALCIUM SERPL-MCNC: 8.5 MG/DL
CHLORIDE BLD-SCNC: 110 MMOL/L
CO2: 23 MMOL/L
CREAT SERPL-MCNC: 1.8 MG/DL
EGFR: 37
GLUCOSE BLD-MCNC: 164 MG/DL
POTASSIUM SERPL-SCNC: 5.9 MMOL/L
SODIUM BLD-SCNC: 142 MMOL/L

## 2023-07-20 ENCOUNTER — OFFICE VISIT (OUTPATIENT)
Dept: GERIATRIC MEDICINE | Age: 78
End: 2023-07-20

## 2023-07-20 DIAGNOSIS — I48.91 ATRIAL FIBRILLATION, UNSPECIFIED TYPE (HCC): ICD-10-CM

## 2023-07-20 DIAGNOSIS — I73.9 PVD (PERIPHERAL VASCULAR DISEASE) (HCC): ICD-10-CM

## 2023-07-20 DIAGNOSIS — G47.33 OSA (OBSTRUCTIVE SLEEP APNEA): Primary | Chronic | ICD-10-CM

## 2023-07-21 LAB
BUN BLDV-MCNC: 66 MG/DL
CALCIUM SERPL-MCNC: 8.9 MG/DL
CHLORIDE BLD-SCNC: 110 MMOL/L
CO2: 25 MMOL/L
CREAT SERPL-MCNC: 1.9 MG/DL
EGFR: 35
GLUCOSE BLD-MCNC: 164 MG/DL
HCT VFR BLD CALC: 27.1 % (ref 41–53)
HEMOGLOBIN: 8.5 G/DL (ref 13.5–17.5)
POTASSIUM SERPL-SCNC: 5.6 MMOL/L
SODIUM BLD-SCNC: 143 MMOL/L

## 2023-07-22 ENCOUNTER — OFFICE VISIT (OUTPATIENT)
Dept: GERIATRIC MEDICINE | Age: 78
End: 2023-07-22
Payer: MEDICARE

## 2023-07-22 DIAGNOSIS — Z91.89 AT RISK FOR DEHYDRATION DUE TO POOR FLUID INTAKE: ICD-10-CM

## 2023-07-22 DIAGNOSIS — N18.30 STAGE 3 CHRONIC KIDNEY DISEASE, UNSPECIFIED WHETHER STAGE 3A OR 3B CKD (HCC): Primary | ICD-10-CM

## 2023-07-22 DIAGNOSIS — Z87.442 HISTORY OF KIDNEY STONES: ICD-10-CM

## 2023-07-22 LAB
ANION GAP SERPL CALCULATED.3IONS-SCNC: 11 MEQ/L (ref 9–15)
BUN SERPL-MCNC: 65 MG/DL (ref 8–23)
CALCIUM SERPL-MCNC: 9.2 MG/DL (ref 8.5–9.9)
CHLORIDE SERPL-SCNC: 111 MEQ/L (ref 95–107)
CO2 SERPL-SCNC: 23 MEQ/L (ref 20–31)
CREAT SERPL-MCNC: 1.81 MG/DL (ref 0.7–1.2)
GLUCOSE SERPL-MCNC: 134 MG/DL (ref 70–99)
POTASSIUM SERPL-SCNC: 5.6 MEQ/L (ref 3.4–4.9)
SODIUM SERPL-SCNC: 145 MEQ/L (ref 135–144)

## 2023-07-22 PROCEDURE — 99309 SBSQ NF CARE MODERATE MDM 30: CPT | Performed by: PHYSICIAN ASSISTANT

## 2023-07-22 PROCEDURE — 1123F ACP DISCUSS/DSCN MKR DOCD: CPT | Performed by: PHYSICIAN ASSISTANT

## 2023-07-25 ENCOUNTER — OFFICE VISIT (OUTPATIENT)
Dept: GERIATRIC MEDICINE | Age: 78
End: 2023-07-25
Payer: MEDICARE

## 2023-07-25 DIAGNOSIS — E86.0 DEHYDRATION, MODERATE: Primary | ICD-10-CM

## 2023-07-25 DIAGNOSIS — E87.5 HYPERKALEMIA: ICD-10-CM

## 2023-07-25 DIAGNOSIS — R21: ICD-10-CM

## 2023-07-25 PROCEDURE — 99309 SBSQ NF CARE MODERATE MDM 30: CPT | Performed by: PHYSICIAN ASSISTANT

## 2023-07-25 PROCEDURE — 1123F ACP DISCUSS/DSCN MKR DOCD: CPT | Performed by: PHYSICIAN ASSISTANT

## 2023-07-27 ENCOUNTER — OFFICE VISIT (OUTPATIENT)
Dept: GERIATRIC MEDICINE | Age: 78
End: 2023-07-27

## 2023-07-27 DIAGNOSIS — I50.22 CHRONIC SYSTOLIC CHF (CONGESTIVE HEART FAILURE), NYHA CLASS 1 (HCC): ICD-10-CM

## 2023-07-27 DIAGNOSIS — D68.9 COAGULATION DEFECT (HCC): Primary | ICD-10-CM

## 2023-07-27 DIAGNOSIS — Z79.4 TYPE 2 DIABETES MELLITUS WITH STAGE 3B CHRONIC KIDNEY DISEASE, WITH LONG-TERM CURRENT USE OF INSULIN (HCC): ICD-10-CM

## 2023-07-27 DIAGNOSIS — G47.33 OSA (OBSTRUCTIVE SLEEP APNEA): Chronic | ICD-10-CM

## 2023-07-27 DIAGNOSIS — E11.22 TYPE 2 DIABETES MELLITUS WITH STAGE 3B CHRONIC KIDNEY DISEASE, WITH LONG-TERM CURRENT USE OF INSULIN (HCC): ICD-10-CM

## 2023-07-27 DIAGNOSIS — N18.32 TYPE 2 DIABETES MELLITUS WITH STAGE 3B CHRONIC KIDNEY DISEASE, WITH LONG-TERM CURRENT USE OF INSULIN (HCC): ICD-10-CM

## 2023-07-30 ASSESSMENT — ENCOUNTER SYMPTOMS
COUGH: 1
BACK PAIN: 1
CONSTIPATION: 1

## 2023-07-31 NOTE — PROGRESS NOTES
Patient arrived via stretcher to room. Patient given pain medication for pain in right leg. Home medications were gone over with patient. Return to clinic in 10 weeks  Labs on RTC: CBC, ferritin, iron, TIBC, folate and vitamin B12   Please call for questions or concerns.

## 2023-08-03 ENCOUNTER — TELEPHONE (OUTPATIENT)
Dept: FAMILY MEDICINE CLINIC | Age: 78
End: 2023-08-03

## 2023-08-03 DIAGNOSIS — I25.10 CORONARY ARTERY DISEASE INVOLVING NATIVE CORONARY ARTERY OF NATIVE HEART WITHOUT ANGINA PECTORIS: ICD-10-CM

## 2023-08-03 DIAGNOSIS — E66.01 SEVERE OBESITY (BMI 35.0-39.9) WITH COMORBIDITY (HCC): ICD-10-CM

## 2023-08-03 DIAGNOSIS — E66.9 DIABETES MELLITUS TYPE 2 IN OBESE (HCC): ICD-10-CM

## 2023-08-03 DIAGNOSIS — I48.91 ATRIAL FIBRILLATION, UNSPECIFIED TYPE (HCC): ICD-10-CM

## 2023-08-03 DIAGNOSIS — I50.22 CHRONIC SYSTOLIC CHF (CONGESTIVE HEART FAILURE), NYHA CLASS 1 (HCC): Primary | ICD-10-CM

## 2023-08-03 DIAGNOSIS — N18.30 STAGE 3 CHRONIC KIDNEY DISEASE, UNSPECIFIED WHETHER STAGE 3A OR 3B CKD (HCC): ICD-10-CM

## 2023-08-03 DIAGNOSIS — E11.69 DIABETES MELLITUS TYPE 2 IN OBESE (HCC): ICD-10-CM

## 2023-08-03 DIAGNOSIS — I73.9 PVD (PERIPHERAL VASCULAR DISEASE) (HCC): ICD-10-CM

## 2023-08-03 NOTE — TELEPHONE ENCOUNTER
----- Message from Cristina Bruneau sent at 8/3/2023 10:09 AM EDT -----  Subject: Message to Provider    QUESTIONS  Information for Provider? Patient was released from hospital and wants son   to take FMLA and help take care of him. Son, Lorraine Benson, is not a current   patient of Marcus or Dr. Cheko Gleason. Unable to validae information on son to   create a chart, was recommended son call himself so a chart can be made   and scheduling can be conducted at that point. Would Dr. Cheko Gleason take on   this patient's son as a NEW patient? And furthermore be able to sign FMLA   papers for him as needed? Please advise. Thank you   ---------------------------------------------------------------------------  --------------  Tawnya [facility]  [phone]; OK to leave message on voicemail  ---------------------------------------------------------------------------  --------------  SCRIPT ANSWERS  Relationship to Patient?  Self

## 2023-08-03 NOTE — TELEPHONE ENCOUNTER
----- Message from Kamar Carranza sent at 8/3/2023 10:11 AM EDT -----  Subject: Referral Request    Reason for referral request? Patient is requesting A referral to Physcial   Therapy r/t back surgery and needs nursing and home heatlh help at home. Please advise. Thank you   Provider patient wants to be referred to(if known):     Provider Phone Number(if known): Additional Information for Provider?  Hesham [address]es[address]  ---------------------------------------------------------------------------  --------------  Maninder GORDON    [phone]; OK to leave message on voicemail  ---------------------------------------------------------------------------  --------------

## 2023-08-03 NOTE — TELEPHONE ENCOUNTER
Pt calling to see If we got a call from home health requesting a referral order for It would like a call back once this is done please and thank you

## 2023-08-04 ASSESSMENT — ENCOUNTER SYMPTOMS
CONSTIPATION: 1
SHORTNESS OF BREATH: 0
COUGH: 0
BACK PAIN: 1

## 2023-08-04 NOTE — PROGRESS NOTES
Hand-held shower, Toilet raiser-Accessibility: Accessible    Home Equipment: Cane, Walker, rolling, Grab bars, Rollator-Has the patient had two or more falls in the past year or any fall with injury in the past year?: Yes (slipped out of a chair)    Receives Help From: Family    ADL Assistance: Independent (wife assists intermittently with socks), Homemaking Assistance: Needs assistance (wife cooks and does the housework), Homemaking Responsibilities: No    Ambulation Assistance: Independent (until 1 wk ago pt was using cane; recently using rollator inside the home and [employer] in [employer]), Transfer Assistance: Independent    Active : [employer] of Transportation: Car        Additional Comments: drop foot L with AFO; uses motorized cart at [employer]; sleeps in recliner             Social Determinants of Health     Financial Resource Strain: Low Risk     Difficulty of Paying Living Expenses: Not hard at all   Food Insecurity: No Food Insecurity    Worried About [place] in the Last Year: Never true    [address] in the Last Year: Never true   Transportation Needs: No Transportation Needs    Lack of Transportation (Medical): No    Lack of Transportation (Non-Medical): No   Physical Activity: Insufficiently Active    Days of Exercise per Week: 3 days    Minutes of Exercise per Session: 10 min   Stress: Not on file   Social Connections: Not on file   Intimate Partner Violence: Not on file   Housing Stability: Not on file     Family History   Problem Relation Age of Onset    Cancer Mother         intestinal, skin    Heart Disease Father     Stroke Father      Allergies   Allergen Reactions    Dye [Iodides]            Review of Systems   Constitutional:  Positive for fatigue. Negative for activity change. HENT:  Negative for congestion. Respiratory:  Negative for cough and shortness of breath. Cardiovascular:  Positive for leg swelling. Negative for chest pain.    Gastrointestinal:  Positive for

## 2023-08-04 NOTE — TELEPHONE ENCOUNTER
Diagnosis Orders   1. Chronic systolic CHF (congestive heart failure), NYHA class 1 ([address] [facility])  Ambulatory referral to Home Health      2. Diabetes mellitus type 2 in obese [facility])  Ambulatory referral to Home Health      3. PVD (peripheral vascular disease) ([facility])  Ambulatory referral to Home Health      4. Stage 3 chronic kidney disease, unspecified whether stage 3a or 3b CKD ([facility])  Ambulatory referral to Home Health      5. Atrial fibrillation, unspecified type [facility])  Ambulatory referral to [facility]      6. Severe obesity (BMI 35.0-39. 9) with comorbidity [facility])  Ambulatory referral to Home Health      7.  Coronary artery disease involving native coronary artery of native heart without angina pectoris  Ambulatory referral to [facility]

## 2023-08-04 NOTE — TELEPHONE ENCOUNTER
Shahnaz Garcia from [facility] calling will  sign orders for pt?         [id number] [phone]

## 2023-08-06 ENCOUNTER — HOSPITAL ENCOUNTER (INPATIENT)
Age: 78
LOS: 6 days | Discharge: NURSING, REHAB OR LONG-TERM CARE | End: 2023-08-12
Attending: INTERNAL MEDICINE
Payer: MEDICARE

## 2023-08-06 ENCOUNTER — APPOINTMENT (OUTPATIENT)
Dept: GENERAL RADIOLOGY | Age: 78
End: 2023-08-06
Payer: MEDICARE

## 2023-08-06 ENCOUNTER — APPOINTMENT (OUTPATIENT)
Dept: CT IMAGING | Age: 78
End: 2023-08-06
Payer: MEDICARE

## 2023-08-06 DIAGNOSIS — R47.1 DYSARTHRIA: ICD-10-CM

## 2023-08-06 DIAGNOSIS — Z78.9 IMPAIRED MOBILITY AND ACTIVITIES OF DAILY LIVING: Primary | ICD-10-CM

## 2023-08-06 DIAGNOSIS — Z74.09 IMPAIRED MOBILITY AND ACTIVITIES OF DAILY LIVING: Primary | ICD-10-CM

## 2023-08-06 PROBLEM — R53.1 WEAKNESS GENERALIZED: Status: ACTIVE | Noted: 2023-08-06

## 2023-08-06 LAB
ALBUMIN SERPL-MCNC: 3.7 G/DL (ref 3.5–4.6)
ALP SERPL-CCNC: 186 U/L (ref 35–104)
ALT SERPL-CCNC: 13 U/L (ref 0–41)
ANION GAP SERPL CALCULATED.3IONS-SCNC: 12 MEQ/L (ref 9–15)
AST SERPL-CCNC: 12 U/L (ref 0–40)
BACTERIA URNS QL MICRO: NEGATIVE /HPF
BASOPHILS # BLD: 0 K/UL (ref 0–0.2)
BASOPHILS NFR BLD: 0.4 %
BILIRUB SERPL-MCNC: 1 MG/DL (ref 0.2–0.7)
BILIRUB UR QL STRIP: NEGATIVE
BUN SERPL-MCNC: 64 MG/DL (ref 8–23)
CALCIUM SERPL-MCNC: 9.1 MG/DL (ref 8.5–9.9)
CHLORIDE SERPL-SCNC: 109 MEQ/L (ref 95–107)
CLARITY UR: CLEAR
CO2 SERPL-SCNC: 23 MEQ/L (ref 20–31)
COLOR UR: YELLOW
CREAT SERPL-MCNC: 2.38 MG/DL (ref 0.7–1.2)
EOSINOPHIL # BLD: 0.2 K/UL (ref 0–0.7)
EOSINOPHIL NFR BLD: 3.8 %
EPI CELLS #/AREA URNS AUTO: ABNORMAL /HPF (ref 0–5)
ERYTHROCYTE [DISTWIDTH] IN BLOOD BY AUTOMATED COUNT: 16.8 % (ref 11.5–14.5)
GLOBULIN SER CALC-MCNC: 2.8 G/DL (ref 2.3–3.5)
GLUCOSE BLD-MCNC: 191 MG/DL (ref 70–99)
GLUCOSE SERPL-MCNC: 182 MG/DL (ref 70–99)
GLUCOSE UR STRIP-MCNC: NEGATIVE MG/DL
HCT VFR BLD AUTO: 30.2 % (ref 42–52)
HGB BLD-MCNC: 9.7 G/DL (ref 14–18)
HGB UR QL STRIP: ABNORMAL
HYALINE CASTS #/AREA URNS AUTO: ABNORMAL /HPF (ref 0–5)
KETONES UR STRIP-MCNC: NEGATIVE MG/DL
LEUKOCYTE ESTERASE UR QL STRIP: NEGATIVE
LYMPHOCYTES # BLD: 0.3 K/UL (ref 1–4.8)
LYMPHOCYTES NFR BLD: 6.2 %
MCH RBC QN AUTO: 30.2 PG (ref 27–31.3)
MCHC RBC AUTO-ENTMCNC: 32.2 % (ref 33–37)
MCV RBC AUTO: 93.8 FL (ref 79–92.2)
MONOCYTES # BLD: 0.5 K/UL (ref 0.2–0.8)
MONOCYTES NFR BLD: 10.2 %
NEUTROPHILS # BLD: 4.2 K/UL (ref 1.4–6.5)
NEUTS SEG NFR BLD: 79.4 %
NITRITE UR QL STRIP: NEGATIVE
PERFORMED ON: ABNORMAL
PH UR STRIP: 5 [PH] (ref 5–9)
PLATELET # BLD AUTO: 94 K/UL (ref 130–400)
POTASSIUM SERPL-SCNC: 5.2 MEQ/L (ref 3.4–4.9)
PROT SERPL-MCNC: 6.5 G/DL (ref 6.3–8)
PROT UR STRIP-MCNC: 30 MG/DL
RBC # BLD AUTO: 3.22 M/UL (ref 4.7–6.1)
RBC #/AREA URNS AUTO: ABNORMAL /HPF (ref 0–5)
SODIUM SERPL-SCNC: 144 MEQ/L (ref 135–144)
SP GR UR STRIP: 1.01 (ref 1–1.03)
URINE REFLEX TO CULTURE: ABNORMAL
UROBILINOGEN UR STRIP-ACNC: 0.2 E.U./DL
WBC # BLD AUTO: 5.3 K/UL (ref 4.8–10.8)
WBC #/AREA URNS AUTO: ABNORMAL /HPF (ref 0–5)

## 2023-08-06 PROCEDURE — 36415 COLL VENOUS BLD VENIPUNCTURE: CPT

## 2023-08-06 PROCEDURE — 2580000003 HC RX 258: Performed by: INTERNAL MEDICINE

## 2023-08-06 PROCEDURE — 6370000000 HC RX 637 (ALT 250 FOR IP): Performed by: INTERNAL MEDICINE

## 2023-08-06 PROCEDURE — 80053 COMPREHEN METABOLIC PANEL: CPT

## 2023-08-06 PROCEDURE — 73560 X-RAY EXAM OF KNEE 1 OR 2: CPT

## 2023-08-06 PROCEDURE — 6370000000 HC RX 637 (ALT 250 FOR IP): Performed by: PHYSICIAN ASSISTANT

## 2023-08-06 PROCEDURE — 1210000000 HC MED SURG R&B

## 2023-08-06 PROCEDURE — 99285 EMERGENCY DEPT VISIT HI MDM: CPT

## 2023-08-06 PROCEDURE — 6830039000 HC L3 TRAUMA ALERT

## 2023-08-06 PROCEDURE — 85025 COMPLETE CBC W/AUTO DIFF WBC: CPT

## 2023-08-06 PROCEDURE — 70450 CT HEAD/BRAIN W/O DYE: CPT

## 2023-08-06 PROCEDURE — 81001 URINALYSIS AUTO W/SCOPE: CPT

## 2023-08-06 RX ORDER — SODIUM CHLORIDE 9 MG/ML
INJECTION, SOLUTION INTRAVENOUS PRN
Status: DISCONTINUED | OUTPATIENT
Start: 2023-08-06 | End: 2023-08-12 | Stop reason: HOSPADM

## 2023-08-06 RX ORDER — INSULIN GLARGINE 100 [IU]/ML
10 INJECTION, SOLUTION SUBCUTANEOUS EVERY MORNING
Status: DISCONTINUED | OUTPATIENT
Start: 2023-08-07 | End: 2023-08-12 | Stop reason: HOSPADM

## 2023-08-06 RX ORDER — PANTOPRAZOLE SODIUM 40 MG/1
40 TABLET, DELAYED RELEASE ORAL
Status: DISCONTINUED | OUTPATIENT
Start: 2023-08-07 | End: 2023-08-12 | Stop reason: HOSPADM

## 2023-08-06 RX ORDER — TAMSULOSIN HYDROCHLORIDE 0.4 MG/1
0.4 CAPSULE ORAL DAILY
Status: DISCONTINUED | OUTPATIENT
Start: 2023-08-06 | End: 2023-08-12 | Stop reason: HOSPADM

## 2023-08-06 RX ORDER — POTASSIUM CHLORIDE 7.45 MG/ML
10 INJECTION INTRAVENOUS PRN
Status: DISCONTINUED | OUTPATIENT
Start: 2023-08-06 | End: 2023-08-12 | Stop reason: HOSPADM

## 2023-08-06 RX ORDER — ATORVASTATIN CALCIUM 40 MG/1
40 TABLET, FILM COATED ORAL NIGHTLY
Status: DISCONTINUED | OUTPATIENT
Start: 2023-08-06 | End: 2023-08-12 | Stop reason: HOSPADM

## 2023-08-06 RX ORDER — POTASSIUM CHLORIDE 20 MEQ/1
40 TABLET, EXTENDED RELEASE ORAL PRN
Status: DISCONTINUED | OUTPATIENT
Start: 2023-08-06 | End: 2023-08-12 | Stop reason: HOSPADM

## 2023-08-06 RX ORDER — ACETAMINOPHEN 650 MG/1
650 SUPPOSITORY RECTAL EVERY 6 HOURS PRN
Status: DISCONTINUED | OUTPATIENT
Start: 2023-08-06 | End: 2023-08-12 | Stop reason: HOSPADM

## 2023-08-06 RX ORDER — ACETAMINOPHEN 325 MG/1
650 TABLET ORAL EVERY 6 HOURS PRN
Status: DISCONTINUED | OUTPATIENT
Start: 2023-08-06 | End: 2023-08-12 | Stop reason: HOSPADM

## 2023-08-06 RX ORDER — CARVEDILOL 3.12 MG/1
6.25 TABLET ORAL 2 TIMES DAILY WITH MEALS
Status: DISCONTINUED | OUTPATIENT
Start: 2023-08-06 | End: 2023-08-12 | Stop reason: HOSPADM

## 2023-08-06 RX ORDER — INSULIN LISPRO 100 [IU]/ML
0-8 INJECTION, SOLUTION INTRAVENOUS; SUBCUTANEOUS
Status: DISCONTINUED | OUTPATIENT
Start: 2023-08-06 | End: 2023-08-12 | Stop reason: HOSPADM

## 2023-08-06 RX ORDER — SODIUM CHLORIDE 0.9 % (FLUSH) 0.9 %
5-40 SYRINGE (ML) INJECTION EVERY 12 HOURS SCHEDULED
Status: DISCONTINUED | OUTPATIENT
Start: 2023-08-06 | End: 2023-08-12 | Stop reason: HOSPADM

## 2023-08-06 RX ORDER — INSULIN LISPRO 100 [IU]/ML
0-4 INJECTION, SOLUTION INTRAVENOUS; SUBCUTANEOUS NIGHTLY
Status: DISCONTINUED | OUTPATIENT
Start: 2023-08-06 | End: 2023-08-12 | Stop reason: HOSPADM

## 2023-08-06 RX ORDER — POLYETHYLENE GLYCOL 3350 17 G/17G
17 POWDER, FOR SOLUTION ORAL DAILY PRN
Status: DISCONTINUED | OUTPATIENT
Start: 2023-08-06 | End: 2023-08-12 | Stop reason: HOSPADM

## 2023-08-06 RX ORDER — ONDANSETRON 2 MG/ML
4 INJECTION INTRAMUSCULAR; INTRAVENOUS EVERY 6 HOURS PRN
Status: DISCONTINUED | OUTPATIENT
Start: 2023-08-06 | End: 2023-08-12 | Stop reason: HOSPADM

## 2023-08-06 RX ORDER — ASPIRIN 81 MG/1
81 TABLET ORAL DAILY
Status: DISCONTINUED | OUTPATIENT
Start: 2023-08-06 | End: 2023-08-12 | Stop reason: HOSPADM

## 2023-08-06 RX ORDER — BACITRACIN ZINC 500 [USP'U]/G
OINTMENT TOPICAL ONCE
Status: COMPLETED | OUTPATIENT
Start: 2023-08-06 | End: 2023-08-06

## 2023-08-06 RX ORDER — GLUCAGON 1 MG/ML
1 KIT INJECTION PRN
Status: DISCONTINUED | OUTPATIENT
Start: 2023-08-06 | End: 2023-08-12 | Stop reason: HOSPADM

## 2023-08-06 RX ORDER — DEXTROSE MONOHYDRATE 100 MG/ML
INJECTION, SOLUTION INTRAVENOUS CONTINUOUS PRN
Status: DISCONTINUED | OUTPATIENT
Start: 2023-08-06 | End: 2023-08-12 | Stop reason: HOSPADM

## 2023-08-06 RX ORDER — SODIUM CHLORIDE 9 MG/ML
INJECTION, SOLUTION INTRAVENOUS CONTINUOUS
Status: DISCONTINUED | OUTPATIENT
Start: 2023-08-06 | End: 2023-08-07

## 2023-08-06 RX ORDER — MAGNESIUM SULFATE IN WATER 40 MG/ML
2000 INJECTION, SOLUTION INTRAVENOUS PRN
Status: DISCONTINUED | OUTPATIENT
Start: 2023-08-06 | End: 2023-08-12 | Stop reason: HOSPADM

## 2023-08-06 RX ORDER — SODIUM CHLORIDE 0.9 % (FLUSH) 0.9 %
5-40 SYRINGE (ML) INJECTION PRN
Status: DISCONTINUED | OUTPATIENT
Start: 2023-08-06 | End: 2023-08-12 | Stop reason: HOSPADM

## 2023-08-06 RX ORDER — ONDANSETRON 4 MG/1
4 TABLET, ORALLY DISINTEGRATING ORAL EVERY 8 HOURS PRN
Status: DISCONTINUED | OUTPATIENT
Start: 2023-08-06 | End: 2023-08-12 | Stop reason: HOSPADM

## 2023-08-06 RX ORDER — ENOXAPARIN SODIUM 100 MG/ML
30 INJECTION SUBCUTANEOUS 2 TIMES DAILY
Status: DISCONTINUED | OUTPATIENT
Start: 2023-08-06 | End: 2023-08-12 | Stop reason: HOSPADM

## 2023-08-06 RX ADMIN — ATORVASTATIN CALCIUM 40 MG: 40 TABLET, FILM COATED ORAL at 21:23

## 2023-08-06 RX ADMIN — SODIUM CHLORIDE: 9 INJECTION, SOLUTION INTRAVENOUS at 19:08

## 2023-08-06 RX ADMIN — TAMSULOSIN HYDROCHLORIDE 0.4 MG: 0.4 CAPSULE ORAL at 21:24

## 2023-08-06 RX ADMIN — BACITRACIN ZINC: 500 OINTMENT TOPICAL at 17:35

## 2023-08-06 RX ADMIN — CARVEDILOL 6.25 MG: 3.12 TABLET, FILM COATED ORAL at 21:23

## 2023-08-06 ASSESSMENT — ENCOUNTER SYMPTOMS
SHORTNESS OF BREATH: 0
RHINORRHEA: 0
CONSTIPATION: 0
COLOR CHANGE: 0
SORE THROAT: 0
EYE DISCHARGE: 0
ABDOMINAL DISTENTION: 0
ABDOMINAL PAIN: 0

## 2023-08-06 ASSESSMENT — PAIN - FUNCTIONAL ASSESSMENT: PAIN_FUNCTIONAL_ASSESSMENT: NONE - DENIES PAIN

## 2023-08-06 NOTE — ED TRIAGE NOTES
Pt arrived  via ems from home. Pt a/o x 3 skin pink w/d resp non labored. Pt reports he was getting gup from toilet and feel to his knees d/t weakness. Pt was just released from [facility] for weakness. O/e pt has multiple bruises in different stages of color. Pt has new abrasion to lt knee and joints of bilat toes.  Pt denies any head,neck,back,chest,abd or pelvic pain

## 2023-08-06 NOTE — CARE COORDINATION
Met with pt at the bedside. He reports he does not want to go back to [facility] and discussed with his son Nav Carver and he wants to go to ! [address]. Pt not able to transfer from ED to SNF, will need facility to accept, will not need 3 day stay. PT/OT was not available for evals, will need to be admitted.      Deanne Joyce, MARCELON, RN

## 2023-08-06 NOTE — FLOWSHEET NOTE
8/6/23 @ [address] [facility] Notified Dr. Elmer Palmer of Neurology consult via [address] [place]

## 2023-08-06 NOTE — ACP (ADVANCE CARE PLANNING)
Advance Care Planning   Healthcare Decision Maker:    Primary Decision Maker: Lavinia Conklin Spouse - [phone]    Secondary Decision Maker: Micky Og - Child - [phone]    Click here to complete Healthcare Decision Makers including selection of the Healthcare Decision Maker Relationship (ie \"Primary\").        Lamar Luke, MARCELON, RN

## 2023-08-06 NOTE — CARE COORDINATION
08/06/23    From:HOME W SPOUSE AMB W WALKER A&0 X 3 WIFE UNABLE TO ASSIST HIM JUST D/C FROM [facility] 8/2 WANTS DIFFERENT FACILITY [facility] [place] WAS TO START 8/7    Admit:GENERALIZED WEAKNESS     PMH:CAD, CABG, CKD, HTN, DM, NEUROPATHY     Anticipated Discharge Disposition:SNF    Patient Mobility or PT/OT ordered:YES    Consults: NEUROLOGY    Clinical: K+---PLTS--94    Barriers to Discharge:  NEED PT/OT EVALS   NO PRECERT NEEDED  NEEDS CT BRAIN  64/ 2.38--    Assessments: CMI/ ACP DECISION MAKER DONE

## 2023-08-06 NOTE — CARE COORDINATION
Case Management Assessment  Initial Evaluation    Date/Time of Evaluation: 8/6/2023 4:21 PM  Assessment Completed by: Keagan Thompson RN    If patient is discharged prior to next notation, then this note serves as note for discharge by case management. Patient Name: Hermila Cuello                   YOB: 1945  Diagnosis: No admission diagnoses are documented for this encounter. Date / Time: 8/6/2023  1:54 PM    Patient Admission Status: Emergency   Readmission Risk (Low < 19, Mod (19-27), High > 27): Readmission Risk Score: 22.7    Current PCP: Anup Corley MD  PCP verified by CM? Yes    Chart Reviewed: Yes      History Provided by: Patient, Medical Record, Other (see comment) [facility])  Patient Orientation: Alert and Oriented, Person, Place, Self    Patient Cognition: Alert    Hospitalization in the last 30 days (Readmission):  Yes    If yes, Readmission Assessment in  Navigator will be completed. Advance Directives:      Code Status: Prior   Patient's Primary Decision Maker is:      Primary Decision MakerLanna Hodgkins Spouse - [phone]    Secondary Decision Maker: Arias Candy  Child - [phone]    Discharge Planning:    Patient lives with: Spouse/Significant Other Type of Home: House  Primary Care Giver:  Other (Comment) (PT JUST RELEASED FROM [facility] 8/3)  Patient Support Systems include:     Current Financial resources: Medicare  Current community resources: ECF/Home Care  Current services prior to admission: Home Care, Other (Comment) (IS TO START 8/7)            Current DME:  ROSS John            Type of Home Care services:  None    ADLS  Prior functional level: Assistance with the following:, Bathing, Dressing, Toileting, Mobility, Other (see comment) (USES WALKER)  Current functional level: Assistance with the following:, Bathing, Dressing, Toileting, Mobility, Other (see comment) (Alfredo Be)          Family can provide

## 2023-08-06 NOTE — ED NOTES
Abrasions on knees cleaned and dressed, abrasions on left toes cleaned but open to air, greater toe on right foot cleaned and bandaged. Left elbow abrasion cleaned and open to air.       Verenice Chavarria RN  08/06/23 7778

## 2023-08-07 ENCOUNTER — APPOINTMENT (OUTPATIENT)
Dept: ULTRASOUND IMAGING | Age: 78
End: 2023-08-07
Payer: MEDICARE

## 2023-08-07 LAB
ANION GAP SERPL CALCULATED.3IONS-SCNC: 13 MEQ/L (ref 9–15)
BASOPHILS # BLD: 0 K/UL (ref 0–0.2)
BASOPHILS NFR BLD: 0.7 %
BUN SERPL-MCNC: 60 MG/DL (ref 8–23)
CALCIUM SERPL-MCNC: 8.7 MG/DL (ref 8.5–9.9)
CHLORIDE SERPL-SCNC: 111 MEQ/L (ref 95–107)
CO2 SERPL-SCNC: 23 MEQ/L (ref 20–31)
CREAT SERPL-MCNC: 2.22 MG/DL (ref 0.7–1.2)
EOSINOPHIL # BLD: 0.2 K/UL (ref 0–0.7)
EOSINOPHIL NFR BLD: 5.6 %
ERYTHROCYTE [DISTWIDTH] IN BLOOD BY AUTOMATED COUNT: 17.1 % (ref 11.5–14.5)
GLUCOSE BLD-MCNC: 155 MG/DL (ref 70–99)
GLUCOSE BLD-MCNC: 203 MG/DL (ref 70–99)
GLUCOSE BLD-MCNC: 215 MG/DL (ref 70–99)
GLUCOSE BLD-MCNC: 221 MG/DL (ref 70–99)
GLUCOSE SERPL-MCNC: 140 MG/DL (ref 70–99)
HCT VFR BLD AUTO: 29.4 % (ref 42–52)
HGB BLD-MCNC: 9.6 G/DL (ref 14–18)
LYMPHOCYTES # BLD: 0.6 K/UL (ref 1–4.8)
LYMPHOCYTES NFR BLD: 13.6 %
MCH RBC QN AUTO: 31 PG (ref 27–31.3)
MCHC RBC AUTO-ENTMCNC: 32.7 % (ref 33–37)
MCV RBC AUTO: 94.6 FL (ref 79–92.2)
MONOCYTES # BLD: 0.5 K/UL (ref 0.2–0.8)
MONOCYTES NFR BLD: 13.1 %
NEUTROPHILS # BLD: 2.7 K/UL (ref 1.4–6.5)
NEUTS SEG NFR BLD: 67 %
PERFORMED ON: ABNORMAL
PLATELET # BLD AUTO: 76 K/UL (ref 130–400)
POTASSIUM SERPL-SCNC: 4.4 MEQ/L (ref 3.4–4.9)
RBC # BLD AUTO: 3.11 M/UL (ref 4.7–6.1)
SODIUM SERPL-SCNC: 147 MEQ/L (ref 135–144)
WBC # BLD AUTO: 4.1 K/UL (ref 4.8–10.8)

## 2023-08-07 PROCEDURE — 85025 COMPLETE CBC W/AUTO DIFF WBC: CPT

## 2023-08-07 PROCEDURE — 99222 1ST HOSP IP/OBS MODERATE 55: CPT | Performed by: PSYCHIATRY & NEUROLOGY

## 2023-08-07 PROCEDURE — 6360000002 HC RX W HCPCS: Performed by: INTERNAL MEDICINE

## 2023-08-07 PROCEDURE — 2580000003 HC RX 258: Performed by: INTERNAL MEDICINE

## 2023-08-07 PROCEDURE — 6370000000 HC RX 637 (ALT 250 FOR IP): Performed by: INTERNAL MEDICINE

## 2023-08-07 PROCEDURE — 83516 IMMUNOASSAY NONANTIBODY: CPT

## 2023-08-07 PROCEDURE — APPSS45 APP SPLIT SHARED TIME 31-45 MINUTES: Performed by: NURSE PRACTITIONER

## 2023-08-07 PROCEDURE — 93880 EXTRACRANIAL BILAT STUDY: CPT

## 2023-08-07 PROCEDURE — 97163 PT EVAL HIGH COMPLEX 45 MIN: CPT

## 2023-08-07 PROCEDURE — 80048 BASIC METABOLIC PNL TOTAL CA: CPT

## 2023-08-07 PROCEDURE — 1210000000 HC MED SURG R&B

## 2023-08-07 PROCEDURE — 83519 RIA NONANTIBODY: CPT

## 2023-08-07 PROCEDURE — 36415 COLL VENOUS BLD VENIPUNCTURE: CPT

## 2023-08-07 RX ORDER — GABAPENTIN 300 MG/1
300 CAPSULE ORAL
Status: DISCONTINUED | OUTPATIENT
Start: 2023-08-07 | End: 2023-08-12 | Stop reason: HOSPADM

## 2023-08-07 RX ORDER — TORSEMIDE 20 MG/1
40 TABLET ORAL DAILY
Status: DISCONTINUED | OUTPATIENT
Start: 2023-08-07 | End: 2023-08-12 | Stop reason: HOSPADM

## 2023-08-07 RX ADMIN — TORSEMIDE 40 MG: 20 TABLET ORAL at 13:52

## 2023-08-07 RX ADMIN — TAMSULOSIN HYDROCHLORIDE 0.4 MG: 0.4 CAPSULE ORAL at 10:10

## 2023-08-07 RX ADMIN — Medication 10 ML: at 21:41

## 2023-08-07 RX ADMIN — CARVEDILOL 6.25 MG: 3.12 TABLET, FILM COATED ORAL at 17:27

## 2023-08-07 RX ADMIN — ENOXAPARIN SODIUM 30 MG: 100 INJECTION SUBCUTANEOUS at 10:10

## 2023-08-07 RX ADMIN — ASPIRIN 81 MG: 81 TABLET, COATED ORAL at 10:11

## 2023-08-07 RX ADMIN — PANTOPRAZOLE SODIUM 40 MG: 40 TABLET, DELAYED RELEASE ORAL at 06:23

## 2023-08-07 RX ADMIN — ENOXAPARIN SODIUM 30 MG: 100 INJECTION SUBCUTANEOUS at 21:41

## 2023-08-07 RX ADMIN — INSULIN LISPRO 2 UNITS: 100 INJECTION, SOLUTION INTRAVENOUS; SUBCUTANEOUS at 17:32

## 2023-08-07 RX ADMIN — GABAPENTIN 300 MG: 300 CAPSULE ORAL at 17:27

## 2023-08-07 RX ADMIN — INSULIN LISPRO 2 UNITS: 100 INJECTION, SOLUTION INTRAVENOUS; SUBCUTANEOUS at 13:53

## 2023-08-07 RX ADMIN — INSULIN GLARGINE 10 UNITS: 100 INJECTION, SOLUTION SUBCUTANEOUS at 10:11

## 2023-08-07 RX ADMIN — ATORVASTATIN CALCIUM 40 MG: 40 TABLET, FILM COATED ORAL at 21:41

## 2023-08-07 ASSESSMENT — ENCOUNTER SYMPTOMS
NAUSEA: 0
COUGH: 0
VOMITING: 0
TROUBLE SWALLOWING: 0
COLOR CHANGE: 0
CHEST TIGHTNESS: 0
SHORTNESS OF BREATH: 0
WHEEZING: 0

## 2023-08-07 NOTE — CARE COORDINATION
LSW met with the pt at bedside to discuss his DC plan. Pt had been at [facility] for approximately 40 days, discharging on 8/2. He would like to go to a SNF at DC and [facility] as his son lives in [facility]. Referral sent to both for review.

## 2023-08-07 NOTE — PROGRESS NOTES
Physical Therapy Med Surg Initial Assessment  [facility]Department[facility]  [facility]       NAME: Moo Mulligan  :  (78 y.o.)  MRN: [id number]  CODE STATUS: Full Code    Date of Service: 2023    Patient Diagnosis(es): Weakness generalized [R53.1]  Impaired mobility and activities of daily living [Z74.09, Z78.9]   Chief Complaint   Patient presents with    Luis Sportsman onto knees getting up from toilet     Patient Active Problem List    Diagnosis Date Noted    Chronic systolic CHF (congestive heart failure), NYHA class 1 ([address]) 2023    Coronary artery disease involving coronary bypass graft of native heart without angina pectoris 2022    Weakness generalized 2023    Generalized weakness 2023    Hypervolemia 2023    Impaired mobility and activities of daily living dt lumbar spinal stenosis 2023    Hypoglycemia 2023    Hypoglycemia due to type 2 diabetes mellitus ([address]) 2023    Hyperkalemia 2023    Entrapment neuropathy 2023    Neuropathic pain 10/20/2022    Failed back syndrome of lumbar spine 10/20/2022    Leg swelling 2022    PVD (peripheral vascular disease) ([address]) 01/15/2021    Left foot drop     Type 2 diabetes mellitus with stage 3 chronic kidney disease, with long-term current use of insulin ([address]) 2020    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 Marika Smith, to B toes and knees; pt reports h/o L foot drop and states wears brace at home  Edema: B LEs and feet    ROM:  RLE AROM: WFL  RLE General AROM: LE stiffness observed due to edema  LLE AROM : WFL  LLE General AROM: LE stiffness observed due to edema    Strength:  Strength RLE  Comment: grossly 3-/5  Strength LLE  Comment: grossly 3-/5 except L DF 1/5  Strength Other  Other: core strength 2/5    Neuro:  Balance  Sitting - Static: Good;-  Sitting - Dynamic: Fair;+  Standing - Static:  (pt unable to stand)  Standing - Dynamic:  (pt unable to stand)    Sensation: Intact    Bed mobility  Supine to Sit: Dependent/Total;2 Person assistance (pt attempted to move LEs toward edge of bed)  Sit to Supine: Dependent/Total;2 Person assistance  Bed Mobility Comments: HOB elevated; seated scooting SBA with increased time and effort; no LOB seated edge of bed    Transfers  Comment: attempted sit to stand TD +2 unable to reach full standing position with [address] used    Ambulation  Comments: unsafe to attempt due to pt unable to reach full standing position    Stairs/Curb  Stairs?: No    Activity Tolerance  Activity Tolerance: Patient limited by fatigue;Patient limited by endurance  Activity Tolerance Comments: pt limited by strength deficits    Patient Education  Education Given To: Patient  Education Provided: Role of Therapy;Plan of Care  Education Method: Verbal  Education Outcome: Continued education needed;Verbalized understanding     ASSESSMENT:   Body Structures, Functions, Activity Limitations Requiring Skilled Therapeutic Intervention: Decreased functional mobility ; Decreased strength;Decreased endurance;Decreased balance;Decreased ROM  Decision Making: High Complexity  History: high  Exam: high  Clinical Presentation: high    Therapy Prognosis: Good;Fair      DISCHARGE RECOMMENDATIONS:  Discharge Recommendations: Continue to assess pending progress    Assessment: Pt is 78 y.o. male s/p fall to knees at home.   Pt exhibits B LE

## 2023-08-07 NOTE — DISCHARGE INSTR - COC
STATUS:20030}    IV Access:  { CLAUDIA IV ACCESS:816034366}    Nursing Mobility/ADLs:  Walking   {[facility] DME AMJR:805518772}  Transfer  {[facility] DME DPSD:665016395}  Bathing  {[facility] DME SAFZ:345895257}  Dressing  {[facility] DME OQEE:891585046}  Toileting  {[facility] DME SFUW:876926873}  Feeding  {[facility] DME TFZK:901587573}  Med Admin  {[facility] DME ICHW:433492044}  Med Delivery   {[facility] MED Delivery:179399200}    Wound Care Documentation and Therapy:  Wound 04/01/22 Pretibial Proximal;Right 12 dark scabbed areas (Active)   Number of days: 492       Wound 04/01/22 Knee Anterior; Left scab left knee due to shopping cart #1 (Active)   Number of days: 492       Wound Toe (Comment  which one) Anterior; Left left great toe scab and reddened/flaky/no drainage from ill shoes (Active)   Number of days:         Elimination:  Continence:    Bowel: {YES / JX:93292}  Bladder: {YES / XW:50582}  Urinary Catheter: {Urinary Catheter:998306369}   Colostomy/Ileostomy/Ileal Conduit: {YES / YJ:02216}       Date of Last BM: ***    Intake/Output Summary (Last 24 hours) at 8/7/2023 1122  Last data filed at 8/6/2023 2319  Gross per 24 hour   Intake --   Output 500 ml   Net -500 ml     I/O last 3 completed shifts:  In: -   Out: 500 [Urine:500]    Safety Concerns:     [address] Safety Concerns:505570030}    Impairments/Disabilities:      [address] [facility] [place] Impairments/Disabilities:124479140}    Nutrition Therapy:  Current Nutrition Therapy:   [address] [facility] [employer] CLAUDIA Diet List:838465849}    Routes of Feeding: {[facility] DME Other Feedings:337498105}  Liquids: {Slp liquid thickness:00444}  Daily Fluid Restriction: {[facility] DME Yes amt example:598324238}  Last Modified Barium Swallow with Video (Video Swallowing Test): {Done Not Done FOUY:015052890}    Treatments at the Time of Hospital Discharge:   Respiratory Treatments: ***  Oxygen Therapy:  {Therapy; copd oxygen:38039}  Ventilator:    {EMILY DE LUNA Vent PBWK:263517630}    Rehab Therapies: {THERAPEUTIC INTERVENTION:7508668591}  Weight Bearing Status/Restrictions: {EMILY DE LUNA

## 2023-08-07 NOTE — FLOWSHEET NOTE
MRI form completed. Spoke with MONTEZ Venegas. Patient has pacemaker/defibrillator, plate right shoulder, and artificial left knee. Refer to form to be reviewed by MRI.   Electronically signed by Michelle Juarez RN on 8/7/2023 at 11:55 AM

## 2023-08-07 NOTE — CONSULTS
falls in the past year or any fall with injury in the past year?: Yes (slipped out of a chair)    Receives Help From: Family    ADL Assistance: Independent (wife assists intermittently with socks), Homemaking Assistance: Needs assistance (wife cooks and does the housework), Homemaking Responsibilities: No    Ambulation Assistance: Independent (until 1 wk ago pt was using cane; recently using rollator inside the home and [employer] in [employer]), Transfer Assistance: Independent    Active : [employer] of Transportation: Car        Additional Comments: drop foot L with AFO; uses motorized cart at [employer]; sleeps in [address] [place] [address] Strain: Low Risk     Difficulty of Paying Living Expenses: Not hard at all   Food Insecurity: No Food Insecurity    Worried About [place] in the Last Year: Never true    [address] in the Last Year: Never true   Transportation Needs: No Transportation Needs    Lack of Transportation (Medical): No    Lack of Transportation (Non-Medical): No   Physical Activity: Insufficiently Active    Days of Exercise per Week: 3 days    Minutes of Exercise per Session: 10 min   Stress: Not on file   Social Connections: Not on file   Intimate Partner Violence: Not on file   Housing Stability: Not on file          Review of Systems   Constitutional:  Positive for activity change and appetite change. Negative for fever. HENT:  Negative for hearing loss and trouble swallowing. Eyes:  Negative for visual disturbance. Respiratory:  Negative for cough, chest tightness, shortness of breath and wheezing. Cardiovascular:  Positive for leg swelling. Negative for chest pain and palpitations. Gastrointestinal:  Negative for nausea and vomiting. Genitourinary:  Negative for difficulty urinating. Musculoskeletal:  Positive for arthralgias and gait problem. Negative for neck pain and neck stiffness.    Skin:  Negative for color status post CABG, diabetic neuropathy, left foot drop, hypertension, CKD presented to [facility] emergency room on 8/6/2023 from home via EMS for progressive weakness of the lower extremities resulting in difficulty with standing and ambulation. Patient had recently been discharged from skilled nursing facility several days prior. We have actually been consulted due to reports of intermittent dysarthria that is occurring for the last 4 months and the last anywhere from 1 to 3 days when occurs. No other associated focal neurodeficits, cardiac symptoms. No history of TIA, stroke, seizure. Denies alcohol or illicit drug use. Denies history of sleep apnea but is morbidly obese and snores. Takes aspirin 81 mg on a daily basis and is compliant. Exam is currently nonfocal.  CT of the head was negative for any acute findings. There is atrophy and chronic changes noted. Patient does have elevated creatinine of 2.38 which is above his baseline of 1.8-2.0. Platelets are chronically low and currently at 94. Hemoglobin is chronically low and is currently at 9.7. Patient has multiple risk factors for CVD. At this time we will obtain MRI of the brain and carotid duplex. Continue aspirin. No evidence of dementia or behavioral changes at this time. Patient is morbidly obese and snores. Will check nocturnal pulse oximetry. Given frequency and timing of dysarthria there is suspicion for subcortical dysarthria. Will also check acetylcholine receptor antibodies. Patient with progressive functional decline, leg weakness, difficulty with ambulation. He has history of cervical spine surgery and 3 lumbar spine surgeries according to him. He is not myelopathic. He is areflexic in the lower extremities. No bowel or bladder dysfunction. He is morbidly obese with lower extremity edema and peripheral neuropathy which could be contributing to leg weakness and gait issues.   Will obtain MRI of the lumbar

## 2023-08-07 NOTE — DISCHARGE INSTRUCTIONS
Follow up with primary care physician in the next 7 days or sooner if needed. If you do not have a Primary care physician, please schedule an appointment with one. Please ask prior to discharge about a list of local providers. Please return to ER or call 911 if you develop any significant signs or symptoms. I may not have addressed all of your medical illnesses or the abnormal blood work or imaging therefore please ask your PCP to obtain [facility] record to follow up on all of the abnormal labs, imaging and findings that I have and have not addressed during your hospitalization. Discharging you from the hospital does not mean that your medical care ends here and now. You may still need additional work up, investigation, monitoring, and treatment to be handled from this point on by outside providers including your PCP, Specialists and other healthcare providers. For medication questions, contact your retail pharmacy and your PCP. Your medical team at [facility] appreciates the opportunity to work with you to get well!     Noah Lara,   11:22 AM

## 2023-08-08 LAB
ANION GAP SERPL CALCULATED.3IONS-SCNC: 11 MEQ/L (ref 9–15)
BASOPHILS # BLD: 0 K/UL (ref 0–0.2)
BASOPHILS NFR BLD: 0.5 %
BUN SERPL-MCNC: 57 MG/DL (ref 8–23)
CALCIUM SERPL-MCNC: 8.6 MG/DL (ref 8.5–9.9)
CHLORIDE SERPL-SCNC: 110 MEQ/L (ref 95–107)
CO2 SERPL-SCNC: 23 MEQ/L (ref 20–31)
CREAT SERPL-MCNC: 2.3 MG/DL (ref 0.7–1.2)
EOSINOPHIL # BLD: 0.2 K/UL (ref 0–0.7)
EOSINOPHIL NFR BLD: 5.5 %
ERYTHROCYTE [DISTWIDTH] IN BLOOD BY AUTOMATED COUNT: 16.7 % (ref 11.5–14.5)
GLUCOSE BLD-MCNC: 157 MG/DL (ref 70–99)
GLUCOSE BLD-MCNC: 162 MG/DL (ref 70–99)
GLUCOSE BLD-MCNC: 210 MG/DL (ref 70–99)
GLUCOSE BLD-MCNC: 235 MG/DL (ref 70–99)
GLUCOSE SERPL-MCNC: 150 MG/DL (ref 70–99)
HCT VFR BLD AUTO: 28.2 % (ref 42–52)
HGB BLD-MCNC: 9.1 G/DL (ref 14–18)
LYMPHOCYTES # BLD: 0.5 K/UL (ref 1–4.8)
LYMPHOCYTES NFR BLD: 13.1 %
MCH RBC QN AUTO: 30.4 PG (ref 27–31.3)
MCHC RBC AUTO-ENTMCNC: 32.4 % (ref 33–37)
MCV RBC AUTO: 93.8 FL (ref 79–92.2)
MONOCYTES # BLD: 0.5 K/UL (ref 0.2–0.8)
MONOCYTES NFR BLD: 12.8 %
NEUTROPHILS # BLD: 2.8 K/UL (ref 1.4–6.5)
NEUTS SEG NFR BLD: 68.1 %
PERFORMED ON: ABNORMAL
PLATELET # BLD AUTO: 81 K/UL (ref 130–400)
POTASSIUM SERPL-SCNC: 4.3 MEQ/L (ref 3.4–4.9)
RBC # BLD AUTO: 3 M/UL (ref 4.7–6.1)
SODIUM SERPL-SCNC: 144 MEQ/L (ref 135–144)
WBC # BLD AUTO: 4.1 K/UL (ref 4.8–10.8)

## 2023-08-08 PROCEDURE — 97166 OT EVAL MOD COMPLEX 45 MIN: CPT

## 2023-08-08 PROCEDURE — 93283 PRGRMG EVAL IMPLANTABLE DFB: CPT

## 2023-08-08 PROCEDURE — 94762 N-INVAS EAR/PLS OXIMTRY CONT: CPT

## 2023-08-08 PROCEDURE — 97116 GAIT TRAINING THERAPY: CPT

## 2023-08-08 PROCEDURE — 80048 BASIC METABOLIC PNL TOTAL CA: CPT

## 2023-08-08 PROCEDURE — 36415 COLL VENOUS BLD VENIPUNCTURE: CPT

## 2023-08-08 PROCEDURE — APPSS15 APP SPLIT SHARED TIME 0-15 MINUTES: Performed by: NURSE PRACTITIONER

## 2023-08-08 PROCEDURE — 97535 SELF CARE MNGMENT TRAINING: CPT

## 2023-08-08 PROCEDURE — 1210000000 HC MED SURG R&B

## 2023-08-08 PROCEDURE — 85025 COMPLETE CBC W/AUTO DIFF WBC: CPT

## 2023-08-08 PROCEDURE — 2580000003 HC RX 258: Performed by: INTERNAL MEDICINE

## 2023-08-08 PROCEDURE — 97110 THERAPEUTIC EXERCISES: CPT

## 2023-08-08 PROCEDURE — 6370000000 HC RX 637 (ALT 250 FOR IP): Performed by: INTERNAL MEDICINE

## 2023-08-08 PROCEDURE — 99232 SBSQ HOSP IP/OBS MODERATE 35: CPT | Performed by: PSYCHIATRY & NEUROLOGY

## 2023-08-08 PROCEDURE — 2700000000 HC OXYGEN THERAPY PER DAY

## 2023-08-08 PROCEDURE — 6360000002 HC RX W HCPCS: Performed by: INTERNAL MEDICINE

## 2023-08-08 RX ORDER — LORAZEPAM 2 MG/ML
0.5 INJECTION INTRAMUSCULAR ONCE
Status: COMPLETED | OUTPATIENT
Start: 2023-08-09 | End: 2023-08-11

## 2023-08-08 RX ORDER — LORAZEPAM 2 MG/ML
0.5 INJECTION INTRAMUSCULAR ONCE
Status: DISCONTINUED | OUTPATIENT
Start: 2023-08-08 | End: 2023-08-08

## 2023-08-08 RX ADMIN — ENOXAPARIN SODIUM 30 MG: 100 INJECTION SUBCUTANEOUS at 09:35

## 2023-08-08 RX ADMIN — TAMSULOSIN HYDROCHLORIDE 0.4 MG: 0.4 CAPSULE ORAL at 08:56

## 2023-08-08 RX ADMIN — PANTOPRAZOLE SODIUM 40 MG: 40 TABLET, DELAYED RELEASE ORAL at 06:06

## 2023-08-08 RX ADMIN — CARVEDILOL 6.25 MG: 3.12 TABLET, FILM COATED ORAL at 08:56

## 2023-08-08 RX ADMIN — CARVEDILOL 6.25 MG: 3.12 TABLET, FILM COATED ORAL at 17:48

## 2023-08-08 RX ADMIN — TORSEMIDE 40 MG: 20 TABLET ORAL at 08:55

## 2023-08-08 RX ADMIN — GABAPENTIN 300 MG: 300 CAPSULE ORAL at 17:48

## 2023-08-08 RX ADMIN — ATORVASTATIN CALCIUM 40 MG: 40 TABLET, FILM COATED ORAL at 21:36

## 2023-08-08 RX ADMIN — Medication 5 ML: at 21:36

## 2023-08-08 RX ADMIN — INSULIN LISPRO 2 UNITS: 100 INJECTION, SOLUTION INTRAVENOUS; SUBCUTANEOUS at 13:21

## 2023-08-08 RX ADMIN — ENOXAPARIN SODIUM 30 MG: 100 INJECTION SUBCUTANEOUS at 21:36

## 2023-08-08 RX ADMIN — INSULIN GLARGINE 10 UNITS: 100 INJECTION, SOLUTION SUBCUTANEOUS at 08:56

## 2023-08-08 RX ADMIN — ASPIRIN 81 MG: 81 TABLET, COATED ORAL at 08:56

## 2023-08-08 RX ADMIN — Medication 10 ML: at 08:56

## 2023-08-08 ASSESSMENT — ENCOUNTER SYMPTOMS
COUGH: 0
VOMITING: 0
ABDOMINAL DISTENTION: 0
ABDOMINAL PAIN: 0
WHEEZING: 0
COLOR CHANGE: 0
NAUSEA: 0
TROUBLE SWALLOWING: 0
CHEST TIGHTNESS: 0
SHORTNESS OF BREATH: 0

## 2023-08-08 NOTE — PROGRESS NOTES
Physical Therapy Med Surg Daily Treatment Note  Facility/Department: [facility]  [id number]       NAME: Suleiman Thomason  :  (78 y.o.)  MRN: [id number]  CODE STATUS: Full Code    Date of Service: 2023    Patient Diagnosis(es): Weakness generalized [R53.1]  Impaired mobility and activities of daily living [Z74.09, Z78.9]   Chief Complaint   Patient presents with    Aurora Jeremiah onto knees getting up from toilet     Patient Active Problem List    Diagnosis Date Noted    Chronic systolic CHF (congestive heart failure), NYHA class 1 ([address]) 2023    Coronary artery disease involving coronary bypass graft of native heart without angina pectoris 2022    Weakness generalized 2023    Generalized weakness 2023    Hypervolemia 2023    Impaired mobility and activities of daily living dt lumbar spinal stenosis 2023    Hypoglycemia 2023    Hypoglycemia due to type 2 diabetes mellitus ([address]) 2023    Hyperkalemia 2023    Entrapment neuropathy 2023    Neuropathic pain 10/20/2022    Failed back syndrome of lumbar spine 10/20/2022    Leg swelling 2022    PVD (peripheral vascular disease) ([address]) 01/15/2021    Left foot drop     Type 2 diabetes mellitus with stage 3 chronic kidney disease, with long-term current use of insulin ([address]) 2020    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 Manuela Hoyos, of the activity; assistance is required to complete the activity  Dependent = pt requires total physical assistance to accomplish the task

## 2023-08-08 NOTE — PROGRESS NOTES
[facility] Neurology Daily Progress Note  Name: Dominguez Guzman  Age: 78 y.o. Gender: male  CodeStatus: Full Code  Allergies: Dye [Iodides]    Chief Complaint:Fall (Meldon Daron onto knees getting up from toilet)    Primary Care Provider: Patti Gutierrez MD  InpatientTreatment Team: Treatment Team: Attending Provider: Mechelle Cloud DO; Consulting Physician: Maura Barton MD; Consulting Physician: Mechelle Cloud DO; Registered Nurse: Berta De Oliveira RN; Utilization Reviewer: Liang Francisco RN; Patient Care Tech: [employer]  Admission Date: 8/6/2023      HPI   Pt seen and examined for neurology follow-up. Patient alert and oriented x 3, no acute distress, cooperative. Overall patient is doing well. No episodes of dysarthria overnight. Exam is nonfocal.  He has bilateral lower extremity weakness patient seen and examined and has continued to lower extremity weakness is dysarthria. MRIs are not done. No choking is reported    Vitals:    08/08/23 0855   BP: 126/60   Pulse: 61   Resp:    Temp:    SpO2:         Review of Systems   Constitutional:  Negative for appetite change and fever. HENT:  Negative for hearing loss and trouble swallowing. Eyes:  Negative for visual disturbance. Respiratory:  Negative for cough, chest tightness, shortness of breath and wheezing. Cardiovascular:  Positive for leg swelling. Negative for chest pain and palpitations. Gastrointestinal:  Negative for abdominal distention, abdominal pain, nausea and vomiting. Musculoskeletal:  Positive for gait problem. Skin:  Negative for color change and rash. Neurological:  Positive for weakness. Negative for dizziness, tremors, seizures, syncope, facial asymmetry, speech difficulty, light-headedness, numbness and headaches. Psychiatric/Behavioral:  Negative for agitation, confusion and hallucinations. The patient is not nervous/anxious. Physical Exam  Vitals and nursing note reviewed.    Constitutional:       General: He is not in stroke\" or \"stroke alert\" been called? ->No  What reading provider will be dictating this exam?->CRC    FINDINGS:  BRAIN/VENTRICLES: Generalized atrophy identified of the brain. Some  low-attenuation areas seen in the periventricular and subcortical white  matter to suggest chronic small vessel ischemic change. There is no acute  intracranial hemorrhage, mass effect or midline shift. No abnormal  extra-axial fluid collection. The gray-white differentiation is maintained  without evidence of an acute infarct. There is no evidence of hydrocephalus. ORBITS: The visualized portion of the orbits demonstrate no acute abnormality. SINUSES: The visualized paranasal sinuses and mastoid air cells demonstrate  no acute abnormality. SOFT TISSUES/SKULL:  No acute abnormality of the visualized skull or soft  tissues. Impression  Generalized atrophy and chronic changes seen within the brain with no acute  intracranial abnormality. No results found for this or any previous visit. No results found for this or any previous visit. Carotid duplex: No results found for this or any previous visit. No results found for this or any previous visit. No results found for this or any previous visit. Echo No results found for this or any previous visit. Assessment/Plan:  8/7/23:  Patient is a 53-year-old  male with past medical history of diabetes mellitus type 2, CAD status post CABG, diabetic neuropathy, left foot drop, hypertension, CKD presented to [facility] [place] emergency room on 8/6/2023 from home via EMS for progressive weakness of the lower extremities resulting in difficulty with standing and ambulation. Patient had recently been discharged from skilled nursing facility several days prior. We have actually been consulted due to reports of intermittent dysarthria that is occurring for the last 4 months and the last anywhere from 1 to 3 days when occurs.   No other associated focal

## 2023-08-08 NOTE — PROGRESS NOTES
4.4 4.3   * 110*   CO2 23 23   BUN 60* 57*   CREATININE 2.22* 2.30*   GLUCOSE 140* 150*     Recent Labs     08/06/23  1440   AST 12   ALT 13   BILITOT 1.0*   ALKPHOS 186*       Current Facility-Administered Medications   Medication Dose Route Frequency Provider Last Rate Last Admin    LORazepam (ATIVAN) injection 0.5 mg  0.5 mg IntraVENous Once Pitney Jane, DO        gabapentin (NEURONTIN) capsule 300 mg  300 mg Oral Dinner Pitney Jane, DO   300 mg at 08/07/23 1727    torsemide (DEMADEX) tablet 40 mg  40 mg Oral Daily Pitney Jane, DO   40 mg at 08/08/23 0855    sodium chloride flush 0.9 % injection 5-40 mL  5-40 mL IntraVENous 2 times per day [facility] DO   10 mL at 08/08/23 0856    sodium chloride flush 0.9 % injection 5-40 mL  5-40 mL IntraVENous PRN [facility] DO        0.9 % sodium chloride infusion   IntraVENous PRN [facility] DO        enoxaparin Sodium (LOVENOX) injection 30 mg  30 mg SubCUTAneous BID [facility] DO   30 mg at 08/08/23 0935    ondansetron (ZOFRAN-ODT) disintegrating tablet 4 mg  4 mg Oral Q8H PRN [facility] DO        Or    ondansetron [facility]) injection 4 mg  4 mg IntraVENous Q6H PRN [facility] DO        polyethylene glycol (GLYCOLAX) packet 17 g  17 g Oral Daily PRN [facility] DO        acetaminophen (TYLENOL) tablet 650 mg  650 mg Oral Q6H PRN [facility] DO        Or    acetaminophen (TYLENOL) suppository 650 mg  650 mg Rectal Q6H PRN [facility] DO        potassium chloride (KLOR-CON M) extended release tablet 40 mEq  40 mEq Oral PRN [facility] DO        Or    potassium bicarb-citric acid (EFFER-K) effervescent tablet 40 mEq  40 mEq Oral PRN [facility] DO        Or    potassium chloride 10 mEq/100 mL IVPB (Peripheral Line)  10 mEq IntraVENous PRN [facility] DO        magnesium sulfate 2000 mg in 50 mL IVPB premix  2,000 mg IntraVENous PRN [facility] DO        insulin glargine (LANTUS) injection vial 10 Units 10 Units [facility]Aneous [facility]   10 Units at 08/08/23 0856    insulin lispro (HUMALOG) injection vial 0-8 Units  0-8 Units SubCUTAneous TID [facility]   2 Units at 08/07/23 1732    insulin lispro (HUMALOG) injection vial 0-4 Units  0-4 Units SubCUTAneous Nightly [facility] DO        glucose chewable tablet 16 g  4 tablet Oral PRN [facility] DO        dextrose bolus 10% 125 mL  125 mL IntraVENous PRN [facility] DO        Or    dextrose bolus 10% 250 mL  250 mL IntraVENous PRN [facility] DO        glucagon injection 1 mg  1 mg SubCUTAneous PRN [facility] DO        dextrose 10 % infusion   IntraVENous Continuous PRN [facility] DO        carvedilol (COREG) tablet 6.25 mg  6.25 mg Oral BID [facility] DO   6.25 mg at 08/08/23 0856    atorvastatin (LIPITOR) tablet 40 mg  40 mg Oral Nightly [facility] DO   40 mg at 08/07/23 2141    tamsulosin (FLOMAX) capsule 0.4 mg  0.4 mg Oral Daily [facility] DO   0.4 mg at 08/08/23 0856    pantoprazole (PROTONIX) tablet 40 mg  [facility]   40 mg at 08/08/23 0606    aspirin EC tablet 81 mg  81 mg Oral Daily [facility] DO   81 mg at 08/08/23 7910       Additional work up or/and treatment plan may be added today or then after based on clinical progression. I am managing a portion of pt care. Some medical issues are handled by other specialists. Additional work up and treatment should be done in out pt setting by pt PCP and other out pt providers. In addition to examining and evaluating pt, I spent additional time explaining care, normaland abnormal findings, and treatment plan. All of pt questions were answered. Counseling, diet and education were provided. Case will be discussed with nursing staff when appropriate. Family will be updated if and when appropriate.        Electronically signed by Musa Means DO on 8/8/2023 at 11:59 AM

## 2023-08-08 NOTE — FLOWSHEET NOTE
Telemetry order , discontinuing as discussed with Dr. Alfie Logan.   Electronically signed by Karen Mcmahon RN on 2023 at 4:38 PM

## 2023-08-08 NOTE — CARE COORDINATION
RADHIKAW received a message from [facility] and they can accept the pt is he chooses them. Call placed to [address] [place] [address] to see if they can accept. No response as of the time of this note. OLVIE will continue to follow.

## 2023-08-08 NOTE — PLAN OF CARE
See OT evaluation for all goals and OT POC.  Electronically signed by RENATA Jordan/L on 8/8/2023 at 4:21 PM

## 2023-08-08 NOTE — PROGRESS NOTES
pectoris 11/4/2022    Hypertension     Kidney stones     Left foot drop     Neuropathy, diabetic (HCC)     mild    Osteoarthritis     hip, knee    S/P CABG (coronary artery bypass graft)     Type II or unspecified type diabetes mellitus without mention of complication, not stated as uncontrolled      Past Surgical History:   Procedure Laterality Date    BACK SURGERY N/A     BICEPS TENDON REPAIR  01/01/1997    CARDIAC CATHETERIZATION  06/05/2013    CORONARY ARTERY BYPASS GRAFT  06/07/2013    [address] SURGERY  01/01/2006    left replacement    NECK SURGERY  2020    ROTATOR CUFF REPAIR  01/01/1996    TONSILLECTOMY  01/01/1966        Restrictions  Restrictions/Precautions: Fall Risk     Safety Devices: Safety Devices  Type of Devices: Bed alarm in place;Call light within reach; Left in bed; All fall risk precautions in place     Patient's date of birth confirmed: Yes    General:  Chart Reviewed: Yes  Patient assessed for rehabilitation services?: Yes    Subjective  Subjective: \"I want to feel better\"       Pain at start of treatment: Yes: 6/10    Pain at end of treatment: Yes: 8/10    Location: L knee, low back  Description: Sore, aching  Nursing notified: Declined  RN:   Intervention: Repositioned    Prior Level of Function:  Social/Functional History  Lives With: Spouse (pt states wife is \"Worn out from taking care of me\")  Type of Home: House  Home Layout: One level  Home Access: Stairs to enter with rails  Entrance Stairs - Number of Steps: 1+3  Entrance Stairs - Rails: Both  Bathroom Shower/Tub: Walk-in shower  Bathroom Equipment: Shower chair, Grab bars in shower, Hand-held shower  Home Equipment: Rollator, Walker, rolling, Cane  Has the patient had two or more falls in the past year or any fall with injury in the past year?: Yes (2xs)  ADL Assistance: Independent  Homemaking Responsibilities: No  Ambulation Assistance: Independent (uses rollator in community, 2ww in house d/t narrow halls)  Transfer attempt    Patient 's ambulation NT due to unable to stand. Increased effort for mobility. Bed Mobility  Bed mobility  Supine to Sit: Minimal assistance  Sit to Supine: Minimal assistance  Bed Mobility Comments: HOB elevated, pulls from bed rail, increased time and effort to complete    Seated and Standing Balance:  Balance  Sitting: Intact  Standing: Impaired (too fatigued to attempt)  Standing - Static: Not tested  Standing - Dynamic: Not tested    Functional Endurance:  Activity Tolerance  Activity Tolerance: Patient limited by fatigue    D/C Recommendations:  OT D/C RECOMMENDATIONS  REQUIRES OT FOLLOW-UP: Yes    Equipment Recommendations:  OT Equipment Recommendations  Other: Continue to assess    OT Education:   Patient Education  Education Given To: Patient  Education Provided: Role of Therapy;Plan of Care  Education Method: Verbal  Barriers to Learning: None  Education Outcome: Verbalized understanding    OT Follow Up:   OT D/C RECOMMENDATIONS  REQUIRES OT FOLLOW-UP: Yes       Assessment/Discharge Disposition:  Assessment: Pt is a 78year old man from home with spouse who presents to [facility] with the above deficits which impact his ability to perform ADL and IADL tasks. Pt. limited d/t SOB, weakness and fatigue. Pt would benefit from continued OT to maximize indepence and safety with ADL tasks.   Performance deficits / Impairments: Decreased functional mobility , Decreased ADL status, Decreased strength, Decreased endurance, Decreased balance, Decreased high-level IADLs  Prognosis: Good  Discharge Recommendations: Continue to assess pending progress  Decision Making: Medium Complexity  History: Pt's medical history is moderately complex  Exam: Pt has 6 performance deficits  Assistance / Modification: Pt requires max A    AMPAC (Six Click) Self care Score   How much help is needed for putting on and taking off regular lower body clothing?: A Lot  How much help is needed for bathing (which includes washing,

## 2023-08-08 NOTE — PROGRESS NOTES
ICD interrogation per order Dr. Sawyer Ahumada has a [place] [employer] Assura model [id number]  RA lead [id number]  RV lead [id number]  Per [facility] technical support, Joaquina Razo, patient's device and existing leads are MRI compatible at 1.5 [place], must program to MRI mode. Results since 2022  Presenting rhythm: AS-  DDDR   Atrial episodes: 0  Ventricular episodes: 0  AP 91%   98%  RA test results: Impedance 350 ohms                           Sensin.5 mV                           Captures Den@Traity. 5ms  RV test results: Impedance 350 Ohms                            Sensin.8mV                            Capture Den@Traity. 5ms  Battery estimated longevity 6.5 years.

## 2023-08-09 LAB
ANION GAP SERPL CALCULATED.3IONS-SCNC: 14 MEQ/L (ref 9–15)
BASOPHILS # BLD: 0 K/UL (ref 0–0.2)
BASOPHILS NFR BLD: 0.4 %
BUN SERPL-MCNC: 61 MG/DL (ref 8–23)
CALCIUM SERPL-MCNC: 8.7 MG/DL (ref 8.5–9.9)
CHLORIDE SERPL-SCNC: 109 MEQ/L (ref 95–107)
CO2 SERPL-SCNC: 21 MEQ/L (ref 20–31)
CREAT SERPL-MCNC: 2.57 MG/DL (ref 0.7–1.2)
EOSINOPHIL # BLD: 0.2 K/UL (ref 0–0.7)
EOSINOPHIL NFR BLD: 4.6 %
ERYTHROCYTE [DISTWIDTH] IN BLOOD BY AUTOMATED COUNT: 16.5 % (ref 11.5–14.5)
GLUCOSE BLD-MCNC: 157 MG/DL (ref 70–99)
GLUCOSE BLD-MCNC: 193 MG/DL (ref 70–99)
GLUCOSE BLD-MCNC: 250 MG/DL (ref 70–99)
GLUCOSE BLD-MCNC: 255 MG/DL (ref 70–99)
GLUCOSE SERPL-MCNC: 147 MG/DL (ref 70–99)
HCT VFR BLD AUTO: 26.8 % (ref 42–52)
HGB BLD-MCNC: 8.9 G/DL (ref 14–18)
LYMPHOCYTES # BLD: 0.7 K/UL (ref 1–4.8)
LYMPHOCYTES NFR BLD: 14.3 %
MCH RBC QN AUTO: 30.9 PG (ref 27–31.3)
MCHC RBC AUTO-ENTMCNC: 33.1 % (ref 33–37)
MCV RBC AUTO: 93.4 FL (ref 79–92.2)
MONOCYTES # BLD: 0.6 K/UL (ref 0.2–0.8)
MONOCYTES NFR BLD: 12.2 %
NEUTROPHILS # BLD: 3.1 K/UL (ref 1.4–6.5)
NEUTS SEG NFR BLD: 68.5 %
PERFORMED ON: ABNORMAL
PLATELET # BLD AUTO: 88 K/UL (ref 130–400)
POTASSIUM SERPL-SCNC: 4.7 MEQ/L (ref 3.4–4.9)
RBC # BLD AUTO: 2.86 M/UL (ref 4.7–6.1)
SODIUM SERPL-SCNC: 144 MEQ/L (ref 135–144)
WBC # BLD AUTO: 4.6 K/UL (ref 4.8–10.8)

## 2023-08-09 PROCEDURE — 2580000003 HC RX 258: Performed by: INTERNAL MEDICINE

## 2023-08-09 PROCEDURE — 6370000000 HC RX 637 (ALT 250 FOR IP): Performed by: INTERNAL MEDICINE

## 2023-08-09 PROCEDURE — APPSS15 APP SPLIT SHARED TIME 0-15 MINUTES: Performed by: NURSE PRACTITIONER

## 2023-08-09 PROCEDURE — 36415 COLL VENOUS BLD VENIPUNCTURE: CPT

## 2023-08-09 PROCEDURE — 80048 BASIC METABOLIC PNL TOTAL CA: CPT

## 2023-08-09 PROCEDURE — 85025 COMPLETE CBC W/AUTO DIFF WBC: CPT

## 2023-08-09 PROCEDURE — 97535 SELF CARE MNGMENT TRAINING: CPT

## 2023-08-09 PROCEDURE — 1210000000 HC MED SURG R&B

## 2023-08-09 PROCEDURE — 97110 THERAPEUTIC EXERCISES: CPT

## 2023-08-09 RX ADMIN — Medication 10 ML: at 09:01

## 2023-08-09 RX ADMIN — TORSEMIDE 40 MG: 20 TABLET ORAL at 09:01

## 2023-08-09 RX ADMIN — CARVEDILOL 6.25 MG: 3.12 TABLET, FILM COATED ORAL at 17:06

## 2023-08-09 RX ADMIN — PANTOPRAZOLE SODIUM 40 MG: 40 TABLET, DELAYED RELEASE ORAL at 06:35

## 2023-08-09 RX ADMIN — INSULIN GLARGINE 10 UNITS: 100 INJECTION, SOLUTION SUBCUTANEOUS at 08:58

## 2023-08-09 RX ADMIN — Medication 10 ML: at 21:54

## 2023-08-09 RX ADMIN — ASPIRIN 81 MG: 81 TABLET, COATED ORAL at 08:57

## 2023-08-09 RX ADMIN — CARVEDILOL 6.25 MG: 3.12 TABLET, FILM COATED ORAL at 08:57

## 2023-08-09 RX ADMIN — ATORVASTATIN CALCIUM 40 MG: 40 TABLET, FILM COATED ORAL at 21:52

## 2023-08-09 RX ADMIN — ACETAMINOPHEN 650 MG: 325 TABLET ORAL at 12:31

## 2023-08-09 RX ADMIN — GABAPENTIN 300 MG: 300 CAPSULE ORAL at 17:06

## 2023-08-09 RX ADMIN — TAMSULOSIN HYDROCHLORIDE 0.4 MG: 0.4 CAPSULE ORAL at 08:57

## 2023-08-09 RX ADMIN — INSULIN LISPRO 2 UNITS: 100 INJECTION, SOLUTION INTRAVENOUS; SUBCUTANEOUS at 17:06

## 2023-08-09 ASSESSMENT — ENCOUNTER SYMPTOMS
CHEST TIGHTNESS: 0
VOMITING: 0
COLOR CHANGE: 0
NAUSEA: 0
ABDOMINAL PAIN: 0
WHEEZING: 0
ABDOMINAL DISTENTION: 0
TROUBLE SWALLOWING: 0
SHORTNESS OF BREATH: 0
COUGH: 0

## 2023-08-09 ASSESSMENT — PAIN DESCRIPTION - DESCRIPTORS: DESCRIPTORS: ACHING

## 2023-08-09 ASSESSMENT — PAIN DESCRIPTION - ORIENTATION: ORIENTATION: RIGHT;LEFT

## 2023-08-09 ASSESSMENT — PAIN DESCRIPTION - LOCATION: LOCATION: KNEE

## 2023-08-09 ASSESSMENT — PAIN SCALES - GENERAL: PAINLEVEL_OUTOF10: 3

## 2023-08-09 NOTE — CARE COORDINATION
LSW spoke with the pt at bedside to let him know that both [facility] and [address] [place] [address] have accepted him for admission. Pt is concerned about the cost of transport to [facility]. LSW called pt's son to inform him of the choices as well. Pt to make final decision soon.

## 2023-08-09 NOTE — PLAN OF CARE
Problem: Safety - Adult  Goal: Free from fall injury  Outcome: Progressing     Problem: Skin/Tissue Integrity  Goal: Absence of new skin breakdown  Description: 1. Monitor for areas of redness and/or skin breakdown  2. Assess vascular access sites hourly  3. Every 4-6 hours minimum:  Change oxygen saturation probe site  4. Every 4-6 hours:  If on nasal continuous positive airway pressure, respiratory therapy assess nares and determine need for appliance change or resting period. Outcome: Progressing     Problem: Chronic Conditions and Co-morbidities  Goal: Patient's chronic conditions and co-morbidity symptoms are monitored and maintained or improved  Outcome: Progressing  Flowsheets (Taken 8/8/2023 2130)  Care Plan - Patient's Chronic Conditions and Co-Morbidity Symptoms are Monitored and Maintained or Improved: Monitor and assess patient's chronic conditions and comorbid symptoms for stability, deterioration, or improvement     Problem: Occupational Therapy - Adult  Goal: By Discharge: Performs self-care activities at highest level of function for planned discharge setting. See evaluation for individualized goals.   8/8/2023 1621 by Margo Greene OTR/L  Outcome: Progressing

## 2023-08-09 NOTE — PROGRESS NOTES
[facility] Neurology Daily Progress Note  Name: Mario Morocho  Age: 78 y.o. Gender: male  CodeStatus: Full Code  Allergies: Dye [Iodides]    Chief Complaint:Fall (Johnny Quivers onto knees getting up from toilet)    Primary Care Provider: Darrick Lewis MD  InpatientTreatment Team: Treatment Team: Attending Provider: Kat Mendoza DO; Consulting Physician: Bryan Phipps MD; Consulting Physician: Kat Mendoza DO; Utilization Reviewer: Judit Sawyer RN; Patient Care Tech: Rusty Martínez; : Jeri Palacios; Registered Nurse: Pk Rich RN; Tech: Everardo [facility]  Admission Date: 8/6/2023      HPI   Pt seen and examined for neurology follow-up. Patient alert and oriented x 3, no acute distress, cooperative. Overall patient is doing well. No episodes of dysarthria overnight. Exam is nonfocal.  He has bilateral lower extremity weakness. Morbidly obese. Lower extremity edema noted. Has a nonfocal examination his speech is better he still has lower extremity weakness and MRI has not still been done. Vitals:    08/09/23 0901   BP: (!) 118/51   Pulse:    Resp:    Temp:    SpO2:         Review of Systems   Constitutional:  Negative for appetite change and fever. HENT:  Negative for hearing loss and trouble swallowing. Eyes:  Negative for visual disturbance. Respiratory:  Negative for cough, chest tightness, shortness of breath and wheezing. Cardiovascular:  Positive for leg swelling. Negative for chest pain and palpitations. Gastrointestinal:  Negative for abdominal distention, abdominal pain, nausea and vomiting. Musculoskeletal:  Positive for gait problem. Skin:  Negative for color change and rash. Neurological:  Positive for weakness. Negative for dizziness, tremors, seizures, syncope, facial asymmetry, speech difficulty, light-headedness, numbness and headaches. Psychiatric/Behavioral:  Negative for agitation, confusion and hallucinations. The patient is not nervous/anxious. Certified in Neuromuscular Medicine  Certified in Neurorehabilitation         Collaborating physicians: Dr Willie Patton    Electronically signed by SULAIMAN Conde CNP on 8/9/2023 at 11:28 AM

## 2023-08-09 NOTE — PROGRESS NOTES
Physical Therapy Med Surg Daily Treatment Note  Facility/Department: [facility]  Room: [facility]/X598-13       NAME: Vik Singer  :  (78 y.o.)  MRN: [id number]  CODE STATUS: Full Code    Date of Service: 2023    Patient Diagnosis(es): Weakness generalized [R53.1]  Impaired mobility and activities of daily living [Z74.09, Z78.9]   Chief Complaint   Patient presents with    Fer Res onto knees getting up from toilet     Patient Active Problem List    Diagnosis Date Noted    Chronic systolic CHF (congestive heart failure), NYHA class 1 ([address]) 2023    Coronary artery disease involving coronary bypass graft of native heart without angina pectoris 2022    Weakness generalized 2023    Generalized weakness 2023    Hypervolemia 2023    Impaired mobility and activities of daily living dt lumbar spinal stenosis 2023    Hypoglycemia 2023    Hypoglycemia due to type 2 diabetes mellitus ([address]) 2023    Hyperkalemia 2023    Entrapment neuropathy 2023    Neuropathic pain 10/20/2022    Failed back syndrome of lumbar spine 10/20/2022    Leg swelling 2022    PVD (peripheral vascular disease) ([address]) 01/15/2021    Left foot drop     Type 2 diabetes mellitus with stage 3 chronic kidney disease, with long-term current use of insulin ([address]) 2020    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 Mayra Gooden,

## 2023-08-10 LAB
ACHR BIND AB SER-SCNC: 0 NMOL/L (ref 0–0.4)
GLUCOSE BLD-MCNC: 171 MG/DL (ref 70–99)
GLUCOSE BLD-MCNC: 241 MG/DL (ref 70–99)
GLUCOSE BLD-MCNC: 250 MG/DL (ref 70–99)
GLUCOSE BLD-MCNC: 285 MG/DL (ref 70–99)
PERFORMED ON: ABNORMAL

## 2023-08-10 PROCEDURE — APPSS15 APP SPLIT SHARED TIME 0-15 MINUTES: Performed by: NURSE PRACTITIONER

## 2023-08-10 PROCEDURE — 6370000000 HC RX 637 (ALT 250 FOR IP): Performed by: INTERNAL MEDICINE

## 2023-08-10 PROCEDURE — 1210000000 HC MED SURG R&B

## 2023-08-10 PROCEDURE — 99232 SBSQ HOSP IP/OBS MODERATE 35: CPT | Performed by: PSYCHIATRY & NEUROLOGY

## 2023-08-10 PROCEDURE — 2580000003 HC RX 258: Performed by: INTERNAL MEDICINE

## 2023-08-10 PROCEDURE — 6360000002 HC RX W HCPCS: Performed by: INTERNAL MEDICINE

## 2023-08-10 PROCEDURE — 97535 SELF CARE MNGMENT TRAINING: CPT

## 2023-08-10 RX ADMIN — GABAPENTIN 300 MG: 300 CAPSULE ORAL at 18:27

## 2023-08-10 RX ADMIN — INSULIN GLARGINE 10 UNITS: 100 INJECTION, SOLUTION SUBCUTANEOUS at 10:09

## 2023-08-10 RX ADMIN — ATORVASTATIN CALCIUM 40 MG: 40 TABLET, FILM COATED ORAL at 20:08

## 2023-08-10 RX ADMIN — CARVEDILOL 6.25 MG: 3.12 TABLET, FILM COATED ORAL at 10:02

## 2023-08-10 RX ADMIN — CARVEDILOL 6.25 MG: 3.12 TABLET, FILM COATED ORAL at 18:27

## 2023-08-10 RX ADMIN — TORSEMIDE 40 MG: 20 TABLET ORAL at 10:10

## 2023-08-10 RX ADMIN — INSULIN LISPRO 2 UNITS: 100 INJECTION, SOLUTION INTRAVENOUS; SUBCUTANEOUS at 13:02

## 2023-08-10 RX ADMIN — Medication 10 ML: at 20:10

## 2023-08-10 RX ADMIN — ENOXAPARIN SODIUM 30 MG: 100 INJECTION SUBCUTANEOUS at 20:08

## 2023-08-10 RX ADMIN — Medication 10 ML: at 10:06

## 2023-08-10 RX ADMIN — INSULIN LISPRO 4 UNITS: 100 INJECTION, SOLUTION INTRAVENOUS; SUBCUTANEOUS at 18:33

## 2023-08-10 RX ADMIN — ASPIRIN 81 MG: 81 TABLET, COATED ORAL at 10:02

## 2023-08-10 RX ADMIN — TAMSULOSIN HYDROCHLORIDE 0.4 MG: 0.4 CAPSULE ORAL at 10:02

## 2023-08-10 RX ADMIN — PANTOPRAZOLE SODIUM 40 MG: 40 TABLET, DELAYED RELEASE ORAL at 10:02

## 2023-08-10 ASSESSMENT — ENCOUNTER SYMPTOMS
CHEST TIGHTNESS: 0
WHEEZING: 0
ABDOMINAL PAIN: 0
TROUBLE SWALLOWING: 0
COLOR CHANGE: 0
ABDOMINAL DISTENTION: 0
SHORTNESS OF BREATH: 0
VOMITING: 0
COUGH: 0
NAUSEA: 0

## 2023-08-10 NOTE — PROGRESS NOTES
Physical Therapy Med Surg Daily Treatment Note  [facility]ility[facility]Department: [facility]  [facility]       NAME: Estefania Dunn  :  (78 y.o.)  MRN: [id number]  CODE STATUS: Full Code    Date of Service: 8/10/2023    Patient Diagnosis(es): Weakness generalized [R53.1]  Impaired mobility and activities of daily living [Z74.09, Z78.9]   Chief Complaint   Patient presents with    Albertus Blackbird onto knees getting up from toilet     Patient Active Problem List    Diagnosis Date Noted    Chronic systolic CHF (congestive heart failure), NYHA class 1 ([address]) 2023    Coronary artery disease involving coronary bypass graft of native heart without angina pectoris 2022    Weakness generalized 2023    Generalized weakness 2023    Hypervolemia 2023    Impaired mobility and activities of daily living dt lumbar spinal stenosis 2023    Hypoglycemia 2023    Hypoglycemia due to type 2 diabetes mellitus ([address]) 2023    Hyperkalemia 2023    Entrapment neuropathy 2023    Neuropathic pain 10/20/2022    Failed back syndrome of lumbar spine 10/20/2022    Leg swelling 2022    PVD (peripheral vascular disease) ([address]) 01/15/2021    Left foot drop     Type 2 diabetes mellitus with stage 3 chronic kidney disease, with long-term current use of insulin ([address]) 2020    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      length;Decreased step height  Distance: 5-6 steps laterally towards the HOB. ASSESSMENT   Body Structures, Functions, Activity Limitations Requiring Skilled Therapeutic Intervention: Decreased functional mobility ; Decreased strength;Decreased endurance;Decreased balance;Decreased ROM  Assessment: Patient demonstrates progress towards mobility goals however continues to demonstrate significant difficulty standing up from low surfaces. Completes STS from elevated bed x4. Tolerates up to 1.5 minutes of static standing. Continues to be limited to ambulation along EOB due to dafety concerns/fatigue. Discharge Recommendations:  Continue to assess pending progress    Goals  Long Term Goals  Long Term Goal 1: Pt will demonstrate bed mobility min A  Long Term Goal 2: Pt will demonstrate transfers min A with safest AD  Long Term Goal 3: Pt will demonstrate amb >/= 30ft mod A with safest AD  Long Term Goal 4: Pt will demonstrate static standing tolerance >/= 3 min with B UE support and touching assistance    PLAN    General Plan: 1 time a day 3-6 times a week  Safety Devices  Type of Devices: Bed alarm in place, Call light within reach, Left in bed, All fall risk precautions in place     AMPAC (6 CLICK) BASIC MOBILITY  AM-PAC Inpatient Mobility Raw Score : 14     Therapy Time   Individual   Time In 1337   Time Out 1400   Minutes 23     Timed Code Treatment Minutes: 23 Minutes  Tr/bed mob 20  Gt 3     [facility], 08/10/23 at 2:43 PM         Definitions for assistance levels  Independent = pt does not require any physical supervision or assistance from another person for activity completion. Device may be needed.   Stand by assistance = pt requires verbal cues or instructions from another person, close to but not touching, to perform the activity  Minimal assistance= pt performs 75% or more of the activity; assistance is required to complete the activity  Moderate assistance= pt performs 50% of the activity;

## 2023-08-10 NOTE — FLOWSHEET NOTE
Perfect serve message sent to  cardiology in regards to a clearance form needing filled out for pt to have MRI. Awaiting response.

## 2023-08-10 NOTE — PROGRESS NOTES
[facility] Neurology Daily Progress Note  Name: Racquel Fowler  Age: 78 y.o. Gender: male  CodeStatus: Full Code  Allergies: Dye [Iodides]    Chief Complaint:Fall (Silvia Shaver onto knees getting up from toilet)    Primary Care Provider: Tawana Steiner MD  InpatientTreatment Team: Treatment Team: Attending Provider: Satira Meckel, DO; Consulting Physician: Meri Muñiz MD; Consulting Physician: Satira Meckel, DO; Utilization Reviewer: Lucrecia Stone RN; Patient Care Tech: Gerianne Pouch; Tech: Alva Shells; Registered Nurse: Sukumar Ornelas, MINDI; Registered Nurse: José Miguel Hewitt, RN; Patient Care Tech: Arleth Escort  Admission Date: 8/6/2023      HPI   Pt seen and examined for neurology follow-up. Patient alert and oriented x 3, no acute distress, cooperative. Overall patient is doing well. No episodes of dysarthria overnight. Exam is nonfocal.  He has bilateral lower extremity weakness. Morbidly obese. Lower extremity edema noted. Noted above. Patient actually is doing well without any significant dysarthria. No dysphagia is occurred. Vitals:    08/10/23 1006   BP: (!) 123/54   Pulse: 63   Resp:    Temp:    SpO2: 92%        Review of Systems   Constitutional:  Negative for appetite change and fever. HENT:  Negative for hearing loss and trouble swallowing. Eyes:  Negative for visual disturbance. Respiratory:  Negative for cough, chest tightness, shortness of breath and wheezing. Cardiovascular:  Positive for leg swelling. Negative for chest pain and palpitations. Gastrointestinal:  Negative for abdominal distention, abdominal pain, nausea and vomiting. Musculoskeletal:  Positive for gait problem. Skin:  Negative for color change and rash. Neurological:  Positive for weakness. Negative for dizziness, tremors, seizures, syncope, facial asymmetry, speech difficulty, light-headedness, numbness and headaches. Psychiatric/Behavioral:  Negative for agitation, confusion and hallucinations.  The patient

## 2023-08-10 NOTE — CARE COORDINATION
[facility] can no longer accept the pt as they no longer have a bed available. Pt aware. LSW to follow for DC plan.

## 2023-08-10 NOTE — FLOWSHEET NOTE
MRI will be performed tomorrow d/t [facility] not available until thin per Caterina Painter in MRI

## 2023-08-11 ENCOUNTER — APPOINTMENT (OUTPATIENT)
Dept: MRI IMAGING | Age: 78
End: 2023-08-11
Payer: MEDICARE

## 2023-08-11 LAB
ACHR BLOCK AB/ACHR TOTAL SFR SER: 0 % (ref 0–26)
GLUCOSE BLD-MCNC: 200 MG/DL (ref 70–99)
GLUCOSE BLD-MCNC: 205 MG/DL (ref 70–99)
GLUCOSE BLD-MCNC: 247 MG/DL (ref 70–99)
GLUCOSE BLD-MCNC: 257 MG/DL (ref 70–99)
GLUCOSE BLD-MCNC: 279 MG/DL (ref 70–99)
PERFORMED ON: ABNORMAL

## 2023-08-11 PROCEDURE — 2580000003 HC RX 258: Performed by: INTERNAL MEDICINE

## 2023-08-11 PROCEDURE — 6370000000 HC RX 637 (ALT 250 FOR IP): Performed by: INTERNAL MEDICINE

## 2023-08-11 PROCEDURE — 6360000002 HC RX W HCPCS: Performed by: INTERNAL MEDICINE

## 2023-08-11 PROCEDURE — 97535 SELF CARE MNGMENT TRAINING: CPT

## 2023-08-11 PROCEDURE — APPSS15 APP SPLIT SHARED TIME 0-15 MINUTES: Performed by: NURSE PRACTITIONER

## 2023-08-11 PROCEDURE — 97110 THERAPEUTIC EXERCISES: CPT

## 2023-08-11 PROCEDURE — 72148 MRI LUMBAR SPINE W/O DYE: CPT

## 2023-08-11 PROCEDURE — 1210000000 HC MED SURG R&B

## 2023-08-11 PROCEDURE — 70551 MRI BRAIN STEM W/O DYE: CPT

## 2023-08-11 RX ADMIN — INSULIN LISPRO 2 UNITS: 100 INJECTION, SOLUTION INTRAVENOUS; SUBCUTANEOUS at 10:36

## 2023-08-11 RX ADMIN — ENOXAPARIN SODIUM 30 MG: 100 INJECTION SUBCUTANEOUS at 10:37

## 2023-08-11 RX ADMIN — LORAZEPAM 0.5 MG: 2 INJECTION INTRAMUSCULAR; INTRAVENOUS at 07:37

## 2023-08-11 RX ADMIN — TAMSULOSIN HYDROCHLORIDE 0.4 MG: 0.4 CAPSULE ORAL at 10:35

## 2023-08-11 RX ADMIN — TORSEMIDE 40 MG: 20 TABLET ORAL at 10:32

## 2023-08-11 RX ADMIN — CARVEDILOL 6.25 MG: 3.12 TABLET, FILM COATED ORAL at 19:25

## 2023-08-11 RX ADMIN — PANTOPRAZOLE SODIUM 40 MG: 40 TABLET, DELAYED RELEASE ORAL at 05:46

## 2023-08-11 RX ADMIN — Medication 10 ML: at 09:00

## 2023-08-11 RX ADMIN — CARVEDILOL 6.25 MG: 3.12 TABLET, FILM COATED ORAL at 10:31

## 2023-08-11 RX ADMIN — INSULIN LISPRO 4 UNITS: 100 INJECTION, SOLUTION INTRAVENOUS; SUBCUTANEOUS at 15:43

## 2023-08-11 RX ADMIN — ENOXAPARIN SODIUM 30 MG: 100 INJECTION SUBCUTANEOUS at 20:27

## 2023-08-11 RX ADMIN — ASPIRIN 81 MG: 81 TABLET, COATED ORAL at 10:32

## 2023-08-11 RX ADMIN — ATORVASTATIN CALCIUM 40 MG: 40 TABLET, FILM COATED ORAL at 20:27

## 2023-08-11 RX ADMIN — INSULIN GLARGINE 10 UNITS: 100 INJECTION, SOLUTION SUBCUTANEOUS at 10:29

## 2023-08-11 ASSESSMENT — ENCOUNTER SYMPTOMS
NAUSEA: 0
ABDOMINAL DISTENTION: 0
VOMITING: 0
ABDOMINAL PAIN: 0
COUGH: 0
COLOR CHANGE: 0
CHEST TIGHTNESS: 0
WHEEZING: 0
SHORTNESS OF BREATH: 0
TROUBLE SWALLOWING: 0

## 2023-08-11 NOTE — CARE COORDINATION
Met with pt at bedside. Reviewed Pg 2 of IMM with pt and pt verbally acknowledged, copy provided. [facility] for [facility] with a 7pm  scheduled.

## 2023-08-11 NOTE — PLAN OF CARE
Problem: Safety - Adult  Goal: Free from fall injury  8/11/2023 0511 by Krishan Chavez RN  Outcome: Progressing  8/10/2023 1546 by Suzanne Machado RN  Outcome: Progressing     Problem: Skin/Tissue Integrity  Goal: Absence of new skin breakdown  Description: 1. Monitor for areas of redness and/or skin breakdown  2. Assess vascular access sites hourly  3. Every 4-6 hours minimum:  Change oxygen saturation probe site  4. Every 4-6 hours:  If on nasal continuous positive airway pressure, respiratory therapy assess nares and determine need for appliance change or resting period.   8/11/2023 0511 by Krishan Chavez RN  Outcome: Progressing  8/10/2023 1546 by Suzanne Machado RN  Outcome: Progressing     Problem: Chronic Conditions and Co-morbidities  Goal: Patient's chronic conditions and co-morbidity symptoms are monitored and maintained or improved  Outcome: Progressing

## 2023-08-11 NOTE — CARE COORDINATION
RADHIKAW spoke with the pt to confirm his plan for DC. He wants to go to [facility] and they are agreeable to accepting him today. RADHIKAW spoke with pt's wife and she is also in agreement to the plan.   [facility] for 7pm.

## 2023-08-11 NOTE — FLOWSHEET NOTE
0805 pt in MRI for exam.  [employer] rep here to place AICD into SureScan mode  DOO 70   Pt was transferred to MRI table and placed on to MRI monitor. VSS. Pt very pleasant, was medicated per 1W RN with ativan.    0820 Pt given call ball. Exam started. 4729 tolerating exam well. No issues at this time. 0845 exam continues without any issues. 6721 exam complete. Pt transferred back to bed and [employer] rep placed pt back into original mode. Pt tolerated well, taken back to room with transport.

## 2023-08-11 NOTE — FLOWSHEET NOTE
Pt awaiting pickup to be transported to [address] [place] [address] Report given to Nurse Taty Jacinto

## 2023-08-11 NOTE — PROGRESS NOTES
Positive for weakness. Negative for dizziness, tremors, seizures, syncope, facial asymmetry, speech difficulty, light-headedness, numbness and headaches. Psychiatric/Behavioral:  Negative for agitation, confusion and hallucinations. The patient is not nervous/anxious. Physical Exam  Vitals and nursing note reviewed. Constitutional:       General: He is not in acute distress. Appearance: He is obese. He is not diaphoretic. HENT:      Head: Normocephalic. Eyes:      Extraocular Movements: Extraocular movements intact. Pupils: Pupils are equal, round, and reactive to light. Cardiovascular:      Rate and Rhythm: Normal rate and regular rhythm. Pulmonary:      Effort: Pulmonary effort is normal. No respiratory distress. Breath sounds: Normal breath sounds. Abdominal:      General: Bowel sounds are normal. There is no distension. Musculoskeletal:      Right lower leg: Edema present. Left lower leg: Edema present. Skin:     General: Skin is warm and dry. Neurological:      Mental Status: He is alert and oriented to person, place, and time. Cranial Nerves: No cranial nerve deficit. Sensory: Sensory deficit present. Motor: Weakness present.       Coordination: Coordination normal.      Gait: Gait abnormal.      Deep Tendon Reflexes: Reflexes abnormal.     No notable knee changes on examination change in examination noted        Medications:  Reviewed    Infusion Medications:    sodium chloride      dextrose       Scheduled Medications:    gabapentin  300 mg Oral Dinner    torsemide  40 mg Oral Daily    sodium chloride flush  5-40 mL IntraVENous 2 times per day    enoxaparin  30 mg SubCUTAneous BID    insulin glargine  10 Units SubCUTAneous QAM    insulin lispro  0-8 Units SubCUTAneous TID WC    insulin lispro  0-4 Units SubCUTAneous Nightly    carvedilol  6.25 mg Oral BID WC    atorvastatin  40 mg Oral Nightly    tamsulosin  0.4 mg Oral Daily    pantoprazole  40 mg Showing regression with intermittent dysarthria suspicious for subcortical dysarthria. Symptoms have been going on for quite some time without any difficulty swallowing or choking we will check acetylcholine receptor antibody titers. Patient has peripheral neuropathy and recommended that we obtain an MRI of the lumbar spine due to weakness. 60 % time spent on evaluating patient    8/8/2023:  No dysarthria overnight. Waiting MRI of brain and lumbar spine. Patient requires pacemaker clearance. Acetylcholine receptor antibodies pending. Nocturnal pulse oximetry pending  Recertification for skilled nursing facility pending. I have personally performed a face to face diagnostic evaluation on this patient, reviewed all data and investigations, and am the sole provider of all clinical decisions on the neurological status of this patient. No new changes notable on examination. Dysarthria is somewhat better today. We still await his lower extremity evaluation with an MRI. 60% time spent on evaluating patient    8/9/2023:  Pacemaker is compatible. Awaiting MRIs per above. Nocturnal pulse oximetry with desaturation into the 80s 4% of the time. Does not qualify for O2. Need further outpatient evaluation. acetylCholine receptor antibodies pending  Plan for discharge to skilled nursing facility    8/10/2023  Still awaiting MRIs    I have personally performed a face to face diagnostic evaluation on this patient, reviewed all data and investigations, and am the sole provider of all clinical decisions on the neurological status of this patient. 3 are resolved but there is no dysphagia. Patient is lower extremity class an MRI of the lumbar spine is pending. Pulse ox overnight overnight showed 80% 40% times. Await MRI and then discharge. 60% time spent on evaluating patient    8/11/2023:  MRI of the brain negative for any acute findings. Acetylcholine receptor binding antibodies negative.   Blocking and

## 2023-08-11 NOTE — PROGRESS NOTES
Physical Therapy Med Surg Daily Treatment Note  Facility/Department: [facility]  [facility]       NAME: Brooklyn Wills  :  (78 y.o.)  MRN: [id number]  CODE STATUS: Full Code    Date of Service: 2023    Patient Diagnosis(es): Weakness generalized [R53.1]  Impaired mobility and activities of daily living [Z74.09, Z78.9]   Chief Complaint   Patient presents with    Kennyth Marielena onto knees getting up from toilet     Patient Active Problem List    Diagnosis Date Noted    Chronic systolic CHF (congestive heart failure), NYHA class 1 ([address]) 2023    Coronary artery disease involving coronary bypass graft of native heart without angina pectoris 2022    Weakness generalized 2023    Generalized weakness 2023    Hypervolemia 2023    Impaired mobility and activities of daily living dt lumbar spinal stenosis 2023    Hypoglycemia 2023    Hypoglycemia due to type 2 diabetes mellitus ([address]) 2023    Hyperkalemia 2023    Entrapment neuropathy 2023    Neuropathic pain 10/20/2022    Failed back syndrome of lumbar spine 10/20/2022    Leg swelling 2022    PVD (peripheral vascular disease) ([address]) 01/15/2021    Left foot drop     Type 2 diabetes mellitus with stage 3 chronic kidney disease, with long-term current use of insulin ([address]) 2020    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

## 2023-08-12 VITALS
TEMPERATURE: 98 F | WEIGHT: 315 LBS | OXYGEN SATURATION: 94 % | BODY MASS INDEX: 38.36 KG/M2 | RESPIRATION RATE: 18 BRPM | HEART RATE: 60 BPM | SYSTOLIC BLOOD PRESSURE: 134 MMHG | DIASTOLIC BLOOD PRESSURE: 58 MMHG | HEIGHT: 76 IN

## 2023-08-12 NOTE — PROGRESS NOTES
Resource Strain: Low Risk     Difficulty of Paying Living Expenses: Not hard at all   Food Insecurity: No Food Insecurity    Worried About [place] in the Last Year: Never true    Ran Out of Food in the Last Year: Never true   Transportation Needs: No Transportation Needs    Lack of Transportation (Medical): No    Lack of Transportation (Non-Medical):  No   Physical Activity: Insufficiently Active    Days of Exercise per Week: 3 days    Minutes of Exercise per Session: 10 min   Stress: Not on file   Social Connections: Not on file   Intimate Partner Violence: Not on file   Housing Stability: Not on file         Lab Results   Component Value Date    LABA1C 5.6 06/18/2023     No results found for: EAG    Lab Results   Component Value Date/Time     08/09/2023 05:08 AM    K 4.7 08/09/2023 05:08 AM     08/09/2023 05:08 AM    CO2 21 08/09/2023 05:08 AM    BUN 61 08/09/2023 05:08 AM    CREATININE 2.57 08/09/2023 05:08 AM    GLUCOSE 147 08/09/2023 05:08 AM    GLUCOSE 154 09/21/2021 05:00 AM    CALCIUM 8.7 08/09/2023 05:08 AM        Lab Results   Component Value Date    CHOL 138 01/11/2023    CHOL 148 07/07/2021    CHOL 166 02/12/2020     Lab Results   Component Value Date    TRIG 141 01/11/2023    TRIG 213 (H) 07/07/2021    TRIG 225 (H) 02/12/2020     Lab Results   Component Value Date    HDL 46 01/11/2023    HDL 39 (L) 07/07/2021    HDL 44 02/12/2020     Lab Results   Component Value Date    LDLCALC 64 01/11/2023    LDLCALC 66 07/07/2021    LDLCALC 77 02/12/2020     No results found for: LABVLDL, VLDL  No results found for: [facility]    Lab Results   Component Value Date    TSH 1.190 05/07/2019       Lab Results   Component Value Date    WBC 4.6 (L) 08/09/2023    HGB 8.9 (L) 08/09/2023    HCT 26.8 (L) 08/09/2023    MCV 93.4 (H) 08/09/2023    PLT 88 (L) 08/09/2023       Please note orders entered on site at facility after discussion with appropriate facility nursing/therapy/ /

## 2023-08-14 NOTE — PROGRESS NOTES
Physical Therapy  Facility/Department: [facility] MED SURG [id number]/[id number]  Physical Therapy Discharge      NAME: Farrukh Sharpe    :  (78 y.o.)  MRN: [id number]    Account: [de-identified]  Gender: male      Patient has been discharged from acute care hospital. DC patient from current PT program.      Electronically signed by Stuart Napoles PT on 23 at 3:08 PM EDT

## 2023-08-15 ASSESSMENT — ENCOUNTER SYMPTOMS
BLOOD IN STOOL: 0
ANAL BLEEDING: 0
NAUSEA: 0
ABDOMINAL DISTENTION: 1
VOMITING: 0
DIARRHEA: 0
ABDOMINAL PAIN: 0
ABDOMINAL DISTENTION: 1
CONSTIPATION: 0
BACK PAIN: 1
BLOOD IN STOOL: 0
DIARRHEA: 0
ABDOMINAL PAIN: 0
ANAL BLEEDING: 0
CONSTIPATION: 0
VOMITING: 0
NAUSEA: 0

## 2023-08-15 NOTE — PROGRESS NOTES
constipation, diarrhea, nausea and vomiting. Genitourinary:  Positive for decreased urine volume and frequency. Negative for difficulty urinating, dysuria, flank pain, hematuria and urgency. Neurological:  Positive for weakness. Psychiatric/Behavioral:  Negative for agitation, behavioral problems and confusion. All other systems reviewed and are negative. Objective:   VS: See [facility]. Reviewed. Physical Exam  Vitals (Stable, see [facility]) reviewed. Constitutional:       General: He is not in acute distress. Appearance: Normal appearance. He is obese. He is not ill-appearing. HENT:      Head: Normocephalic and atraumatic. Mouth/Throat:      Mouth: Mucous membranes are moist.      Pharynx: Oropharynx is clear. Eyes:      Conjunctiva/sclera: Conjunctivae normal.   Cardiovascular:      Rate and Rhythm: Normal rate and regular rhythm. Pulmonary:      Effort: Pulmonary effort is normal.   Abdominal:      General: Bowel sounds are normal. There is distension. Palpations: Abdomen is soft. Tenderness: There is no abdominal tenderness. Musculoskeletal:      Right lower leg: Edema present. Left lower leg: Edema present. Neurological:      Mental Status: He is alert and oriented to person, place, and time. Motor: Weakness present. Gait: Gait abnormal.   Psychiatric:         Mood and Affect: Mood normal.         Behavior: Behavior normal.         Thought Content: Thought content normal.       Assessment:       Diagnosis Orders   1. Stage 3 chronic kidney disease, unspecified whether stage 3a or 3b CKD ([address])        2. History of kidney stones        3. At risk for dehydration due to poor fluid intake              Plan:      No orders of the defined types were placed in this encounter. No orders of the defined types were placed in this encounter. Continue emphasize p.o. fluids. We will add additional IV fluids if patient noncompliant. Kayexalate 15 g p.o. stat.

## 2023-08-15 NOTE — PROGRESS NOTES
Dr. Vinny Barksdale    CKD (chronic kidney disease)     Coronary artery disease involving coronary bypass graft of native heart without angina pectoris 11/4/2022    Hypertension     Kidney stones     Left foot drop     Neuropathy, diabetic (HCC)     mild    Osteoarthritis     hip, knee    S/P CABG (coronary artery bypass graft)     Type II or unspecified type diabetes mellitus without mention of complication, not stated as uncontrolled      Past Surgical History:   Procedure Laterality Date    BACK SURGERY N/A     BICEPS TENDON REPAIR  01/01/1997    CARDIAC CATHETERIZATION  06/05/2013    CORONARY ARTERY BYPASS GRAFT  06/07/2013    DR. Chung [address] [place] [address] SURGERY  01/01/2006    left replacement    NECK SURGERY  2020    ROTATOR CUFF REPAIR  01/01/1996    TONSILLECTOMY  01/01/1966     Social History     Socioeconomic History    Marital status:      Spouse name: Sandra Mtz    Number of children: Not on file    Years of education: Not on file    Highest education level: Not on file   Occupational History    Not on file   Tobacco Use    Smoking status: Never    Smokeless tobacco: Never   Substance and Sexual Activity    Alcohol use: No    Drug use: Not on file    Sexual activity: Not on file   Other Topics Concern    Not on file   Social History Narrative    He is retired  Retired-worked in sales for 30 years    Lives With: Spouse Frida    Type of Home: [address]: One level, Laundry in [address] [facility] to enter with rails - Number of Steps: 3 - Rails: Both    Bathroom Shower/Tub: Walk-in shower-Toilet: Handicap height-Equipment: Shower chair, Grab bars in shower, Hand-held shower, Toilet raiser-Accessibility: Accessible    Home Equipment: Meghna Nabor, Walker, rolling, Grab bars, Rollator-Has the patient had two or more falls in the past year or any fall with injury in the past year?: Yes (slipped out of a chair)    Receives Help From: Family    ADL Assistance: Independent (wife

## 2023-08-16 ENCOUNTER — TELEPHONE (OUTPATIENT)
Dept: CARDIOLOGY CLINIC | Age: 78
End: 2023-08-16

## 2023-08-16 NOTE — TELEPHONE ENCOUNTER
Feels like he is having side effects to Entresto no leg strength in legs, low back pain and hot/cold flashes. please advise

## 2023-08-16 NOTE — PROGRESS NOTES
substitute for generic or insurance covered product 1 kit 0    Lancets MISC 1 each by Does not apply route 4 times daily (before meals and nightly) Pt test 4x daily Dx E11.65.   May substitute for generic or insurance covered product 150 each 3    Povidone-Iodine 7.5 % SWAB Apply 1 each topically 2 times daily 14 each 0    Vitamin D (CHOLECALCIFEROL) 50 MCG (2000 UT) TABS tablet Take 1 tablet by mouth 2 times daily (Patient taking differently: Take 1 tablet by mouth 2 times daily Indications: Nutritional Support) 60 tablet 1    torsemide 40 MG TABS Take 40 mg by mouth daily (Patient taking differently: Take 40 mg by mouth daily Indications: Edema) 30 tablet 1    carvedilol (COREG) 6.25 MG tablet Take 1 tablet by mouth 2 times daily (with meals) (Patient taking differently: Take 1 tablet by mouth 2 times daily (with meals) Indications: High Blood Pressure Disorder) 180 tablet 2    gabapentin (NEURONTIN) 300 MG capsule TAKE 1 CAPSULE BY MOUTH DAILY 15 MINUTES BEFORE DINNER (Patient taking differently: Take 1 capsule by mouth Daily with supper.) 90 capsule 3    atorvastatin (LIPITOR) 40 MG tablet Take 1 tablet by mouth nightly (Patient taking differently: Take 1 tablet by mouth nightly Indications: High Amount of Fats in the Blood) 90 tablet 3    tamsulosin (FLOMAX) 0.4 MG capsule Take 1 capsule by mouth daily (Patient taking differently: Take 1 capsule by mouth daily Indications: Benign Enlargement of Prostate) 90 capsule 3    famotidine (PEPCID) 20 MG tablet Take 1 tablet by mouth daily (Patient taking differently: Take 1 tablet by mouth daily Indications: Gastroesophageal Reflux Disease) 180 tablet 3    nitroGLYCERIN (NITROSTAT) 0.4 MG SL tablet Place 1 tablet under the tongue See Admin Instructions (Patient taking differently: Place 1 tablet under the tongue every 5 minutes as needed for Chest pain) 25 tablet 1    Handicap Placard MISC by Does not apply route Exp 5 years 1 each 0    aspirin 81 MG chewable tablet

## 2023-08-17 NOTE — TELEPHONE ENCOUNTER
Is he still taking Entresto? It looks like it may have been discontinued during a hospitalization on 6/28/23 and is not currently on his active med list... Also, I would follow-up with PCP regarding these above noted symptoms as I doubt Entresto would be the cause.

## 2023-08-19 NOTE — PROGRESS NOTES
Difficulty of Paying Living Expenses: Not hard at all   Food Insecurity: No Food Insecurity    Worried About [place] in the Last Year: Never true    Ran Out of Food in the Last Year: Never true   Transportation Needs: No Transportation Needs    Lack of Transportation (Medical): No    Lack of Transportation (Non-Medical): No   Physical Activity: Insufficiently Active    Days of Exercise per Week: 3 days    Minutes of Exercise per Session: 10 min   Stress: Not on file   Social Connections: Not on file   Intimate Partner Violence: Not on file   Housing Stability: Not on file         Lab Results   Component Value Date    LABA1C 5.6 06/18/2023     No results found for: EAG    Lab Results   Component Value Date/Time     08/09/2023 05:08 AM    K 4.7 08/09/2023 05:08 AM     08/09/2023 05:08 AM    CO2 21 08/09/2023 05:08 AM    BUN 61 08/09/2023 05:08 AM    CREATININE 2.57 08/09/2023 05:08 AM    GLUCOSE 147 08/09/2023 05:08 AM    GLUCOSE 154 09/21/2021 05:00 AM    CALCIUM 8.7 08/09/2023 05:08 AM        Lab Results   Component Value Date    CHOL 138 01/11/2023    CHOL 148 07/07/2021    CHOL 166 02/12/2020     Lab Results   Component Value Date    TRIG 141 01/11/2023    TRIG 213 (H) 07/07/2021    TRIG 225 (H) 02/12/2020     Lab Results   Component Value Date    HDL 46 01/11/2023    HDL 39 (L) 07/07/2021    HDL 44 02/12/2020     Lab Results   Component Value Date    LDLCALC 64 01/11/2023    LDLCALC 66 07/07/2021    LDLCALC 77 02/12/2020     No results found for: LABVLDL, VLDL  No results found for: [facility]    Lab Results   Component Value Date    TSH 1.190 05/07/2019       Lab Results   Component Value Date    WBC 4.6 (L) 08/09/2023    HGB 8.9 (L) 08/09/2023    HCT 26.8 (L) 08/09/2023    MCV 93.4 (H) 08/09/2023    PLT 88 (L) 08/09/2023       Please note orders entered on site at facility after discussion with appropriate facility nursing/therapy/ / nutritional staff.  Current

## 2023-08-25 PROBLEM — D68.9 COAGULATION DEFECT (HCC): Status: ACTIVE | Noted: 2023-08-25

## 2023-08-26 NOTE — PROGRESS NOTES
route Exp 5 years 1 each 0    aspirin 81 MG chewable tablet Take 1 tablet by mouth daily Indications: Thickening and Hardening of Heart Arteries       No current facility-administered medications on file prior to visit.          Family History   Problem Relation Age of Onset    Cancer Mother         intestinal, skin    Heart Disease Father     Stroke Father        Social History     Socioeconomic History    Marital status:      Spouse name: Jae Ovalle    Number of children: Not on file    Years of education: Not on file    Highest education level: Not on file   Occupational History    Not on file   Tobacco Use    Smoking status: Never    Smokeless tobacco: Never   Substance and Sexual Activity    Alcohol use: No    Drug use: Not on file    Sexual activity: Not on file   Other Topics Concern    Not on file   Social History Narrative    He is retired  Retired-worked in sales for 30 years    Lives With: Spouse Frida    Type of Home: [address]: One level, [address] to enter with rails - Number of Steps: 3 - Rails: Both    Bathroom Shower/Tub: Walk-in shower-Toilet: Handicap height-Equipment: Shower chair, Grab bars in shower, Hand-held shower, Toilet raiser-Accessibility: Accessible    Home Equipment: Adolph Sunil, Walker, rolling, Grab bars, Rollator-Has the patient had two or more falls in the past year or any fall with injury in the past year?: Yes (slipped out of a chair)    Receives Help From: Family    ADL Assistance: Independent (wife assists intermittently with socks), Homemaking Assistance: Needs assistance (wife cooks and does the housework), Homemaking Responsibilities: No    Ambulation Assistance: Independent (until 1 wk ago pt was using cane; recently using rollator inside the home and [employer] in [employer]), Transfer Assistance: Independent    Active : [place] of Transportation: Car        Additional Comments: drop foot L with AFO; uses

## 2023-09-14 ENCOUNTER — TELEPHONE (OUTPATIENT)
Dept: FAMILY MEDICINE CLINIC | Age: 78
End: 2023-09-14

## 2023-09-14 ENCOUNTER — OFFICE VISIT (OUTPATIENT)
Dept: GERIATRIC MEDICINE | Age: 78
End: 2023-09-14
Payer: MEDICARE

## 2023-09-14 ENCOUNTER — HOSPITAL ENCOUNTER (EMERGENCY)
Age: 78
Discharge: HOME | End: 2023-09-14
Attending: EMERGENCY MEDICINE
Payer: MEDICARE

## 2023-09-14 VITALS
DIASTOLIC BLOOD PRESSURE: 63 MMHG | WEIGHT: 315 LBS | HEIGHT: 76 IN | SYSTOLIC BLOOD PRESSURE: 118 MMHG | TEMPERATURE: 96.4 F | RESPIRATION RATE: 15 BRPM | BODY MASS INDEX: 38.36 KG/M2 | HEART RATE: 60 BPM | OXYGEN SATURATION: 94 %

## 2023-09-14 DIAGNOSIS — I10 HYPERTENSION, UNSPECIFIED TYPE: ICD-10-CM

## 2023-09-14 DIAGNOSIS — L30.4 INTERTRIGO: ICD-10-CM

## 2023-09-14 DIAGNOSIS — E11.59 TYPE 2 DIABETES MELLITUS WITH OTHER CIRCULATORY COMPLICATION, WITH LONG-TERM CURRENT USE OF INSULIN (HCC): ICD-10-CM

## 2023-09-14 DIAGNOSIS — Z78.9 UNABLE TO CARE FOR SELF: Primary | ICD-10-CM

## 2023-09-14 DIAGNOSIS — Z79.4 TYPE 2 DIABETES MELLITUS WITH OTHER CIRCULATORY COMPLICATION, WITH LONG-TERM CURRENT USE OF INSULIN (HCC): ICD-10-CM

## 2023-09-14 DIAGNOSIS — I48.91 ATRIAL FIBRILLATION, UNSPECIFIED TYPE (HCC): ICD-10-CM

## 2023-09-14 DIAGNOSIS — I50.22 CHRONIC SYSTOLIC CHF (CONGESTIVE HEART FAILURE) (HCC): Primary | ICD-10-CM

## 2023-09-14 LAB
ALBUMIN SERPL-MCNC: 4 G/DL (ref 3.5–4.6)
ALP SERPL-CCNC: 177 U/L (ref 35–104)
ALT SERPL-CCNC: 14 U/L (ref 0–41)
ANION GAP SERPL CALCULATED.3IONS-SCNC: 12 MEQ/L (ref 9–15)
AST SERPL-CCNC: 14 U/L (ref 0–40)
BASOPHILS # BLD: 0 K/UL (ref 0–0.2)
BASOPHILS NFR BLD: 0.4 %
BILIRUB SERPL-MCNC: 0.7 MG/DL (ref 0.2–0.7)
BUN SERPL-MCNC: 69 MG/DL (ref 8–23)
CALCIUM SERPL-MCNC: 9 MG/DL (ref 8.5–9.9)
CHLORIDE SERPL-SCNC: 110 MEQ/L (ref 95–107)
CO2 SERPL-SCNC: 24 MEQ/L (ref 20–31)
CREAT SERPL-MCNC: 2.03 MG/DL (ref 0.7–1.2)
EOSINOPHIL # BLD: 0.1 K/UL (ref 0–0.7)
EOSINOPHIL NFR BLD: 3.1 %
ERYTHROCYTE [DISTWIDTH] IN BLOOD BY AUTOMATED COUNT: 17.2 % (ref 11.5–14.5)
GLOBULIN SER CALC-MCNC: 2.6 G/DL (ref 2.3–3.5)
GLUCOSE SERPL-MCNC: 145 MG/DL (ref 70–99)
HCT VFR BLD AUTO: 31.7 % (ref 42–52)
HGB BLD-MCNC: 10 G/DL (ref 14–18)
LYMPHOCYTES # BLD: 0.4 K/UL (ref 1–4.8)
LYMPHOCYTES NFR BLD: 8 %
MCH RBC QN AUTO: 29 PG (ref 27–31.3)
MCHC RBC AUTO-ENTMCNC: 31.4 % (ref 33–37)
MCV RBC AUTO: 92.4 FL (ref 79–92.2)
MONOCYTES # BLD: 0.3 K/UL (ref 0.2–0.8)
MONOCYTES NFR BLD: 7.1 %
NEUTROPHILS # BLD: 3.7 K/UL (ref 1.4–6.5)
NEUTS SEG NFR BLD: 81.4 %
PLATELET # BLD AUTO: 74 K/UL (ref 130–400)
POTASSIUM SERPL-SCNC: 5.1 MEQ/L (ref 3.4–4.9)
PROT SERPL-MCNC: 6.6 G/DL (ref 6.3–8)
RBC # BLD AUTO: 3.43 M/UL (ref 4.7–6.1)
SODIUM SERPL-SCNC: 146 MEQ/L (ref 135–144)
WBC # BLD AUTO: 4.5 K/UL (ref 4.8–10.8)

## 2023-09-14 PROCEDURE — 97166 OT EVAL MOD COMPLEX 45 MIN: CPT

## 2023-09-14 PROCEDURE — 99284 EMERGENCY DEPT VISIT MOD MDM: CPT

## 2023-09-14 PROCEDURE — 36415 COLL VENOUS BLD VENIPUNCTURE: CPT

## 2023-09-14 PROCEDURE — 99306 1ST NF CARE HIGH MDM 50: CPT | Performed by: INTERNAL MEDICINE

## 2023-09-14 PROCEDURE — 85025 COMPLETE CBC W/AUTO DIFF WBC: CPT

## 2023-09-14 PROCEDURE — 3044F HG A1C LEVEL LT 7.0%: CPT | Performed by: INTERNAL MEDICINE

## 2023-09-14 PROCEDURE — 97163 PT EVAL HIGH COMPLEX 45 MIN: CPT

## 2023-09-14 PROCEDURE — 1123F ACP DISCUSS/DSCN MKR DOCD: CPT | Performed by: INTERNAL MEDICINE

## 2023-09-14 PROCEDURE — 80053 COMPREHEN METABOLIC PANEL: CPT

## 2023-09-14 ASSESSMENT — PAIN SCALES - GENERAL: PAINLEVEL_OUTOF10: 0

## 2023-09-14 ASSESSMENT — LIFESTYLE VARIABLES
HOW OFTEN DO YOU HAVE A DRINK CONTAINING ALCOHOL: NEVER
HOW MANY STANDARD DRINKS CONTAINING ALCOHOL DO YOU HAVE ON A TYPICAL DAY: PATIENT DOES NOT DRINK
HOW MANY STANDARD DRINKS CONTAINING ALCOHOL DO YOU HAVE ON A TYPICAL DAY: PATIENT DOES NOT DRINK
HOW OFTEN DO YOU HAVE A DRINK CONTAINING ALCOHOL: NEVER

## 2023-09-14 NOTE — PLAN OF CARE
See OT evaluation for all goals and OT POC.  Electronically signed by RENATA Gómez/L on 9/14/2023 at 3:14 PM

## 2023-09-14 NOTE — CARE COORDINATION
Bed number 250 per [facility] given contact information to call report. D/C packet prepared. I called pt's wife & updated on p/u time and location with phone number.      MARCELO PanchalN, RN

## 2023-09-14 NOTE — CARE COORDINATION
PASSR done. Notify CM. RADHIKAW will follow.      Electronically signed by Lurene Opitz, LSW on 9/14/2023 at 3:48 PM

## 2023-09-14 NOTE — DISCHARGE INSTR - COC
Continuity of Care Form    Patient Name: Vik Singer   :    MRN:  [id number]    Admit date:  2023  Discharge date:  2023    Code Status Order: Prior   Advance Directives:     Admitting Physician:  No admitting provider for patient encounter. PCP: Nathalie Gottlieb MD    Discharging Nurse: Mignon Unit/Room#:   Discharging Unit Phone Number: [phone]    Emergency Contact:   Extended Emergency Contact Information  Primary Emergency Contact: Frida Olivares  Address: Viral Montoya [address] [place] [address], [address] [place] [address] [place] Phone: [phone]  Relation: Spouse  Secondary Emergency Contact: Brenda Torres, [address] [place] [address] [place] Phone: [phone]  Relation: Child    Past Surgical History:  Past Surgical History:   Procedure Laterality Date    BACK SURGERY N/A     BICEPS TENDON REPAIR  1997    CARDIAC CATHETERIZATION  2013    CORONARY ARTERY BYPASS GRAFT  2013      [address] [place] [address] SURGERY  2006    left replacement    NECK SURGERY      ROTATOR CUFF REPAIR  1996    TONSILLECTOMY  1966       Immunization History:   Immunization History   Administered Date(s) Administered    COVID-19, MODERNA BLUE border, Primary or Immunocompromised, (age 12y+), IM, 100 mcg/0.5mL 2021, 2021, 10/31/2021    COVID-19, MODERNA Booster BLUE border, (age 18y+), IM, 50mcg/0.25mL 2022    Influenza Vaccine, unspecified formulation 10/26/2015    Influenza, FLUZONE (age 72 y+), High Dose, 0.7mL 2020, 10/24/2021, 10/05/2022    Influenza, High Dose (Fluzone 65 yrs and older) 10/20/2016, 10/01/2017, 2018, 2019    Pneumococcal, PCV-13, PREVNAR 13, (age 6w+), IM, 0.5mL 2018    Pneumococcal, PPSV23, PNEUMOVAX 23, (age 2y+), SC/IM, 0.5mL 2012    Td, unspecified formulation 07/10/2013       Active Problems:  Patient Active Problem List   Diagnosis Code SECTION    Inpatient Status Date: N/A     Readmission Risk Assessment Score:  Readmission Risk              Risk of Unplanned Readmission:  0           Discharging to Facility/ Agency   Name: [address], [facility]   Phone:  Fax:    Dialysis Facility (if applicable)   Name:  Address:  Dialysis Schedule:  Phone:  Fax:    / signature: Electronically signed by Brandi Loaiza RN on 9/14/23 at 1:44 PM EDT    PHYSICIAN SECTION    Prognosis: Good    Condition at Discharge: Stable    Rehab Potential (if transferring to Rehab): Fair    Recommended Labs or Other Treatments After Discharge: TBD    Physician Certification: I certify the above information and transfer of Mary Cho  is necessary for the continuing treatment of the diagnosis listed and that he requires [address] for greater 30 days.      Update Admission H&P: No change in H&P    PHYSICIAN SIGNATURE:  Electronically signed by Tasha Troncoso MD on 9/14/23 at 1:23 PM EDT

## 2023-09-14 NOTE — PROGRESS NOTES
[facility] OCCUPATIONAL THERAPY EVALUATION - ACUTE     NAME: Carolyn Du  :  (78 y.o.)  MRN: [id number]  CODE STATUS: Prior  Room:     Date of Service: 2023    Patient Diagnosis(es): No admission diagnoses are documented for this encounter.    Patient Active Problem List    Diagnosis Date Noted    Chronic systolic CHF (congestive heart failure), NYHA class 1 ([address]) 2023    Coronary artery disease involving coronary bypass graft of native heart without angina pectoris 2022    Coagulation defect ([address]) 2023    Weakness generalized 2023    Generalized weakness 2023    Hypervolemia 2023    Impaired mobility and activities of daily living dt lumbar spinal stenosis 2023    Hypoglycemia 2023    Hypoglycemia due to type 2 diabetes mellitus ([address]) 2023    Hyperkalemia 2023    Entrapment neuropathy 2023    Neuropathic pain 10/20/2022    Failed back syndrome of lumbar spine 10/20/2022    Leg swelling 2022    PVD (peripheral vascular disease) ([address]) 01/15/2021    Left foot drop     Type 2 diabetes mellitus with stage 3 chronic kidney disease, with long-term current use of insulin ([address]) 2020    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 Abram Telles, Dr. Ted Clay    CKD (chronic kidney disease)     Coronary artery disease involving coronary bypass graft of native heart without angina pectoris to tolerate/complete 30 mins of ADL's  - Be Mod I in UB ADLs   - Be Min A in LB ADLs  - Be Mod I in ADL transfers without LOB  - Be Min A in toileting tasks  - Improve B UE strength and endurance to 4-/5 in order to participate in self-care activities as projected. - Access appropriate D/C site with as few architectural barriers as possible. - Sequence self-care tasks with no verbal cues for safety    Patient Goal: Patient goals : \"I want to get strong\"     Discussed and agreed upon: Yes Comments:       Therapy Time:   Individual   Time In 1429   Time Out 1443   Minutes 14     Eval: 14 minutes     Electronically signed by:     KIRSTEN Banegas,   9/14/2023, 3:11 PM

## 2023-09-14 NOTE — ED NOTES
[facility] called and can accept pt, she does not have bed assignment yet will call back with. Sammi GARRIDOW completing [id number]. CLAUDIA completed as well. Arrangements made for transport via [id number] [address] for 1730 via cot. Pt informed.      Janet Mane, MARCELON, RN

## 2023-09-14 NOTE — ED TRIAGE NOTES
Pt arrived from home via EMS. Pt states he returned home after discharge from a rehab facility yesterday and fell going up his stairs upon arrival, once he was able to get in his house he sat down in his chair and was unable to move until EMS arrived to assist him.

## 2023-09-14 NOTE — TELEPHONE ENCOUNTER
Pt recently discharge from hospital, fell going into house, had to call squad to help him up; states that pt has reopened some wounds and is not able to care for self. States wife not able to help so unable to due [facility] [address], asking for advise. Per Dr. Ivy Darden pt to return to hospital and SNF, [facility]  [facility] states they will talk to pt about this.

## 2023-09-14 NOTE — CARE COORDINATION
Met with pt and called spouse to discuss what needs are needed. Wife states she is unable to get him into the house she had to call the squad to help get him in the house and he remained in the chair, today unable to walk. Both decided he wants to go to [address] [place] [address]. Pt was recently at [facility] (only because son lives there) and will not need a 3 day overnight stay. Called [facility] of [facility] spoke with Daniela Quinonez, she will review, still waiting for PT/OT to come down for evals.      Roberta Woodruff, MARCELON, RN

## 2023-09-14 NOTE — ED NOTES
Pt's oral and temporal temperatures taken repeatedly with abnormally low results each, Dr. Jesenia Cho notified.      Unruly Dunn, MINDI  09/14/23 [id number]

## 2023-09-14 NOTE — ED PROVIDER NOTES
[facility] ED  EMERGENCY DEPARTMENT ENCOUNTER      Pt Name: Oj Pepper  MRN: [id number]  [address] 1945  Date of evaluation: 9/14/2023  Provider: Junie James MD    [address]       Chief Complaint   Patient presents with    Fall         HISTORY OF PRESENT ILLNESS   (Location/Symptom, Timing/Onset, Context/Setting, Quality, Duration, Modifying Factors, Severity)  Note limiting factors. 72-year-old male presenting for placement in nursing home. States he was previously in one and got out yesterday. Upon returning home he was unable to care for himself. After discussion with home health and doctor presents back here for placement. Nursing Notes were reviewed. REVIEW OF SYSTEMS    (2-9 systems for level 4, 10 or more for level 5)     Review of Systems   Neurological:  Positive for weakness. All other systems reviewed and are negative. Except as noted above the remainder of the review of systems was reviewed and negative. PAST MEDICAL HISTORY     Past Medical History:   Diagnosis Date    CAD (coronary artery disease)     Dr. Concetta Almanza, Dr. Rogelio Montalvo    CKD (chronic kidney disease)     Coronary artery disease involving coronary bypass graft of native heart without angina pectoris 11/4/2022    Hypertension     Kidney stones     Left foot drop     Neuropathy, diabetic (HCC)     mild    Osteoarthritis     hip, knee    S/P CABG (coronary artery bypass graft)     Type II or unspecified type diabetes mellitus without mention of complication, not stated as uncontrolled          SURGICAL HISTORY       Past Surgical History:   Procedure Laterality Date    BACK SURGERY N/A     BICEPS TENDON REPAIR  01/01/1997    CARDIAC CATHETERIZATION  06/05/2013    CORONARY ARTERY BYPASS GRAFT  06/07/2013    DR. PERALES    KNEE SURGERY  01/01/2006    left replacement    NECK SURGERY  2020    ROTATOR CUFF REPAIR  01/01/1996    TONSILLECTOMY  01/01/1966         CURRENT MEDICATIONS       Current Normal appearance. He is well-developed. He is not ill-appearing. HENT:      Head: Normocephalic and atraumatic. Mouth/Throat:      Mouth: Mucous membranes are moist.      Pharynx: Oropharynx is clear. Eyes:      Extraocular Movements: Extraocular movements intact. Conjunctiva/sclera: Conjunctivae normal.   Cardiovascular:      Rate and Rhythm: Normal rate and regular rhythm. Pulmonary:      Effort: Pulmonary effort is normal.      Breath sounds: Normal breath sounds. Abdominal:      General: Bowel sounds are normal.      Palpations: Abdomen is soft. Tenderness: There is no abdominal tenderness. Musculoskeletal:         General: No deformity. Normal range of motion. Cervical back: Normal range of motion and neck supple. Skin:     General: Skin is warm and dry. Capillary Refill: Capillary refill takes less than 2 seconds. Neurological:      General: No focal deficit present. Mental Status: He is alert and oriented to person, place, and time. Mental status is at baseline. Cranial Nerves: No cranial nerve deficit. Psychiatric:         Thought Content:  Thought content normal.         DIAGNOSTIC RESULTS     EKG: All EKG's are interpreted by the Emergency Department Physician who either signs or Co-signs this chart in the absence of a cardiologist.    RADIOLOGY:   Non-plain film images such as CT, Ultrasound and MRI are read by the radiologist. Plain radiographic images are visualized and preliminarily interpreted by the emergency physician with the below findings:    Interpretation per the Radiologist below, if available at the time of this note:    No orders to display       LABS:  Labs Reviewed   CBC WITH AUTO DIFFERENTIAL - Abnormal; Notable for the following components:       Result Value    WBC 4.5 (*)     RBC 3.43 (*)     Hemoglobin 10.0 (*)     Hematocrit 31.7 (*)     MCV 92.4 (*)     MCHC 31.4 (*)     RDW 17.2 (*)     Platelets 74 (*)     Lymphocytes Absolute 0.4

## 2023-09-15 ENCOUNTER — OFFICE VISIT (OUTPATIENT)
Dept: GERIATRIC MEDICINE | Age: 78
End: 2023-09-15

## 2023-09-15 DIAGNOSIS — L30.4 INTERTRIGO: ICD-10-CM

## 2023-09-15 DIAGNOSIS — E11.59 TYPE 2 DIABETES MELLITUS WITH OTHER CIRCULATORY COMPLICATION, WITH LONG-TERM CURRENT USE OF INSULIN (HCC): ICD-10-CM

## 2023-09-15 DIAGNOSIS — I50.22 CHRONIC SYSTOLIC CHF (CONGESTIVE HEART FAILURE) (HCC): Primary | ICD-10-CM

## 2023-09-15 DIAGNOSIS — I48.91 ATRIAL FIBRILLATION, UNSPECIFIED TYPE (HCC): ICD-10-CM

## 2023-09-15 DIAGNOSIS — Z79.4 TYPE 2 DIABETES MELLITUS WITH OTHER CIRCULATORY COMPLICATION, WITH LONG-TERM CURRENT USE OF INSULIN (HCC): ICD-10-CM

## 2023-09-15 DIAGNOSIS — I10 HYPERTENSION, UNSPECIFIED TYPE: ICD-10-CM

## 2023-09-17 ENCOUNTER — OFFICE VISIT (OUTPATIENT)
Dept: GERIATRIC MEDICINE | Age: 78
End: 2023-09-17

## 2023-09-17 DIAGNOSIS — I48.91 ATRIAL FIBRILLATION, UNSPECIFIED TYPE (HCC): ICD-10-CM

## 2023-09-17 DIAGNOSIS — I50.22 CHRONIC SYSTOLIC CHF (CONGESTIVE HEART FAILURE) (HCC): Primary | ICD-10-CM

## 2023-09-17 DIAGNOSIS — L30.4 INTERTRIGO: ICD-10-CM

## 2023-09-17 DIAGNOSIS — I10 HYPERTENSION, UNSPECIFIED TYPE: ICD-10-CM

## 2023-09-17 DIAGNOSIS — Z79.4 TYPE 2 DIABETES MELLITUS WITH OTHER CIRCULATORY COMPLICATION, WITH LONG-TERM CURRENT USE OF INSULIN (HCC): ICD-10-CM

## 2023-09-17 DIAGNOSIS — E11.59 TYPE 2 DIABETES MELLITUS WITH OTHER CIRCULATORY COMPLICATION, WITH LONG-TERM CURRENT USE OF INSULIN (HCC): ICD-10-CM

## 2023-09-18 ENCOUNTER — OFFICE VISIT (OUTPATIENT)
Dept: GERIATRIC MEDICINE | Age: 78
End: 2023-09-18

## 2023-09-18 DIAGNOSIS — Z79.4 TYPE 2 DIABETES MELLITUS WITH OTHER CIRCULATORY COMPLICATION, WITH LONG-TERM CURRENT USE OF INSULIN (HCC): ICD-10-CM

## 2023-09-18 DIAGNOSIS — L30.4 INTERTRIGO: ICD-10-CM

## 2023-09-18 DIAGNOSIS — I10 HYPERTENSION, UNSPECIFIED TYPE: ICD-10-CM

## 2023-09-18 DIAGNOSIS — E11.59 TYPE 2 DIABETES MELLITUS WITH OTHER CIRCULATORY COMPLICATION, WITH LONG-TERM CURRENT USE OF INSULIN (HCC): ICD-10-CM

## 2023-09-18 DIAGNOSIS — I48.91 ATRIAL FIBRILLATION, UNSPECIFIED TYPE (HCC): ICD-10-CM

## 2023-09-18 DIAGNOSIS — I50.22 CHRONIC SYSTOLIC CHF (CONGESTIVE HEART FAILURE) (HCC): Primary | ICD-10-CM

## 2023-09-19 ENCOUNTER — OFFICE VISIT (OUTPATIENT)
Dept: GERIATRIC MEDICINE | Age: 78
End: 2023-09-19

## 2023-09-19 DIAGNOSIS — I50.22 CHRONIC SYSTOLIC CHF (CONGESTIVE HEART FAILURE) (HCC): Primary | ICD-10-CM

## 2023-09-19 NOTE — PROGRESS NOTES
9.0 09/14/2023 12:00 PM    LABGLOM 33.4 09/14/2023 12:00 PM    LABGLOM 35 07/21/2023 12:00 AM      Lab Results   Component Value Date    WBC 4.5 (L) 09/14/2023    HGB 10.0 (L) 09/14/2023    HCT 31.7 (L) 09/14/2023    MCV 92.4 (H) 09/14/2023    PLT 74 (L) 09/14/2023             ASSESSMENT/ PLAN[de-identified]        Chronic systolic CHF   Torsemide   coreg  DM  Lantus   JAZZMINE   HTN  coreg  Afib  On ASA   PVD  ASA and statin   Intertrigo  Nystatin powder           MILY Mendez DO     Electronically signed 9/19/2023        NOTE: This report was transcribed using voice recognition software. Every effort was made to ensure accuracy; however, inadvertent computerized transcription errors may be present.

## 2023-09-21 NOTE — PROGRESS NOTES
SNF PROGRESS NOTE      Cc- CHF       Patient is a Moo Mulilgan 78 y.o. male who is being seen at [facility] for CHF. The patient is laying in bed. The patient is on room air oxygen. No issues at this time.          Past Medical History:   Diagnosis Date    CAD (coronary artery disease)     Dr. Marika Smith, Dr. Sherif Simpson    CKD (chronic kidney disease)     Coronary artery disease involving coronary bypass graft of native heart without angina pectoris 11/4/2022    Hypertension     Kidney stones     Left foot drop     Neuropathy, diabetic ([address])     mild    Osteoarthritis     hip, knee    S/P CABG (coronary artery bypass graft)     Type II or unspecified type diabetes mellitus without mention of complication, not stated as uncontrolled      Dye [iodides]    VS reviewed    Gen- Alert and oriented x 3   Heart- RRR no murmur no LE edema   Lungs- CTA b/l no resp distress RA  oxygen   Abd- bs x 4           Assessment and Plan    Chronic systolic CHF   Torsemide   coreg  DM  Lantus   JAZZMINE   HTN  coreg  Afib  On ASA   PVD  ASA and statin   Intertrigo  Nystatin powder       Lesly Ordonez DO, [address]     Electronically signed by: Case Infante DO on 9/15/2023

## 2023-09-22 NOTE — PROGRESS NOTES
SNF PROGRESS NOTE      Cc- CHF      Patient is a Rudy Trejo 78 y.o. male who is being seen at [facility] for CHF. The patient is laying in bed. He is eating well and has some slight LE edema.          Past Medical History:   Diagnosis Date    CAD (coronary artery disease)     Dr. Eva Perez, Dr. Jake Mendez    CKD (chronic kidney disease)     Coronary artery disease involving coronary bypass graft of native heart without angina pectoris 11/4/2022    Hypertension     Kidney stones     Left foot drop     Neuropathy, diabetic ([address])     mild    Osteoarthritis     hip, knee    S/P CABG (coronary artery bypass graft)     Type II or unspecified type diabetes mellitus without mention of complication, not stated as uncontrolled      Dye [iodides]    VS reviewed    Gen- Alert and oriented x 3   Heart- RRR no murmur no LE edema   Lungs- CTA b/l no resp distress RA  oxygen   Abd- bs x 4           Assessment and Plan    Chronic systolic CHF   Torsemide   coreg  DM  Lantus   JAZZMINE   HTN  coreg  Afib  On ASA   PVD  ASA and statin   Intertrigo  Nystatin powder      Jennifer Renteria DO, [address]     Electronically signed by: Althea Gama DO on 9/17/2023

## 2023-09-23 NOTE — PROGRESS NOTES
SNF PROGRESS NOTE      Cc- sob      Patient is a Mario Morocho 78 y.o. male who is being seen at [facility] for CHF. Patient is laying in bed and has significant edema. The patient is complaining of wheezing and sob. He had poor urine output this am despite placement of a fox,. Past Medical History:   Diagnosis Date    CAD (coronary artery disease)     Dr. Rosy Langford, Dr. Taco Shah    CKD (chronic kidney disease)     Coronary artery disease involving coronary bypass graft of native heart without angina pectoris 11/4/2022    Hypertension     Kidney stones     Left foot drop     Neuropathy, diabetic (HCC)     mild    Osteoarthritis     hip, knee    S/P CABG (coronary artery bypass graft)     Type II or unspecified type diabetes mellitus without mention of complication, not stated as uncontrolled      Dye [iodides]    VS reviewed    Gen- Alert and oriented x 3   Heart- RRR no murmur 3+  b/l LE edema   Lungs- wheezing on 2 L NC   oxygen   Abd- bs x 4 obese             Assessment and Plan      Chronic systolic CHF   Torsemide   coreg  DM  Lantus   JAZZMINE   HTN  coreg  Afib  On ASA   PVD  ASA and statin   Intertrigo  Nystatin powder      Patiently acutely SOB with wheezing   Will give some zaroxolyn and lasix. Will check CXR   Aerosols.       Jose Walsh DO, [address]     Electronically signed by: Tatiana Mann DO on 9/19/2023

## 2023-09-23 NOTE — PROGRESS NOTES
SNF PROGRESS NOTE      Cc- CHF       Patient is a Brunilda Hamm 78 y.o. male who is being seen at [facility] for CHF. Patient is laying in bed and complaining of hip pain. He bumped his knee. He continues to work with therapy.          Past Medical History:   Diagnosis Date    CAD (coronary artery disease)     Dr. Ita Beard, Dr. Evan Villalobos    CKD (chronic kidney disease)     Coronary artery disease involving coronary bypass graft of native heart without angina pectoris 2022    Hypertension     Kidney stones     Left foot drop     Neuropathy, diabetic ([address])     mild    Osteoarthritis     hip, knee    S/P CABG (coronary artery bypass graft)     Type II or unspecified type diabetes mellitus without mention of complication, not stated as uncontrolled      Dye [iodides]    VS reviewed    Gen- Alert and oriented x 3   Heart- RRR no murmur 1-2+ b/l LE edema   Lungs- CTA b/l no resp distress RA  oxygen   Abd- bs x 4           Assessment and Plan    Chronic systolic CHF   Torsemide   coreg  DM  Lantus   JAZZMINE   HTN  coreg  Afib  On ASA   PVD  ASA and statin   Intertrigo  Nystatin powder      Arthurine Nick SOARES, [address]     Electronically signed by: Elly Apgar, DO on 2023

## 2023-09-27 ENCOUNTER — OFFICE VISIT (OUTPATIENT)
Dept: GERIATRIC MEDICINE | Age: 78
End: 2023-09-27

## 2023-09-27 DIAGNOSIS — I10 HYPERTENSION, UNSPECIFIED TYPE: ICD-10-CM

## 2023-09-27 DIAGNOSIS — I50.22 CHRONIC SYSTOLIC CHF (CONGESTIVE HEART FAILURE) (HCC): Primary | ICD-10-CM

## 2023-09-27 DIAGNOSIS — I48.91 ATRIAL FIBRILLATION, UNSPECIFIED TYPE (HCC): ICD-10-CM

## 2023-09-27 DIAGNOSIS — G47.33 OSA (OBSTRUCTIVE SLEEP APNEA): ICD-10-CM

## 2023-09-27 NOTE — PROGRESS NOTES
History and Physical      CHIEF COMPLAINT:  CHF     History of Present Illness:      A 78 y.o. male who is being seen at [facility] as a re-admit. In the facility previously he had scrotal, penis, and significant leg edema. He was symptomatic and SOB. HE went to hospital for CHF. Unfortunately, he returned still with significant swelling/ edema despite being in the hospital.     At this time, the patient would like to simplify meds and just remain comfortable. REVIEW OF SYSTEMS:  A complete 10 Point review of systems was preformed and negative unless previously stated      PMH:  Past Medical History:   Diagnosis Date    CAD (coronary artery disease)     Dr. Nury Sherman, Dr. Merrick Pham    CKD (chronic kidney disease)     Coronary artery disease involving coronary bypass graft of native heart without angina pectoris 11/4/2022    Hypertension     Kidney stones     Left foot drop     Neuropathy, diabetic (HCC)     mild    Osteoarthritis     hip, knee    S/P CABG (coronary artery bypass graft)     Type II or unspecified type diabetes mellitus without mention of complication, not stated as uncontrolled        Surgical History:  Past Surgical History:   Procedure Laterality Date    BACK SURGERY N/A     BICEPS TENDON REPAIR  01/01/1997    CARDIAC CATHETERIZATION  06/05/2013    CORONARY ARTERY BYPASS GRAFT  06/07/2013     [address] [place] [address] SURGERY  01/01/2006    left replacement    NECK SURGERY  2020    ROTATOR CUFF REPAIR  01/01/1996    TONSILLECTOMY  01/01/1966       Medications Prior to Admission:    Prior to Admission medications    Medication Sig Start Date End Date Taking?  Authorizing Provider   glucose 4 g chewable tablet Take 4 tablets by mouth as needed for Low blood sugar 6/29/23   FORTINO Herrera   insulin glargine (LANTUS SOLOSTAR) 100 UNIT/ML injection pen Inject 10 units in the morning only if your glucose is greater than 200  Patient taking differently: 10 Units Indications: Type 2 Diabetes Inject 10 units in

## 2023-09-28 ENCOUNTER — OFFICE VISIT (OUTPATIENT)
Dept: GERIATRIC MEDICINE | Age: 78
End: 2023-09-28

## 2023-09-28 DIAGNOSIS — I10 HYPERTENSION, UNSPECIFIED TYPE: ICD-10-CM

## 2023-09-28 DIAGNOSIS — I50.22 CHRONIC SYSTOLIC CHF (CONGESTIVE HEART FAILURE) (HCC): Primary | ICD-10-CM

## 2023-09-28 DIAGNOSIS — E11.59 TYPE 2 DIABETES MELLITUS WITH OTHER CIRCULATORY COMPLICATION, WITH LONG-TERM CURRENT USE OF INSULIN (HCC): ICD-10-CM

## 2023-09-28 DIAGNOSIS — I48.91 ATRIAL FIBRILLATION, UNSPECIFIED TYPE (HCC): ICD-10-CM

## 2023-09-28 DIAGNOSIS — Z79.4 TYPE 2 DIABETES MELLITUS WITH OTHER CIRCULATORY COMPLICATION, WITH LONG-TERM CURRENT USE OF INSULIN (HCC): ICD-10-CM

## 2023-09-28 DIAGNOSIS — G47.33 OSA (OBSTRUCTIVE SLEEP APNEA): ICD-10-CM

## 2023-09-29 ENCOUNTER — OFFICE VISIT (OUTPATIENT)
Dept: GERIATRIC MEDICINE | Age: 78
End: 2023-09-29
Payer: MEDICARE

## 2023-09-29 DIAGNOSIS — L30.4 INTERTRIGO: ICD-10-CM

## 2023-09-29 DIAGNOSIS — G47.33 OSA (OBSTRUCTIVE SLEEP APNEA): ICD-10-CM

## 2023-09-29 DIAGNOSIS — E11.59 TYPE 2 DIABETES MELLITUS WITH OTHER CIRCULATORY COMPLICATION, WITH LONG-TERM CURRENT USE OF INSULIN (HCC): ICD-10-CM

## 2023-09-29 DIAGNOSIS — I10 HYPERTENSION, UNSPECIFIED TYPE: ICD-10-CM

## 2023-09-29 DIAGNOSIS — Z79.4 TYPE 2 DIABETES MELLITUS WITH OTHER CIRCULATORY COMPLICATION, WITH LONG-TERM CURRENT USE OF INSULIN (HCC): ICD-10-CM

## 2023-09-29 DIAGNOSIS — I48.91 ATRIAL FIBRILLATION, UNSPECIFIED TYPE (HCC): ICD-10-CM

## 2023-09-29 DIAGNOSIS — I50.22 CHRONIC SYSTOLIC CHF (CONGESTIVE HEART FAILURE) (HCC): Primary | ICD-10-CM

## 2023-09-29 PROCEDURE — 3044F HG A1C LEVEL LT 7.0%: CPT | Performed by: INTERNAL MEDICINE

## 2023-09-29 PROCEDURE — 99308 SBSQ NF CARE LOW MDM 20: CPT | Performed by: INTERNAL MEDICINE

## 2023-09-29 PROCEDURE — 1123F ACP DISCUSS/DSCN MKR DOCD: CPT | Performed by: INTERNAL MEDICINE

## 2023-09-29 NOTE — PROGRESS NOTES
SNF PROGRESS NOTE      Cc- CHF       Patient is a Mario Morocho 78 y.o. male A 78 y.o. male who is being seen at [facility] as a re-admit. In the facility previously he had scrotal, penis, and significant leg edema. He was symptomatic and SOB. HE went to hospital for CHF. Unfortunately, he returned still with significant swelling/ edema despite being in the hospital.    Patient is comfortable. Family at bedside. Past Medical History:   Diagnosis Date    CAD (coronary artery disease)     Dr. Rosy Langford, Dr. Taco Sahh    CKD (chronic kidney disease)     Coronary artery disease involving coronary bypass graft of native heart without angina pectoris 11/4/2022    Hypertension     Kidney stones     Left foot drop     Neuropathy, diabetic (HCC)     mild    Osteoarthritis     hip, knee    S/P CABG (coronary artery bypass graft)     Type II or unspecified type diabetes mellitus without mention of complication, not stated as uncontrolled      Dye [iodides]    VS reviewed    Gen- laying in bed and comfortable  Heart- RRR no murmur 3+ b/l  LE edema   Lungs- CTA b/l no resp distress 4 L NC oxygen   Abd- bs x 4 Obese       + fox     Assessment and Plan    Acute on Chronic systolic CHF   Torsemide   coreg  DM  Lantus   JAZZMINE   HTN  coreg  Afib   D/c ASA as patient wishes comfort care model  PVD  D/c statin and ASA as patient wishes comfort care model       Plan for comfort care at this time.        Jose Walsh DO, [facility]     Electronically signed by: Tatiana Mann DO on 9/28/2023

## 2023-09-30 ENCOUNTER — OFFICE VISIT (OUTPATIENT)
Dept: GERIATRIC MEDICINE | Age: 78
End: 2023-09-30
Payer: MEDICARE

## 2023-09-30 DIAGNOSIS — G47.33 OSA (OBSTRUCTIVE SLEEP APNEA): ICD-10-CM

## 2023-09-30 DIAGNOSIS — I48.91 ATRIAL FIBRILLATION, UNSPECIFIED TYPE (HCC): ICD-10-CM

## 2023-09-30 DIAGNOSIS — E11.59 TYPE 2 DIABETES MELLITUS WITH OTHER CIRCULATORY COMPLICATION, WITH LONG-TERM CURRENT USE OF INSULIN (HCC): ICD-10-CM

## 2023-09-30 DIAGNOSIS — I50.22 CHRONIC SYSTOLIC CHF (CONGESTIVE HEART FAILURE) (HCC): Primary | ICD-10-CM

## 2023-09-30 DIAGNOSIS — I10 HYPERTENSION, UNSPECIFIED TYPE: ICD-10-CM

## 2023-09-30 DIAGNOSIS — L30.4 INTERTRIGO: ICD-10-CM

## 2023-09-30 DIAGNOSIS — Z79.4 TYPE 2 DIABETES MELLITUS WITH OTHER CIRCULATORY COMPLICATION, WITH LONG-TERM CURRENT USE OF INSULIN (HCC): ICD-10-CM

## 2023-09-30 PROCEDURE — 99308 SBSQ NF CARE LOW MDM 20: CPT | Performed by: INTERNAL MEDICINE

## 2023-09-30 PROCEDURE — 3044F HG A1C LEVEL LT 7.0%: CPT | Performed by: INTERNAL MEDICINE

## 2023-09-30 PROCEDURE — 1123F ACP DISCUSS/DSCN MKR DOCD: CPT | Performed by: INTERNAL MEDICINE

## 2023-10-01 ENCOUNTER — OFFICE VISIT (OUTPATIENT)
Dept: GERIATRIC MEDICINE | Age: 78
End: 2023-10-01
Payer: MEDICARE

## 2023-10-01 DIAGNOSIS — L30.4 INTERTRIGO: ICD-10-CM

## 2023-10-01 DIAGNOSIS — G47.33 OSA (OBSTRUCTIVE SLEEP APNEA): ICD-10-CM

## 2023-10-01 DIAGNOSIS — I10 HYPERTENSION, UNSPECIFIED TYPE: ICD-10-CM

## 2023-10-01 DIAGNOSIS — Z79.4 TYPE 2 DIABETES MELLITUS WITH OTHER CIRCULATORY COMPLICATION, WITH LONG-TERM CURRENT USE OF INSULIN (HCC): ICD-10-CM

## 2023-10-01 DIAGNOSIS — I48.91 ATRIAL FIBRILLATION, UNSPECIFIED TYPE (HCC): ICD-10-CM

## 2023-10-01 DIAGNOSIS — I50.22 CHRONIC SYSTOLIC CHF (CONGESTIVE HEART FAILURE) (HCC): Primary | ICD-10-CM

## 2023-10-01 DIAGNOSIS — E11.59 TYPE 2 DIABETES MELLITUS WITH OTHER CIRCULATORY COMPLICATION, WITH LONG-TERM CURRENT USE OF INSULIN (HCC): ICD-10-CM

## 2023-10-01 PROCEDURE — 3044F HG A1C LEVEL LT 7.0%: CPT | Performed by: INTERNAL MEDICINE

## 2023-10-01 PROCEDURE — 1123F ACP DISCUSS/DSCN MKR DOCD: CPT | Performed by: INTERNAL MEDICINE

## 2023-10-01 PROCEDURE — 99308 SBSQ NF CARE LOW MDM 20: CPT | Performed by: INTERNAL MEDICINE

## 2023-10-02 ENCOUNTER — OFFICE VISIT (OUTPATIENT)
Dept: GERIATRIC MEDICINE | Age: 78
End: 2023-10-02

## 2023-10-02 DIAGNOSIS — I48.91 ATRIAL FIBRILLATION, UNSPECIFIED TYPE (HCC): ICD-10-CM

## 2023-10-02 DIAGNOSIS — I10 HYPERTENSION, UNSPECIFIED TYPE: ICD-10-CM

## 2023-10-02 DIAGNOSIS — L30.4 INTERTRIGO: ICD-10-CM

## 2023-10-02 DIAGNOSIS — Z79.4 TYPE 2 DIABETES MELLITUS WITH OTHER CIRCULATORY COMPLICATION, WITH LONG-TERM CURRENT USE OF INSULIN (HCC): ICD-10-CM

## 2023-10-02 DIAGNOSIS — G47.33 OSA (OBSTRUCTIVE SLEEP APNEA): ICD-10-CM

## 2023-10-02 DIAGNOSIS — I50.22 CHRONIC SYSTOLIC CHF (CONGESTIVE HEART FAILURE) (HCC): Primary | ICD-10-CM

## 2023-10-02 DIAGNOSIS — E11.59 TYPE 2 DIABETES MELLITUS WITH OTHER CIRCULATORY COMPLICATION, WITH LONG-TERM CURRENT USE OF INSULIN (HCC): ICD-10-CM

## 2023-10-02 NOTE — PROGRESS NOTES
SNF PROGRESS NOTE      Cc- CHF      Patient is a Carolyn Du 78 y.o. male who is being seen at [facility] as a re-admit. In the facility previously he had scrotal, penis, and significant leg edema. He was symptomatic and SOB. HE went to hospital for CHF. Unfortunately, he returned still with significant swelling/ edema despite being in the hospital.    Patient is resting in bed.         Past Medical History:   Diagnosis Date    CAD (coronary artery disease)     Dr. Abram Telles, Dr. Ted Clay    CKD (chronic kidney disease)     Coronary artery disease involving coronary bypass graft of native heart without angina pectoris 11/4/2022    Hypertension     Kidney stones     Left foot drop     Neuropathy, diabetic (HCC)     mild    Osteoarthritis     hip, knee    S/P CABG (coronary artery bypass graft)     Type II or unspecified type diabetes mellitus without mention of complication, not stated as uncontrolled      Dye [iodides]    VS reviewed  Gen- laying in bed and comfortable  Heart- RRR no murmur 3+ b/l  LE edema   Lungs- CTA b/l no resp distress 4 L NC oxygen   Abd- bs x 4 Obese         + fox           Assessment and Plan    Acute on Chronic systolic CHF   Torsemide   coreg  DM  Lantus   JAZZMINE   HTN  coreg  Afib   D/c ASA as patient wishes comfort care model  PVD  D/c statin and ASA as patient wishes comfort care model       Plan for comfort care at this time      [address], [facility]     Electronically signed by: Miguel Angel Charles DO on 10/1/2023

## 2023-10-02 NOTE — PROGRESS NOTES
SNF PROGRESS NOTE      Cc- CHF      Patient is a Jeri Guillaume 78 y.o. male who is being seen at [facility] as a re-admit. In the facility previously he had scrotal, penis, and significant leg edema. He was symptomatic and SOB. HE went to hospital for CHF. Unfortunately, he returned still with significant swelling/ edema despite being in the hospital.    Patient is resting in bed. He would like a beer. Family at bedside.          Past Medical History:   Diagnosis Date    CAD (coronary artery disease)     Dr. Zack Osei, Dr. Monik Tucker    CKD (chronic kidney disease)     Coronary artery disease involving coronary bypass graft of native heart without angina pectoris 11/4/2022    Hypertension     Kidney stones     Left foot drop     Neuropathy, diabetic (HCC)     mild    Osteoarthritis     hip, knee    S/P CABG (coronary artery bypass graft)     Type II or unspecified type diabetes mellitus without mention of complication, not stated as uncontrolled      Dye [iodides]    VS reviewed      Gen- laying in bed and comfortable  Heart- RRR no murmur 3+ b/l  LE edema   Lungs- CTA b/l no resp distress 4 L NC oxygen   Abd- bs x 4 Obese         + fox         Assessment and Plan    Acute on Chronic systolic CHF   Torsemide   coreg  DM  Lantus   JAZZMINE   HTN  coreg  Afib   D/c ASA as patient wishes comfort care model  PVD  D/c statin and ASA as patient wishes comfort care model       Plan for comfort care at this time      [address], [facility]     Electronically signed by: Linda Rosado DO on 9/30/2023

## 2023-10-02 NOTE — PROGRESS NOTES
SNF PROGRESS NOTE      Cc- CHF      Patient is a Brunilda Hamm 78 y.o. male who is being seen at [facility] as a re-admit. In the facility previously he had scrotal, penis, and significant leg edema. He was symptomatic and SOB. HE went to hospital for CHF. Unfortunately, he returned still with significant swelling/ edema despite being in the hospital.    Patient is laying in bed. The family is at bedside. Past Medical History:   Diagnosis Date    CAD (coronary artery disease)     Dr. Ita Beard, Dr. Evan Villalobos    CKD (chronic kidney disease)     Coronary artery disease involving coronary bypass graft of native heart without angina pectoris 2022    Hypertension     Kidney stones     Left foot drop     Neuropathy, diabetic (HCC)     mild    Osteoarthritis     hip, knee    S/P CABG (coronary artery bypass graft)     Type II or unspecified type diabetes mellitus without mention of complication, not stated as uncontrolled      Dye [iodides]    VS reviewed    Gen- laying in bed and comfortable  Heart- RRR no murmur 3+ b/l  LE edema   Lungs- CTA b/l no resp distress 4 L NC oxygen   Abd- bs x 4 Obese         + fox           Assessment and Plan    Acute on Chronic systolic CHF   Torsemide   coreg  DM  Lantus   JAZZMINE   HTN  coreg  Afib   D/c ASA as patient wishes comfort care model  PVD  D/c statin and ASA as patient wishes comfort care model       Plan for comfort care at this time.       Nancy Romero DO, [facility]     Electronically signed by: Elly Apgar, DO on 2023

## 2023-10-03 RX ORDER — NYSTATIN 100000 [USP'U]/G
POWDER TOPICAL 3 TIMES DAILY
COMMUNITY

## 2023-10-03 RX ORDER — MORPHINE SULFATE 100 MG/5ML
10 SOLUTION ORAL
COMMUNITY

## 2023-10-03 RX ORDER — MORPHINE SULFATE 100 MG/5ML
5 SOLUTION ORAL
COMMUNITY

## 2023-10-03 RX ORDER — ASPIRIN 81 MG/1
81 TABLET ORAL DAILY
COMMUNITY

## 2023-10-03 RX ORDER — LORAZEPAM 0.5 MG/1
0.5 TABLET ORAL EVERY 4 HOURS PRN
COMMUNITY
Start: 2023-09-27 | End: 2023-10-11

## 2023-10-03 RX ORDER — OXYCODONE HYDROCHLORIDE 5 MG/1
5 CAPSULE ORAL EVERY 6 HOURS PRN
COMMUNITY

## 2023-10-03 RX ORDER — LANOLIN ALCOHOL/MO/W.PET/CERES
400 CREAM (GRAM) TOPICAL DAILY
COMMUNITY

## 2023-10-04 ENCOUNTER — TELEPHONE (OUTPATIENT)
Dept: FAMILY MEDICINE CLINIC | Age: 78
End: 2023-10-04

## 2023-10-04 ENCOUNTER — OFFICE VISIT (OUTPATIENT)
Dept: GERIATRIC MEDICINE | Age: 78
End: 2023-10-04

## 2023-10-04 DIAGNOSIS — I48.91 ATRIAL FIBRILLATION, UNSPECIFIED TYPE (HCC): ICD-10-CM

## 2023-10-04 DIAGNOSIS — E11.59 TYPE 2 DIABETES MELLITUS WITH OTHER CIRCULATORY COMPLICATION, WITH LONG-TERM CURRENT USE OF INSULIN (HCC): ICD-10-CM

## 2023-10-04 DIAGNOSIS — G47.33 OSA (OBSTRUCTIVE SLEEP APNEA): ICD-10-CM

## 2023-10-04 DIAGNOSIS — L30.4 INTERTRIGO: ICD-10-CM

## 2023-10-04 DIAGNOSIS — I10 HYPERTENSION, UNSPECIFIED TYPE: ICD-10-CM

## 2023-10-04 DIAGNOSIS — Z79.4 TYPE 2 DIABETES MELLITUS WITH OTHER CIRCULATORY COMPLICATION, WITH LONG-TERM CURRENT USE OF INSULIN (HCC): ICD-10-CM

## 2023-10-04 DIAGNOSIS — I50.22 CHRONIC SYSTOLIC CHF (CONGESTIVE HEART FAILURE) (HCC): Primary | ICD-10-CM

## 2023-10-04 PROBLEM — I50.43 CHF (CONGESTIVE HEART FAILURE), NYHA CLASS I, ACUTE ON CHRONIC, COMBINED (HCC): Status: ACTIVE | Noted: 2023-10-04

## 2024-01-11 NOTE — PATIENT INSTRUCTIONS
-Add hydralazine 25mg PO twice daily for improved BP management    -Repeat limited echocardiogram to be completed first week in July      -Check daily weight every morning and notify CHF clinic if gaining more than 3 pounds in 2 days    -Check blood pressure twice daily in a.m. and p.m. prior to taking medications and keep log of blood pressure trends to review at next office visit  Notify office if BP running low with  mmHg or below prior to taking medications  Notify office if BP running high with  mmHg or above or if DBP 85 mmHg or above    -Recommend 2000 mg daily sodium restriction    -Recommend 1.5 liter (48 ounces) daily fluid restriction
00:00

## 2024-05-25 NOTE — PROGRESS NOTES
Physical Therapy  Physical Therapy Med Surg Initial Assessment  Facility/Department: [facility] ED  Room:        NAME: Vikram Thurston  :  (78 y.o.)  MRN: [id number]  CODE STATUS: Prior    Date of Service: 2023    Patient Diagnosis(es): No admission diagnoses are documented for this encounter.    Chief Complaint   Patient presents with    Fall     Patient Active Problem List    Diagnosis Date Noted    Chronic systolic CHF (congestive heart failure), NYHA class 1 ([address]) 2023    Coronary artery disease involving coronary bypass graft of native heart without angina pectoris 2022    Coagulation defect ([address]) 2023    Weakness generalized 2023    Generalized weakness 2023    Hypervolemia 2023    Impaired mobility and activities of daily living dt lumbar spinal stenosis 2023    Hypoglycemia 2023    Hypoglycemia due to type 2 diabetes mellitus ([address]) 2023    Hyperkalemia 2023    Entrapment neuropathy 2023    Neuropathic pain 10/20/2022    Failed back syndrome of lumbar spine 10/20/2022    Leg swelling 2022    PVD (peripheral vascular disease) ([address]) 01/15/2021    Left foot drop     Type 2 diabetes mellitus with stage 3 chronic kidney disease, with long-term current use of insulin ([address]) 2020    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 Mai Bell, Dr. Fatou Field    CKD (chronic Pt woke from nap, speech was garbled, after awake able to answer questions correctly, did miss which visitors she had in AM and Lunch. Has family at bed side. Discharge iin IV ti be removed when squad arrives. Tel off   kidney disease)     Coronary artery disease involving coronary bypass graft of native heart without angina pectoris 11/4/2022    Hypertension     Kidney stones     Left foot drop     Neuropathy, diabetic (HCC)     mild    Osteoarthritis     hip, knee    S/P CABG (coronary artery bypass graft)     Type II or unspecified type diabetes mellitus without mention of complication, not stated as uncontrolled      Past Surgical History:   Procedure Laterality Date    BACK SURGERY N/A     BICEPS TENDON REPAIR  01/01/1997    CARDIAC CATHETERIZATION  06/05/2013    CORONARY ARTERY BYPASS GRAFT  06/07/2013    DR. [address]  SURGERY  01/01/2006    left replacement    NECK SURGERY  2020    ROTATOR CUFF REPAIR  01/01/1996    TONSILLECTOMY  01/01/1966       Chart Reviewed: Yes  Patient assessed for rehabilitation services?: Yes    Restrictions:falls risk        SUBJECTIVE:   Subjective: Pt agreeable to PT evaluation.  Needs multiple verbal cues to arouse    Pain: no pain       Prior Level of Function:  Social/Functional History  Lives With: Spouse  Type of Home: House  Home Layout: Two level, Bed/Bath upstairs  Home Access: Stairs to enter with rails  Entrance Stairs - Number of Steps: 3  Bathroom Shower/Tub: Walk-in shower  Bathroom Equipment: Shower chair, Grab bars in shower, Hand-held shower  Home Equipment: Rollator, Cane  ADL Assistance: Needs assistance (Spouse assists)  Toileting: Needs assistance (Rear aj care)  Ambulation Assistance: Needs assistance (Rollator, spouse assists)  Transfer Assistance: Needs assistance (Intermittent assist)  Active : Yes (Son can assist)    OBJECTIVE:   Vision  Vision: Impaired  Vision Exceptions: Wears glasses at all times  Hearing: Exceptions to [facility]  Hearing Exceptions: Hard of hearing/hearing concerns    Cognition:  Overall Orientation Status: Within Functional Limits  Overall Cognitive Status: Exceptions  Cognition Comment: Variable cognition throughout; initially lethargic but

## 2025-01-24 NOTE — PROGRESS NOTES
Caller: Terrance Gresham    Relationship to patient: Self    Best call back number: [phone]    Patient is needing: PT WENT TO PCP WITH PALPITATIONS. PCP SUGGESTED PT DOUBLE HIS metoprolol tartrate (LOPRESSOR) 25 MG tablet AND MAKE AN APPT WITH CARDIOLOGY TO MAKE SURE THAT IS OK WITH DR. CAMPO. PT WILL RUN OUT OF MEDICATION BEFORE RX IS READY TO REFILL IF HE CONTINUES WITH DOUBLING THE MEDICATION. PLEASE CALL PT TO ADVISE OR SCHEDULE.         [address]  PHYSICAL THERAPY PLAN OF CARE   [address], [address] [facility]         Ph: [phone]  Fax: [phone]    [] Certification  [] Recertification []  Plan of Care  [x] Progress Note [] Discharge      Referring Provider: Bruce Rosenthal MD     From:  Tho Gee, PT, DPT  Patient: Yaya Sanderson (72 y.o. male) : 1945 Date: 2023  Medical Diagnosis: Other intervertebral disc degeneration, lumbosacral region [M51.37]       Treatment Diagnosis: back pain, neck pain, impaired gait and activity tolerance, back and neck muscle weakness    Plan of Care/Certification Expiration Date: 23   Progress Report Period from:  2023  to 2023    Visits to Date: 10 No Show: 0 Cancelled Appts: 0    OBJECTIVE:       Long Term Goals - Time Frame for Long Term Goals : 6-8 weeks  Goals Current/ Discharge status Status   Long Term Goal 1: Pt will be able to complete > 15 minutes of continuous standing or ambulatory activity with LRAD to improve completion of ADLS/IADLs and community outings. LTG 1 Current Status[de-identified] : Pt reports standing tolerance of 2-3 min and walking tolerance of 5 min before needing to sit and rest.   In progress   Long Term Goal 2: Pt will improve neck and back muscle strength to 4+/5 or better to improve posture for carryover to decreased pain with unsupported sitting or standing activity. Not Tested due to clinical over site  In progress   Long Term Goal 3: Pt will improve standing balance to step forward, laterally, and retro to manage vacuum  in assisting spouse with home care tasks. LTG 3 Current Status[de-identified] 23: Unable to complete tasks without use of AD due to balance deficits. In progress, Partially met   Long Term Goal 4:  [place] with HEP to self manage exercises for progressive strengthening LTG 4 Current Status[de-identified] 23: Pt preports good compliance with HEP performing daily   In progress   Long Term Goal 5: ASA improvement > 6 points to demo improved functional tolerance with decreased back pain limitations. LTG 5 Current Status[de-identified] 2/16/23:  ASA Score 28/50 (1 pt decrease from previous score)   In progress     Body Structures, Functions, Activity Limitations Requiring Skilled Therapeutic Intervention: Increased pain, Decreased ROM, Decreased strength, Decreased balance, Decreased functional mobility , Decreased ADL status, Decreased high-level IADLs    Assessment: Pt with difficulty standing up from chair without arm rest due to back pain and LE weakness. Able to increase reps with exercises and resistance on Nustep to challenge pt's strength and endurance levels. Pt continues to display limitation in strength and balance and could benefit from further therapy to address current deficits in order to improve safety and ease of ADLs. HEP progressing within pt's tolerance and safety. SBA with intermittent CGA still required for activities pt completes in standing w/o UE support. Therapy Prognosis: Fair         PLAN:   Frequency/Duration:  Plan Frequency: 2  Plan weeks: 6-8  Current Treatment Recommendations: ROM, Strengthening, Balance training, Functional mobility training, Endurance training, Gait training, Stair training, Neuromuscular re-education, Manual, Pain management, Home exercise program, Safety education & training, Patient/Caregiver education & training, Equipment evaluation, education, & procurement, Modalities  Modalities: Heat/Cold  Additional Comments: Continue through current POC then reassess     Precautions:   Restrictions/Precautions: Fall Risk        Other position/activity restrictions: lumbar fusion, pacemaker                         Patient Status:[x] Continue/ Initiate plan of Care     [] Discharge PT. Recommend pt continue with HEP.       [] Additional visits requested, Please re-certify for additional visits:     [] Hold        Signature: Objective information by: Electronically signed by Kathy Alfaro, PTA on 2/16/23 at 12:09 PM EST    Signature:Electronically signed by Brandon Graf PT on 2/16/2023 at 1:41 PM      If you have any questions or concerns, please don't hesitate to call. Thank you for your referral.    I have reviewed this plan of care and certify a need for medically necessary rehabilitation services.     Physician Signature:__________________________________________________________  Date:  Please sign and return